# Patient Record
Sex: FEMALE | Race: BLACK OR AFRICAN AMERICAN | NOT HISPANIC OR LATINO | Employment: FULL TIME | URBAN - METROPOLITAN AREA
[De-identification: names, ages, dates, MRNs, and addresses within clinical notes are randomized per-mention and may not be internally consistent; named-entity substitution may affect disease eponyms.]

---

## 2017-01-12 RX ORDER — MONTELUKAST SODIUM 10 MG/1
TABLET ORAL
Qty: 90 TABLET | Refills: 3 | Status: SHIPPED | OUTPATIENT
Start: 2017-01-12 | End: 2018-01-21 | Stop reason: SDUPTHER

## 2017-01-12 RX ORDER — SIMVASTATIN 20 MG
TABLET ORAL
Qty: 90 TABLET | Refills: 3 | Status: SHIPPED | OUTPATIENT
Start: 2017-01-12 | End: 2018-01-21 | Stop reason: SDUPTHER

## 2017-01-12 RX ORDER — PANTOPRAZOLE SODIUM 40 MG/1
TABLET, DELAYED RELEASE ORAL
Qty: 90 TABLET | Refills: 3 | Status: SHIPPED | OUTPATIENT
Start: 2017-01-12 | End: 2018-02-15 | Stop reason: SDUPTHER

## 2017-01-12 RX ORDER — LISINOPRIL AND HYDROCHLOROTHIAZIDE 20; 12.5 MG/1; MG/1
TABLET ORAL
Qty: 180 TABLET | Refills: 3 | Status: SHIPPED | OUTPATIENT
Start: 2017-01-12 | End: 2018-02-12 | Stop reason: SDUPTHER

## 2017-01-12 RX ORDER — DESLORATADINE 5 MG/1
TABLET ORAL
Qty: 90 TABLET | Refills: 3 | Status: SHIPPED | OUTPATIENT
Start: 2017-01-12 | End: 2018-02-13 | Stop reason: SDUPTHER

## 2017-03-07 ENCOUNTER — LAB (OUTPATIENT)
Dept: INTERNAL MEDICINE | Facility: CLINIC | Age: 62
End: 2017-03-07

## 2017-03-07 DIAGNOSIS — Z00.00 HEALTH CARE MAINTENANCE: ICD-10-CM

## 2017-03-07 DIAGNOSIS — E11.9 TYPE 2 DIABETES MELLITUS WITHOUT COMPLICATION (HCC): ICD-10-CM

## 2017-03-07 DIAGNOSIS — E78.5 HYPERLIPIDEMIA: ICD-10-CM

## 2017-03-08 LAB
25(OH)D3+25(OH)D2 SERPL-MCNC: 66.2 NG/ML (ref 30–100)
ALBUMIN SERPL-MCNC: 4.4 G/DL (ref 3.5–5.2)
ALBUMIN/GLOB SERPL: 1.6 G/DL
ALP SERPL-CCNC: 63 U/L (ref 39–117)
ALT SERPL-CCNC: 13 U/L (ref 1–33)
AST SERPL-CCNC: 17 U/L (ref 1–32)
BASOPHILS # BLD AUTO: 0.04 10*3/MM3 (ref 0–0.2)
BASOPHILS NFR BLD AUTO: 0.7 % (ref 0–1.5)
BILIRUB SERPL-MCNC: 0.2 MG/DL (ref 0.1–1.2)
BUN SERPL-MCNC: 15 MG/DL (ref 8–23)
BUN/CREAT SERPL: 14.6 (ref 7–25)
CALCIUM SERPL-MCNC: 10.7 MG/DL (ref 8.6–10.5)
CHLORIDE SERPL-SCNC: 93 MMOL/L (ref 98–107)
CHOLEST SERPL-MCNC: 131 MG/DL (ref 0–200)
CO2 SERPL-SCNC: 31.9 MMOL/L (ref 22–29)
CREAT SERPL-MCNC: 1.03 MG/DL (ref 0.57–1)
EOSINOPHIL # BLD AUTO: 0.07 10*3/MM3 (ref 0–0.7)
EOSINOPHIL NFR BLD AUTO: 1.1 % (ref 0.3–6.2)
ERYTHROCYTE [DISTWIDTH] IN BLOOD BY AUTOMATED COUNT: 15.5 % (ref 11.7–13)
GLOBULIN SER CALC-MCNC: 2.8 GM/DL
GLUCOSE SERPL-MCNC: 103 MG/DL (ref 65–99)
HBA1C MFR BLD: 6.4 % (ref 4.8–5.6)
HCT VFR BLD AUTO: 38.6 % (ref 35.6–45.5)
HCV AB S/CO SERPL IA: 0.1 S/CO RATIO (ref 0–0.9)
HDLC SERPL-MCNC: 40 MG/DL (ref 40–60)
HGB BLD-MCNC: 11.6 G/DL (ref 11.9–15.5)
IMM GRANULOCYTES # BLD: 0 10*3/MM3 (ref 0–0.03)
IMM GRANULOCYTES NFR BLD: 0 % (ref 0–0.5)
LDLC SERPL CALC-MCNC: 71 MG/DL (ref 0–100)
LYMPHOCYTES # BLD AUTO: 2.35 10*3/MM3 (ref 0.9–4.8)
LYMPHOCYTES NFR BLD AUTO: 38.2 % (ref 19.6–45.3)
Lab: NORMAL
Lab: NORMAL
MCH RBC QN AUTO: 24.6 PG (ref 26.9–32)
MCHC RBC AUTO-ENTMCNC: 30.1 G/DL (ref 32.4–36.3)
MCV RBC AUTO: 81.8 FL (ref 80.5–98.2)
MONOCYTES # BLD AUTO: 0.5 10*3/MM3 (ref 0.2–1.2)
MONOCYTES NFR BLD AUTO: 8.1 % (ref 5–12)
NEUTROPHILS # BLD AUTO: 3.19 10*3/MM3 (ref 1.9–8.1)
NEUTROPHILS NFR BLD AUTO: 51.9 % (ref 42.7–76)
PLATELET # BLD AUTO: 456 10*3/MM3 (ref 140–500)
POTASSIUM SERPL-SCNC: 3.9 MMOL/L (ref 3.5–5.2)
PROT SERPL-MCNC: 7.2 G/DL (ref 6–8.5)
RBC # BLD AUTO: 4.72 10*6/MM3 (ref 3.9–5.2)
SODIUM SERPL-SCNC: 139 MMOL/L (ref 136–145)
TRIGL SERPL-MCNC: 98 MG/DL (ref 0–150)
TSH SERPL DL<=0.005 MIU/L-ACNC: 2.39 MIU/ML (ref 0.27–4.2)
VLDLC SERPL CALC-MCNC: 19.6 MG/DL (ref 5–40)
WBC # BLD AUTO: 6.15 10*3/MM3 (ref 4.5–10.7)

## 2017-03-09 LAB
FERRITIN SERPL-MCNC: 7.61 NG/ML (ref 13–150)
IRON SATN MFR SERPL: 9 % (ref 20–50)
IRON SERPL-MCNC: 59 MCG/DL (ref 37–145)
TIBC SERPL-MCNC: 627 MCG/DL
UIBC SERPL-MCNC: 568 MCG/DL
VIT B12 SERPL-MCNC: 890 PG/ML (ref 211–946)
WRITTEN AUTHORIZATION: NORMAL

## 2017-03-14 ENCOUNTER — OFFICE VISIT (OUTPATIENT)
Dept: INTERNAL MEDICINE | Facility: CLINIC | Age: 62
End: 2017-03-14

## 2017-03-14 ENCOUNTER — TELEPHONE (OUTPATIENT)
Dept: INTERNAL MEDICINE | Facility: CLINIC | Age: 62
End: 2017-03-14

## 2017-03-14 VITALS
SYSTOLIC BLOOD PRESSURE: 122 MMHG | RESPIRATION RATE: 18 BRPM | BODY MASS INDEX: 28.93 KG/M2 | HEART RATE: 80 BPM | OXYGEN SATURATION: 96 % | WEIGHT: 180 LBS | HEIGHT: 66 IN | DIASTOLIC BLOOD PRESSURE: 78 MMHG

## 2017-03-14 DIAGNOSIS — Z00.00 HEALTH MAINTENANCE EXAMINATION: Primary | ICD-10-CM

## 2017-03-14 DIAGNOSIS — Z13.820 SCREENING FOR OSTEOPOROSIS: ICD-10-CM

## 2017-03-14 DIAGNOSIS — Z71.6 ENCOUNTER FOR SMOKING CESSATION COUNSELING: ICD-10-CM

## 2017-03-14 DIAGNOSIS — D50.9 IRON DEFICIENCY ANEMIA, UNSPECIFIED IRON DEFICIENCY ANEMIA TYPE: ICD-10-CM

## 2017-03-14 DIAGNOSIS — E11.22 TYPE 2 DIABETES MELLITUS WITH STAGE 3 CHRONIC KIDNEY DISEASE, WITHOUT LONG-TERM CURRENT USE OF INSULIN (HCC): ICD-10-CM

## 2017-03-14 DIAGNOSIS — N18.30 TYPE 2 DIABETES MELLITUS WITH STAGE 3 CHRONIC KIDNEY DISEASE, WITHOUT LONG-TERM CURRENT USE OF INSULIN (HCC): ICD-10-CM

## 2017-03-14 DIAGNOSIS — Z72.0 TOBACCO USE: ICD-10-CM

## 2017-03-14 DIAGNOSIS — I10 BENIGN ESSENTIAL HYPERTENSION: ICD-10-CM

## 2017-03-14 DIAGNOSIS — E78.2 MIXED HYPERLIPIDEMIA: ICD-10-CM

## 2017-03-14 DIAGNOSIS — F17.200 TOBACCO DEPENDENCE: ICD-10-CM

## 2017-03-14 DIAGNOSIS — N18.30 CHRONIC KIDNEY DISEASE, STAGE III (MODERATE) (HCC): ICD-10-CM

## 2017-03-14 PROCEDURE — 90736 HZV VACCINE LIVE SUBQ: CPT | Performed by: INTERNAL MEDICINE

## 2017-03-14 PROCEDURE — 90471 IMMUNIZATION ADMIN: CPT | Performed by: INTERNAL MEDICINE

## 2017-03-14 PROCEDURE — 77080 DXA BONE DENSITY AXIAL: CPT | Performed by: INTERNAL MEDICINE

## 2017-03-14 PROCEDURE — 99213 OFFICE O/P EST LOW 20 MIN: CPT | Performed by: INTERNAL MEDICINE

## 2017-03-14 PROCEDURE — 99396 PREV VISIT EST AGE 40-64: CPT | Performed by: INTERNAL MEDICINE

## 2017-03-14 PROCEDURE — 99406 BEHAV CHNG SMOKING 3-10 MIN: CPT | Performed by: INTERNAL MEDICINE

## 2017-03-14 RX ORDER — ALBUTEROL SULFATE 90 UG/1
2 AEROSOL, METERED RESPIRATORY (INHALATION) EVERY 4 HOURS PRN
Qty: 18 G | Refills: 3 | Status: SHIPPED | OUTPATIENT
Start: 2017-03-14 | End: 2017-06-14 | Stop reason: SDUPTHER

## 2017-03-14 RX ORDER — FLUTICASONE PROPIONATE 50 MCG
2 SPRAY, SUSPENSION (ML) NASAL DAILY
Qty: 3 BOTTLE | Refills: 3 | Status: SHIPPED | OUTPATIENT
Start: 2017-03-14 | End: 2018-03-29 | Stop reason: SDUPTHER

## 2017-03-14 NOTE — PROGRESS NOTES
Chief Complaint  Linette Ghosh is a 61 y.o. female who presents for Annual Exam (CPE); Diabetes; Hypertension; Hyperlipidemia; and Chronic Kidney Disease  .    History of Present Illness   Linette is here for routine CPE.    Mammo: 9/19/2016  Pap: s/p hysterectomy  DEXA: not yet  C-scope: 5/2015 due 2020  Zostavax - due  Exercise: none  Eye exam - she goes to [facility] with Dr. Miles.  She has an appt in May.    Immunization History   Administered Date(s) Administered   • Pneumococcal Conjugate 01/17/2011   • Pneumococcal Conjugate 13-Valent 09/13/2016   • Tdap 03/07/2012   • Zoster 03/14/2017     We discussed that her blood counts are low and her storage iron is low.  She has had a change in her bowel movements.  She hasn't noticed any blood in her stools.    DM2 - she does not exercise at all.  We discussed her A1c which is well controlled.  She is still smoking.  We discussed this at length.  She has tried to quit in the past.  She has cut down the number of cigarettes she smokes a day.  She declines flu shots.    HLD - no myalgias  HTN - no cp or palp    Review of Systems   Constitutional: Negative for chills, fatigue and fever.   HENT: Negative for hearing loss, sore throat, tinnitus, trouble swallowing and voice change.    Eyes: Negative for visual disturbance.   Respiratory: Positive for cough and shortness of breath (sometimes).    Cardiovascular: Negative for chest pain, palpitations and leg swelling.   Gastrointestinal: Positive for abdominal distention (always bloated). Negative for abdominal pain, anal bleeding, blood in stool, constipation, diarrhea, nausea and vomiting.   Endocrine: Negative for polydipsia and polyuria.   Genitourinary: Negative for dysuria and hematuria.   Musculoskeletal: Positive for arthralgias (knees). Negative for myalgias.   Skin: Negative for rash and wound.   Neurological: Negative for dizziness, syncope, light-headedness and headaches.   Hematological: Negative  for adenopathy. Does not bruise/bleed easily.   Psychiatric/Behavioral: Negative for confusion and dysphoric mood. The patient is not nervous/anxious.        Patient Active Problem List   Diagnosis   • Moderate persistent asthma without complication   • Asthma   • Benign essential hypertension   • Hyperlipidemia   • Arthralgia of hip   • Pain of thigh   • Microscopic hematuria   • Type 2 diabetes mellitus   • Abnormal kidney function study   • Tobacco use   • Chronic kidney disease, stage III (moderate)   • Iron deficiency anemia       Past Medical History   Diagnosis Date   • Asthma    • Carotid artery stenosis    • Diabetes mellitus    • GERD (gastroesophageal reflux disease)    • Glaucoma    • Hydronephrosis    • Hyperlipidemia    • Hypertension    • Intracranial hemorrhage    • Pneumonia    • Polyp of sigmoid colon    • Vitamin D deficiency        Past Surgical History   Procedure Laterality Date   • Hysterectomy     • Hernia repair     • Gallbladder surgery         Family History   Problem Relation Age of Onset   • Hypertension Mother    • Diabetes Mother    • Hypertension Sister    • Diabetes Sister    • Hypertension Brother    • Diabetes Brother    • Lung cancer Maternal Uncle    • Stroke Maternal Grandfather        Social History     Social History   • Marital status: Unknown     Spouse name: N/A   • Number of children: 0   • Years of education: N/A     Occupational History   • [employer]      Social History Main Topics   • Smoking status: Current Every Day Smoker   • Smokeless tobacco: Never Used   • Alcohol use Yes      Comment: socially   • Drug use: No   • Sexual activity: Not on file     Other Topics Concern   • Not on file     Social History Narrative       Current Outpatient Prescriptions on File Prior to Visit   Medication Sig Dispense Refill   • albuterol (PROVENTIL) (2.5 MG/3ML) 0.083% nebulizer solution USE ONE VIAL VIA NEBULIZER BY MOUTH EVERY 6 TO 8 HOURS AS NEEDED 540 mL 1   • aspirin 325 MG tablet  "Take  by mouth daily.     • desloratadine (CLARINEX) 5 MG tablet TAKE 1 TABLET BY MOUTH DAILY 90 tablet 3   • hydrOXYzine (ATARAX) 25 MG tablet TAKE 1 TABLET BY MOUTH EVERY EIGHT HOURS AS NEEDED FOR ITCHING 270 tablet 2   • lisinopril-hydrochlorothiazide (PRINZIDE,ZESTORETIC) 20-12.5 MG per tablet TAKE 2 TABLETS BY MOUTH DAILY 180 tablet 3   • metFORMIN (GLUCOPHAGE) 1000 MG tablet Take 1 tablet by mouth 2 (two) times a day with meals. 180 tablet 2   • montelukast (SINGULAIR) 10 MG tablet TAKE 1 TABLET BY MOUTH EVERY NIGHT 90 tablet 3   • Multiple Vitamins-Minerals (CENTRUM SILVER PO) Take  by mouth daily.     • pantoprazole (PROTONIX) 40 MG EC tablet TAKE 1 TABLET BY MOUTH DAILY 90 tablet 3   • simvastatin (ZOCOR) 20 MG tablet TAKE 1 TABLET BY MOUTH EVERY NIGHT 90 tablet 3   • SYMBICORT 160-4.5 MCG/ACT inhaler INHALE TWO PUFFS BY MOUTH TWICE A DAY **RINSE MOUTH AFTER USE** 10.2 g 10   • timolol (TIMOPTIC) 0.25 % ophthalmic solution Apply  to eye.     • [DISCONTINUED] albuterol (PROVENTIL HFA;VENTOLIN HFA) 108 (90 BASE) MCG/ACT inhaler ProAir  (90 Base) MCG/ACT Inhalation Aerosol Solution; Patient Sig: ProAir  (90 Base) MCG/ACT Inhalation Aerosol Solution INHALE 2 PUFFS EVERY 4 HOURS AS NEEDED; 3; 3; 12-Feb-2014; Active     • [DISCONTINUED] fluticasone (FLONASE) 50 MCG/ACT nasal spray USE 2 SPRAYS IN EACH NOSTRIL DAILY 3 bottle 2     No current facility-administered medications on file prior to visit.        Allergies   Allergen Reactions   • Penicillins        Vitals:    03/14/17 0829   BP: 122/78   Pulse: 80   Resp: 18   SpO2: 96%   Weight: 180 lb (81.6 kg)   Height: 66\" (167.6 cm)       Body mass index is 29.05 kg/(m^2).    Objective   Physical Exam   Constitutional: She is oriented to person, place, and time. She appears well-developed and well-nourished. No distress.   HENT:   Head: Normocephalic and atraumatic.   Nose: Nose normal.   Mouth/Throat: Oropharynx is clear and moist.   Eyes: " Conjunctivae and EOM are normal. Pupils are equal, round, and reactive to light. No scleral icterus.   Neck: Normal range of motion. Neck supple. No thyromegaly present.   Cardiovascular: Normal rate, regular rhythm and normal heart sounds.  Exam reveals no gallop and no friction rub.    No murmur heard.  Pulses:       Carotid pulses are 2+ on the right side, and 2+ on the left side.       Femoral pulses are 2+ on the right side, and 2+ on the left side.       Dorsalis pedis pulses are 2+ on the right side, and 2+ on the left side.        Posterior tibial pulses are 2+ on the right side, and 2+ on the left side.   Pulmonary/Chest: Effort normal and breath sounds normal. No respiratory distress. She has no wheezes. She has no rales. Right breast exhibits no mass and no nipple discharge. Left breast exhibits no mass and no nipple discharge.   Abdominal: Soft. Bowel sounds are normal. She exhibits no distension and no mass. There is no tenderness.   Musculoskeletal: Normal range of motion. She exhibits no edema.    Linette had a diabetic foot exam performed today.   During the foot exam she had a monofilament test performed (normal in all six areas bilat).    Vascular Status -  Her exam exhibits right foot vasculature normal. Her exam exhibits no right foot edema. Her exam exhibits left foot vasculature normal. Her exam exhibits no left foot edema.   Skin Integrity  -  Her right foot skin is intact.     Linette 's left foot skin is intact. .  Lymphadenopathy:     She has no cervical adenopathy.     She has no axillary adenopathy.        Right: No inguinal and no supraclavicular adenopathy present.        Left: No inguinal and no supraclavicular adenopathy present.   Neurological: She is alert and oriented to person, place, and time. She has normal reflexes. No cranial nerve deficit.   Skin: Skin is warm and dry.   Psychiatric: She has a normal mood and affect. Her speech is normal and behavior is normal. Judgment and  thought content normal. Cognition and memory are normal.   Vitals reviewed.        Results for orders placed or performed in visit on 03/07/17   Lipid panel   Result Value Ref Range    Total Cholesterol 131 0 - 200 mg/dL    Triglycerides 98 0 - 150 mg/dL    HDL Cholesterol 40 40 - 60 mg/dL    VLDL Cholesterol 19.6 5 - 40 mg/dL    LDL Cholesterol  71 0 - 100 mg/dL   Comprehensive Metabolic Panel   Result Value Ref Range    Glucose 103 (H) 65 - 99 mg/dL    BUN 15 8 - 23 mg/dL    Creatinine 1.03 (H) 0.57 - 1.00 mg/dL    eGFR Non African Am 54 (L) >60 mL/min/1.73    eGFR African Am 66 >60 mL/min/1.73    BUN/Creatinine Ratio 14.6 7.0 - 25.0    Sodium 139 136 - 145 mmol/L    Potassium 3.9 3.5 - 5.2 mmol/L    Chloride 93 (L) 98 - 107 mmol/L    Total CO2 31.9 (H) 22.0 - 29.0 mmol/L    Calcium 10.7 (H) 8.6 - 10.5 mg/dL    Total Protein 7.2 6.0 - 8.5 g/dL    Albumin 4.40 3.50 - 5.20 g/dL    Globulin 2.8 gm/dL    A/G Ratio 1.6 g/dL    Total Bilirubin 0.2 0.1 - 1.2 mg/dL    Alkaline Phosphatase 63 39 - 117 U/L    AST (SGOT) 17 1 - 32 U/L    ALT (SGPT) 13 1 - 33 U/L   Hemoglobin A1c   Result Value Ref Range    Hemoglobin A1C 6.40 (H) 4.80 - 5.60 %   TSH   Result Value Ref Range    TSH 2.390 0.270 - 4.200 mIU/mL   Vitamin D 25 Hydroxy   Result Value Ref Range    25 Hydroxy, Vitamin D 66.2 30.0 - 100.0 ng/mL   Hepatitis C Antibody   Result Value Ref Range    Hep C Virus Ab 0.1 0.0 - 0.9 s/co ratio   Verbal Order   Result Value Ref Range    See below: Comment:     Additional Test(s) Requested Comment:    Iron Profile   Result Value Ref Range    TIBC 627 mcg/dL    UIBC 568 mcg/dL    Iron 59 37 - 145 mcg/dL    Iron Saturation 9 (L) 20 - 50 %   Vitamin B12   Result Value Ref Range    Vitamin B-12 890 211 - 946 pg/mL   Ferritin   Result Value Ref Range    Ferritin 7.61 (L) 13.00 - 150.00 ng/mL   Written Authorization   Result Value Ref Range    Written Authorization Comment    CBC & Differential   Result Value Ref Range    WBC 6.15  4.50 - 10.70 10*3/mm3    RBC 4.72 3.90 - 5.20 10*6/mm3    Hemoglobin 11.6 (L) 11.9 - 15.5 g/dL    Hematocrit 38.6 35.6 - 45.5 %    MCV 81.8 80.5 - 98.2 fL    MCH 24.6 (L) 26.9 - 32.0 pg    MCHC 30.1 (L) 32.4 - 36.3 g/dL    RDW 15.5 (H) 11.7 - 13.0 %    Platelets 456 140 - 500 10*3/mm3    Neutrophil Rel % 51.9 42.7 - 76.0 %    Lymphocyte Rel % 38.2 19.6 - 45.3 %    Monocyte Rel % 8.1 5.0 - 12.0 %    Eosinophil Rel % 1.1 0.3 - 6.2 %    Basophil Rel % 0.7 0.0 - 1.5 %    Neutrophils Absolute 3.19 1.90 - 8.10 10*3/mm3    Lymphocytes Absolute 2.35 0.90 - 4.80 10*3/mm3    Monocytes Absolute 0.50 0.20 - 1.20 10*3/mm3    Eosinophils Absolute 0.07 0.00 - 0.70 10*3/mm3    Basophils Absolute 0.04 0.00 - 0.20 10*3/mm3    Immature Granulocyte Rel % 0.0 0.0 - 0.5 %    Immature Grans Absolute 0.00 0.00 - 0.03 10*3/mm3       Assessment/Plan   Diagnoses and all orders for this visit:    Health maintenance examination  -     Varicella-Zoster Vaccine Subcutaneous    Benign essential hypertension    Mixed hyperlipidemia    Screening for osteoporosis  -     DEXA Bone Density Axial    Chronic kidney disease, stage III (moderate)  -     Urinalysis With / Culture If Indicated  -     Basic Metabolic Panel; Future    Type 2 diabetes mellitus with stage 3 chronic kidney disease, without long-term current use of insulin  -     Hemoglobin A1c; Future    Iron deficiency anemia, unspecified iron deficiency anemia type  -     CBC & Differential; Future  -     Iron Profile; Future  -     Ferritin; Future    Tobacco use    Tobacco dependence    Encounter for smoking cessation counseling    Other orders  -     albuterol (PROVENTIL HFA;VENTOLIN HFA) 108 (90 BASE) MCG/ACT inhaler; Inhale 2 puffs Every 4 (Four) Hours As Needed for Wheezing or shortness of air.  -     fluticasone (FLONASE) 50 MCG/ACT nasal spray; 2 sprays into each nostril Daily.        Discussion/Summary  Linette is here for routine CPE and follow up.  She is up to date on most health  maintenance.  Zostavax given today.  She sees her eye doctor regularly but we do not have a recent report from Dr. Miles.  We will call and request this again.  Her bp is well controlled.  Diabetes is well controlled.  CKD is stable.  Lipids look very good.  She has a new iron deficiency anemia which needs to be worked up.  I have given her a stool kit to take home.  Instructed her to bring this back to the office and not to mail it back.  We will check a urine today.  I have asked her to start Slo-Fe once a day.  I will see her back in 3 months.  In the interim, depending on her stool sample result we will either refer for scopes or order a CT abd and pelvis.      I spent 5 minutes discussing smoking cessation and lung cancer screening with Linette.  I recommended lung cancer screening with  LDCT screening based on age and pack-year history, absence of symptoms and no personal history of lung cancer.  We discussed the benefits of screening including reduced mortality.  We also discussed potential for false positive and need for continued testing.  We discussed the importance of adherence to annual LDCT lung cancer screening.  I have given the patient a packet of papers with handouts from the [place] [facility] and [place] leads collaborative which includes the potential benefits and harms of lung cancer screening, the Five Keys for Quitting, A Personalized Quit Plan, Phone and Website resources, and a table on first line medications used for smoking cessation and the 1-800-Quit-Now handout.  She will consider all of this and let me know if she wishes to proceed wth LDCT.  She does not wish to use chantix or wellbutrin as she has used these in the past but had side effects.      Return in about 3 months (around 6/14/2017) for Next scheduled follow up, with nonfasting labs prior.

## 2017-03-14 NOTE — TELEPHONE ENCOUNTER
----- Message from Yessenia Joe MD sent at 3/14/2017  1:09 PM EDT -----  Please call - her bone density is normal.  Will repeat in 5 years.  She has excellent bone density.

## 2017-03-14 NOTE — PATIENT INSTRUCTIONS
IF YOU SMOKE OR USE TOBACCO PLEASE READ THE FOLLOWING:    Why is smoking bad for me?  Smoking increases the risk of heart disease, lung disease, vascular disease, stroke, and cancer.     If you smoke, STOP!    If you would like more information on quitting smoking, please visit the [employer] website: [email or web address]   This link will provide additional resources including the QUIT line and the Beat the Pack support groups.     For more information:    American Cancer Society  ([phone] 454-4774    American Heart Association  1-207.283.6463      Steps to Quit Smoking   Smoking tobacco can be harmful to your health and can affect almost every organ in your body. Smoking puts you, and those around you, at risk for developing many serious chronic diseases. Quitting smoking is difficult, but it is one of the best things that you can do for your health. It is never too late to quit.  WHAT ARE THE BENEFITS OF QUITTING SMOKING?  When you quit smoking, you lower your risk of developing serious diseases and conditions, such as:  · Lung cancer or lung disease, such as COPD.  · Heart disease.  · Stroke.  · Heart attack.  · Infertility.  · Osteoporosis and bone fractures.  Additionally, symptoms such as coughing, wheezing, and shortness of breath may get better when you quit. You may also find that you get sick less often because your body is stronger at fighting off colds and infections. If you are pregnant, quitting smoking can help to reduce your chances of having a baby of low birth weight.  HOW DO I GET READY TO QUIT?  When you decide to quit smoking, create a plan to make sure that you are successful. Before you quit:  · Pick a date to quit. Set a date within the next two weeks to give you time to prepare.  · Write down the reasons why you are quitting. Keep this list in places where you will see it often, such as on your bathroom mirror or in your car or  "wallet.  · Identify the people, places, things, and activities that make you want to smoke (triggers) and avoid them. Make sure to take these actions:    Throw away all cigarettes at home, at work, and in your car.    Throw away smoking accessories, such as ashtrays and lighters.    Clean your car and make sure to empty the ashtray.    Clean your home, including curtains and carpets.  · Tell your family, friends, and coworkers that you are quitting. Support from your loved ones can make quitting easier.  · Talk with your health care provider about your options for quitting smoking.  · Find out what treatment options are covered by your health insurance.  WHAT STRATEGIES CAN I USE TO QUIT SMOKING?   Talk with your healthcare provider about different strategies to quit smoking. Some strategies include:  · Quitting smoking altogether instead of gradually lessening how much you smoke over a period of time. Research shows that quitting \"cold turkey\" is more successful than gradually quitting.  · Attending in-person counseling to help you build problem-solving skills. You are more likely to have success in quitting if you attend several counseling sessions. Even short sessions of 10 minutes can be effective.  · Finding resources and support systems that can help you to quit smoking and remain smoke-free after you quit. These resources are most helpful when you use them often. They can include:    Online chats with a counselor.    Telephone quitlines.    Printed self-help materials.    Support groups or group counseling.    Text messaging programs.    Mobile phone applications.  · Taking medicines to help you quit smoking. (If you are pregnant or breastfeeding, talk with your health care provider first.) Some medicines contain nicotine and some do not. Both types of medicines help with cravings, but the medicines that include nicotine help to relieve withdrawal symptoms. Your health care provider may recommend:    Nicotine " patches, gum, or lozenges.    Nicotine inhalers or sprays.    Non-nicotine medicine that is taken by mouth.  Talk with your health care provider about combining strategies, such as taking medicines while you are also receiving in-person counseling. Using these two strategies together makes you more likely to succeed in quitting than if you used either strategy on its own.  If you are pregnant or breastfeeding, talk with your health care provider about finding counseling or other support strategies to quit smoking. Do not take medicine to help you quit smoking unless told to do so by your health care provider.  WHAT THINGS CAN I DO TO MAKE IT EASIER TO QUIT?  Quitting smoking might feel overwhelming at first, but there is a lot that you can do to make it easier. Take these important actions:  · Reach out to your family and friends and ask that they support and encourage you during this time. Call telephone quitlines, reach out to support groups, or work with a counselor for support.  · Ask people who smoke to avoid smoking around you.  · Avoid places that trigger you to smoke, such as bars, parties, or smoke-break areas at work.  · Spend time around people who do not smoke.  · Lessen stress in your life, because stress can be a smoking trigger for some people. To lessen stress, try:    Exercising regularly.    Deep-breathing exercises.    Yoga.    Meditating.    Performing a body scan. This involves closing your eyes, scanning your body from head to toe, and noticing which parts of your body are particularly tense. Purposefully relax the muscles in those areas.  · Download or purchase mobile phone or tablet apps (applications) that can help you stick to your quit plan by providing reminders, tips, and encouragement. There are many free apps, such as QuitGuide from the CDC (Centers for Disease Control and Prevention). You can find other support for quitting smoking (smoking cessation) through smokefree.gov and other  websites.  HOW WILL I FEEL WHEN I QUIT SMOKING?  Within the first 24 hours of quitting smoking, you may start to feel some withdrawal symptoms. These symptoms are usually most noticeable 2-3 days after quitting, but they usually do not last beyond 2-3 weeks. Changes or symptoms that you might experience include:  · Mood swings.  · Restlessness, anxiety, or irritation.  · Difficulty concentrating.  · Dizziness.  · Strong cravings for sugary foods in addition to nicotine.  · Mild weight gain.  · Constipation.  · Nausea.  · Coughing or a sore throat.  · Changes in how your medicines work in your body.  · A depressed mood.  · Difficulty sleeping (insomnia).  After the first 2-3 weeks of quitting, you may start to notice more positive results, such as:  · Improved sense of smell and taste.  · Decreased coughing and sore throat.  · Slower heart rate.  · Lower blood pressure.  · Clearer skin.  · The ability to breathe more easily.  · Fewer sick days.  Quitting smoking is very challenging for most people. Do not get discouraged if you are not successful the first time. Some people need to make many attempts to quit before they achieve long-term success. Do your best to stick to your quit plan, and talk with your health care provider if you have any questions or concerns.     This information is not intended to replace advice given to you by your health care provider. Make sure you discuss any questions you have with your health care provider.     Document Released: 12/12/2002 Document Revised: 05/03/2016 Document Reviewed: 05/03/2016  [employer] Interactive Patient Education ©2016 [employer]

## 2017-03-15 ENCOUNTER — TELEPHONE (OUTPATIENT)
Dept: INTERNAL MEDICINE | Facility: CLINIC | Age: 62
End: 2017-03-15

## 2017-03-15 LAB
APPEARANCE UR: CLEAR
BACTERIA #/AREA URNS HPF: NORMAL /HPF
BILIRUB UR QL STRIP: NEGATIVE
COLOR UR: YELLOW
EPI CELLS #/AREA URNS HPF: NORMAL /HPF
GLUCOSE UR QL: NEGATIVE
HGB UR QL STRIP: NEGATIVE
KETONES UR QL STRIP: NEGATIVE
LEUKOCYTE ESTERASE UR QL STRIP: NEGATIVE
MICRO URNS: NORMAL
MICRO URNS: NORMAL
MUCOUS THREADS URNS QL MICRO: PRESENT /HPF
NITRITE UR QL STRIP: NEGATIVE
PH UR STRIP: 7 [PH] (ref 5–7.5)
PROT UR QL STRIP: NEGATIVE
RBC #/AREA URNS HPF: NORMAL /HPF
SP GR UR: 1.01 (ref 1–1.03)
URINALYSIS REFLEX: NORMAL
UROBILINOGEN UR STRIP-MCNC: 0.2 MG/DL (ref 0.2–1)
WBC #/AREA URNS HPF: NORMAL /HPF

## 2017-03-15 NOTE — TELEPHONE ENCOUNTER
----- Message from Yessenia Joe MD sent at 3/15/2017  9:23 AM EDT -----  Please call - her urine is negative.  Remind her that we need her stool sample back.

## 2017-03-16 ENCOUNTER — LAB (OUTPATIENT)
Dept: INTERNAL MEDICINE | Facility: CLINIC | Age: 62
End: 2017-03-16

## 2017-03-16 DIAGNOSIS — Z00.00 HEALTH CARE MAINTENANCE: Primary | ICD-10-CM

## 2017-03-16 LAB — HEMOCCULT STL QL IA: NEGATIVE

## 2017-03-16 PROCEDURE — 82274 ASSAY TEST FOR BLOOD FECAL: CPT | Performed by: INTERNAL MEDICINE

## 2017-03-17 ENCOUNTER — TELEPHONE (OUTPATIENT)
Dept: INTERNAL MEDICINE | Facility: CLINIC | Age: 62
End: 2017-03-17

## 2017-03-17 NOTE — TELEPHONE ENCOUNTER
Pt informed, via voicemail, to call back and schedule a non fasting lab apt in the near future at her convenience.

## 2017-03-17 NOTE — TELEPHONE ENCOUNTER
----- Message from Yessenia Joe MD sent at 3/16/2017  5:02 PM EDT -----  Please call - her stool is negative for blood.  I would like to check one additional lab on her. Please have her come in for a nonfasting lab to check a celiac panel.

## 2017-03-23 DIAGNOSIS — N18.30 TYPE 2 DIABETES MELLITUS WITH STAGE 3 CHRONIC KIDNEY DISEASE, WITHOUT LONG-TERM CURRENT USE OF INSULIN (HCC): ICD-10-CM

## 2017-03-23 DIAGNOSIS — E11.22 TYPE 2 DIABETES MELLITUS WITH STAGE 3 CHRONIC KIDNEY DISEASE, WITHOUT LONG-TERM CURRENT USE OF INSULIN (HCC): ICD-10-CM

## 2017-03-23 DIAGNOSIS — E78.2 MIXED HYPERLIPIDEMIA: ICD-10-CM

## 2017-03-23 DIAGNOSIS — D50.9 IRON DEFICIENCY ANEMIA, UNSPECIFIED IRON DEFICIENCY ANEMIA TYPE: Primary | ICD-10-CM

## 2017-03-23 DIAGNOSIS — I10 BENIGN ESSENTIAL HYPERTENSION: ICD-10-CM

## 2017-03-27 LAB
ENDOMYSIUM IGA SER QL: NEGATIVE
IGA SERPL-MCNC: 100 MG/DL (ref 87–352)
TTG IGA SER-ACNC: <2 U/ML (ref 0–3)

## 2017-03-28 ENCOUNTER — TELEPHONE (OUTPATIENT)
Dept: INTERNAL MEDICINE | Facility: CLINIC | Age: 62
End: 2017-03-28

## 2017-03-28 NOTE — TELEPHONE ENCOUNTER
----- Message from Yessenia Joe MD sent at 3/27/2017  5:01 PM EDT -----  Please call - her celiac panel is negative.  Given her reported change in bowel movements, I am inclined to refer her to GI for further eval of her iron deficiency.  IS she ok with this?

## 2017-04-17 ENCOUNTER — APPOINTMENT (OUTPATIENT)
Dept: CT IMAGING | Facility: HOSPITAL | Age: 62
End: 2017-04-17

## 2017-04-17 ENCOUNTER — HOSPITAL ENCOUNTER (EMERGENCY)
Facility: HOSPITAL | Age: 62
Discharge: HOME | End: 2017-04-17
Attending: EMERGENCY MEDICINE | Admitting: EMERGENCY MEDICINE

## 2017-04-17 ENCOUNTER — APPOINTMENT (OUTPATIENT)
Dept: GENERAL RADIOLOGY | Facility: HOSPITAL | Age: 62
End: 2017-04-17

## 2017-04-17 VITALS
HEART RATE: 77 BPM | RESPIRATION RATE: 18 BRPM | TEMPERATURE: 97.7 F | OXYGEN SATURATION: 98 % | BODY MASS INDEX: 29.73 KG/M2 | DIASTOLIC BLOOD PRESSURE: 83 MMHG | HEIGHT: 66 IN | SYSTOLIC BLOOD PRESSURE: 119 MMHG | WEIGHT: 185 LBS

## 2017-04-17 DIAGNOSIS — Z87.09 HISTORY OF ASTHMA: ICD-10-CM

## 2017-04-17 DIAGNOSIS — R51.9 ACUTE NONINTRACTABLE HEADACHE, UNSPECIFIED HEADACHE TYPE: Primary | ICD-10-CM

## 2017-04-17 DIAGNOSIS — R06.00 DYSPNEA, UNSPECIFIED TYPE: ICD-10-CM

## 2017-04-17 LAB
ALBUMIN SERPL-MCNC: 4.3 G/DL (ref 3.5–5.2)
ALBUMIN/GLOB SERPL: 1.3 G/DL
ALP SERPL-CCNC: 55 U/L (ref 39–117)
ALT SERPL W P-5'-P-CCNC: 12 U/L (ref 1–33)
ANION GAP SERPL CALCULATED.3IONS-SCNC: 12.8 MMOL/L
AST SERPL-CCNC: 15 U/L (ref 1–32)
BASOPHILS # BLD AUTO: 0.03 10*3/MM3 (ref 0–0.2)
BASOPHILS NFR BLD AUTO: 0.5 % (ref 0–1.5)
BILIRUB SERPL-MCNC: 0.3 MG/DL (ref 0.1–1.2)
BUN BLD-MCNC: 16 MG/DL (ref 8–23)
BUN/CREAT SERPL: 15 (ref 7–25)
CALCIUM SPEC-SCNC: 9.6 MG/DL (ref 8.6–10.5)
CHLORIDE SERPL-SCNC: 98 MMOL/L (ref 98–107)
CO2 SERPL-SCNC: 30.2 MMOL/L (ref 22–29)
CREAT BLD-MCNC: 1.07 MG/DL (ref 0.57–1)
DEPRECATED RDW RBC AUTO: 55.6 FL (ref 37–54)
EOSINOPHIL # BLD AUTO: 0.09 10*3/MM3 (ref 0–0.7)
EOSINOPHIL NFR BLD AUTO: 1.5 % (ref 0.3–6.2)
ERYTHROCYTE [DISTWIDTH] IN BLOOD BY AUTOMATED COUNT: 18.8 % (ref 11.7–13)
GFR SERPL CREATININE-BSD FRML MDRD: 52 ML/MIN/1.73
GLOBULIN UR ELPH-MCNC: 3.3 GM/DL
GLUCOSE BLD-MCNC: 109 MG/DL (ref 65–99)
HCT VFR BLD AUTO: 39.1 % (ref 35.6–45.5)
HGB BLD-MCNC: 12.1 G/DL (ref 11.9–15.5)
HOLD SPECIMEN: NORMAL
HOLD SPECIMEN: NORMAL
IMM GRANULOCYTES # BLD: 0 10*3/MM3 (ref 0–0.03)
IMM GRANULOCYTES NFR BLD: 0 % (ref 0–0.5)
INR PPP: 0.96 (ref 0.9–1.1)
LYMPHOCYTES # BLD AUTO: 2 10*3/MM3 (ref 0.9–4.8)
LYMPHOCYTES NFR BLD AUTO: 32.6 % (ref 19.6–45.3)
MCH RBC QN AUTO: 25.4 PG (ref 26.9–32)
MCHC RBC AUTO-ENTMCNC: 30.9 G/DL (ref 32.4–36.3)
MCV RBC AUTO: 82.1 FL (ref 80.5–98.2)
MONOCYTES # BLD AUTO: 0.51 10*3/MM3 (ref 0.2–1.2)
MONOCYTES NFR BLD AUTO: 8.3 % (ref 5–12)
NEUTROPHILS # BLD AUTO: 3.5 10*3/MM3 (ref 1.9–8.1)
NEUTROPHILS NFR BLD AUTO: 57.1 % (ref 42.7–76)
PLATELET # BLD AUTO: 312 10*3/MM3 (ref 140–500)
PMV BLD AUTO: 10.6 FL (ref 6–12)
POTASSIUM BLD-SCNC: 3.5 MMOL/L (ref 3.5–5.2)
PROT SERPL-MCNC: 7.6 G/DL (ref 6–8.5)
PROTHROMBIN TIME: 12.4 SECONDS (ref 11.7–14.2)
RBC # BLD AUTO: 4.76 10*6/MM3 (ref 3.9–5.2)
SODIUM BLD-SCNC: 141 MMOL/L (ref 136–145)
TROPONIN T SERPL-MCNC: <0.01 NG/ML (ref 0–0.03)
WBC NRBC COR # BLD: 6.13 10*3/MM3 (ref 4.5–10.7)
WHOLE BLOOD HOLD SPECIMEN: NORMAL
WHOLE BLOOD HOLD SPECIMEN: NORMAL

## 2017-04-17 PROCEDURE — 80053 COMPREHEN METABOLIC PANEL: CPT | Performed by: PHYSICIAN ASSISTANT

## 2017-04-17 PROCEDURE — 99284 EMERGENCY DEPT VISIT MOD MDM: CPT

## 2017-04-17 PROCEDURE — 71010 HC CHEST PA OR AP: CPT

## 2017-04-17 PROCEDURE — 85025 COMPLETE CBC W/AUTO DIFF WBC: CPT | Performed by: PHYSICIAN ASSISTANT

## 2017-04-17 PROCEDURE — 84484 ASSAY OF TROPONIN QUANT: CPT | Performed by: PHYSICIAN ASSISTANT

## 2017-04-17 PROCEDURE — 85610 PROTHROMBIN TIME: CPT | Performed by: PHYSICIAN ASSISTANT

## 2017-04-17 PROCEDURE — 93005 ELECTROCARDIOGRAM TRACING: CPT | Performed by: PHYSICIAN ASSISTANT

## 2017-04-17 PROCEDURE — 70450 CT HEAD/BRAIN W/O DYE: CPT

## 2017-04-17 PROCEDURE — 93010 ELECTROCARDIOGRAM REPORT: CPT | Performed by: INTERNAL MEDICINE

## 2017-04-17 PROCEDURE — 36415 COLL VENOUS BLD VENIPUNCTURE: CPT

## 2017-04-17 RX ORDER — SODIUM CHLORIDE 0.9 % (FLUSH) 0.9 %
10 SYRINGE (ML) INJECTION AS NEEDED
Status: DISCONTINUED | OUTPATIENT
Start: 2017-04-17 | End: 2017-04-17 | Stop reason: HOSPADM

## 2017-04-17 RX ORDER — ASPIRIN 325 MG
325 TABLET ORAL ONCE
Status: DISCONTINUED | OUTPATIENT
Start: 2017-04-17 | End: 2017-04-17

## 2017-04-17 NOTE — ED PROVIDER NOTES
EMERGENCY DEPARTMENT ENCOUNTER    CHIEF COMPLAINT  Chief Complaint: HA  History given by: patient   History limited by: nothing   Room Number: 01/01  PMD: Yessenia Joe MD      HPI:  Pt is a 61 y.o. female who presents with an episode of head pressure just above left ear at 1000 this morning. Pt also complains of lightheadedness, SOA, and shakiness during episode. Pt reports that she is still mildly SOA, but all of her other sx have resolved. She also reports intermittent left leg tingling for 1 week but no symptoms in her right leg. Pt denies focal weakness, facial droop, ear pain, chest pain, and any other sx at this time. Pt has had HA in the past but states that this pain feels different as it is more of a pressure.   Pt had a stress test and heart cath many years ago.     Duration: 2 hours  Timing: constant   Location: left parietal just above ear  Radiation: does not radiate   Quality: new   Intensity/Severity: moderate   Progression: most symptoms have resolved   Associated Symptoms: head pressure, SOA, shakiness, lightheadedness   Aggravating Factors: none specified   Alleviating Factors: none specified   Previous Episodes: none specified   Treatment before arrival: none specified     MEDICAL RECORD REVIEW  Pt has a h/o hypertension, diabetes, and asthma. Pt smokes.      PAST MEDICAL HISTORY  Active Ambulatory Problems     Diagnosis Date Noted   • Moderate persistent asthma without complication 03/09/2016   • Asthma 03/09/2016   • Benign essential hypertension 03/09/2016   • Hyperlipidemia 03/09/2016   • Arthralgia of hip 03/09/2016   • Pain of thigh 03/09/2016   • Microscopic hematuria 03/09/2016   • Type 2 diabetes mellitus 03/09/2016   • Abnormal kidney function study 03/09/2016   • Tobacco use 09/13/2016   • Chronic kidney disease, stage III (moderate) 03/14/2017   • Iron deficiency anemia 03/14/2017     Resolved Ambulatory Problems     Diagnosis Date Noted   • No Resolved Ambulatory Problems      Past Medical History:   Diagnosis Date   • Asthma    • Carotid artery stenosis    • Diabetes mellitus    • GERD (gastroesophageal reflux disease)    • Glaucoma    • Hydronephrosis    • Hyperlipidemia    • Hypertension    • Intracranial hemorrhage    • Pneumonia    • Polyp of sigmoid colon    • Vitamin D deficiency        PAST SURGICAL HISTORY  Past Surgical History:   Procedure Laterality Date   • GALLBLADDER SURGERY     • HERNIA REPAIR     • HYSTERECTOMY         FAMILY HISTORY  Family History   Problem Relation Age of Onset   • Hypertension Mother    • Diabetes Mother    • Hypertension Sister    • Diabetes Sister    • Hypertension Brother    • Diabetes Brother    • Lung cancer Maternal Uncle    • Stroke Maternal Grandfather        SOCIAL HISTORY  Social History     Social History   • Marital status: Unknown     Spouse name: N/A   • Number of children: 0   • Years of education: N/A     Occupational History   • [employer]      Social History Main Topics   • Smoking status: Current Every Day Smoker   • Smokeless tobacco: Never Used   • Alcohol use Yes      Comment: socially   • Drug use: No   • Sexual activity: Not on file     Other Topics Concern   • Not on file     Social History Narrative       ALLERGIES  Penicillins    REVIEW OF SYSTEMS  Review of Systems   Constitutional: Negative for chills and fever.        Shakiness    HENT: Negative for congestion, sinus pressure, sore throat and voice change.    Eyes: Negative for pain and visual disturbance.   Respiratory: Positive for shortness of breath. Negative for cough and chest tightness.    Cardiovascular: Negative for chest pain and palpitations.   Gastrointestinal: Negative for abdominal pain, diarrhea, nausea and vomiting.   Genitourinary: Positive for vaginal bleeding. Negative for dysuria, flank pain and hematuria.   Musculoskeletal: Negative for back pain, myalgias, neck pain and neck stiffness.   Skin: Negative for rash.   Neurological: Positive for  light-headedness, numbness (intermittent left leg tingling for 1 week) and headaches (pressure just above left ear). Negative for dizziness and weakness.   Psychiatric/Behavioral: Negative for agitation, confusion and suicidal ideas. The patient is not nervous/anxious.    All other systems reviewed and are negative.      PHYSICAL EXAM  ED Triage Vitals   Temp Heart Rate Resp BP SpO2   04/17/17 1117 04/17/17 1117 04/17/17 1117 04/17/17 1127 04/17/17 1117   97.8 °F (36.6 °C) 95 16 122/92 96 %      Temp src Heart Rate Source Patient Position BP Location FiO2 (%)   04/17/17 1117 04/17/17 1117 04/17/17 1127 04/17/17 1127 --   Tympanic Monitor Sitting Right arm        Physical Exam   Constitutional: She is oriented to person, place, and time and well-developed, well-nourished, and in no distress. No distress.   HENT:   Head: Normocephalic and atraumatic.   Mouth/Throat: Oropharynx is clear and moist.   Eyes: EOM are normal.   Neck: Normal range of motion. Neck supple.   Cardiovascular: Normal rate and regular rhythm.    Pulmonary/Chest: Breath sounds normal. She exhibits no tenderness.   Course breath sounds with expiration throughout.    Abdominal: Soft. She exhibits no distension. There is no tenderness. There is no rebound.   Musculoskeletal: Normal range of motion. She exhibits no edema.   Lymphadenopathy:     She has no cervical adenopathy.   Neurological: She is alert and oriented to person, place, and time. She has intact cranial nerves.   Unremarkable neurological exam.    Skin: Skin is warm and dry. No rash noted. No pallor.   Psychiatric: Mood, memory, affect and judgment normal.   Nursing note and vitals reviewed.      LAB RESULTS  Recent Results (from the past 24 hour(s))   Comprehensive Metabolic Panel    Collection Time: 04/17/17 12:26 PM   Result Value Ref Range    Glucose 109 (H) 65 - 99 mg/dL    BUN 16 8 - 23 mg/dL    Creatinine 1.07 (H) 0.57 - 1.00 mg/dL    Sodium 141 136 - 145 mmol/L    Potassium 3.5  3.5 - 5.2 mmol/L    Chloride 98 98 - 107 mmol/L    CO2 30.2 (H) 22.0 - 29.0 mmol/L    Calcium 9.6 8.6 - 10.5 mg/dL    Total Protein 7.6 6.0 - 8.5 g/dL    Albumin 4.30 3.50 - 5.20 g/dL    ALT (SGPT) 12 1 - 33 U/L    AST (SGOT) 15 1 - 32 U/L    Alkaline Phosphatase 55 39 - 117 U/L    Total Bilirubin 0.3 0.1 - 1.2 mg/dL    eGFR Non African Amer 52 (L) >60 mL/min/1.73    Globulin 3.3 gm/dL    A/G Ratio 1.3 g/dL    BUN/Creatinine Ratio 15.0 7.0 - 25.0    Anion Gap 12.8 mmol/L   Troponin    Collection Time: 04/17/17 12:26 PM   Result Value Ref Range    Troponin T <0.010 0.000 - 0.030 ng/mL   Protime-INR    Collection Time: 04/17/17 12:26 PM   Result Value Ref Range    Protime 12.4 11.7 - 14.2 Seconds    INR 0.96 0.90 - 1.10   CBC Auto Differential    Collection Time: 04/17/17 12:26 PM   Result Value Ref Range    WBC 6.13 4.50 - 10.70 10*3/mm3    RBC 4.76 3.90 - 5.20 10*6/mm3    Hemoglobin 12.1 11.9 - 15.5 g/dL    Hematocrit 39.1 35.6 - 45.5 %    MCV 82.1 80.5 - 98.2 fL    MCH 25.4 (L) 26.9 - 32.0 pg    MCHC 30.9 (L) 32.4 - 36.3 g/dL    RDW 18.8 (H) 11.7 - 13.0 %    RDW-SD 55.6 (H) 37.0 - 54.0 fl    MPV 10.6 6.0 - 12.0 fL    Platelets 312 140 - 500 10*3/mm3    Neutrophil % 57.1 42.7 - 76.0 %    Lymphocyte % 32.6 19.6 - 45.3 %    Monocyte % 8.3 5.0 - 12.0 %    Eosinophil % 1.5 0.3 - 6.2 %    Basophil % 0.5 0.0 - 1.5 %    Immature Grans % 0.0 0.0 - 0.5 %    Neutrophils, Absolute 3.50 1.90 - 8.10 10*3/mm3    Lymphocytes, Absolute 2.00 0.90 - 4.80 10*3/mm3    Monocytes, Absolute 0.51 0.20 - 1.20 10*3/mm3    Eosinophils, Absolute 0.09 0.00 - 0.70 10*3/mm3    Basophils, Absolute 0.03 0.00 - 0.20 10*3/mm3    Immature Grans, Absolute 0.00 0.00 - 0.03 10*3/mm3       I ordered the above labs and reviewed the results    RADIOLOGY  CT Head Without Contrast   Preliminary Result   No acute process identified. Further evaluation could be   performed with MRI examination of the brain as indicated.          XR Chest 1 View   Final Result       CXR shows NAD.    I ordered the above noted radiological studies and reviewed the images on the PACS system.        EKG    ekg was interpreted by Dr. Monge      PROCEDURES  Interval: baseline  1a. Level Of Consciousness: 0-->Alert: keenly responsive  1b. LOC Questions: 0-->Answers both questions correctly  1c. LOC Commands: 0-->Performs both tasks correctly  2. Best Gaze: 0-->Normal  3. Visual: 0-->No visual loss  4. Facial Palsy: 0-->Normal symmetrical movements  5a. Motor Arm, Left: 0-->No drift: limb holds 90 (or 45) degrees for full 10 secs  5b. Motor Arm, Right: 0-->No drift: limb holds 90 (or 45) degrees for full 10 secs  6a. Motor Leg, Left: 0-->No drift: leg holds 30 degree position for full 5 secs  6b. Motor Leg, Right: 0-->No drift: leg holds 30 degree position for full 5 secs  7. Limb Ataxia: 0-->Absent  8. Sensory: 0-->Normal: no sensory loss  9. Best Language: 0-->No aphasia: normal  10. Dysarthria: 0-->Normal  11. Extinction and Inattention (formerly Neglect): 0-->No abnormality    Total (NIH Stroke Scale): 0      COURSE & MEDICAL DECISION MAKING  Pertinent Labs and Imaging studies that were ordered and reviewed are noted above.  Results were reviewed/discussed with the patient and they were also made aware of online assess.  Pt also made aware that some labs, such as cultures, will not be resulted during ER visit and follow up with PMD is necessary.       PROGRESS AND CONSULTS    Progress Notes:    1405: Reviewed labs, which are essentially unremarkable. Orthostatics are normal.     1500: Pt's HA has improved.     1513: Reviewed pt's history and workup with Dr. Monge.  After a bedside evaluation; Dr. Monge agrees with the plan of care    1532: The patient's history, physical exam, and lab findings were discussed with the physician, who also performed a face to face history and physical exam.  I discussed all results and noted any abnormalities with patient.  Discussed absoute need to  "recheck abnormalities with their family physician.  I answered any of the patient's questions.  Discussed plan for discharge, as there is no emergent indication for admission.  Pt is agreeable and understands need for follow up and repeat testing.  Pt is aware that discharge does not mean that nothing is wrong but it indicates no emergency is present and they must continue care with their family physician.  Pt is discharged with instructions to follow up with primary care doctor to have their blood pressure rechecked.       MEDICATIONS GIVEN IN ER  Medications   sodium chloride 0.9 % flush 10 mL (not administered)       /74 (BP Location: Left arm, Patient Position: Lying)  Pulse 83  Temp 97.7 °F (36.5 °C) (Oral)   Resp 18  Ht 66\" (167.6 cm)  Wt 185 lb (83.9 kg)  SpO2 98%  BMI 29.86 kg/m2      DIAGNOSIS  Final diagnoses:   Acute nonintractable headache, unspecified headache type   History of asthma   Dyspnea, unspecified type       FOLLOW UP   Yessenia Joe MD  [address] [address]  [phone]            I personally scribed for Landy Cox PA-C on 4/17/2017 at 3:33 PM.  Electronically signed by Luli Torres on 4/17/2017 at time 3:33 PM               Luli Torres  04/17/17 1535       Landy Cox PA-C  04/17/17 1537    "

## 2017-04-17 NOTE — DISCHARGE INSTRUCTIONS
PLEASE READ AND REVIEW ALL DISCHARGE INSTRUCTIONS.     Please follow up with your primary care physician for any further evaluation/treatment and further management of your blood pressure.     Recheck in emergency department for any worsening and/or concerning symptoms including severe headache, confusion, weakness, chest pain, new shortness of breath, fever.     Take all prescribed medicine as written and continue chronic medication.  Tylenol/ibuprofen as needed for headache.

## 2017-04-17 NOTE — ED PROVIDER NOTES
Pt presents to the ED complaining of feeling light headed, SOB, head pressure and shakiness this morning around 1000 while sitting at her desk. Pt also complains of intermittent LE tingling for the last week but denies focal weakness or numbness. Pt states that her symptoms are now resolved. CT Head and CXR showed nothing acute. Pt's labs were unremarkable. On exam, the pt's heart is RRR, lungs are CTAB, abd is benign and the pt has a normal neuro exam. I agree with the plan to discharge the pt home to follow up with her PMD.    EKG           EKG time: 1331  Rhythm/Rate: NSR, 82  P waves and NC: normal  QRS, axis: normal   ST and T waves: normal     Interpreted Contemporaneously by me, independently viewed  No old for comparison    I supervised care provided by the midlevel provider.    We have discussed this patient's history, physical exam, and treatment plan.   I have reviewed the note and personally saw and examined the patient and agree with the plan of care.    Documentation assistance provided by autumn Mehta for Dr. Monge.  Information recorded by the autumn was done at my direction and has been verified and validated by me.       Anette Mheta  04/17/17 1524       Richard Monge MD  04/17/17 1600

## 2017-04-17 NOTE — ED NOTES
Orthostatic blood pressures:  112/81 lying  127/92 standing 1 minute  119/84 standing 3 minutes      Harika Tracy RN  04/17/17 4708

## 2017-04-18 ENCOUNTER — TELEPHONE (OUTPATIENT)
Dept: SOCIAL WORK | Facility: HOSPITAL | Age: 62
End: 2017-04-18

## 2017-06-12 ENCOUNTER — RESULTS ENCOUNTER (OUTPATIENT)
Dept: INTERNAL MEDICINE | Facility: CLINIC | Age: 62
End: 2017-06-12

## 2017-06-12 DIAGNOSIS — E11.22 TYPE 2 DIABETES MELLITUS WITH STAGE 3 CHRONIC KIDNEY DISEASE, WITHOUT LONG-TERM CURRENT USE OF INSULIN (HCC): ICD-10-CM

## 2017-06-12 DIAGNOSIS — N18.30 TYPE 2 DIABETES MELLITUS WITH STAGE 3 CHRONIC KIDNEY DISEASE, WITHOUT LONG-TERM CURRENT USE OF INSULIN (HCC): ICD-10-CM

## 2017-06-12 DIAGNOSIS — N18.30 CHRONIC KIDNEY DISEASE, STAGE III (MODERATE) (HCC): ICD-10-CM

## 2017-06-12 DIAGNOSIS — D50.9 IRON DEFICIENCY ANEMIA, UNSPECIFIED IRON DEFICIENCY ANEMIA TYPE: ICD-10-CM

## 2017-06-14 ENCOUNTER — TELEPHONE (OUTPATIENT)
Dept: INTERNAL MEDICINE | Facility: CLINIC | Age: 62
End: 2017-06-14

## 2017-06-14 ENCOUNTER — OFFICE VISIT (OUTPATIENT)
Dept: INTERNAL MEDICINE | Facility: CLINIC | Age: 62
End: 2017-06-14

## 2017-06-14 VITALS
WEIGHT: 180 LBS | OXYGEN SATURATION: 99 % | RESPIRATION RATE: 18 BRPM | SYSTOLIC BLOOD PRESSURE: 124 MMHG | BODY MASS INDEX: 28.93 KG/M2 | HEART RATE: 85 BPM | DIASTOLIC BLOOD PRESSURE: 84 MMHG | HEIGHT: 66 IN

## 2017-06-14 DIAGNOSIS — E11.22 TYPE 2 DIABETES MELLITUS WITH STAGE 3 CHRONIC KIDNEY DISEASE, WITHOUT LONG-TERM CURRENT USE OF INSULIN (HCC): Primary | ICD-10-CM

## 2017-06-14 DIAGNOSIS — N18.30 TYPE 2 DIABETES MELLITUS WITH STAGE 3 CHRONIC KIDNEY DISEASE, WITHOUT LONG-TERM CURRENT USE OF INSULIN (HCC): Primary | ICD-10-CM

## 2017-06-14 DIAGNOSIS — J45.40 MODERATE PERSISTENT ASTHMA WITHOUT COMPLICATION: ICD-10-CM

## 2017-06-14 DIAGNOSIS — D50.9 IRON DEFICIENCY ANEMIA, UNSPECIFIED IRON DEFICIENCY ANEMIA TYPE: ICD-10-CM

## 2017-06-14 DIAGNOSIS — E78.2 MIXED HYPERLIPIDEMIA: ICD-10-CM

## 2017-06-14 DIAGNOSIS — I10 BENIGN ESSENTIAL HYPERTENSION: ICD-10-CM

## 2017-06-14 DIAGNOSIS — N18.30 CHRONIC KIDNEY DISEASE, STAGE III (MODERATE) (HCC): ICD-10-CM

## 2017-06-14 LAB
BASOPHILS # BLD AUTO: 0.04 10*3/MM3 (ref 0–0.2)
BASOPHILS NFR BLD AUTO: 0.7 % (ref 0–1.5)
BUN SERPL-MCNC: 15 MG/DL (ref 8–23)
BUN/CREAT SERPL: 15.6 (ref 7–25)
CALCIUM SERPL-MCNC: 10.2 MG/DL (ref 8.6–10.5)
CHLORIDE SERPL-SCNC: 94 MMOL/L (ref 98–107)
CO2 SERPL-SCNC: 31.1 MMOL/L (ref 22–29)
CREAT SERPL-MCNC: 0.96 MG/DL (ref 0.57–1)
EOSINOPHIL # BLD AUTO: 0.06 10*3/MM3 (ref 0–0.7)
EOSINOPHIL NFR BLD AUTO: 1 % (ref 0.3–6.2)
ERYTHROCYTE [DISTWIDTH] IN BLOOD BY AUTOMATED COUNT: 18.2 % (ref 11.7–13)
FERRITIN SERPL-MCNC: 10.87 NG/ML (ref 13–150)
GLUCOSE SERPL-MCNC: 103 MG/DL (ref 65–99)
HBA1C MFR BLD: 5.99 % (ref 4.8–5.6)
HCT VFR BLD AUTO: 43.7 % (ref 35.6–45.5)
HGB BLD-MCNC: 13.4 G/DL (ref 11.9–15.5)
IMM GRANULOCYTES # BLD: 0 10*3/MM3 (ref 0–0.03)
IMM GRANULOCYTES NFR BLD: 0 % (ref 0–0.5)
IRON SATN MFR SERPL: 31 % (ref 20–50)
IRON SERPL-MCNC: 168 MCG/DL (ref 37–145)
LYMPHOCYTES # BLD AUTO: 2.77 10*3/MM3 (ref 0.9–4.8)
LYMPHOCYTES NFR BLD AUTO: 47.6 % (ref 19.6–45.3)
MCH RBC QN AUTO: 26.8 PG (ref 26.9–32)
MCHC RBC AUTO-ENTMCNC: 30.7 G/DL (ref 32.4–36.3)
MCV RBC AUTO: 87.4 FL (ref 80.5–98.2)
MONOCYTES # BLD AUTO: 0.41 10*3/MM3 (ref 0.2–1.2)
MONOCYTES NFR BLD AUTO: 7 % (ref 5–12)
NEUTROPHILS # BLD AUTO: 2.54 10*3/MM3 (ref 1.9–8.1)
NEUTROPHILS NFR BLD AUTO: 43.7 % (ref 42.7–76)
PLATELET # BLD AUTO: 342 10*3/MM3 (ref 140–500)
POTASSIUM SERPL-SCNC: 4.6 MMOL/L (ref 3.5–5.2)
RBC # BLD AUTO: 5 10*6/MM3 (ref 3.9–5.2)
SODIUM SERPL-SCNC: 137 MMOL/L (ref 136–145)
TIBC SERPL-MCNC: 538 MCG/DL
UIBC SERPL-MCNC: 370 MCG/DL
WBC # BLD AUTO: 5.82 10*3/MM3 (ref 4.5–10.7)

## 2017-06-14 PROCEDURE — 99214 OFFICE O/P EST MOD 30 MIN: CPT | Performed by: INTERNAL MEDICINE

## 2017-06-14 RX ORDER — ALBUTEROL SULFATE 90 UG/1
2 AEROSOL, METERED RESPIRATORY (INHALATION) EVERY 4 HOURS PRN
Qty: 18 G | Refills: 3 | Status: SHIPPED | OUTPATIENT
Start: 2017-06-14 | End: 2019-01-01 | Stop reason: SDUPTHER

## 2017-06-14 NOTE — TELEPHONE ENCOUNTER
Pt informed, via voicemail. To take her glucometer up to the pharmacy and have them send us the request to fill a new one, hard to find the old one that she has in our computer system. Will approve a refill for the one she has though, once we receive the refill request.

## 2017-06-14 NOTE — PROGRESS NOTES
Chief Complaint  Linette Ghosh is a 61 y.o. female who presents for Hypertension; Hyperlipidemia; Diabetes; and Follow-up (3 month)  .    History of Present Illness   Linette is here for routine follow up for HTN, HLD, CKD, and DM2. No cp or heart palpitations or dizziness.  No SOA currently. No edema.   She has a lot of congestion.  She checks her bs occasionally.  She doesn't get any exercise.  She is still smoking but not as much.  I sent her to an allergist who did allergy testing.  She was not happy about that.  She would like to see a pulmonary doctor for her lungs.  I have discussed smoking cessation and lung cancer screening CT with her in the past but she wants to see a pulmonologist first.      She had an ER visit back in April.  She was feeling dizzy and weird.  She had a head CT and CXR and EKG.  Her symptoms had gone by the time she got to the ER.  No recurrence of symptosm.      She has pain in her left heel when she gets out of bed.  If she has been sitting a while it bothers her when she gets up.  It started the early part of this year.      Review of Systems   Constitutional: Negative for chills and fever.   HENT: Positive for congestion and postnasal drip.    Respiratory: Positive for cough and shortness of breath.    Cardiovascular: Negative for chest pain, palpitations and leg swelling.   Endocrine: Negative for polydipsia and polyuria.   Musculoskeletal: Positive for arthralgias. Negative for myalgias.   Neurological: Negative for dizziness and light-headedness.       Patient Active Problem List   Diagnosis   • Moderate persistent asthma without complication   • Asthma   • Benign essential hypertension   • Hyperlipidemia   • Arthralgia of hip   • Pain of thigh   • Microscopic hematuria   • Type 2 diabetes mellitus   • Abnormal kidney function study   • Tobacco use   • Chronic kidney disease, stage III (moderate)   • Iron deficiency anemia       Past Medical History:   Diagnosis Date   • Asthma     • Carotid artery stenosis    • Diabetes mellitus    • GERD (gastroesophageal reflux disease)    • Glaucoma    • Hydronephrosis    • Hyperlipidemia    • Hypertension    • Intracranial hemorrhage    • Pneumonia    • Polyp of sigmoid colon    • Vitamin D deficiency        Past Surgical History:   Procedure Laterality Date   • GALLBLADDER SURGERY     • HERNIA REPAIR     • HYSTERECTOMY         Family History   Problem Relation Age of Onset   • Hypertension Mother    • Diabetes Mother    • Hypertension Sister    • Diabetes Sister    • Hypertension Brother    • Diabetes Brother    • Lung cancer Maternal Uncle    • Stroke Maternal Grandfather        Social History     Social History   • Marital status: Unknown     Spouse name: N/A   • Number of children: 0   • Years of education: N/A     Occupational History   • [employer]      Social History Main Topics   • Smoking status: Current Every Day Smoker   • Smokeless tobacco: Never Used   • Alcohol use Yes      Comment: socially   • Drug use: No   • Sexual activity: Not on file     Other Topics Concern   • Not on file     Social History Narrative       Current Outpatient Prescriptions on File Prior to Visit   Medication Sig Dispense Refill   • albuterol (PROVENTIL) (2.5 MG/3ML) 0.083% nebulizer solution USE ONE VIAL VIA NEBULIZER BY MOUTH EVERY 6 TO 8 HOURS AS NEEDED 540 mL 1   • aspirin 325 MG tablet Take  by mouth daily.     • desloratadine (CLARINEX) 5 MG tablet TAKE 1 TABLET BY MOUTH DAILY 90 tablet 3   • fluticasone (FLONASE) 50 MCG/ACT nasal spray 2 sprays into each nostril Daily. 3 bottle 3   • hydrOXYzine (ATARAX) 25 MG tablet TAKE 1 TABLET BY MOUTH EVERY EIGHT HOURS AS NEEDED FOR ITCHING 270 tablet 2   • lisinopril-hydrochlorothiazide (PRINZIDE,ZESTORETIC) 20-12.5 MG per tablet TAKE 2 TABLETS BY MOUTH DAILY 180 tablet 3   • montelukast (SINGULAIR) 10 MG tablet TAKE 1 TABLET BY MOUTH EVERY NIGHT 90 tablet 3   • Multiple Vitamins-Minerals (CENTRUM SILVER PO) Take  by mouth  "daily.     • pantoprazole (PROTONIX) 40 MG EC tablet TAKE 1 TABLET BY MOUTH DAILY 90 tablet 3   • simvastatin (ZOCOR) 20 MG tablet TAKE 1 TABLET BY MOUTH EVERY NIGHT 90 tablet 3   • SYMBICORT 160-4.5 MCG/ACT inhaler INHALE TWO PUFFS BY MOUTH TWICE A DAY **RINSE MOUTH AFTER USE** 10.2 g 10   • timolol (TIMOPTIC) 0.25 % ophthalmic solution Administer 1 drop to both eyes Daily.     • [DISCONTINUED] albuterol (PROVENTIL HFA;VENTOLIN HFA) 108 (90 BASE) MCG/ACT inhaler Inhale 2 puffs Every 4 (Four) Hours As Needed for Wheezing or shortness of air. 18 g 3   • [DISCONTINUED] metFORMIN (GLUCOPHAGE) 1000 MG tablet Take 1 tablet by mouth 2 (two) times a day with meals. 180 tablet 2     No current facility-administered medications on file prior to visit.        Allergies   Allergen Reactions   • Penicillins        Vitals:    06/14/17 0803   BP: 124/84   Pulse: 85   Resp: 18   SpO2: 99%   Weight: 180 lb (81.6 kg)   Height: 66\" (167.6 cm)       Body mass index is 29.05 kg/(m^2).    Objective   Physical Exam   Constitutional: She is oriented to person, place, and time. She appears well-developed and well-nourished. No distress.   HENT:   Head: Normocephalic and atraumatic.   Eyes: Conjunctivae are normal. No scleral icterus.   Neck: Normal range of motion. Neck supple.   Cardiovascular: Normal rate, regular rhythm and normal heart sounds.  Exam reveals no gallop and no friction rub.    No murmur heard.  Pulmonary/Chest: Effort normal and breath sounds normal. No respiratory distress. She has no wheezes. She has no rales.   Musculoskeletal: She exhibits no edema.   Lymphadenopathy:     She has no cervical adenopathy.   Neurological: She is alert and oriented to person, place, and time. No cranial nerve deficit.   Psychiatric: She has a normal mood and affect. Her behavior is normal. Judgment and thought content normal.       Results for orders placed or performed during the hospital encounter of 04/17/17   Comprehensive Metabolic " Panel   Result Value Ref Range    Glucose 109 (H) 65 - 99 mg/dL    BUN 16 8 - 23 mg/dL    Creatinine 1.07 (H) 0.57 - 1.00 mg/dL    Sodium 141 136 - 145 mmol/L    Potassium 3.5 3.5 - 5.2 mmol/L    Chloride 98 98 - 107 mmol/L    CO2 30.2 (H) 22.0 - 29.0 mmol/L    Calcium 9.6 8.6 - 10.5 mg/dL    Total Protein 7.6 6.0 - 8.5 g/dL    Albumin 4.30 3.50 - 5.20 g/dL    ALT (SGPT) 12 1 - 33 U/L    AST (SGOT) 15 1 - 32 U/L    Alkaline Phosphatase 55 39 - 117 U/L    Total Bilirubin 0.3 0.1 - 1.2 mg/dL    eGFR Non African Amer 52 (L) >60 mL/min/1.73    Globulin 3.3 gm/dL    A/G Ratio 1.3 g/dL    BUN/Creatinine Ratio 15.0 7.0 - 25.0    Anion Gap 12.8 mmol/L   Troponin   Result Value Ref Range    Troponin T <0.010 0.000 - 0.030 ng/mL   Protime-INR   Result Value Ref Range    Protime 12.4 11.7 - 14.2 Seconds    INR 0.96 0.90 - 1.10   CBC Auto Differential   Result Value Ref Range    WBC 6.13 4.50 - 10.70 10*3/mm3    RBC 4.76 3.90 - 5.20 10*6/mm3    Hemoglobin 12.1 11.9 - 15.5 g/dL    Hematocrit 39.1 35.6 - 45.5 %    MCV 82.1 80.5 - 98.2 fL    MCH 25.4 (L) 26.9 - 32.0 pg    MCHC 30.9 (L) 32.4 - 36.3 g/dL    RDW 18.8 (H) 11.7 - 13.0 %    RDW-SD 55.6 (H) 37.0 - 54.0 fl    MPV 10.6 6.0 - 12.0 fL    Platelets 312 140 - 500 10*3/mm3    Neutrophil % 57.1 42.7 - 76.0 %    Lymphocyte % 32.6 19.6 - 45.3 %    Monocyte % 8.3 5.0 - 12.0 %    Eosinophil % 1.5 0.3 - 6.2 %    Basophil % 0.5 0.0 - 1.5 %    Immature Grans % 0.0 0.0 - 0.5 %    Neutrophils, Absolute 3.50 1.90 - 8.10 10*3/mm3    Lymphocytes, Absolute 2.00 0.90 - 4.80 10*3/mm3    Monocytes, Absolute 0.51 0.20 - 1.20 10*3/mm3    Eosinophils, Absolute 0.09 0.00 - 0.70 10*3/mm3    Basophils, Absolute 0.03 0.00 - 0.20 10*3/mm3    Immature Grans, Absolute 0.00 0.00 - 0.03 10*3/mm3   Light Blue Top   Result Value Ref Range    Extra Tube hold for add-on    Green Top (Gel)   Result Value Ref Range    Extra Tube Hold for add-ons.    Lavender Top   Result Value Ref Range    Extra Tube hold for  add-on    Gold Top - SST   Result Value Ref Range    Extra Tube Hold for add-ons.        Assessment/Plan   Diagnoses and all orders for this visit:    Type 2 diabetes mellitus with stage 3 chronic kidney disease, without long-term current use of insulin    Benign essential hypertension    Mixed hyperlipidemia    Chronic kidney disease, stage III (moderate)    Iron deficiency anemia, unspecified iron deficiency anemia type    Moderate persistent asthma without complication  -     Ambulatory Referral to Pulmonology    Other orders  -     metFORMIN (GLUCOPHAGE) 1000 MG tablet; Take 1 tablet by mouth 2 (Two) Times a Day With Meals.  -     albuterol (PROVENTIL HFA;VENTOLIN HFA) 108 (90 BASE) MCG/ACT inhaler; Inhale 2 puffs Every 4 (Four) Hours As Needed for Wheezing or Shortness of Air.        Discussion/Summary  Linette is here for routine follow up.  She did not have her labs drawn prior to her appt.  She will have those today.  I have put in a pulmonary referral.  Maybe Dr. Rodriguez can inspire her to stop smoking.  I certainly have not been able to over the years despite continued efforts.  Continue all current meds.  I have reviewed the labs and imaging and EKG done in the ER in April.    Return for Annual physical, with fasting labs prior.

## 2017-06-15 ENCOUNTER — TELEPHONE (OUTPATIENT)
Dept: INTERNAL MEDICINE | Facility: CLINIC | Age: 62
End: 2017-06-15

## 2017-06-15 NOTE — TELEPHONE ENCOUNTER
----- Message from Yessenia Joe MD sent at 6/15/2017  1:03 PM EDT -----  Please call - her hgb is normal.  Her iron level is high.  Is she taking iron?  If so, she needs to stop the iron.  Her kidney function is looking better.  A1c is well controlled and better than it has been.  If she is not taking iron, we need to repeat an iron level in about 4 weeks.

## 2017-07-24 ENCOUNTER — TELEPHONE (OUTPATIENT)
Dept: INTERNAL MEDICINE | Facility: CLINIC | Age: 62
End: 2017-07-24

## 2017-07-24 NOTE — TELEPHONE ENCOUNTER
Pt called to get the name and number of the pulmonologist that  had referred her to. Left name, number and address on her voicemail.

## 2017-10-03 ENCOUNTER — APPOINTMENT (OUTPATIENT)
Dept: WOMENS IMAGING | Facility: HOSPITAL | Age: 62
End: 2017-10-03

## 2017-10-03 PROCEDURE — G0202 SCR MAMMO BI INCL CAD: HCPCS | Performed by: RADIOLOGY

## 2017-10-03 PROCEDURE — 77067 SCR MAMMO BI INCL CAD: CPT | Performed by: RADIOLOGY

## 2018-01-04 ENCOUNTER — OFFICE VISIT (OUTPATIENT)
Dept: INTERNAL MEDICINE | Facility: CLINIC | Age: 63
End: 2018-01-04

## 2018-01-04 ENCOUNTER — TELEPHONE (OUTPATIENT)
Dept: INTERNAL MEDICINE | Facility: CLINIC | Age: 63
End: 2018-01-04

## 2018-01-04 VITALS
WEIGHT: 186 LBS | DIASTOLIC BLOOD PRESSURE: 76 MMHG | SYSTOLIC BLOOD PRESSURE: 120 MMHG | BODY MASS INDEX: 29.89 KG/M2 | HEART RATE: 98 BPM | RESPIRATION RATE: 18 BRPM | OXYGEN SATURATION: 99 % | HEIGHT: 66 IN

## 2018-01-04 DIAGNOSIS — J45.40 MODERATE PERSISTENT ASTHMA WITHOUT COMPLICATION: ICD-10-CM

## 2018-01-04 DIAGNOSIS — R42 LIGHTHEADEDNESS: ICD-10-CM

## 2018-01-04 DIAGNOSIS — B34.9 VIRAL INFECTION: ICD-10-CM

## 2018-01-04 DIAGNOSIS — R19.7 DIARRHEA, UNSPECIFIED TYPE: ICD-10-CM

## 2018-01-04 DIAGNOSIS — R11.0 NAUSEA WITHOUT VOMITING: ICD-10-CM

## 2018-01-04 DIAGNOSIS — R53.83 FATIGUE, UNSPECIFIED TYPE: Primary | ICD-10-CM

## 2018-01-04 PROCEDURE — 99214 OFFICE O/P EST MOD 30 MIN: CPT | Performed by: INTERNAL MEDICINE

## 2018-01-04 RX ORDER — ONDANSETRON 4 MG/1
4 TABLET, FILM COATED ORAL EVERY 8 HOURS PRN
Qty: 12 TABLET | Refills: 0 | OUTPATIENT
Start: 2018-01-04 | End: 2019-03-07

## 2018-01-04 NOTE — PROGRESS NOTES
Chief Complaint  Linette Ghosh is a 62 y.o. female who presents for Diarrhea (This morning. ); Nausea; and Dizziness (Felt extremely weak and dizzy starting saturday. Chills, denies fever. )  .    History of Present Illness   She started getting sick Saturday.  She just felt exhausted.  Stayed in bed for several days.    No body aches. No chills.  She just feels like her legs are heavy.  She has had nausea and diarrhea.  No abd pain.  She feels a little chilled.  She doesn't really have a cough any more than she normally has.  .  She felt like she just couldn't get out of bed for days.  She felt better today enough to come to the doctor.      Review of Systems   Constitution: Positive for malaise/fatigue. Negative for chills and fever.   Respiratory: Positive for cough (at baseline). Negative for shortness of breath and sputum production.    Gastrointestinal: Positive for diarrhea and nausea. Negative for abdominal pain, hematochezia, melena and vomiting.       Patient Active Problem List   Diagnosis   • Moderate persistent asthma without complication   • Asthma   • Benign essential hypertension   • Hyperlipidemia   • Arthralgia of hip   • Pain of thigh   • Microscopic hematuria   • Type 2 diabetes mellitus   • Abnormal kidney function study   • Tobacco use   • Chronic kidney disease, stage III (moderate)   • Iron deficiency anemia       Past Medical History:   Diagnosis Date   • Asthma    • Carotid artery stenosis    • Diabetes mellitus    • GERD (gastroesophageal reflux disease)    • Glaucoma    • Hydronephrosis    • Hyperlipidemia    • Hypertension    • Intracranial hemorrhage    • Pneumonia    • Polyp of sigmoid colon    • Vitamin D deficiency        Past Surgical History:   Procedure Laterality Date   • GALLBLADDER SURGERY     • HERNIA REPAIR     • HYSTERECTOMY         Family History   Problem Relation Age of Onset   • Hypertension Mother    • Diabetes Mother    • Hypertension Sister    • Diabetes Sister    •  Hypertension Brother    • Diabetes Brother    • Lung cancer Maternal Uncle    • Stroke Maternal Grandfather        Social History     Social History   • Marital status: Unknown     Spouse name: N/A   • Number of children: 0   • Years of education: N/A     Occupational History   • [employer]      Social History Main Topics   • Smoking status: Current Every Day Smoker   • Smokeless tobacco: Never Used   • Alcohol use Yes      Comment: socially   • Drug use: No   • Sexual activity: Not on file     Other Topics Concern   • Not on file     Social History Narrative       Current Outpatient Prescriptions on File Prior to Visit   Medication Sig Dispense Refill   • albuterol (PROVENTIL HFA;VENTOLIN HFA) 108 (90 BASE) MCG/ACT inhaler Inhale 2 puffs Every 4 (Four) Hours As Needed for Wheezing or Shortness of Air. 18 g 3   • albuterol (PROVENTIL) (2.5 MG/3ML) 0.083% nebulizer solution USE ONE VIAL VIA NEBULIZER BY MOUTH EVERY 6 TO 8 HOURS AS NEEDED 540 mL 1   • aspirin 325 MG tablet Take  by mouth daily.     • desloratadine (CLARINEX) 5 MG tablet TAKE 1 TABLET BY MOUTH DAILY 90 tablet 3   • fluticasone (FLONASE) 50 MCG/ACT nasal spray 2 sprays into each nostril Daily. 3 bottle 3   • hydrOXYzine (ATARAX) 25 MG tablet TAKE 1 TABLET BY MOUTH EVERY EIGHT HOURS AS NEEDED FOR ITCHING 270 tablet 2   • lisinopril-hydrochlorothiazide (PRINZIDE,ZESTORETIC) 20-12.5 MG per tablet TAKE 2 TABLETS BY MOUTH DAILY 180 tablet 3   • metFORMIN (GLUCOPHAGE) 1000 MG tablet Take 1 tablet by mouth 2 (Two) Times a Day With Meals. 180 tablet 2   • montelukast (SINGULAIR) 10 MG tablet TAKE 1 TABLET BY MOUTH EVERY NIGHT 90 tablet 3   • Multiple Vitamins-Minerals (CENTRUM SILVER PO) Take  by mouth daily.     • pantoprazole (PROTONIX) 40 MG EC tablet TAKE 1 TABLET BY MOUTH DAILY 90 tablet 3   • simvastatin (ZOCOR) 20 MG tablet TAKE 1 TABLET BY MOUTH EVERY NIGHT 90 tablet 3   • SYMBICORT 160-4.5 MCG/ACT inhaler INHALE TWO PUFFS BY MOUTH TWICE A DAY **RINSE MOUTH  "AFTER USE** 10.2 g 10   • timolol (TIMOPTIC) 0.25 % ophthalmic solution Administer 1 drop to both eyes Daily.       No current facility-administered medications on file prior to visit.        Allergies   Allergen Reactions   • Penicillins        Vitals:    01/04/18 1535   BP: 120/76   Pulse: 98   Resp: 18   SpO2: 99%   Weight: 84.4 kg (186 lb)   Height: 167.6 cm (65.98\")       Body mass index is 30.04 kg/(m^2).    Objective   Physical Exam   Constitutional: She is oriented to person, place, and time. She appears well-developed and well-nourished. No distress.   HENT:   Head: Normocephalic and atraumatic.   Eyes: Conjunctivae are normal. No scleral icterus.   Neck: Normal range of motion. Neck supple.   Cardiovascular: Normal rate and regular rhythm.    Pulmonary/Chest: Effort normal and breath sounds normal. No respiratory distress. She has no wheezes. She has no rales.   Abdominal: Soft. Bowel sounds are normal. She exhibits no distension. There is no tenderness.   Lymphadenopathy:     She has no cervical adenopathy.   Neurological: She is alert and oriented to person, place, and time. No cranial nerve deficit.   Psychiatric: She has a normal mood and affect. Her behavior is normal.       Results for orders placed or performed in visit on 06/12/17   Basic Metabolic Panel   Result Value Ref Range    Glucose 103 (H) 65 - 99 mg/dL    BUN 15 8 - 23 mg/dL    Creatinine 0.96 0.57 - 1.00 mg/dL    eGFR Non African Am 59 (L) >60 mL/min/1.73    eGFR African Am 72 >60 mL/min/1.73    BUN/Creatinine Ratio 15.6 7.0 - 25.0    Sodium 137 136 - 145 mmol/L    Potassium 4.6 3.5 - 5.2 mmol/L    Chloride 94 (L) 98 - 107 mmol/L    Total CO2 31.1 (H) 22.0 - 29.0 mmol/L    Calcium 10.2 8.6 - 10.5 mg/dL   Hemoglobin A1c   Result Value Ref Range    Hemoglobin A1C 5.99 (H) 4.80 - 5.60 %   Iron Profile   Result Value Ref Range    TIBC 538 mcg/dL    UIBC 370 mcg/dL    Iron 168 (H) 37 - 145 mcg/dL    Iron Saturation 31 20 - 50 %   Ferritin "   Result Value Ref Range    Ferritin 10.87 (L) 13.00 - 150.00 ng/mL   CBC & Differential   Result Value Ref Range    WBC 5.82 4.50 - 10.70 10*3/mm3    RBC 5.00 3.90 - 5.20 10*6/mm3    Hemoglobin 13.4 11.9 - 15.5 g/dL    Hematocrit 43.7 35.6 - 45.5 %    MCV 87.4 80.5 - 98.2 fL    MCH 26.8 (L) 26.9 - 32.0 pg    MCHC 30.7 (L) 32.4 - 36.3 g/dL    RDW 18.2 (H) 11.7 - 13.0 %    Platelets 342 140 - 500 10*3/mm3    Neutrophil Rel % 43.7 42.7 - 76.0 %    Lymphocyte Rel % 47.6 (H) 19.6 - 45.3 %    Monocyte Rel % 7.0 5.0 - 12.0 %    Eosinophil Rel % 1.0 0.3 - 6.2 %    Basophil Rel % 0.7 0.0 - 1.5 %    Neutrophils Absolute 2.54 1.90 - 8.10 10*3/mm3    Lymphocytes Absolute 2.77 0.90 - 4.80 10*3/mm3    Monocytes Absolute 0.41 0.20 - 1.20 10*3/mm3    Eosinophils Absolute 0.06 0.00 - 0.70 10*3/mm3    Basophils Absolute 0.04 0.00 - 0.20 10*3/mm3    Immature Granulocyte Rel % 0.0 0.0 - 0.5 %    Immature Grans Absolute 0.00 0.00 - 0.03 10*3/mm3       Assessment/Plan   Diagnoses and all orders for this visit:    Fatigue, unspecified type  -     CBC & Differential  -     CBC & Differential    Lightheadedness  -     CBC & Differential  -     CBC & Differential    Nausea without vomiting    Diarrhea, unspecified type    Viral infection    Moderate persistent asthma without complication  -     Home Nebulizer Accessories    Other orders  -     ondansetron (ZOFRAN) 4 MG tablet; Take 1 tablet by mouth Every 8 (Eight) Hours As Needed for Nausea or Vomiting.      Discussion/Summary  Linette is here for acute care for what is likely just a viral illness.  Unfortunately, our in office CBC machine is down.  Will send out her CBC.  Advised to continue to rest.  I sent in zofran for nausea.  Advance diet as tolerated to build back strength.    No Follow-up on file.

## 2018-01-04 NOTE — TELEPHONE ENCOUNTER
Pt called and wanted to get an abx for uri.   Can somebody call her and offer her an appointment in the 3 o'clock hour. Must be seen to figure out what abx she needs.

## 2018-01-04 NOTE — TELEPHONE ENCOUNTER
Called patient and offered an appointment with Dr. Joe today 01-4-18 at 3:30 pm. Patient accepted and she will be here for the appointment

## 2018-01-05 LAB
BASOPHILS # BLD AUTO: 0.02 10*3/MM3 (ref 0–0.2)
BASOPHILS NFR BLD AUTO: 0.4 % (ref 0–1.5)
EOSINOPHIL # BLD AUTO: 0.01 10*3/MM3 (ref 0–0.7)
EOSINOPHIL NFR BLD AUTO: 0.2 % (ref 0.3–6.2)
ERYTHROCYTE [DISTWIDTH] IN BLOOD BY AUTOMATED COUNT: 16 % (ref 11.7–13)
HCT VFR BLD AUTO: 42.4 % (ref 35.6–45.5)
HGB BLD-MCNC: 13.1 G/DL (ref 11.9–15.5)
IMM GRANULOCYTES # BLD: 0 10*3/MM3 (ref 0–0.03)
IMM GRANULOCYTES NFR BLD: 0 % (ref 0–0.5)
LYMPHOCYTES # BLD AUTO: 2.47 10*3/MM3 (ref 0.9–4.8)
LYMPHOCYTES NFR BLD AUTO: 50 % (ref 19.6–45.3)
MCH RBC QN AUTO: 26.6 PG (ref 26.9–32)
MCHC RBC AUTO-ENTMCNC: 30.9 G/DL (ref 32.4–36.3)
MCV RBC AUTO: 86.2 FL (ref 80.5–98.2)
MONOCYTES # BLD AUTO: 0.67 10*3/MM3 (ref 0.2–1.2)
MONOCYTES NFR BLD AUTO: 13.6 % (ref 5–12)
NEUTROPHILS # BLD AUTO: 1.77 10*3/MM3 (ref 1.9–8.1)
NEUTROPHILS NFR BLD AUTO: 35.8 % (ref 42.7–76)
PLATELET # BLD AUTO: 290 10*3/MM3 (ref 140–500)
RBC # BLD AUTO: 4.92 10*6/MM3 (ref 3.9–5.2)
WBC # BLD AUTO: 4.94 10*3/MM3 (ref 4.5–10.7)

## 2018-01-10 ENCOUNTER — TELEPHONE (OUTPATIENT)
Dept: INTERNAL MEDICINE | Facility: CLINIC | Age: 63
End: 2018-01-10

## 2018-01-22 RX ORDER — SIMVASTATIN 20 MG
TABLET ORAL
Qty: 90 TABLET | Refills: 3 | Status: SHIPPED | OUTPATIENT
Start: 2018-01-22 | End: 2019-03-08 | Stop reason: SDUPTHER

## 2018-01-22 RX ORDER — MONTELUKAST SODIUM 10 MG/1
TABLET ORAL
Qty: 90 TABLET | Refills: 3 | Status: SHIPPED | OUTPATIENT
Start: 2018-01-22 | End: 2019-03-08 | Stop reason: SDUPTHER

## 2018-02-13 RX ORDER — LISINOPRIL AND HYDROCHLOROTHIAZIDE 20; 12.5 MG/1; MG/1
TABLET ORAL
Qty: 180 TABLET | Refills: 3 | Status: SHIPPED | OUTPATIENT
Start: 2018-02-13 | End: 2019-02-13 | Stop reason: SDUPTHER

## 2018-02-13 RX ORDER — DESLORATADINE 5 MG/1
5 TABLET ORAL DAILY
Qty: 90 TABLET | Refills: 3 | Status: SHIPPED | OUTPATIENT
Start: 2018-02-13 | End: 2018-02-15 | Stop reason: SDUPTHER

## 2018-02-15 RX ORDER — DESLORATADINE 5 MG/1
5 TABLET ORAL DAILY
Qty: 90 TABLET | Refills: 3 | Status: SHIPPED | OUTPATIENT
Start: 2018-02-15 | End: 2019-03-18 | Stop reason: SDUPTHER

## 2018-02-15 RX ORDER — PANTOPRAZOLE SODIUM 40 MG/1
40 TABLET, DELAYED RELEASE ORAL DAILY
Qty: 90 TABLET | Refills: 3 | Status: SHIPPED | OUTPATIENT
Start: 2018-02-15 | End: 2019-01-01 | Stop reason: SDUPTHER

## 2018-03-16 DIAGNOSIS — E78.2 MIXED HYPERLIPIDEMIA: ICD-10-CM

## 2018-03-16 DIAGNOSIS — N18.30 CHRONIC KIDNEY DISEASE, STAGE III (MODERATE) (HCC): ICD-10-CM

## 2018-03-16 DIAGNOSIS — E11.22 TYPE 2 DIABETES MELLITUS WITH STAGE 3 CHRONIC KIDNEY DISEASE, WITHOUT LONG-TERM CURRENT USE OF INSULIN (HCC): ICD-10-CM

## 2018-03-16 DIAGNOSIS — D50.9 IRON DEFICIENCY ANEMIA, UNSPECIFIED IRON DEFICIENCY ANEMIA TYPE: ICD-10-CM

## 2018-03-16 DIAGNOSIS — I10 BENIGN ESSENTIAL HYPERTENSION: ICD-10-CM

## 2018-03-16 DIAGNOSIS — Z00.00 HEALTH CARE MAINTENANCE: Primary | ICD-10-CM

## 2018-03-16 DIAGNOSIS — N18.30 TYPE 2 DIABETES MELLITUS WITH STAGE 3 CHRONIC KIDNEY DISEASE, WITHOUT LONG-TERM CURRENT USE OF INSULIN (HCC): ICD-10-CM

## 2018-03-21 LAB
25(OH)D3+25(OH)D2 SERPL-MCNC: 52.9 NG/ML (ref 30–100)
ALBUMIN SERPL-MCNC: 4.6 G/DL (ref 3.5–5.2)
ALBUMIN/CREAT UR: <12.1 MG/G CREAT (ref 0–30)
ALBUMIN/GLOB SERPL: 1.6 G/DL
ALP SERPL-CCNC: 70 U/L (ref 39–117)
ALT SERPL-CCNC: 11 U/L (ref 1–33)
APPEARANCE UR: CLEAR
AST SERPL-CCNC: 16 U/L (ref 1–32)
BACTERIA #/AREA URNS HPF: NORMAL /HPF
BASOPHILS # BLD AUTO: 0.04 10*3/MM3 (ref 0–0.2)
BASOPHILS NFR BLD AUTO: 0.6 % (ref 0–1.5)
BILIRUB SERPL-MCNC: 0.2 MG/DL (ref 0.1–1.2)
BILIRUB UR QL STRIP: NEGATIVE
BUN SERPL-MCNC: 12 MG/DL (ref 8–23)
BUN/CREAT SERPL: 12.9 (ref 7–25)
CALCIUM SERPL-MCNC: 10.3 MG/DL (ref 8.6–10.5)
CHLORIDE SERPL-SCNC: 97 MMOL/L (ref 98–107)
CHOLEST SERPL-MCNC: 134 MG/DL (ref 0–200)
CO2 SERPL-SCNC: 34.7 MMOL/L (ref 22–29)
COLOR UR: YELLOW
CREAT SERPL-MCNC: 0.93 MG/DL (ref 0.57–1)
CREAT UR-MCNC: 24.8 MG/DL
EOSINOPHIL # BLD AUTO: 0.11 10*3/MM3 (ref 0–0.7)
EOSINOPHIL NFR BLD AUTO: 1.6 % (ref 0.3–6.2)
EPI CELLS #/AREA URNS HPF: NORMAL /HPF
ERYTHROCYTE [DISTWIDTH] IN BLOOD BY AUTOMATED COUNT: 15.1 % (ref 11.7–13)
GFR SERPLBLD CREATININE-BSD FMLA CKD-EPI: 61 ML/MIN/1.73
GFR SERPLBLD CREATININE-BSD FMLA CKD-EPI: 74 ML/MIN/1.73
GLOBULIN SER CALC-MCNC: 2.8 GM/DL
GLUCOSE SERPL-MCNC: 111 MG/DL (ref 65–99)
GLUCOSE UR QL: NEGATIVE
HBA1C MFR BLD: 6.6 % (ref 4.8–5.6)
HCT VFR BLD AUTO: 42 % (ref 35.6–45.5)
HDLC SERPL-MCNC: 43 MG/DL (ref 40–60)
HGB BLD-MCNC: 12.9 G/DL (ref 11.9–15.5)
HGB UR QL STRIP: NEGATIVE
IMM GRANULOCYTES # BLD: 0 10*3/MM3 (ref 0–0.03)
IMM GRANULOCYTES NFR BLD: 0 % (ref 0–0.5)
KETONES UR QL STRIP: NEGATIVE
LDLC SERPL CALC-MCNC: 66 MG/DL (ref 0–100)
LEUKOCYTE ESTERASE UR QL STRIP: NEGATIVE
LYMPHOCYTES # BLD AUTO: 3.22 10*3/MM3 (ref 0.9–4.8)
LYMPHOCYTES NFR BLD AUTO: 47.3 % (ref 19.6–45.3)
MCH RBC QN AUTO: 27.5 PG (ref 26.9–32)
MCHC RBC AUTO-ENTMCNC: 30.7 G/DL (ref 32.4–36.3)
MCV RBC AUTO: 89.6 FL (ref 80.5–98.2)
MICRO URNS: NORMAL
MICRO URNS: NORMAL
MICROALBUMIN UR-MCNC: <3 UG/ML
MONOCYTES # BLD AUTO: 0.44 10*3/MM3 (ref 0.2–1.2)
MONOCYTES NFR BLD AUTO: 6.5 % (ref 5–12)
NEUTROPHILS # BLD AUTO: 3 10*3/MM3 (ref 1.9–8.1)
NEUTROPHILS NFR BLD AUTO: 44 % (ref 42.7–76)
NITRITE UR QL STRIP: NEGATIVE
PH UR STRIP: 7.5 [PH] (ref 5–7.5)
PLATELET # BLD AUTO: 335 10*3/MM3 (ref 140–500)
POTASSIUM SERPL-SCNC: 4.5 MMOL/L (ref 3.5–5.2)
PROT SERPL-MCNC: 7.4 G/DL (ref 6–8.5)
PROT UR QL STRIP: NEGATIVE
RBC # BLD AUTO: 4.69 10*6/MM3 (ref 3.9–5.2)
RBC #/AREA URNS HPF: NORMAL /HPF
SODIUM SERPL-SCNC: 142 MMOL/L (ref 136–145)
SP GR UR: 1.01 (ref 1–1.03)
TRIGL SERPL-MCNC: 126 MG/DL (ref 0–150)
TSH SERPL DL<=0.005 MIU/L-ACNC: 2.74 MIU/ML (ref 0.27–4.2)
URINALYSIS REFLEX: NORMAL
UROBILINOGEN UR STRIP-MCNC: 0.2 MG/DL (ref 0.2–1)
VLDLC SERPL CALC-MCNC: 25.2 MG/DL (ref 5–40)
WBC # BLD AUTO: 6.81 10*3/MM3 (ref 4.5–10.7)
WBC #/AREA URNS HPF: NORMAL /HPF

## 2018-03-27 ENCOUNTER — OFFICE VISIT (OUTPATIENT)
Dept: INTERNAL MEDICINE | Facility: CLINIC | Age: 63
End: 2018-03-27

## 2018-03-27 VITALS
OXYGEN SATURATION: 98 % | HEIGHT: 66 IN | SYSTOLIC BLOOD PRESSURE: 114 MMHG | WEIGHT: 180 LBS | BODY MASS INDEX: 28.93 KG/M2 | DIASTOLIC BLOOD PRESSURE: 70 MMHG | HEART RATE: 95 BPM | RESPIRATION RATE: 18 BRPM

## 2018-03-27 DIAGNOSIS — R13.19 ESOPHAGEAL DYSPHAGIA: ICD-10-CM

## 2018-03-27 DIAGNOSIS — Z72.0 TOBACCO USE: ICD-10-CM

## 2018-03-27 DIAGNOSIS — E78.2 MIXED HYPERLIPIDEMIA: ICD-10-CM

## 2018-03-27 DIAGNOSIS — N18.30 CHRONIC KIDNEY DISEASE, STAGE III (MODERATE) (HCC): ICD-10-CM

## 2018-03-27 DIAGNOSIS — I10 BENIGN ESSENTIAL HYPERTENSION: ICD-10-CM

## 2018-03-27 DIAGNOSIS — Z00.00 HEALTH MAINTENANCE EXAMINATION: Primary | ICD-10-CM

## 2018-03-27 DIAGNOSIS — R00.2 HEART PALPITATIONS: ICD-10-CM

## 2018-03-27 DIAGNOSIS — J45.40 MODERATE PERSISTENT ASTHMA WITHOUT COMPLICATION: ICD-10-CM

## 2018-03-27 PROCEDURE — 93000 ELECTROCARDIOGRAM COMPLETE: CPT | Performed by: INTERNAL MEDICINE

## 2018-03-27 PROCEDURE — 99396 PREV VISIT EST AGE 40-64: CPT | Performed by: INTERNAL MEDICINE

## 2018-03-27 NOTE — PROGRESS NOTES
Chief Complaint  Linette Ghosh is a 62 y.o. female who presents for Annual Exam (CPE)  .    History of Present Illness   Linette is here for routine cpe.  She had an episode of heart racing last week when she went to bed.  She denied any cp.  She may have been a little short of breath.  She does get lightheaded if she hasn't eaten.  She is still smoking. She is not ready to quit and she does not want LDCT at this time.    Mammo:10/3/17  Pap: s/p hysterectomy  DEXA:3/14/17 normal  C-scope:5/22/15  Eye exam: she goes every three months.      Exercise: none    Immunization History   Administered Date(s) Administered   • Pneumococcal Conjugate (PCV7) 01/17/2011   • Pneumococcal Conjugate 13-Valent (PCV13) 09/13/2016   • Tdap 03/07/2012   • Zoster 03/14/2017       Review of Systems   Constitution: Negative for chills, fever and malaise/fatigue.   HENT: Negative for hearing loss, hoarse voice and sore throat.    Eyes: Negative for blurred vision, double vision and visual disturbance.   Cardiovascular: Positive for palpitations. Negative for chest pain and leg swelling.   Respiratory: Positive for cough (sometimes) and wheezing. Negative for shortness of breath.    Endocrine: Negative for polydipsia and polyuria.   Hematologic/Lymphatic: Does not bruise/bleed easily.   Skin: Negative for rash and suspicious lesions.   Musculoskeletal: Positive for joint pain. Negative for arthritis and myalgias.   Gastrointestinal: Positive for bloating (chronic), constipation (chronic) and dysphagia. Negative for abdominal pain, change in bowel habit, diarrhea, hematochezia, melena, nausea and vomiting.   Genitourinary: Negative for dysuria and hematuria.   Neurological: Positive for light-headedness (if she hasn't eaten). Negative for dizziness and headaches.   Psychiatric/Behavioral: Negative for depression. The patient is nervous/anxious.        Patient Active Problem List   Diagnosis   • Moderate persistent asthma without complication    • Asthma   • Benign essential hypertension   • Hyperlipidemia   • Arthralgia of hip   • Pain of thigh   • Microscopic hematuria   • Type 2 diabetes mellitus   • Tobacco use   • Chronic kidney disease, stage III (moderate)   • Iron deficiency anemia       Past Medical History:   Diagnosis Date   • Asthma    • Carotid artery stenosis    • Diabetes mellitus    • GERD (gastroesophageal reflux disease)    • Glaucoma    • Hydronephrosis    • Hyperlipidemia    • Hypertension    • Intracranial hemorrhage    • Pneumonia    • Polyp of sigmoid colon    • Vitamin D deficiency        Past Surgical History:   Procedure Laterality Date   • GALLBLADDER SURGERY     • HERNIA REPAIR     • HYSTERECTOMY         Family History   Problem Relation Age of Onset   • Hypertension Mother    • Diabetes Mother    • Hypertension Sister    • Diabetes Sister    • Hypertension Brother    • Diabetes Brother    • Lung cancer Maternal Uncle    • Stroke Maternal Grandfather        Social History     Social History   • Marital status: Unknown     Spouse name: N/A   • Number of children: 0   • Years of education: N/A     Occupational History   • [employer]      Social History Main Topics   • Smoking status: Current Every Day Smoker   • Smokeless tobacco: Never Used   • Alcohol use Yes      Comment: socially   • Drug use: No   • Sexual activity: Not on file     Other Topics Concern   • Not on file     Social History Narrative   • No narrative on file       Current Outpatient Prescriptions on File Prior to Visit   Medication Sig Dispense Refill   • albuterol (PROVENTIL HFA;VENTOLIN HFA) 108 (90 BASE) MCG/ACT inhaler Inhale 2 puffs Every 4 (Four) Hours As Needed for Wheezing or Shortness of Air. 18 g 3   • albuterol (PROVENTIL) (2.5 MG/3ML) 0.083% nebulizer solution USE ONE VIAL VIA NEBULIZER BY MOUTH EVERY 6 TO 8 HOURS AS NEEDED 540 mL 1   • aspirin 325 MG tablet Take  by mouth daily.     • desloratadine (CLARINEX) 5 MG tablet Take 1 tablet by mouth Daily. 90  "tablet 3   • fluticasone (FLONASE) 50 MCG/ACT nasal spray 2 sprays into each nostril Daily. 3 bottle 3   • hydrOXYzine (ATARAX) 25 MG tablet TAKE 1 TABLET BY MOUTH EVERY EIGHT HOURS AS NEEDED FOR ITCHING 270 tablet 2   • lisinopril-hydrochlorothiazide (PRINZIDE,ZESTORETIC) 20-12.5 MG per tablet TAKE 2 TABLETS BY MOUTH DAILY 180 tablet 3   • metFORMIN (GLUCOPHAGE) 1000 MG tablet TAKE 1 TABLET BY MOUTH TWICE DAILY WITH MEALS 180 tablet 1   • montelukast (SINGULAIR) 10 MG tablet TAKE 1 TABLET BY MOUTH EVERY NIGHT 90 tablet 3   • Multiple Vitamins-Minerals (CENTRUM SILVER PO) Take  by mouth daily.     • ondansetron (ZOFRAN) 4 MG tablet Take 1 tablet by mouth Every 8 (Eight) Hours As Needed for Nausea or Vomiting. 12 tablet 0   • pantoprazole (PROTONIX) 40 MG EC tablet Take 1 tablet by mouth Daily. 90 tablet 3   • simvastatin (ZOCOR) 20 MG tablet TAKE 1 TABLET BY MOUTH EVERY NIGHT 90 tablet 3   • SYMBICORT 160-4.5 MCG/ACT inhaler INHALE TWO PUFFS BY MOUTH TWICE A DAY **RINSE MOUTH AFTER USE** 10.2 g 10   • timolol (TIMOPTIC) 0.25 % ophthalmic solution Administer 1 drop to both eyes Daily.       No current facility-administered medications on file prior to visit.        Allergies   Allergen Reactions   • Penicillins        Vitals:    03/27/18 0831   BP: 114/70   Pulse: 95   Resp: 18   SpO2: 98%   Weight: 81.6 kg (180 lb)   Height: 167.6 cm (65.98\")       Body mass index is 29.07 kg/m².    Objective   Physical Exam   Constitutional: She is oriented to person, place, and time. She appears well-developed and well-nourished. No distress.   HENT:   Head: Normocephalic and atraumatic.   Nose: Nose normal.   Mouth/Throat: Oropharynx is clear and moist.   Eyes: Conjunctivae and EOM are normal. Pupils are equal, round, and reactive to light. No scleral icterus.   Neck: Normal range of motion. Neck supple. No thyromegaly present.   Cardiovascular: Normal rate, regular rhythm and normal heart sounds.  Exam reveals no gallop and no " friction rub.    No murmur heard.  Pulses:       Carotid pulses are 2+ on the right side, and 2+ on the left side.       Femoral pulses are 2+ on the right side, and 2+ on the left side.       Dorsalis pedis pulses are 2+ on the right side, and 2+ on the left side.        Posterior tibial pulses are 2+ on the right side, and 2+ on the left side.   Pulmonary/Chest: Effort normal and breath sounds normal. No respiratory distress. She has no wheezes. She has no rales. Right breast exhibits no mass and no nipple discharge. Left breast exhibits no mass and no nipple discharge.   Abdominal: Soft. Bowel sounds are normal. She exhibits no distension and no mass. There is no tenderness.   Musculoskeletal: Normal range of motion. She exhibits no edema.    Linette had a diabetic foot exam performed today.   During the foot exam she had a monofilament test performed (normal in all six areas mike).  Vascular Status -  Her right foot exhibits normal foot vasculature  and no edema. Her left foot exhibits normal foot vasculature  and no edema.  Skin Integrity  -  Her right foot skin is intact.Her left foot skin is intact..  Lymphadenopathy:     She has no cervical adenopathy.     She has no axillary adenopathy.        Right: No inguinal and no supraclavicular adenopathy present.        Left: No inguinal and no supraclavicular adenopathy present.   Neurological: She is alert and oriented to person, place, and time. She has normal reflexes. No cranial nerve deficit.   Skin: Skin is warm and dry.   Psychiatric: She has a normal mood and affect. Her speech is normal and behavior is normal. Judgment and thought content normal. Cognition and memory are normal.   Vitals reviewed.        Results for orders placed or performed in visit on 03/16/18   Comprehensive Metabolic Panel   Result Value Ref Range    Glucose 111 (H) 65 - 99 mg/dL    BUN 12 8 - 23 mg/dL    Creatinine 0.93 0.57 - 1.00 mg/dL    eGFR Non African Am 61 >60 mL/min/1.73    eGFR   Am 74 >60 mL/min/1.73    BUN/Creatinine Ratio 12.9 7.0 - 25.0    Sodium 142 136 - 145 mmol/L    Potassium 4.5 3.5 - 5.2 mmol/L    Chloride 97 (L) 98 - 107 mmol/L    Total CO2 34.7 (H) 22.0 - 29.0 mmol/L    Calcium 10.3 8.6 - 10.5 mg/dL    Total Protein 7.4 6.0 - 8.5 g/dL    Albumin 4.60 3.50 - 5.20 g/dL    Globulin 2.8 gm/dL    A/G Ratio 1.6 g/dL    Total Bilirubin 0.2 0.1 - 1.2 mg/dL    Alkaline Phosphatase 70 39 - 117 U/L    AST (SGOT) 16 1 - 32 U/L    ALT (SGPT) 11 1 - 33 U/L   Lipid Panel   Result Value Ref Range    Total Cholesterol 134 0 - 200 mg/dL    Triglycerides 126 0 - 150 mg/dL    HDL Cholesterol 43 40 - 60 mg/dL    VLDL Cholesterol 25.2 5 - 40 mg/dL    LDL Cholesterol  66 0 - 100 mg/dL   TSH Rfx On Abnormal To Free T4   Result Value Ref Range    TSH 2.74 0.27 - 4.2 mIU/mL   Urinalysis With / Culture If Indicated - Urine, Clean Catch   Result Value Ref Range    Specific Gravity, UA 1.013 1.005 - 1.030    pH, UA 7.5 5.0 - 7.5    Color, UA Yellow Yellow    Appearance, UA Clear Clear    Leukocytes, UA Negative Negative    Protein Negative Negative/Trace    Glucose, UA Negative Negative    Ketones Negative Negative    Blood, UA Negative Negative    Bilirubin, UA Negative Negative    Urobilinogen, UA 0.2 0.2 - 1.0 mg/dL    Nitrite, UA Negative Negative    Microscopic Examination Comment     MICROSCOPIC EXAMINATION See below:     Urinalysis Reflex Comment    Vitamin D 25 Hydroxy   Result Value Ref Range    25 Hydroxy, Vitamin D 52.9 30.0 - 100.0 ng/ml   Hemoglobin A1c   Result Value Ref Range    Hemoglobin A1C 6.60 (H) 4.80 - 5.60 %   Microalbumin / Creatinine Urine Ratio - Urine, Clean Catch   Result Value Ref Range    Creatinine, Urine 24.8 Not Estab. mg/dL    Microalbumin, Urine <3.0 Not Estab. ug/mL    Microalbumin/Creatinine Ratio <12.1 0.0 - 30.0 mg/g creat   Microscopic Examination   Result Value Ref Range    WBC, UA 0-5 0 - 5 /hpf    RBC, UA None seen 0 - 2 /hpf    Epithelial Cells (non renal)  0-10 0 - 10 /hpf    Bacteria, UA Few None seen/Few /hpf   CBC & Differential   Result Value Ref Range    WBC 6.81 4.50 - 10.70 10*3/mm3    RBC 4.69 3.90 - 5.20 10*6/mm3    Hemoglobin 12.9 11.9 - 15.5 g/dL    Hematocrit 42.0 35.6 - 45.5 %    MCV 89.6 80.5 - 98.2 fL    MCH 27.5 26.9 - 32.0 pg    MCHC 30.7 (L) 32.4 - 36.3 g/dL    RDW 15.1 (H) 11.7 - 13.0 %    Platelets 335 140 - 500 10*3/mm3    Neutrophil Rel % 44.0 42.7 - 76.0 %    Lymphocyte Rel % 47.3 (H) 19.6 - 45.3 %    Monocyte Rel % 6.5 5.0 - 12.0 %    Eosinophil Rel % 1.6 0.3 - 6.2 %    Basophil Rel % 0.6 0.0 - 1.5 %    Neutrophils Absolute 3.00 1.90 - 8.10 10*3/mm3    Lymphocytes Absolute 3.22 0.90 - 4.80 10*3/mm3    Monocytes Absolute 0.44 0.20 - 1.20 10*3/mm3    Eosinophils Absolute 0.11 0.00 - 0.70 10*3/mm3    Basophils Absolute 0.04 0.00 - 0.20 10*3/mm3    Immature Granulocyte Rel % 0.0 0.0 - 0.5 %    Immature Grans Absolute 0.00 0.00 - 0.03 10*3/mm3       ECG 12 Lead  Date/Time: 3/27/2018 9:25 AM  Performed by: MAYRA OLSEN  Authorized by: MAYRA OLSEN   Comparison: compared with previous ECG from 4/17/2017  Similar to previous ECG  Rhythm: sinus rhythm  Rate: normal  Conduction: conduction normal  ST Segments: ST segments normal  T Waves: T waves normal  QRS axis: normal  Other: no other findings  Clinical impression: normal ECG  Comments: Reason for exam: palpitations            Assessment/Plan   Diagnoses and all orders for this visit:    Health maintenance examination    Benign essential hypertension    Mixed hyperlipidemia    Moderate persistent asthma without complication    Chronic kidney disease, stage III (moderate)    Tobacco use  -     Ambulatory Referral to Gastroenterology    Esophageal dysphagia  -     Ambulatory Referral to Gastroenterology        Discussion/Summary  Linette is here for routine CPE.  She is up to date on most health maintenance.  Her bp is very well controlled. Her creatinine actually looks a lot better.   Her A1c is  controlled but did increase from last visit.  She is not getting any exercise.  I have really encouraged her to work on this.  I am concerned about the dysphagia to liquids in a long time smoker.  I have put in a referral for GI for an EGD.  Continue current meds.  FMLA papers filled out for her asthma.      I recommended lung cancer screening with  LDCT screening based on age and pack-year history, absence of symptoms and no personal history of lung cancer.  We discussed the benefits of screening including reduced mortality.  We also discussed potential for false positive and need for continued testing.  We discussed the importance of adherence to annual LDCT lung cancer screening.  I have given the patient a packet of papers with handouts from the [place] [facility] and [place] [employer] which includes the potential benefits and harms of lung cancer screening, the Five Keys for Quitting, A Personalized Quit Plan, Phone and Website resources, and a table on first line medications used for smoking cessation and the 1-800-Quit-Now handout.  She has declined this screening but will let me know if she changes her mind.        No Follow-up on file.

## 2018-03-29 RX ORDER — FLUTICASONE PROPIONATE 50 MCG
2 SPRAY, SUSPENSION (ML) NASAL DAILY
Qty: 3 BOTTLE | Refills: 3 | Status: SHIPPED | OUTPATIENT
Start: 2018-03-29 | End: 2019-03-18 | Stop reason: SDUPTHER

## 2018-05-21 RX ORDER — FLUTICASONE PROPIONATE 50 MCG
SPRAY, SUSPENSION (ML) NASAL
Qty: 16 G | Refills: 2 | Status: SHIPPED | OUTPATIENT
Start: 2018-05-21 | End: 2018-12-30 | Stop reason: SDUPTHER

## 2018-09-17 DIAGNOSIS — N18.30 CHRONIC KIDNEY DISEASE, STAGE III (MODERATE) (HCC): ICD-10-CM

## 2018-09-17 DIAGNOSIS — E11.22 TYPE 2 DIABETES MELLITUS WITH STAGE 3 CHRONIC KIDNEY DISEASE, WITHOUT LONG-TERM CURRENT USE OF INSULIN (HCC): ICD-10-CM

## 2018-09-17 DIAGNOSIS — D50.9 IRON DEFICIENCY ANEMIA, UNSPECIFIED IRON DEFICIENCY ANEMIA TYPE: ICD-10-CM

## 2018-09-17 DIAGNOSIS — N18.30 TYPE 2 DIABETES MELLITUS WITH STAGE 3 CHRONIC KIDNEY DISEASE, WITHOUT LONG-TERM CURRENT USE OF INSULIN (HCC): ICD-10-CM

## 2018-09-17 DIAGNOSIS — I10 BENIGN ESSENTIAL HYPERTENSION: Primary | ICD-10-CM

## 2018-09-17 DIAGNOSIS — E78.2 MIXED HYPERLIPIDEMIA: ICD-10-CM

## 2018-09-18 LAB
ALBUMIN SERPL-MCNC: 4.3 G/DL (ref 3.5–5.2)
ALBUMIN/GLOB SERPL: 1.4 G/DL
ALP SERPL-CCNC: 74 U/L (ref 39–117)
ALT SERPL-CCNC: 12 U/L (ref 1–33)
AST SERPL-CCNC: 12 U/L (ref 1–32)
BASOPHILS # BLD AUTO: 0.02 10*3/MM3 (ref 0–0.2)
BASOPHILS NFR BLD AUTO: 0.3 % (ref 0–1.5)
BILIRUB SERPL-MCNC: 0.2 MG/DL (ref 0.1–1.2)
BUN SERPL-MCNC: 13 MG/DL (ref 8–23)
BUN/CREAT SERPL: 12.9 (ref 7–25)
CALCIUM SERPL-MCNC: 10.2 MG/DL (ref 8.6–10.5)
CHLORIDE SERPL-SCNC: 94 MMOL/L (ref 98–107)
CO2 SERPL-SCNC: 31.5 MMOL/L (ref 22–29)
CREAT SERPL-MCNC: 1.01 MG/DL (ref 0.57–1)
EOSINOPHIL # BLD AUTO: 0.05 10*3/MM3 (ref 0–0.7)
EOSINOPHIL NFR BLD AUTO: 0.6 % (ref 0.3–6.2)
ERYTHROCYTE [DISTWIDTH] IN BLOOD BY AUTOMATED COUNT: 16.4 % (ref 11.7–13)
GLOBULIN SER CALC-MCNC: 3.1 GM/DL
GLUCOSE SERPL-MCNC: 110 MG/DL (ref 65–99)
HBA1C MFR BLD: 7 % (ref 4.8–5.6)
HCT VFR BLD AUTO: 38 % (ref 35.6–45.5)
HGB BLD-MCNC: 11.5 G/DL (ref 11.9–15.5)
IMM GRANULOCYTES # BLD: 0.01 10*3/MM3 (ref 0–0.03)
IMM GRANULOCYTES NFR BLD: 0.1 % (ref 0–0.5)
LYMPHOCYTES # BLD AUTO: 2.75 10*3/MM3 (ref 0.9–4.8)
LYMPHOCYTES NFR BLD AUTO: 35.5 % (ref 19.6–45.3)
MCH RBC QN AUTO: 24.5 PG (ref 26.9–32)
MCHC RBC AUTO-ENTMCNC: 30.3 G/DL (ref 32.4–36.3)
MCV RBC AUTO: 80.9 FL (ref 80.5–98.2)
MONOCYTES # BLD AUTO: 0.68 10*3/MM3 (ref 0.2–1.2)
MONOCYTES NFR BLD AUTO: 8.8 % (ref 5–12)
NEUTROPHILS # BLD AUTO: 4.24 10*3/MM3 (ref 1.9–8.1)
NEUTROPHILS NFR BLD AUTO: 54.8 % (ref 42.7–76)
PLATELET # BLD AUTO: 386 10*3/MM3 (ref 140–500)
POTASSIUM SERPL-SCNC: 4.6 MMOL/L (ref 3.5–5.2)
PROT SERPL-MCNC: 7.4 G/DL (ref 6–8.5)
RBC # BLD AUTO: 4.7 10*6/MM3 (ref 3.9–5.2)
SODIUM SERPL-SCNC: 137 MMOL/L (ref 136–145)
WBC # BLD AUTO: 7.74 10*3/MM3 (ref 4.5–10.7)

## 2018-09-19 LAB
FERRITIN SERPL-MCNC: 9 NG/ML (ref 13–150)
IRON SATN MFR SERPL: 10 % (ref 20–50)
IRON SERPL-MCNC: 56 MCG/DL (ref 37–145)
Lab: NORMAL
TIBC SERPL-MCNC: 565 MCG/DL
UIBC SERPL-MCNC: 509 MCG/DL
VIT B12 SERPL-MCNC: 1038 PG/ML (ref 211–946)
WRITTEN AUTHORIZATION: NORMAL

## 2018-10-04 ENCOUNTER — APPOINTMENT (OUTPATIENT)
Dept: WOMENS IMAGING | Facility: HOSPITAL | Age: 63
End: 2018-10-04

## 2018-10-04 PROCEDURE — 77067 SCR MAMMO BI INCL CAD: CPT | Performed by: RADIOLOGY

## 2018-10-09 ENCOUNTER — OFFICE VISIT (OUTPATIENT)
Dept: INTERNAL MEDICINE | Facility: CLINIC | Age: 63
End: 2018-10-09

## 2018-10-09 VITALS
HEART RATE: 73 BPM | WEIGHT: 170 LBS | DIASTOLIC BLOOD PRESSURE: 82 MMHG | RESPIRATION RATE: 18 BRPM | HEIGHT: 66 IN | OXYGEN SATURATION: 98 % | BODY MASS INDEX: 27.32 KG/M2 | TEMPERATURE: 98.6 F | SYSTOLIC BLOOD PRESSURE: 116 MMHG

## 2018-10-09 DIAGNOSIS — I10 BENIGN ESSENTIAL HYPERTENSION: Primary | ICD-10-CM

## 2018-10-09 DIAGNOSIS — N18.30 TYPE 2 DIABETES MELLITUS WITH STAGE 3 CHRONIC KIDNEY DISEASE, WITHOUT LONG-TERM CURRENT USE OF INSULIN (HCC): ICD-10-CM

## 2018-10-09 DIAGNOSIS — N18.30 CHRONIC KIDNEY DISEASE, STAGE III (MODERATE) (HCC): ICD-10-CM

## 2018-10-09 DIAGNOSIS — E78.2 MIXED HYPERLIPIDEMIA: ICD-10-CM

## 2018-10-09 DIAGNOSIS — M25.562 ACUTE PAIN OF LEFT KNEE: ICD-10-CM

## 2018-10-09 DIAGNOSIS — D50.9 IRON DEFICIENCY ANEMIA, UNSPECIFIED IRON DEFICIENCY ANEMIA TYPE: ICD-10-CM

## 2018-10-09 DIAGNOSIS — E11.22 TYPE 2 DIABETES MELLITUS WITH STAGE 3 CHRONIC KIDNEY DISEASE, WITHOUT LONG-TERM CURRENT USE OF INSULIN (HCC): ICD-10-CM

## 2018-10-09 LAB
BASOPHILS # BLD AUTO: 0.02 10*3/MM3 (ref 0–0.2)
BASOPHILS NFR BLD AUTO: 0.3 % (ref 0–1.5)
EOSINOPHIL # BLD AUTO: 0.06 10*3/MM3 (ref 0–0.7)
EOSINOPHIL NFR BLD AUTO: 1 % (ref 0.3–6.2)
ERYTHROCYTE [DISTWIDTH] IN BLOOD BY AUTOMATED COUNT: 16.6 % (ref 11.7–13)
HCT VFR BLD AUTO: 37.2 % (ref 35.6–45.5)
HGB BLD-MCNC: 10.8 G/DL (ref 11.9–15.5)
IMM GRANULOCYTES # BLD: 0 10*3/MM3 (ref 0–0.03)
IMM GRANULOCYTES NFR BLD: 0 % (ref 0–0.5)
LYMPHOCYTES # BLD AUTO: 2.16 10*3/MM3 (ref 0.9–4.8)
LYMPHOCYTES NFR BLD AUTO: 35.5 % (ref 19.6–45.3)
MCH RBC QN AUTO: 23.5 PG (ref 26.9–32)
MCHC RBC AUTO-ENTMCNC: 29 G/DL (ref 32.4–36.3)
MCV RBC AUTO: 81 FL (ref 80.5–98.2)
MONOCYTES # BLD AUTO: 0.62 10*3/MM3 (ref 0.2–1.2)
MONOCYTES NFR BLD AUTO: 10.2 % (ref 5–12)
NEUTROPHILS # BLD AUTO: 3.22 10*3/MM3 (ref 1.9–8.1)
NEUTROPHILS NFR BLD AUTO: 53 % (ref 42.7–76)
PLATELET # BLD AUTO: 408 10*3/MM3 (ref 140–500)
RBC # BLD AUTO: 4.59 10*6/MM3 (ref 3.9–5.2)
WBC # BLD AUTO: 6.08 10*3/MM3 (ref 4.5–10.7)

## 2018-10-09 PROCEDURE — 99214 OFFICE O/P EST MOD 30 MIN: CPT | Performed by: INTERNAL MEDICINE

## 2018-10-09 NOTE — PROGRESS NOTES
Chief Complaint  Linette Ghosh is a 63 y.o. female who presents for Hypertension (6 month follow up ); Hyperlipidemia; Diabetes; and Chronic Kidney Disease  .    History of Present Illness   Linette is here for routine follow up on her HTN, HLD, DM, and CKD.  She was noted to have an iron deficiency anemia on her most recent labs.  She denies any blood in her stool or black tarry stool.  She is prone to constipation.  Her last c-scope was 2015.  She has had polyps in the past.  No abd pain or bloating.    Her left knee has been bothering her for two weeks.  She is really not getting any exercise.  No cp or dizziness.  She has occ palpitations.  Occ SOA with her asthma.  She has some congestion now.  She is still smoking.        Review of Systems   Constitution: Positive for weight loss. Negative for malaise/fatigue.   Cardiovascular: Positive for palpitations (occ). Negative for chest pain, dyspnea on exertion and leg swelling.   Respiratory: Positive for cough (occ) and wheezing (occ).    Endocrine: Negative for polydipsia and polyuria.   Musculoskeletal: Positive for joint pain (left knee) and joint swelling (left knee).   Gastrointestinal: Positive for constipation (she is prone to this). Negative for bloating, abdominal pain and change in bowel habit.   Neurological: Negative for dizziness and light-headedness.       Patient Active Problem List   Diagnosis   • Moderate persistent asthma without complication   • Asthma   • Benign essential hypertension   • Hyperlipidemia   • Arthralgia of hip   • Pain of thigh   • Microscopic hematuria   • Type 2 diabetes mellitus (CMS/HCC)   • Tobacco use   • Chronic kidney disease, stage III (moderate) (CMS/HCC)   • Iron deficiency anemia       Past Medical History:   Diagnosis Date   • Asthma    • Carotid artery stenosis    • Diabetes mellitus (CMS/HCC)    • GERD (gastroesophageal reflux disease)    • Glaucoma    • Hydronephrosis    • Hyperlipidemia    • Hypertension    •  Intracranial hemorrhage (CMS/HCC)    • Pneumonia    • Polyp of sigmoid colon    • Vitamin D deficiency        Past Surgical History:   Procedure Laterality Date   • GALLBLADDER SURGERY     • HERNIA REPAIR     • HYSTERECTOMY         Family History   Problem Relation Age of Onset   • Hypertension Mother    • Diabetes Mother    • Hypertension Sister    • Diabetes Sister    • Hypertension Brother    • Diabetes Brother    • Lung cancer Maternal Uncle    • Stroke Maternal Grandfather        Social History     Social History   • Marital status: Unknown     Spouse name: N/A   • Number of children: 0   • Years of education: N/A     Occupational History   • [employer]      Social History Main Topics   • Smoking status: Current Every Day Smoker   • Smokeless tobacco: Never Used   • Alcohol use Yes      Comment: socially   • Drug use: No   • Sexual activity: Not on file     Other Topics Concern   • Not on file     Social History Narrative   • No narrative on file       Current Outpatient Prescriptions on File Prior to Visit   Medication Sig Dispense Refill   • albuterol (PROVENTIL HFA;VENTOLIN HFA) 108 (90 BASE) MCG/ACT inhaler Inhale 2 puffs Every 4 (Four) Hours As Needed for Wheezing or Shortness of Air. 18 g 3   • albuterol (PROVENTIL) (2.5 MG/3ML) 0.083% nebulizer solution USE ONE VIAL VIA NEBULIZER BY MOUTH EVERY 6 TO 8 HOURS AS NEEDED 540 mL 1   • aspirin 325 MG tablet Take  by mouth daily.     • desloratadine (CLARINEX) 5 MG tablet Take 1 tablet by mouth Daily. 90 tablet 3   • fluticasone (FLONASE) 50 MCG/ACT nasal spray 2 sprays into each nostril Daily. 3 bottle 3   • fluticasone (FLONASE) 50 MCG/ACT nasal spray USE 2 SPRAYS IN EACH NOSTRIL DAILY 16 g 2   • hydrOXYzine (ATARAX) 25 MG tablet TAKE 1 TABLET BY MOUTH EVERY EIGHT HOURS AS NEEDED FOR ITCHING 270 tablet 2   • lisinopril-hydrochlorothiazide (PRINZIDE,ZESTORETIC) 20-12.5 MG per tablet TAKE 2 TABLETS BY MOUTH DAILY 180 tablet 3   • metFORMIN (GLUCOPHAGE) 1000 MG tablet  "TAKE 1 TABLET BY MOUTH TWICE DAILY WITH MEALS 180 tablet 3   • montelukast (SINGULAIR) 10 MG tablet TAKE 1 TABLET BY MOUTH EVERY NIGHT 90 tablet 3   • Multiple Vitamins-Minerals (CENTRUM SILVER PO) Take  by mouth daily.     • ondansetron (ZOFRAN) 4 MG tablet Take 1 tablet by mouth Every 8 (Eight) Hours As Needed for Nausea or Vomiting. 12 tablet 0   • pantoprazole (PROTONIX) 40 MG EC tablet Take 1 tablet by mouth Daily. 90 tablet 3   • simvastatin (ZOCOR) 20 MG tablet TAKE 1 TABLET BY MOUTH EVERY NIGHT 90 tablet 3   • SYMBICORT 160-4.5 MCG/ACT inhaler INHALE TWO PUFFS BY MOUTH TWICE A DAY **RINSE MOUTH AFTER USE** 10.2 g 10   • timolol (TIMOPTIC) 0.25 % ophthalmic solution Administer 1 drop to both eyes Daily.       No current facility-administered medications on file prior to visit.        Allergies   Allergen Reactions   • Penicillins        Vitals:    10/09/18 0804   BP: 116/82   BP Location: Left arm   Patient Position: Sitting   Cuff Size: Adult   Pulse: 73   Resp: 18   Temp: 98.6 °F (37 °C)   TempSrc: Oral   SpO2: 98%   Weight: 77.1 kg (170 lb)   Height: 167.6 cm (66\")       Body mass index is 27.44 kg/m².    Objective   Physical Exam   Constitutional: She is oriented to person, place, and time. She appears well-developed and well-nourished. No distress.   HENT:   Head: Normocephalic and atraumatic.   Eyes: Conjunctivae are normal. No scleral icterus.   Neck: Normal range of motion. Neck supple.   Cardiovascular: Normal rate, regular rhythm and normal heart sounds.  Exam reveals no gallop and no friction rub.    No murmur heard.  Pulmonary/Chest: Effort normal and breath sounds normal. No respiratory distress. She has no wheezes. She has no rales.   Musculoskeletal: She exhibits no edema.        Left knee: She exhibits no deformity. Tenderness found. Medial joint line and lateral joint line tenderness noted.   Lymphadenopathy:     She has no cervical adenopathy.   Neurological: She is alert and oriented to " person, place, and time. No cranial nerve deficit.   Psychiatric: She has a normal mood and affect. Her behavior is normal. Judgment and thought content normal.       Results for orders placed or performed in visit on 09/17/18   Comprehensive Metabolic Panel   Result Value Ref Range    Glucose 110 (H) 65 - 99 mg/dL    BUN 13 8 - 23 mg/dL    Creatinine 1.01 (H) 0.57 - 1.00 mg/dL    eGFR Non African Am 55 (L) >60 mL/min/1.73    eGFR African Am 67 >60 mL/min/1.73    BUN/Creatinine Ratio 12.9 7.0 - 25.0    Sodium 137 136 - 145 mmol/L    Potassium 4.6 3.5 - 5.2 mmol/L    Chloride 94 (L) 98 - 107 mmol/L    Total CO2 31.5 (H) 22.0 - 29.0 mmol/L    Calcium 10.2 8.6 - 10.5 mg/dL    Total Protein 7.4 6.0 - 8.5 g/dL    Albumin 4.30 3.50 - 5.20 g/dL    Globulin 3.1 gm/dL    A/G Ratio 1.4 g/dL    Total Bilirubin 0.2 0.1 - 1.2 mg/dL    Alkaline Phosphatase 74 39 - 117 U/L    AST (SGOT) 12 1 - 32 U/L    ALT (SGPT) 12 1 - 33 U/L   Hemoglobin A1c   Result Value Ref Range    Hemoglobin A1C 7.00 (H) 4.80 - 5.60 %   Iron Profile   Result Value Ref Range    TIBC 565 mcg/dL    UIBC 509 mcg/dL    Iron 56 37 - 145 mcg/dL    Iron Saturation 10 (L) 20 - 50 %   Ferritin   Result Value Ref Range    Ferritin 9.00 (L) 13.00 - 150.00 ng/mL   Vitamin B12   Result Value Ref Range    Vitamin B-12 1,038 (H) 211 - 946 pg/mL   Please Note   Result Value Ref Range    Please note Comment    Written Authorization   Result Value Ref Range    Written Authorization Comment    CBC & Differential   Result Value Ref Range    WBC 7.74 4.50 - 10.70 10*3/mm3    RBC 4.70 3.90 - 5.20 10*6/mm3    Hemoglobin 11.5 (L) 11.9 - 15.5 g/dL    Hematocrit 38.0 35.6 - 45.5 %    MCV 80.9 80.5 - 98.2 fL    MCH 24.5 (L) 26.9 - 32.0 pg    MCHC 30.3 (L) 32.4 - 36.3 g/dL    RDW 16.4 (H) 11.7 - 13.0 %    Platelets 386 140 - 500 10*3/mm3    Neutrophil Rel % 54.8 42.7 - 76.0 %    Lymphocyte Rel % 35.5 19.6 - 45.3 %    Monocyte Rel % 8.8 5.0 - 12.0 %    Eosinophil Rel % 0.6 0.3 - 6.2 %     Basophil Rel % 0.3 0.0 - 1.5 %    Neutrophils Absolute 4.24 1.90 - 8.10 10*3/mm3    Lymphocytes Absolute 2.75 0.90 - 4.80 10*3/mm3    Monocytes Absolute 0.68 0.20 - 1.20 10*3/mm3    Eosinophils Absolute 0.05 0.00 - 0.70 10*3/mm3    Basophils Absolute 0.02 0.00 - 0.20 10*3/mm3    Immature Granulocyte Rel % 0.1 0.0 - 0.5 %    Immature Grans Absolute 0.01 0.00 - 0.03 10*3/mm3       Assessment/Plan   Diagnoses and all orders for this visit:    Benign essential hypertension    Mixed hyperlipidemia    Type 2 diabetes mellitus with stage 3 chronic kidney disease, without long-term current use of insulin (CMS/[facility])    Chronic kidney disease, stage III (moderate) (CMS/[facility])    Acute pain of left knee  -     Ambulatory Referral to Orthopedic Surgery    Iron deficiency anemia, unspecified iron deficiency anemia type  -     CBC & Differential        Discussion/Summary  Linette is here for routine follow up.  Her bp is very well controlled.  Her kidney function looks very good.  Her A1c has gone up some but she has been drinking more soda.  I have asked her to really work on this.  Referral placed for her knee.  I am concerned about the drop in her hgb.  Will repeat a CBC today.  I sent her home with a stool kit.  Advised not to mail this back but to bring it back in.  Unfortunately, she is still smoking with no intention to quit.  She declines flu shots.  I have on many occasions advised her that not getting a flu shot is a very risky idea given her asthma and DM.  She is resolute in her decision not to get one.    Return in about 6 months (around 4/9/2019) for Annual physical, with fasting labs prior.

## 2018-10-10 ENCOUNTER — TELEPHONE (OUTPATIENT)
Dept: INTERNAL MEDICINE | Facility: CLINIC | Age: 63
End: 2018-10-10

## 2018-10-10 NOTE — TELEPHONE ENCOUNTER
----- Message from Yessenia Joe MD sent at 10/10/2018  9:56 AM EDT -----  Please call - her hgb is lower than it was three weeks ago.  I really want her to get the stool study back asap.  Has she had any GI discomfort, bloating, cramping, change in bowel habits at all??

## 2018-10-11 ENCOUNTER — TELEPHONE (OUTPATIENT)
Dept: INTERNAL MEDICINE | Facility: CLINIC | Age: 63
End: 2018-10-11

## 2018-10-11 DIAGNOSIS — D50.9 IRON DEFICIENCY ANEMIA, UNSPECIFIED IRON DEFICIENCY ANEMIA TYPE: Primary | ICD-10-CM

## 2018-10-11 NOTE — TELEPHONE ENCOUNTER
----- Message from Yessenia Joe MD sent at 10/11/2018  4:09 PM EDT -----  Please call - her stool is negative for blood.  Did she used to be on iron?  Certainly, some of the meds she is on could be hampering her ability to absorb iron.

## 2018-10-11 NOTE — TELEPHONE ENCOUNTER
Pt informed, states understanding.   States that you use to have her take iron a while back and she has discontinued taking it.   Wants to know if you'd like her to restart this and if so does she need a prescription strength?    Also stated that she has been under a lot of stress lately and was curious if this would cause her hemoglobin to drop so low.

## 2018-10-15 NOTE — TELEPHONE ENCOUNTER
Please call - I would like for her to take iron (Slo-Fe) three times a week.i reviewed her labs from the past year.  It looks like her iron and hgb have been high and low and we have had a hard time keeping this in balance.  Let's see if we can keep her iron levels balanced with three time a week iron.  Repeat labs in 6 weeks.    CBC, iron studies.

## 2018-10-15 NOTE — TELEPHONE ENCOUNTER
Pt informed, via voicemail.   Told to call back if she had questions and to call back and schedule a nonfasting lab apt 6 weeks after starting the iron.

## 2018-11-01 ENCOUNTER — OFFICE VISIT (OUTPATIENT)
Dept: ORTHOPEDIC SURGERY | Facility: CLINIC | Age: 63
End: 2018-11-01

## 2018-11-01 VITALS — TEMPERATURE: 99.1 F | HEIGHT: 64 IN | BODY MASS INDEX: 28.85 KG/M2 | WEIGHT: 169 LBS

## 2018-11-01 DIAGNOSIS — M25.562 ACUTE PAIN OF BOTH KNEES: Primary | ICD-10-CM

## 2018-11-01 DIAGNOSIS — M25.561 ACUTE PAIN OF BOTH KNEES: Primary | ICD-10-CM

## 2018-11-01 DIAGNOSIS — M25.562 ACUTE PAIN OF LEFT KNEE: ICD-10-CM

## 2018-11-01 PROCEDURE — 99204 OFFICE O/P NEW MOD 45 MIN: CPT | Performed by: NURSE PRACTITIONER

## 2018-11-01 PROCEDURE — 73562 X-RAY EXAM OF KNEE 3: CPT | Performed by: NURSE PRACTITIONER

## 2018-11-01 NOTE — PROGRESS NOTES
Patient: Linette Ghosh  YOB: 1955 63 y.o. female  Medical Record Number: [id number]    Chief Complaints:   Chief Complaint   Patient presents with   • Right Knee - Pain   • Left Knee - Pain       History of Present Illness:Linette Ghosh is a 63 y.o. female who presents as a new patient both myself as well as to the practice with complaints of left knee pain.  Patient reports it started 4 weeks ago.  Acute onset after twisting her knee.  She does have some mild right knee pain but nothing like the left.  She describes the knee pain as a severe intermittent stabbing type pain with clicking swelling and feelings of instability and giving way.  Sitting makes the pain worse.    Allergies:   Allergies   Allergen Reactions   • Penicillins        Medications:   Current Outpatient Prescriptions   Medication Sig Dispense Refill   • albuterol (PROVENTIL HFA;VENTOLIN HFA) 108 (90 BASE) MCG/ACT inhaler Inhale 2 puffs Every 4 (Four) Hours As Needed for Wheezing or Shortness of Air. 18 g 3   • albuterol (PROVENTIL) (2.5 MG/3ML) 0.083% nebulizer solution USE ONE VIAL VIA NEBULIZER BY MOUTH EVERY 6 TO 8 HOURS AS NEEDED 540 mL 1   • aspirin 325 MG tablet Take  by mouth daily.     • desloratadine (CLARINEX) 5 MG tablet Take 1 tablet by mouth Daily. 90 tablet 3   • fluticasone (FLONASE) 50 MCG/ACT nasal spray 2 sprays into each nostril Daily. 3 bottle 3   • fluticasone (FLONASE) 50 MCG/ACT nasal spray USE 2 SPRAYS IN EACH NOSTRIL DAILY 16 g 2   • hydrOXYzine (ATARAX) 25 MG tablet TAKE 1 TABLET BY MOUTH EVERY EIGHT HOURS AS NEEDED FOR ITCHING 270 tablet 2   • lisinopril-hydrochlorothiazide (PRINZIDE,ZESTORETIC) 20-12.5 MG per tablet TAKE 2 TABLETS BY MOUTH DAILY 180 tablet 3   • metFORMIN (GLUCOPHAGE) 1000 MG tablet TAKE 1 TABLET BY MOUTH TWICE DAILY WITH MEALS 180 tablet 3   • montelukast (SINGULAIR) 10 MG tablet TAKE 1 TABLET BY MOUTH EVERY NIGHT 90 tablet 3   • Multiple Vitamins-Minerals (CENTRUM SILVER PO) Take  by  "mouth daily.     • ondansetron (ZOFRAN) 4 MG tablet Take 1 tablet by mouth Every 8 (Eight) Hours As Needed for Nausea or Vomiting. 12 tablet 0   • pantoprazole (PROTONIX) 40 MG EC tablet Take 1 tablet by mouth Daily. 90 tablet 3   • simvastatin (ZOCOR) 20 MG tablet TAKE 1 TABLET BY MOUTH EVERY NIGHT 90 tablet 3   • SYMBICORT 160-4.5 MCG/ACT inhaler INHALE TWO PUFFS BY MOUTH TWICE A DAY **RINSE MOUTH AFTER USE** 10.2 g 10   • timolol (TIMOPTIC) 0.25 % ophthalmic solution Administer 1 drop to both eyes Daily.       No current facility-administered medications for this visit.          The following portions of the patient's history were reviewed and updated as appropriate: allergies, current medications, past family history, past medical history, past social history, past surgical history and problem list.    Review of Systems:   A 14 point review of systems was performed. All systems negative except pertinent positives/negative listed in HPI above    Physical Exam:   Vitals:    11/01/18 1005   Temp: 99.1 °F (37.3 °C)   Weight: 76.7 kg (169 lb)   Height: 162.6 cm (64\")       General: A and O x 3, ASA, NAD    SCLERA:    Normal    DENTITION:   Normal skin clear no unusual lesions noted  Left knee patient has trace amount of effusion noted left knee with only 95° flexion neutron extension with a positive Genesis negative Lockman calf is soft and nontender right knee no effusion, good range of motion normal exam.    Radiology:  Xrays 3views (ap,lateral, sunrise)bilateral knees were ordered and reviewed today secondary to pain and show moderate patellofemoral arthritic changes.  Otherwise normal.  No comparative views available     Assessment/Plan:Severe left knee pain and mechanical symptoms following trauma    We'll proceed with an MRI of the left knee to evaluate the meniscus.  She'll call couple days after regarding results and treatment options  "

## 2018-11-18 ENCOUNTER — HOSPITAL ENCOUNTER (OUTPATIENT)
Dept: MRI IMAGING | Facility: HOSPITAL | Age: 63
Discharge: HOME | End: 2018-11-18
Admitting: NURSE PRACTITIONER

## 2018-11-18 DIAGNOSIS — M25.562 ACUTE PAIN OF LEFT KNEE: ICD-10-CM

## 2018-11-18 PROCEDURE — 73721 MRI JNT OF LWR EXTRE W/O DYE: CPT

## 2018-11-26 ENCOUNTER — RESULTS ENCOUNTER (OUTPATIENT)
Dept: INTERNAL MEDICINE | Facility: CLINIC | Age: 63
End: 2018-11-26

## 2018-11-26 DIAGNOSIS — D50.9 IRON DEFICIENCY ANEMIA, UNSPECIFIED IRON DEFICIENCY ANEMIA TYPE: ICD-10-CM

## 2018-11-28 ENCOUNTER — TELEPHONE (OUTPATIENT)
Dept: ORTHOPEDIC SURGERY | Facility: CLINIC | Age: 63
End: 2018-11-28

## 2018-12-31 RX ORDER — FLUTICASONE PROPIONATE 50 MCG
SPRAY, SUSPENSION (ML) NASAL
Qty: 16 G | Refills: 3 | Status: SHIPPED | OUTPATIENT
Start: 2018-12-31 | End: 2019-03-18 | Stop reason: SDUPTHER

## 2019-01-01 ENCOUNTER — DOCUMENTATION (OUTPATIENT)
Dept: INTERNAL MEDICINE | Facility: CLINIC | Age: 64
End: 2019-01-01

## 2019-01-01 ENCOUNTER — HOSPITAL ENCOUNTER (EMERGENCY)
Facility: HOSPITAL | Age: 64
Discharge: HOME | End: 2019-12-02
Attending: EMERGENCY MEDICINE | Admitting: EMERGENCY MEDICINE

## 2019-01-01 ENCOUNTER — APPOINTMENT (OUTPATIENT)
Dept: ONCOLOGY | Facility: HOSPITAL | Age: 64
End: 2019-01-01

## 2019-01-01 ENCOUNTER — DOCUMENTATION (OUTPATIENT)
Dept: ONCOLOGY | Facility: CLINIC | Age: 64
End: 2019-01-01

## 2019-01-01 ENCOUNTER — TELEPHONE (OUTPATIENT)
Dept: SOCIAL WORK | Facility: HOSPITAL | Age: 64
End: 2019-01-01

## 2019-01-01 ENCOUNTER — RESULTS ENCOUNTER (OUTPATIENT)
Dept: INTERNAL MEDICINE | Facility: CLINIC | Age: 64
End: 2019-01-01

## 2019-01-01 ENCOUNTER — ANESTHESIA (OUTPATIENT)
Dept: GASTROENTEROLOGY | Facility: HOSPITAL | Age: 64
End: 2019-01-01

## 2019-01-01 ENCOUNTER — APPOINTMENT (OUTPATIENT)
Dept: CARDIOLOGY | Facility: HOSPITAL | Age: 64
End: 2019-01-01

## 2019-01-01 ENCOUNTER — APPOINTMENT (OUTPATIENT)
Dept: PET IMAGING | Facility: HOSPITAL | Age: 64
End: 2019-01-01

## 2019-01-01 ENCOUNTER — INFUSION (OUTPATIENT)
Dept: ONCOLOGY | Facility: HOSPITAL | Age: 64
End: 2019-01-01

## 2019-01-01 ENCOUNTER — APPOINTMENT (OUTPATIENT)
Dept: GENERAL RADIOLOGY | Facility: HOSPITAL | Age: 64
End: 2019-01-01

## 2019-01-01 ENCOUNTER — READMISSION MANAGEMENT (OUTPATIENT)
Dept: CALL CENTER | Facility: HOSPITAL | Age: 64
End: 2019-01-01

## 2019-01-01 ENCOUNTER — OFFICE VISIT (OUTPATIENT)
Dept: ONCOLOGY | Facility: CLINIC | Age: 64
End: 2019-01-01

## 2019-01-01 ENCOUNTER — HOSPITAL ENCOUNTER (OUTPATIENT)
Dept: PET IMAGING | Facility: HOSPITAL | Age: 64
Discharge: HOME | End: 2019-06-25

## 2019-01-01 ENCOUNTER — APPOINTMENT (OUTPATIENT)
Dept: CT IMAGING | Facility: HOSPITAL | Age: 64
End: 2019-01-01

## 2019-01-01 ENCOUNTER — HOSPITAL ENCOUNTER (OUTPATIENT)
Dept: PET IMAGING | Facility: HOSPITAL | Age: 64
Discharge: HOME | End: 2019-06-25
Admitting: INTERNAL MEDICINE

## 2019-01-01 ENCOUNTER — TELEPHONE (OUTPATIENT)
Dept: ONCOLOGY | Facility: HOSPITAL | Age: 64
End: 2019-01-01

## 2019-01-01 ENCOUNTER — LAB (OUTPATIENT)
Dept: LAB | Facility: HOSPITAL | Age: 64
End: 2019-01-01

## 2019-01-01 ENCOUNTER — OFFICE VISIT (OUTPATIENT)
Dept: INTERNAL MEDICINE | Facility: CLINIC | Age: 64
End: 2019-01-01

## 2019-01-01 ENCOUNTER — HOSPITAL ENCOUNTER (OUTPATIENT)
Dept: ULTRASOUND IMAGING | Facility: HOSPITAL | Age: 64
End: 2019-01-01

## 2019-01-01 ENCOUNTER — APPOINTMENT (OUTPATIENT)
Dept: WOMENS IMAGING | Facility: HOSPITAL | Age: 64
End: 2019-01-01

## 2019-01-01 ENCOUNTER — HOSPITAL ENCOUNTER (EMERGENCY)
Facility: HOSPITAL | Age: 64
Discharge: HOME | End: 2019-09-02
Attending: EMERGENCY MEDICINE | Admitting: EMERGENCY MEDICINE

## 2019-01-01 ENCOUNTER — TELEPHONE (OUTPATIENT)
Dept: ONCOLOGY | Facility: CLINIC | Age: 64
End: 2019-01-01

## 2019-01-01 ENCOUNTER — HOSPITAL ENCOUNTER (OUTPATIENT)
Dept: PET IMAGING | Facility: HOSPITAL | Age: 64
Discharge: HOME | End: 2019-09-18

## 2019-01-01 ENCOUNTER — APPOINTMENT (OUTPATIENT)
Dept: LAB | Facility: HOSPITAL | Age: 64
End: 2019-01-01

## 2019-01-01 ENCOUNTER — HOSPITAL ENCOUNTER (OUTPATIENT)
Facility: HOSPITAL | Age: 64
Setting detail: HOSPITAL OUTPATIENT SURGERY
Discharge: HOME | End: 2019-06-17
Attending: INTERNAL MEDICINE | Admitting: INTERNAL MEDICINE

## 2019-01-01 ENCOUNTER — TELEPHONE (OUTPATIENT)
Dept: GASTROENTEROLOGY | Facility: CLINIC | Age: 64
End: 2019-01-01

## 2019-01-01 ENCOUNTER — TELEPHONE (OUTPATIENT)
Dept: INTERNAL MEDICINE | Facility: CLINIC | Age: 64
End: 2019-01-01

## 2019-01-01 ENCOUNTER — HOSPITAL ENCOUNTER (OUTPATIENT)
Facility: HOSPITAL | Age: 64
Setting detail: HOSPITAL OUTPATIENT SURGERY
End: 2019-01-01
Attending: INTERNAL MEDICINE | Admitting: INTERNAL MEDICINE

## 2019-01-01 ENCOUNTER — HOSPITAL ENCOUNTER (OUTPATIENT)
Dept: PET IMAGING | Facility: HOSPITAL | Age: 64
Discharge: HOME | End: 2019-12-06
Admitting: INTERNAL MEDICINE

## 2019-01-01 ENCOUNTER — HOSPITAL ENCOUNTER (EMERGENCY)
Facility: HOSPITAL | Age: 64
Discharge: HOME | End: 2019-06-01
Attending: EMERGENCY MEDICINE | Admitting: EMERGENCY MEDICINE

## 2019-01-01 ENCOUNTER — HOSPITAL ENCOUNTER (OUTPATIENT)
Dept: ULTRASOUND IMAGING | Facility: HOSPITAL | Age: 64
Discharge: HOME | End: 2019-10-02
Admitting: INTERNAL MEDICINE

## 2019-01-01 ENCOUNTER — DOCUMENTATION (OUTPATIENT)
Dept: NUTRITION | Facility: HOSPITAL | Age: 64
End: 2019-01-01

## 2019-01-01 ENCOUNTER — APPOINTMENT (OUTPATIENT)
Dept: ONCOLOGY | Facility: CLINIC | Age: 64
End: 2019-01-01

## 2019-01-01 ENCOUNTER — HOSPITAL ENCOUNTER (OUTPATIENT)
Dept: CARDIOLOGY | Facility: HOSPITAL | Age: 64
Discharge: HOME | End: 2019-08-05
Admitting: INTERNAL MEDICINE

## 2019-01-01 ENCOUNTER — HOSPITAL ENCOUNTER (EMERGENCY)
Facility: HOSPITAL | Age: 64
End: 2019-01-01

## 2019-01-01 ENCOUNTER — ANESTHESIA EVENT (OUTPATIENT)
Dept: GASTROENTEROLOGY | Facility: HOSPITAL | Age: 64
End: 2019-01-01

## 2019-01-01 ENCOUNTER — HOSPITAL ENCOUNTER (OUTPATIENT)
Dept: PET IMAGING | Facility: HOSPITAL | Age: 64
Discharge: HOME | End: 2019-09-18
Admitting: INTERNAL MEDICINE

## 2019-01-01 ENCOUNTER — CONSULT (OUTPATIENT)
Dept: ONCOLOGY | Facility: CLINIC | Age: 64
End: 2019-01-01

## 2019-01-01 ENCOUNTER — HOSPITAL ENCOUNTER (INPATIENT)
Facility: HOSPITAL | Age: 64
LOS: 1 days | Discharge: HOME | End: 2019-07-30
Attending: INTERNAL MEDICINE | Admitting: INTERNAL MEDICINE

## 2019-01-01 ENCOUNTER — TELEPHONE (OUTPATIENT)
Dept: INTERVENTIONAL RADIOLOGY/VASCULAR | Facility: HOSPITAL | Age: 64
End: 2019-01-01

## 2019-01-01 ENCOUNTER — HOSPITAL ENCOUNTER (INPATIENT)
Facility: HOSPITAL | Age: 64
LOS: 9 days | Discharge: HOME | End: 2019-08-14
Attending: EMERGENCY MEDICINE | Admitting: HOSPITALIST

## 2019-01-01 ENCOUNTER — HOSPITAL ENCOUNTER (OUTPATIENT)
Dept: CT IMAGING | Facility: HOSPITAL | Age: 64
Discharge: HOME | End: 2019-06-09
Admitting: INTERNAL MEDICINE

## 2019-01-01 ENCOUNTER — HOSPITAL ENCOUNTER (OUTPATIENT)
Facility: HOSPITAL | Age: 64
Setting detail: OBSERVATION
Discharge: HOME | End: 2019-09-22
Attending: EMERGENCY MEDICINE | Admitting: INTERNAL MEDICINE

## 2019-01-01 ENCOUNTER — APPOINTMENT (OUTPATIENT)
Dept: OTHER | Facility: HOSPITAL | Age: 64
End: 2019-01-01

## 2019-01-01 ENCOUNTER — HOSPITAL ENCOUNTER (INPATIENT)
Facility: HOSPITAL | Age: 64
LOS: 6 days | Discharge: HOME | End: 2019-12-27
Attending: EMERGENCY MEDICINE | Admitting: HOSPITALIST

## 2019-01-01 ENCOUNTER — HOSPITAL ENCOUNTER (OUTPATIENT)
Dept: PET IMAGING | Facility: HOSPITAL | Age: 64
End: 2019-01-01

## 2019-01-01 ENCOUNTER — APPOINTMENT (OUTPATIENT)
Dept: ULTRASOUND IMAGING | Facility: HOSPITAL | Age: 64
End: 2019-01-01

## 2019-01-01 ENCOUNTER — HOSPITAL ENCOUNTER (INPATIENT)
Facility: HOSPITAL | Age: 64
LOS: 2 days | Discharge: HOME | End: 2019-07-19
Attending: EMERGENCY MEDICINE | Admitting: HOSPITALIST

## 2019-01-01 ENCOUNTER — DOCUMENTATION (OUTPATIENT)
Dept: OTHER | Facility: HOSPITAL | Age: 64
End: 2019-01-01

## 2019-01-01 ENCOUNTER — OFFICE VISIT (OUTPATIENT)
Dept: CARDIOLOGY | Facility: CLINIC | Age: 64
End: 2019-01-01

## 2019-01-01 ENCOUNTER — TELEPHONE (OUTPATIENT)
Dept: PHARMACY | Facility: HOSPITAL | Age: 64
End: 2019-01-01

## 2019-01-01 ENCOUNTER — HOSPITAL ENCOUNTER (INPATIENT)
Facility: HOSPITAL | Age: 64
LOS: 3 days | Discharge: HOME | End: 2019-10-16
Attending: EMERGENCY MEDICINE | Admitting: HOSPITALIST

## 2019-01-01 VITALS
HEIGHT: 70 IN | HEART RATE: 120 BPM | BODY MASS INDEX: 18.17 KG/M2 | DIASTOLIC BLOOD PRESSURE: 74 MMHG | SYSTOLIC BLOOD PRESSURE: 112 MMHG | RESPIRATION RATE: 16 BRPM | TEMPERATURE: 98.2 F | OXYGEN SATURATION: 92 % | WEIGHT: 126.9 LBS

## 2019-01-01 VITALS
SYSTOLIC BLOOD PRESSURE: 110 MMHG | OXYGEN SATURATION: 95 % | RESPIRATION RATE: 18 BRPM | DIASTOLIC BLOOD PRESSURE: 85 MMHG | WEIGHT: 126.54 LBS | HEART RATE: 118 BPM | HEART RATE: 110 BPM | WEIGHT: 132.3 LBS | DIASTOLIC BLOOD PRESSURE: 70 MMHG | HEIGHT: 66 IN | BODY MASS INDEX: 21.26 KG/M2 | TEMPERATURE: 98.6 F | HEIGHT: 66 IN | OXYGEN SATURATION: 99 % | SYSTOLIC BLOOD PRESSURE: 142 MMHG | TEMPERATURE: 98.2 F | RESPIRATION RATE: 16 BRPM | BODY MASS INDEX: 20.34 KG/M2

## 2019-01-01 VITALS
HEART RATE: 123 BPM | HEIGHT: 63 IN | WEIGHT: 125 LBS | DIASTOLIC BLOOD PRESSURE: 88 MMHG | OXYGEN SATURATION: 97 % | BODY MASS INDEX: 22.15 KG/M2 | SYSTOLIC BLOOD PRESSURE: 134 MMHG

## 2019-01-01 VITALS
HEART RATE: 83 BPM | WEIGHT: 144.4 LBS | HEIGHT: 65 IN | OXYGEN SATURATION: 100 % | SYSTOLIC BLOOD PRESSURE: 95 MMHG | BODY MASS INDEX: 24.06 KG/M2 | RESPIRATION RATE: 16 BRPM | DIASTOLIC BLOOD PRESSURE: 78 MMHG | TEMPERATURE: 97.9 F

## 2019-01-01 VITALS
DIASTOLIC BLOOD PRESSURE: 71 MMHG | WEIGHT: 140.65 LBS | SYSTOLIC BLOOD PRESSURE: 101 MMHG | OXYGEN SATURATION: 93 % | HEIGHT: 65 IN | RESPIRATION RATE: 18 BRPM | BODY MASS INDEX: 23.43 KG/M2 | HEART RATE: 116 BPM | TEMPERATURE: 98.6 F

## 2019-01-01 VITALS
BODY MASS INDEX: 21.34 KG/M2 | HEART RATE: 115 BPM | WEIGHT: 132.8 LBS | TEMPERATURE: 97.6 F | HEIGHT: 66 IN | RESPIRATION RATE: 16 BRPM | DIASTOLIC BLOOD PRESSURE: 92 MMHG | OXYGEN SATURATION: 92 % | SYSTOLIC BLOOD PRESSURE: 149 MMHG

## 2019-01-01 VITALS
SYSTOLIC BLOOD PRESSURE: 123 MMHG | BODY MASS INDEX: 22.5 KG/M2 | DIASTOLIC BLOOD PRESSURE: 80 MMHG | HEART RATE: 135 BPM | OXYGEN SATURATION: 95 % | TEMPERATURE: 98.7 F | WEIGHT: 140 LBS

## 2019-01-01 VITALS
RESPIRATION RATE: 20 BRPM | OXYGEN SATURATION: 94 % | BODY MASS INDEX: 21.16 KG/M2 | HEIGHT: 65 IN | SYSTOLIC BLOOD PRESSURE: 145 MMHG | HEART RATE: 118 BPM | DIASTOLIC BLOOD PRESSURE: 99 MMHG | TEMPERATURE: 97 F | WEIGHT: 126.98 LBS

## 2019-01-01 VITALS
HEART RATE: 109 BPM | TEMPERATURE: 97.3 F | DIASTOLIC BLOOD PRESSURE: 85 MMHG | HEIGHT: 66 IN | OXYGEN SATURATION: 99 % | RESPIRATION RATE: 16 BRPM | WEIGHT: 138 LBS | SYSTOLIC BLOOD PRESSURE: 128 MMHG | HEART RATE: 95 BPM | SYSTOLIC BLOOD PRESSURE: 120 MMHG | BODY MASS INDEX: 22.18 KG/M2 | HEIGHT: 66 IN | OXYGEN SATURATION: 95 % | DIASTOLIC BLOOD PRESSURE: 84 MMHG | TEMPERATURE: 97.8 F | BODY MASS INDEX: 22.43 KG/M2 | WEIGHT: 139.6 LBS | RESPIRATION RATE: 16 BRPM

## 2019-01-01 VITALS
DIASTOLIC BLOOD PRESSURE: 80 MMHG | SYSTOLIC BLOOD PRESSURE: 115 MMHG | WEIGHT: 117.9 LBS | BODY MASS INDEX: 18.95 KG/M2 | OXYGEN SATURATION: 92 % | HEIGHT: 66 IN | RESPIRATION RATE: 16 BRPM | TEMPERATURE: 97.8 F | HEART RATE: 109 BPM

## 2019-01-01 VITALS
TEMPERATURE: 98.1 F | HEIGHT: 66 IN | WEIGHT: 135.9 LBS | RESPIRATION RATE: 14 BRPM | OXYGEN SATURATION: 95 % | BODY MASS INDEX: 21.84 KG/M2 | SYSTOLIC BLOOD PRESSURE: 153 MMHG | HEART RATE: 100 BPM | DIASTOLIC BLOOD PRESSURE: 102 MMHG

## 2019-01-01 VITALS
RESPIRATION RATE: 16 BRPM | HEART RATE: 115 BPM | TEMPERATURE: 98.2 F | SYSTOLIC BLOOD PRESSURE: 112 MMHG | DIASTOLIC BLOOD PRESSURE: 74 MMHG | HEIGHT: 66 IN | OXYGEN SATURATION: 94 % | BODY MASS INDEX: 20.43 KG/M2

## 2019-01-01 VITALS
HEART RATE: 97 BPM | OXYGEN SATURATION: 97 % | DIASTOLIC BLOOD PRESSURE: 90 MMHG | HEIGHT: 66 IN | TEMPERATURE: 97.7 F | WEIGHT: 144 LBS | RESPIRATION RATE: 14 BRPM | BODY MASS INDEX: 23.14 KG/M2 | SYSTOLIC BLOOD PRESSURE: 136 MMHG

## 2019-01-01 VITALS
WEIGHT: 122.1 LBS | DIASTOLIC BLOOD PRESSURE: 73 MMHG | HEART RATE: 97 BPM | HEIGHT: 66 IN | RESPIRATION RATE: 16 BRPM | SYSTOLIC BLOOD PRESSURE: 108 MMHG | BODY MASS INDEX: 19.62 KG/M2 | TEMPERATURE: 97 F | OXYGEN SATURATION: 94 %

## 2019-01-01 VITALS — HEIGHT: 66 IN | WEIGHT: 117 LBS | BODY MASS INDEX: 18.8 KG/M2

## 2019-01-01 VITALS
BODY MASS INDEX: 23.89 KG/M2 | HEART RATE: 119 BPM | RESPIRATION RATE: 16 BRPM | SYSTOLIC BLOOD PRESSURE: 124 MMHG | HEIGHT: 65 IN | DIASTOLIC BLOOD PRESSURE: 84 MMHG | OXYGEN SATURATION: 98 % | TEMPERATURE: 97.9 F | WEIGHT: 143.4 LBS

## 2019-01-01 VITALS
DIASTOLIC BLOOD PRESSURE: 81 MMHG | BODY MASS INDEX: 21.7 KG/M2 | HEART RATE: 123 BPM | SYSTOLIC BLOOD PRESSURE: 130 MMHG | OXYGEN SATURATION: 94 % | TEMPERATURE: 98.9 F | HEART RATE: 144 BPM | OXYGEN SATURATION: 94 % | SYSTOLIC BLOOD PRESSURE: 128 MMHG | DIASTOLIC BLOOD PRESSURE: 80 MMHG | WEIGHT: 135 LBS | RESPIRATION RATE: 20 BRPM

## 2019-01-01 VITALS
HEIGHT: 66 IN | BODY MASS INDEX: 20.89 KG/M2 | DIASTOLIC BLOOD PRESSURE: 68 MMHG | TEMPERATURE: 98.9 F | RESPIRATION RATE: 14 BRPM | HEART RATE: 115 BPM | SYSTOLIC BLOOD PRESSURE: 111 MMHG | OXYGEN SATURATION: 99 % | WEIGHT: 130 LBS

## 2019-01-01 VITALS
HEIGHT: 63 IN | DIASTOLIC BLOOD PRESSURE: 76 MMHG | WEIGHT: 117 LBS | HEART RATE: 95 BPM | SYSTOLIC BLOOD PRESSURE: 113 MMHG | RESPIRATION RATE: 20 BRPM | TEMPERATURE: 97.4 F | BODY MASS INDEX: 20.73 KG/M2 | OXYGEN SATURATION: 94 %

## 2019-01-01 VITALS
RESPIRATION RATE: 18 BRPM | SYSTOLIC BLOOD PRESSURE: 123 MMHG | BODY MASS INDEX: 22.5 KG/M2 | TEMPERATURE: 97.9 F | HEART RATE: 134 BPM | WEIGHT: 140 LBS | DIASTOLIC BLOOD PRESSURE: 80 MMHG | OXYGEN SATURATION: 96 %

## 2019-01-01 VITALS
WEIGHT: 132 LBS | TEMPERATURE: 97.6 F | DIASTOLIC BLOOD PRESSURE: 86 MMHG | SYSTOLIC BLOOD PRESSURE: 124 MMHG | OXYGEN SATURATION: 99 % | HEIGHT: 66 IN | HEART RATE: 87 BPM | BODY MASS INDEX: 21.21 KG/M2 | RESPIRATION RATE: 18 BRPM

## 2019-01-01 VITALS
OXYGEN SATURATION: 97 % | BODY MASS INDEX: 24.63 KG/M2 | WEIGHT: 148 LBS | HEART RATE: 113 BPM | DIASTOLIC BLOOD PRESSURE: 70 MMHG | SYSTOLIC BLOOD PRESSURE: 118 MMHG

## 2019-01-01 VITALS
OXYGEN SATURATION: 95 % | SYSTOLIC BLOOD PRESSURE: 116 MMHG | HEART RATE: 98 BPM | DIASTOLIC BLOOD PRESSURE: 80 MMHG | BODY MASS INDEX: 23.13 KG/M2 | HEIGHT: 66 IN | WEIGHT: 143.9 LBS | TEMPERATURE: 98.3 F | RESPIRATION RATE: 16 BRPM

## 2019-01-01 VITALS
BODY MASS INDEX: 21.5 KG/M2 | WEIGHT: 137 LBS | DIASTOLIC BLOOD PRESSURE: 86 MMHG | SYSTOLIC BLOOD PRESSURE: 142 MMHG | HEIGHT: 67 IN

## 2019-01-01 VITALS
BODY MASS INDEX: 19.37 KG/M2 | HEART RATE: 100 BPM | TEMPERATURE: 98.6 F | RESPIRATION RATE: 16 BRPM | SYSTOLIC BLOOD PRESSURE: 104 MMHG | HEIGHT: 66 IN | DIASTOLIC BLOOD PRESSURE: 57 MMHG | WEIGHT: 120.5 LBS | OXYGEN SATURATION: 94 %

## 2019-01-01 VITALS
DIASTOLIC BLOOD PRESSURE: 77 MMHG | SYSTOLIC BLOOD PRESSURE: 102 MMHG | RESPIRATION RATE: 16 BRPM | TEMPERATURE: 98.7 F | HEART RATE: 90 BPM | BODY MASS INDEX: 24.83 KG/M2 | HEIGHT: 65 IN | WEIGHT: 149 LBS | OXYGEN SATURATION: 98 %

## 2019-01-01 VITALS
DIASTOLIC BLOOD PRESSURE: 70 MMHG | HEIGHT: 66 IN | HEART RATE: 69 BPM | OXYGEN SATURATION: 98 % | WEIGHT: 117.8 LBS | TEMPERATURE: 97.9 F | SYSTOLIC BLOOD PRESSURE: 108 MMHG | RESPIRATION RATE: 16 BRPM | BODY MASS INDEX: 18.93 KG/M2

## 2019-01-01 VITALS
SYSTOLIC BLOOD PRESSURE: 130 MMHG | OXYGEN SATURATION: 99 % | BODY MASS INDEX: 20.47 KG/M2 | DIASTOLIC BLOOD PRESSURE: 64 MMHG | HEART RATE: 109 BPM | WEIGHT: 123 LBS

## 2019-01-01 VITALS
DIASTOLIC BLOOD PRESSURE: 92 MMHG | WEIGHT: 137.6 LBS | BODY MASS INDEX: 22.21 KG/M2 | OXYGEN SATURATION: 94 % | HEART RATE: 119 BPM | TEMPERATURE: 98.2 F | SYSTOLIC BLOOD PRESSURE: 147 MMHG

## 2019-01-01 VITALS
BODY MASS INDEX: 20.6 KG/M2 | WEIGHT: 128.2 LBS | HEIGHT: 66 IN | RESPIRATION RATE: 16 BRPM | TEMPERATURE: 97.1 F | OXYGEN SATURATION: 98 % | SYSTOLIC BLOOD PRESSURE: 110 MMHG | DIASTOLIC BLOOD PRESSURE: 72 MMHG | HEART RATE: 103 BPM

## 2019-01-01 VITALS — BODY MASS INDEX: 22.95 KG/M2 | WEIGHT: 142.8 LBS

## 2019-01-01 VITALS
SYSTOLIC BLOOD PRESSURE: 137 MMHG | RESPIRATION RATE: 14 BRPM | OXYGEN SATURATION: 94 % | HEIGHT: 66 IN | WEIGHT: 138.9 LBS | DIASTOLIC BLOOD PRESSURE: 90 MMHG | HEART RATE: 109 BPM | TEMPERATURE: 98 F | BODY MASS INDEX: 22.32 KG/M2

## 2019-01-01 VITALS — HEART RATE: 136 BPM

## 2019-01-01 DIAGNOSIS — J90 PLEURAL EFFUSION ON RIGHT: ICD-10-CM

## 2019-01-01 DIAGNOSIS — I82.4Z1 ACUTE DEEP VEIN THROMBOSIS (DVT) OF DISTAL END OF RIGHT LOWER EXTREMITY (HCC): Primary | ICD-10-CM

## 2019-01-01 DIAGNOSIS — C25.2 MALIGNANT NEOPLASM OF TAIL OF PANCREAS (HCC): Primary | ICD-10-CM

## 2019-01-01 DIAGNOSIS — E11.22 TYPE 2 DIABETES MELLITUS WITH STAGE 3 CHRONIC KIDNEY DISEASE, WITHOUT LONG-TERM CURRENT USE OF INSULIN (HCC): ICD-10-CM

## 2019-01-01 DIAGNOSIS — R06.03 ACUTE RESPIRATORY DISTRESS: ICD-10-CM

## 2019-01-01 DIAGNOSIS — G89.3 CANCER ASSOCIATED PAIN: ICD-10-CM

## 2019-01-01 DIAGNOSIS — D72.829 LEUKOCYTOSIS, UNSPECIFIED TYPE: ICD-10-CM

## 2019-01-01 DIAGNOSIS — C25.2 MALIGNANT NEOPLASM OF TAIL OF PANCREAS (HCC): ICD-10-CM

## 2019-01-01 DIAGNOSIS — R00.0 TACHYCARDIA, UNSPECIFIED: Primary | ICD-10-CM

## 2019-01-01 DIAGNOSIS — J90 PLEURAL EFFUSION, RIGHT: ICD-10-CM

## 2019-01-01 DIAGNOSIS — J45.21 MILD INTERMITTENT ASTHMA WITH ACUTE EXACERBATION IN ADULT: Primary | ICD-10-CM

## 2019-01-01 DIAGNOSIS — M79.89 LEFT LEG SWELLING: Primary | ICD-10-CM

## 2019-01-01 DIAGNOSIS — R00.0 TACHYCARDIA: Primary | ICD-10-CM

## 2019-01-01 DIAGNOSIS — R00.0 TACHYCARDIA: ICD-10-CM

## 2019-01-01 DIAGNOSIS — I26.99 PULMONARY EMBOLISM, BILATERAL (HCC): Primary | ICD-10-CM

## 2019-01-01 DIAGNOSIS — E87.6 HYPOKALEMIA: ICD-10-CM

## 2019-01-01 DIAGNOSIS — J90 PLEURAL EFFUSION ON RIGHT: Primary | ICD-10-CM

## 2019-01-01 DIAGNOSIS — J18.9 PNEUMONIA OF LEFT UPPER LOBE DUE TO INFECTIOUS ORGANISM: Primary | ICD-10-CM

## 2019-01-01 DIAGNOSIS — K86.89 PANCREATIC MASS: ICD-10-CM

## 2019-01-01 DIAGNOSIS — R06.02 SHORTNESS OF BREATH: ICD-10-CM

## 2019-01-01 DIAGNOSIS — C25.9 MALIGNANT NEOPLASM OF PANCREAS, UNSPECIFIED LOCATION OF MALIGNANCY (HCC): ICD-10-CM

## 2019-01-01 DIAGNOSIS — Z45.2 ENCOUNTER FOR FITTING AND ADJUSTMENT OF VASCULAR CATHETER: ICD-10-CM

## 2019-01-01 DIAGNOSIS — T45.1X5A CHEMOTHERAPY INDUCED NEUTROPENIA (HCC): ICD-10-CM

## 2019-01-01 DIAGNOSIS — T45.1X5A ANEMIA ASSOCIATED WITH CHEMOTHERAPY: ICD-10-CM

## 2019-01-01 DIAGNOSIS — I10 HYPERTENSION, UNSPECIFIED TYPE: ICD-10-CM

## 2019-01-01 DIAGNOSIS — A41.9 SEPSIS, DUE TO UNSPECIFIED ORGANISM, UNSPECIFIED WHETHER ACUTE ORGAN DYSFUNCTION PRESENT (HCC): ICD-10-CM

## 2019-01-01 DIAGNOSIS — R10.13 EPIGASTRIC PAIN: ICD-10-CM

## 2019-01-01 DIAGNOSIS — K86.89 PANCREATIC MASS: Primary | ICD-10-CM

## 2019-01-01 DIAGNOSIS — Z45.2 ENCOUNTER FOR FITTING AND ADJUSTMENT OF VASCULAR CATHETER: Primary | ICD-10-CM

## 2019-01-01 DIAGNOSIS — R63.4 WEIGHT LOSS: ICD-10-CM

## 2019-01-01 DIAGNOSIS — E11.9 TYPE 2 DIABETES MELLITUS WITHOUT COMPLICATION, WITHOUT LONG-TERM CURRENT USE OF INSULIN (HCC): ICD-10-CM

## 2019-01-01 DIAGNOSIS — R11.0 CHEMOTHERAPY-INDUCED NAUSEA: ICD-10-CM

## 2019-01-01 DIAGNOSIS — R09.02 HYPOXIA: ICD-10-CM

## 2019-01-01 DIAGNOSIS — I10 BENIGN ESSENTIAL HYPERTENSION: ICD-10-CM

## 2019-01-01 DIAGNOSIS — J18.9 PNEUMONIA OF RIGHT LOWER LOBE DUE TO INFECTIOUS ORGANISM: ICD-10-CM

## 2019-01-01 DIAGNOSIS — J90 PLEURAL EFFUSION, RIGHT: Primary | ICD-10-CM

## 2019-01-01 DIAGNOSIS — J45.40 MODERATE PERSISTENT ASTHMA WITHOUT COMPLICATION: ICD-10-CM

## 2019-01-01 DIAGNOSIS — R93.89 ABNORMAL CT OF THE CHEST: ICD-10-CM

## 2019-01-01 DIAGNOSIS — R10.9 RIGHT FLANK PAIN: ICD-10-CM

## 2019-01-01 DIAGNOSIS — R53.1 WEAKNESS: ICD-10-CM

## 2019-01-01 DIAGNOSIS — N18.30 TYPE 2 DIABETES MELLITUS WITH STAGE 3 CHRONIC KIDNEY DISEASE, WITHOUT LONG-TERM CURRENT USE OF INSULIN (HCC): ICD-10-CM

## 2019-01-01 DIAGNOSIS — G89.3 CANCER ASSOCIATED PAIN: Primary | ICD-10-CM

## 2019-01-01 DIAGNOSIS — J90 PLEURAL EFFUSION: Primary | ICD-10-CM

## 2019-01-01 DIAGNOSIS — E87.1 HYPONATREMIA: ICD-10-CM

## 2019-01-01 DIAGNOSIS — N18.9 ANEMIA DUE TO CHRONIC KIDNEY DISEASE, UNSPECIFIED CKD STAGE: Primary | ICD-10-CM

## 2019-01-01 DIAGNOSIS — K92.0 HEMATEMESIS WITH NAUSEA: ICD-10-CM

## 2019-01-01 DIAGNOSIS — B33.8 RSV INFECTION: Primary | ICD-10-CM

## 2019-01-01 DIAGNOSIS — E87.6 HYPOKALEMIA: Primary | ICD-10-CM

## 2019-01-01 DIAGNOSIS — C25.9 MALIGNANT NEOPLASM OF PANCREAS, UNSPECIFIED LOCATION OF MALIGNANCY (HCC): Primary | ICD-10-CM

## 2019-01-01 DIAGNOSIS — E78.2 MIXED HYPERLIPIDEMIA: ICD-10-CM

## 2019-01-01 DIAGNOSIS — R60.9 PERIPHERAL EDEMA: ICD-10-CM

## 2019-01-01 DIAGNOSIS — D70.1 CHEMOTHERAPY INDUCED NEUTROPENIA (HCC): ICD-10-CM

## 2019-01-01 DIAGNOSIS — D64.81 ANEMIA ASSOCIATED WITH CHEMOTHERAPY: ICD-10-CM

## 2019-01-01 DIAGNOSIS — R06.00 DYSPNEA, UNSPECIFIED TYPE: ICD-10-CM

## 2019-01-01 DIAGNOSIS — E78.2 MIXED HYPERLIPIDEMIA: Primary | ICD-10-CM

## 2019-01-01 DIAGNOSIS — T45.1X5A CHEMOTHERAPY-INDUCED NAUSEA: ICD-10-CM

## 2019-01-01 DIAGNOSIS — D63.1 ANEMIA DUE TO CHRONIC KIDNEY DISEASE, UNSPECIFIED CKD STAGE: Primary | ICD-10-CM

## 2019-01-01 DIAGNOSIS — I82.4Z1 ACUTE DEEP VEIN THROMBOSIS (DVT) OF DISTAL END OF RIGHT LOWER EXTREMITY (HCC): ICD-10-CM

## 2019-01-01 DIAGNOSIS — E87.1 HYPONATREMIA: Primary | ICD-10-CM

## 2019-01-01 LAB
ABO + RH BLD: NORMAL
ABO + RH BLD: NORMAL
ABO GROUP BLD: NORMAL
ABO GROUP BLD: NORMAL
ALBUMIN SERPL-MCNC: 3 G/DL (ref 3.5–5.2)
ALBUMIN SERPL-MCNC: 3.1 G/DL (ref 3.5–5.2)
ALBUMIN SERPL-MCNC: 3.1 G/DL (ref 3.5–5.2)
ALBUMIN SERPL-MCNC: 3.2 G/DL (ref 3.5–5.2)
ALBUMIN SERPL-MCNC: 3.3 G/DL (ref 3.5–5.2)
ALBUMIN SERPL-MCNC: 3.4 G/DL (ref 3.5–5.2)
ALBUMIN SERPL-MCNC: 3.5 G/DL (ref 3.5–5.2)
ALBUMIN SERPL-MCNC: 3.6 G/DL (ref 3.5–5.2)
ALBUMIN SERPL-MCNC: 3.7 G/DL (ref 3.5–5.2)
ALBUMIN SERPL-MCNC: 3.7 G/DL (ref 3.5–5.2)
ALBUMIN SERPL-MCNC: 3.8 G/DL (ref 3.5–5.2)
ALBUMIN SERPL-MCNC: 3.9 G/DL (ref 3.5–5.2)
ALBUMIN SERPL-MCNC: 3.9 G/DL (ref 3.5–5.2)
ALBUMIN SERPL-MCNC: 4 G/DL (ref 3.5–5.2)
ALBUMIN SERPL-MCNC: 4 G/DL (ref 3.5–5.2)
ALBUMIN SERPL-MCNC: 4.3 G/DL (ref 3.5–5.2)
ALBUMIN SERPL-MCNC: 4.4 G/DL (ref 3.5–5.2)
ALBUMIN/GLOB SERPL: 0.8 G/DL
ALBUMIN/GLOB SERPL: 0.9 G/DL
ALBUMIN/GLOB SERPL: 0.9 G/DL (ref 1.1–2.4)
ALBUMIN/GLOB SERPL: 1 G/DL
ALBUMIN/GLOB SERPL: 1 G/DL (ref 1.1–2.4)
ALBUMIN/GLOB SERPL: 1.1 G/DL
ALBUMIN/GLOB SERPL: 1.1 G/DL (ref 1.1–2.4)
ALBUMIN/GLOB SERPL: 1.1 G/DL (ref 1.1–2.4)
ALBUMIN/GLOB SERPL: 1.2 G/DL
ALBUMIN/GLOB SERPL: 1.2 G/DL
ALBUMIN/GLOB SERPL: 1.2 G/DL (ref 1.1–2.4)
ALBUMIN/GLOB SERPL: 1.2 G/DL (ref 1.1–2.4)
ALBUMIN/GLOB SERPL: 1.3 G/DL
ALP SERPL-CCNC: 100 U/L (ref 38–116)
ALP SERPL-CCNC: 105 U/L (ref 38–116)
ALP SERPL-CCNC: 109 U/L (ref 39–117)
ALP SERPL-CCNC: 109 U/L (ref 39–117)
ALP SERPL-CCNC: 115 U/L (ref 39–117)
ALP SERPL-CCNC: 118 U/L (ref 38–116)
ALP SERPL-CCNC: 120 U/L (ref 39–117)
ALP SERPL-CCNC: 121 U/L (ref 38–116)
ALP SERPL-CCNC: 121 U/L (ref 39–117)
ALP SERPL-CCNC: 124 U/L (ref 39–117)
ALP SERPL-CCNC: 128 U/L (ref 39–117)
ALP SERPL-CCNC: 128 U/L (ref 39–117)
ALP SERPL-CCNC: 129 U/L (ref 38–116)
ALP SERPL-CCNC: 133 U/L (ref 38–116)
ALP SERPL-CCNC: 135 U/L (ref 39–117)
ALP SERPL-CCNC: 140 U/L (ref 38–116)
ALP SERPL-CCNC: 148 U/L (ref 38–116)
ALP SERPL-CCNC: 160 U/L (ref 39–117)
ALP SERPL-CCNC: 161 U/L (ref 39–117)
ALP SERPL-CCNC: 162 U/L (ref 39–117)
ALP SERPL-CCNC: 174 U/L (ref 39–117)
ALP SERPL-CCNC: 68 U/L (ref 39–117)
ALP SERPL-CCNC: 69 U/L (ref 39–117)
ALP SERPL-CCNC: 70 U/L (ref 39–117)
ALP SERPL-CCNC: 73 U/L (ref 39–117)
ALP SERPL-CCNC: 75 U/L (ref 39–117)
ALP SERPL-CCNC: 76 U/L (ref 38–116)
ALP SERPL-CCNC: 80 U/L (ref 38–116)
ALP SERPL-CCNC: 86 U/L (ref 38–116)
ALP SERPL-CCNC: 98 U/L (ref 39–117)
ALT SERPL W P-5'-P-CCNC: 10 U/L (ref 0–33)
ALT SERPL W P-5'-P-CCNC: 10 U/L (ref 1–33)
ALT SERPL W P-5'-P-CCNC: 11 U/L (ref 0–33)
ALT SERPL W P-5'-P-CCNC: 11 U/L (ref 1–33)
ALT SERPL W P-5'-P-CCNC: 12 U/L (ref 1–33)
ALT SERPL W P-5'-P-CCNC: 13 U/L (ref 0–33)
ALT SERPL W P-5'-P-CCNC: 13 U/L (ref 1–33)
ALT SERPL W P-5'-P-CCNC: 14 U/L (ref 0–33)
ALT SERPL W P-5'-P-CCNC: 14 U/L (ref 1–33)
ALT SERPL W P-5'-P-CCNC: 14 U/L (ref 1–33)
ALT SERPL W P-5'-P-CCNC: 15 U/L (ref 1–33)
ALT SERPL W P-5'-P-CCNC: 16 U/L (ref 0–33)
ALT SERPL W P-5'-P-CCNC: 17 U/L (ref 0–33)
ALT SERPL W P-5'-P-CCNC: 19 U/L (ref 1–33)
ALT SERPL W P-5'-P-CCNC: 20 U/L (ref 1–33)
ALT SERPL W P-5'-P-CCNC: 22 U/L (ref 1–33)
ALT SERPL W P-5'-P-CCNC: 24 U/L (ref 1–33)
ALT SERPL W P-5'-P-CCNC: 25 U/L (ref 0–33)
ALT SERPL W P-5'-P-CCNC: 32 U/L (ref 1–33)
ALT SERPL W P-5'-P-CCNC: 36 U/L (ref 1–33)
ALT SERPL W P-5'-P-CCNC: 37 U/L (ref 1–33)
ALT SERPL W P-5'-P-CCNC: 38 U/L (ref 0–33)
ALT SERPL W P-5'-P-CCNC: 47 U/L (ref 1–33)
ALT SERPL W P-5'-P-CCNC: 6 U/L (ref 0–33)
ALT SERPL W P-5'-P-CCNC: 6 U/L (ref 0–33)
ALT SERPL W P-5'-P-CCNC: 6 U/L (ref 1–33)
ALT SERPL W P-5'-P-CCNC: 9 U/L (ref 0–33)
ALT SERPL W P-5'-P-CCNC: 9 U/L (ref 1–33)
ANION GAP SERPL CALCULATED.3IONS-SCNC: 10.2 MMOL/L (ref 5–15)
ANION GAP SERPL CALCULATED.3IONS-SCNC: 10.4 MMOL/L (ref 5–15)
ANION GAP SERPL CALCULATED.3IONS-SCNC: 10.7 MMOL/L (ref 5–15)
ANION GAP SERPL CALCULATED.3IONS-SCNC: 10.7 MMOL/L (ref 5–15)
ANION GAP SERPL CALCULATED.3IONS-SCNC: 10.8 MMOL/L (ref 5–15)
ANION GAP SERPL CALCULATED.3IONS-SCNC: 11 MMOL/L (ref 5–15)
ANION GAP SERPL CALCULATED.3IONS-SCNC: 11.3 MMOL/L
ANION GAP SERPL CALCULATED.3IONS-SCNC: 11.3 MMOL/L (ref 5–15)
ANION GAP SERPL CALCULATED.3IONS-SCNC: 11.3 MMOL/L (ref 5–15)
ANION GAP SERPL CALCULATED.3IONS-SCNC: 11.4 MMOL/L (ref 5–15)
ANION GAP SERPL CALCULATED.3IONS-SCNC: 11.4 MMOL/L (ref 5–15)
ANION GAP SERPL CALCULATED.3IONS-SCNC: 11.6 MMOL/L (ref 5–15)
ANION GAP SERPL CALCULATED.3IONS-SCNC: 11.7 MMOL/L (ref 5–15)
ANION GAP SERPL CALCULATED.3IONS-SCNC: 11.8 MMOL/L (ref 5–15)
ANION GAP SERPL CALCULATED.3IONS-SCNC: 12.2 MMOL/L (ref 5–15)
ANION GAP SERPL CALCULATED.3IONS-SCNC: 12.3 MMOL/L (ref 5–15)
ANION GAP SERPL CALCULATED.3IONS-SCNC: 12.5 MMOL/L (ref 5–15)
ANION GAP SERPL CALCULATED.3IONS-SCNC: 12.6 MMOL/L (ref 5–15)
ANION GAP SERPL CALCULATED.3IONS-SCNC: 12.6 MMOL/L (ref 5–15)
ANION GAP SERPL CALCULATED.3IONS-SCNC: 12.9 MMOL/L (ref 5–15)
ANION GAP SERPL CALCULATED.3IONS-SCNC: 13.3 MMOL/L (ref 5–15)
ANION GAP SERPL CALCULATED.3IONS-SCNC: 13.5 MMOL/L (ref 5–15)
ANION GAP SERPL CALCULATED.3IONS-SCNC: 13.8 MMOL/L (ref 5–15)
ANION GAP SERPL CALCULATED.3IONS-SCNC: 14 MMOL/L (ref 5–15)
ANION GAP SERPL CALCULATED.3IONS-SCNC: 14 MMOL/L (ref 5–15)
ANION GAP SERPL CALCULATED.3IONS-SCNC: 14.8 MMOL/L (ref 5–15)
ANION GAP SERPL CALCULATED.3IONS-SCNC: 15.1 MMOL/L
ANION GAP SERPL CALCULATED.3IONS-SCNC: 15.3 MMOL/L (ref 5–15)
ANION GAP SERPL CALCULATED.3IONS-SCNC: 15.3 MMOL/L (ref 5–15)
ANION GAP SERPL CALCULATED.3IONS-SCNC: 16 MMOL/L (ref 5–15)
ANION GAP SERPL CALCULATED.3IONS-SCNC: 17 MMOL/L (ref 5–15)
ANION GAP SERPL CALCULATED.3IONS-SCNC: 19 MMOL/L (ref 5–15)
ANION GAP SERPL CALCULATED.3IONS-SCNC: 7.1 MMOL/L (ref 5–15)
ANION GAP SERPL CALCULATED.3IONS-SCNC: 7.6 MMOL/L (ref 5–15)
ANION GAP SERPL CALCULATED.3IONS-SCNC: 8 MMOL/L (ref 5–15)
ANION GAP SERPL CALCULATED.3IONS-SCNC: 8.5 MMOL/L (ref 5–15)
ANION GAP SERPL CALCULATED.3IONS-SCNC: 9 MMOL/L (ref 5–15)
ANION GAP SERPL CALCULATED.3IONS-SCNC: 9.4 MMOL/L (ref 5–15)
ANION GAP SERPL CALCULATED.3IONS-SCNC: 9.6 MMOL/L (ref 5–15)
ANISOCYTOSIS BLD QL: ABNORMAL
APPEARANCE FLD: ABNORMAL
APPEARANCE FLD: ABNORMAL
APTT PPP: 109.8 SECONDS (ref 22.7–35.4)
APTT PPP: 129.6 SECONDS (ref 22.7–35.4)
APTT PPP: 27.8 SECONDS (ref 22.7–35.4)
APTT PPP: 31.6 SECONDS (ref 22.7–35.4)
APTT PPP: 54.5 SECONDS (ref 22.7–35.4)
APTT PPP: 64.3 SECONDS (ref 22.7–35.4)
APTT PPP: 64.7 SECONDS (ref 22.7–35.4)
APTT PPP: 75.4 SECONDS (ref 22.7–35.4)
APTT PPP: 90.1 SECONDS (ref 22.7–35.4)
ARTERIAL PATENCY WRIST A: ABNORMAL
ARTERIAL PATENCY WRIST A: POSITIVE
AST SERPL-CCNC: 11 U/L (ref 0–32)
AST SERPL-CCNC: 11 U/L (ref 1–32)
AST SERPL-CCNC: 12 U/L (ref 1–32)
AST SERPL-CCNC: 13 U/L (ref 0–32)
AST SERPL-CCNC: 13 U/L (ref 1–32)
AST SERPL-CCNC: 13 U/L (ref 1–32)
AST SERPL-CCNC: 14 U/L (ref 1–32)
AST SERPL-CCNC: 14 U/L (ref 1–32)
AST SERPL-CCNC: 15 U/L (ref 0–32)
AST SERPL-CCNC: 16 U/L (ref 0–32)
AST SERPL-CCNC: 16 U/L (ref 1–32)
AST SERPL-CCNC: 17 U/L (ref 0–32)
AST SERPL-CCNC: 17 U/L (ref 1–32)
AST SERPL-CCNC: 18 U/L (ref 1–32)
AST SERPL-CCNC: 18 U/L (ref 1–32)
AST SERPL-CCNC: 19 U/L (ref 0–32)
AST SERPL-CCNC: 20 U/L (ref 1–32)
AST SERPL-CCNC: 20 U/L (ref 1–32)
AST SERPL-CCNC: 21 U/L (ref 0–32)
AST SERPL-CCNC: 21 U/L (ref 0–32)
AST SERPL-CCNC: 21 U/L (ref 1–32)
AST SERPL-CCNC: 22 U/L (ref 0–32)
AST SERPL-CCNC: 25 U/L (ref 1–32)
AST SERPL-CCNC: 26 U/L (ref 1–32)
AST SERPL-CCNC: 32 U/L (ref 0–32)
AST SERPL-CCNC: 32 U/L (ref 1–32)
AST SERPL-CCNC: 43 U/L (ref 1–32)
AST SERPL-CCNC: 44 U/L (ref 0–32)
AST SERPL-CCNC: 50 U/L (ref 1–32)
AST SERPL-CCNC: 69 U/L (ref 1–32)
ATMOSPHERIC PRESS: 753.7 MMHG
ATMOSPHERIC PRESS: 758.2 MMHG
B PARAPERT DNA SPEC QL NAA+PROBE: NOT DETECTED
B PERT DNA SPEC QL NAA+PROBE: NOT DETECTED
BACTERIA FLD CULT: NORMAL
BACTERIA SPEC AEROBE CULT: NORMAL
BACTERIA SPEC RESP CULT: NORMAL
BACTERIA UR QL AUTO: ABNORMAL /HPF
BASE EXCESS BLDA CALC-SCNC: 7.9 MMOL/L (ref 0–2)
BASE EXCESS BLDA CALC-SCNC: 9.4 MMOL/L (ref 0–2)
BASOPHILS # BLD AUTO: 0.02 10*3/MM3 (ref 0–0.2)
BASOPHILS # BLD AUTO: 0.03 10*3/MM3 (ref 0–0.2)
BASOPHILS # BLD AUTO: 0.04 10*3/MM3 (ref 0–0.2)
BASOPHILS # BLD AUTO: 0.05 10*3/MM3 (ref 0–0.2)
BASOPHILS # BLD AUTO: 0.06 10*3/MM3 (ref 0–0.2)
BASOPHILS # BLD AUTO: 0.07 10*3/MM3 (ref 0–0.2)
BASOPHILS # BLD AUTO: 0.08 10*3/MM3 (ref 0–0.2)
BASOPHILS # BLD AUTO: 0.09 10*3/MM3 (ref 0–0.2)
BASOPHILS # BLD AUTO: 0.11 10*3/MM3 (ref 0–0.2)
BASOPHILS # BLD AUTO: 0.19 10*3/MM3 (ref 0–0.2)
BASOPHILS # BLD MANUAL: 0.04 10*3/MM3 (ref 0–0.2)
BASOPHILS # BLD MANUAL: 0.09 10*3/MM3 (ref 0–0.2)
BASOPHILS # BLD MANUAL: 0.1 10*3/MM3 (ref 0–0.2)
BASOPHILS NFR BLD AUTO: 0.2 % (ref 0–1.5)
BASOPHILS NFR BLD AUTO: 0.3 % (ref 0–1.5)
BASOPHILS NFR BLD AUTO: 0.3 % (ref 0–1.5)
BASOPHILS NFR BLD AUTO: 0.4 % (ref 0–1.5)
BASOPHILS NFR BLD AUTO: 0.5 % (ref 0–1.5)
BASOPHILS NFR BLD AUTO: 0.6 % (ref 0–1.5)
BASOPHILS NFR BLD AUTO: 0.7 % (ref 0–1.5)
BASOPHILS NFR BLD AUTO: 0.8 % (ref 0–1.5)
BASOPHILS NFR BLD AUTO: 0.8 % (ref 0–1.5)
BASOPHILS NFR BLD AUTO: 0.9 % (ref 0–1.5)
BASOPHILS NFR BLD AUTO: 1 % (ref 0–1.5)
BASOPHILS NFR BLD AUTO: 1.2 % (ref 0–1.5)
BDY SITE: ABNORMAL
BDY SITE: ABNORMAL
BH BB BLOOD EXPIRATION DATE: NORMAL
BH BB BLOOD EXPIRATION DATE: NORMAL
BH BB BLOOD TYPE BARCODE: 5100
BH BB BLOOD TYPE BARCODE: 5100
BH BB DISPENSE STATUS: NORMAL
BH BB DISPENSE STATUS: NORMAL
BH BB PRODUCT CODE: NORMAL
BH BB PRODUCT CODE: NORMAL
BH BB UNIT NUMBER: NORMAL
BH BB UNIT NUMBER: NORMAL
BH CV ECHO MEAS - ACS: 1.8 CM
BH CV ECHO MEAS - AO MAX PG (FULL): 2.2 MMHG
BH CV ECHO MEAS - AO MAX PG: 8.1 MMHG
BH CV ECHO MEAS - AO MEAN PG (FULL): 1.2 MMHG
BH CV ECHO MEAS - AO MEAN PG: 4.1 MMHG
BH CV ECHO MEAS - AO ROOT AREA (BSA CORRECTED): 1.8
BH CV ECHO MEAS - AO ROOT AREA: 7.8 CM^2
BH CV ECHO MEAS - AO ROOT DIAM: 3.1 CM
BH CV ECHO MEAS - AO V2 MAX: 142.3 CM/SEC
BH CV ECHO MEAS - AO V2 MEAN: 91.9 CM/SEC
BH CV ECHO MEAS - AO V2 VTI: 23.6 CM
BH CV ECHO MEAS - AVA(I,A): 2.2 CM^2
BH CV ECHO MEAS - AVA(I,D): 2.2 CM^2
BH CV ECHO MEAS - AVA(V,A): 2.5 CM^2
BH CV ECHO MEAS - AVA(V,D): 2.5 CM^2
BH CV ECHO MEAS - BSA(HAYCOCK): 1.7 M^2
BH CV ECHO MEAS - BSA: 1.7 M^2
BH CV ECHO MEAS - BZI_BMI: 22.1 KILOGRAMS/M^2
BH CV ECHO MEAS - BZI_METRIC_HEIGHT: 167.6 CM
BH CV ECHO MEAS - BZI_METRIC_WEIGHT: 62.1 KG
BH CV ECHO MEAS - EDV(CUBED): 62.1 ML
BH CV ECHO MEAS - EDV(MOD-SP4): 57 ML
BH CV ECHO MEAS - EDV(TEICH): 68.3 ML
BH CV ECHO MEAS - EF(CUBED): 80.5 %
BH CV ECHO MEAS - EF(MOD-BP): 51 %
BH CV ECHO MEAS - EF(MOD-SP4): 50.9 %
BH CV ECHO MEAS - EF(TEICH): 73.6 %
BH CV ECHO MEAS - ESV(CUBED): 12.1 ML
BH CV ECHO MEAS - ESV(MOD-SP4): 28 ML
BH CV ECHO MEAS - ESV(TEICH): 18 ML
BH CV ECHO MEAS - IVS/LVPW: 0.86
BH CV ECHO MEAS - IVSD: 0.8 CM
BH CV ECHO MEAS - LAT PEAK E' VEL: 10 CM/SEC
BH CV ECHO MEAS - LV DIASTOLIC VOL/BSA (35-75): 33.5 ML/M^2
BH CV ECHO MEAS - LV MASS(C)D: 102.2 GRAMS
BH CV ECHO MEAS - LV MASS(C)DI: 60 GRAMS/M^2
BH CV ECHO MEAS - LV MAX PG: 5.9 MMHG
BH CV ECHO MEAS - LV MEAN PG: 2.9 MMHG
BH CV ECHO MEAS - LV SYSTOLIC VOL/BSA (12-30): 16.4 ML/M^2
BH CV ECHO MEAS - LV V1 MAX: 121.7 CM/SEC
BH CV ECHO MEAS - LV V1 MEAN: 78 CM/SEC
BH CV ECHO MEAS - LV V1 VTI: 18.1 CM
BH CV ECHO MEAS - LVIDD: 4 CM
BH CV ECHO MEAS - LVIDS: 2.3 CM
BH CV ECHO MEAS - LVLD AP4: 6.6 CM
BH CV ECHO MEAS - LVLS AP4: 6.8 CM
BH CV ECHO MEAS - LVOT AREA (M): 2.8 CM^2
BH CV ECHO MEAS - LVOT AREA: 2.9 CM^2
BH CV ECHO MEAS - LVOT DIAM: 1.9 CM
BH CV ECHO MEAS - LVPWD: 0.93 CM
BH CV ECHO MEAS - MED PEAK E' VEL: 9 CM/SEC
BH CV ECHO MEAS - MR MAX PG: 37.9 MMHG
BH CV ECHO MEAS - MR MAX VEL: 307.7 CM/SEC
BH CV ECHO MEAS - MV E MAX VEL: 127 CM/SEC
BH CV ECHO MEAS - MV MAX PG: 6.5 MMHG
BH CV ECHO MEAS - MV MEAN PG: 3.4 MMHG
BH CV ECHO MEAS - MV V2 MAX: 127 CM/SEC
BH CV ECHO MEAS - MV V2 MEAN: 86.9 CM/SEC
BH CV ECHO MEAS - MV V2 VTI: 14.5 CM
BH CV ECHO MEAS - MVA(VTI): 3.7 CM^2
BH CV ECHO MEAS - PA MAX PG (FULL): 0.94 MMHG
BH CV ECHO MEAS - PA MAX PG: 2.5 MMHG
BH CV ECHO MEAS - PA V2 MAX: 78.4 CM/SEC
BH CV ECHO MEAS - PVA(V,A): 2.1 CM^2
BH CV ECHO MEAS - PVA(V,D): 2.1 CM^2
BH CV ECHO MEAS - QP/QS: 0.48
BH CV ECHO MEAS - RAP SYSTOLE: 3 MMHG
BH CV ECHO MEAS - RV MAX PG: 1.5 MMHG
BH CV ECHO MEAS - RV MEAN PG: 0.75 MMHG
BH CV ECHO MEAS - RV V1 MAX: 61.6 CM/SEC
BH CV ECHO MEAS - RV V1 MEAN: 40.6 CM/SEC
BH CV ECHO MEAS - RV V1 VTI: 9.8 CM
BH CV ECHO MEAS - RVOT AREA: 2.6 CM^2
BH CV ECHO MEAS - RVOT DIAM: 1.8 CM
BH CV ECHO MEAS - SI(AO): 107.8 ML/M^2
BH CV ECHO MEAS - SI(CUBED): 29.3 ML/M^2
BH CV ECHO MEAS - SI(LVOT): 31.1 ML/M^2
BH CV ECHO MEAS - SI(MOD-SP4): 17 ML/M^2
BH CV ECHO MEAS - SI(TEICH): 29.5 ML/M^2
BH CV ECHO MEAS - SV(AO): 183.6 ML
BH CV ECHO MEAS - SV(CUBED): 50 ML
BH CV ECHO MEAS - SV(LVOT): 52.9 ML
BH CV ECHO MEAS - SV(MOD-SP4): 29 ML
BH CV ECHO MEAS - SV(RVOT): 25.5 ML
BH CV ECHO MEAS - SV(TEICH): 50.3 ML
BH CV ECHO MEAS - TAPSE (>1.6): 2.4 CM2
BH CV ECHO MEAS - TR MAX VEL: 369.5 CM/SEC
BH CV ECHO MEASUREMENTS AVERAGE E/E' RATIO: 13.37
BH CV LOW VAS LEFT GASTRONEMIUS VESSEL: 1
BH CV LOW VAS LEFT LESSER SAPH VESSEL: 1
BH CV LOW VAS RIGHT GASTRONEMIUS VESSEL: 1
BH CV LOW VAS RIGHT GASTRONEMIUS VESSEL: 1
BH CV LOWER VASCULAR LEFT COMMON FEMORAL AUGMENT: NORMAL
BH CV LOWER VASCULAR LEFT COMMON FEMORAL COMPETENT: NORMAL
BH CV LOWER VASCULAR LEFT COMMON FEMORAL COMPRESS: NORMAL
BH CV LOWER VASCULAR LEFT COMMON FEMORAL PHASIC: NORMAL
BH CV LOWER VASCULAR LEFT COMMON FEMORAL SPONT: NORMAL
BH CV LOWER VASCULAR LEFT DISTAL FEMORAL COMPRESS: NORMAL
BH CV LOWER VASCULAR LEFT GASTRONEMIUS COMPRESS: NORMAL
BH CV LOWER VASCULAR LEFT GASTRONEMIUS THROMBUS: NORMAL
BH CV LOWER VASCULAR LEFT GASTRONEMIUS THROMBUS: NORMAL
BH CV LOWER VASCULAR LEFT GREATER SAPH AK COMPRESS: NORMAL
BH CV LOWER VASCULAR LEFT GREATER SAPH BK COMPRESS: NORMAL
BH CV LOWER VASCULAR LEFT LESSER SAPH COMPRESS: NORMAL
BH CV LOWER VASCULAR LEFT LESSER SAPH COMPRESS: NORMAL
BH CV LOWER VASCULAR LEFT LESSER SAPH THROMBUS: NORMAL
BH CV LOWER VASCULAR LEFT LESSER SAPH THROMBUS: NORMAL
BH CV LOWER VASCULAR LEFT MID FEMORAL AUGMENT: NORMAL
BH CV LOWER VASCULAR LEFT MID FEMORAL COMPETENT: NORMAL
BH CV LOWER VASCULAR LEFT MID FEMORAL COMPRESS: NORMAL
BH CV LOWER VASCULAR LEFT MID FEMORAL PHASIC: NORMAL
BH CV LOWER VASCULAR LEFT MID FEMORAL SPONT: NORMAL
BH CV LOWER VASCULAR LEFT PERONEAL COMPRESS: NORMAL
BH CV LOWER VASCULAR LEFT POPLITEAL AUGMENT: NORMAL
BH CV LOWER VASCULAR LEFT POPLITEAL COMPETENT: NORMAL
BH CV LOWER VASCULAR LEFT POPLITEAL COMPRESS: NORMAL
BH CV LOWER VASCULAR LEFT POPLITEAL PHASIC: NORMAL
BH CV LOWER VASCULAR LEFT POPLITEAL SPONT: NORMAL
BH CV LOWER VASCULAR LEFT POSTERIOR TIBIAL COMPRESS: NORMAL
BH CV LOWER VASCULAR LEFT PROFUNDA FEMORAL COMPRESS: NORMAL
BH CV LOWER VASCULAR LEFT PROFUNDA FEMORAL COMPRESS: NORMAL
BH CV LOWER VASCULAR LEFT PROXIMAL FEMORAL COMPRESS: NORMAL
BH CV LOWER VASCULAR LEFT SAPHENOFEMORAL JUNCTION COMPRESS: NORMAL
BH CV LOWER VASCULAR RIGHT COMMON FEMORAL AUGMENT: NORMAL
BH CV LOWER VASCULAR RIGHT COMMON FEMORAL COMPETENT: NORMAL
BH CV LOWER VASCULAR RIGHT COMMON FEMORAL COMPRESS: NORMAL
BH CV LOWER VASCULAR RIGHT COMMON FEMORAL PHASIC: NORMAL
BH CV LOWER VASCULAR RIGHT COMMON FEMORAL SPONT: NORMAL
BH CV LOWER VASCULAR RIGHT DISTAL FEMORAL COMPRESS: NORMAL
BH CV LOWER VASCULAR RIGHT GASTRONEMIUS COMPRESS: NORMAL
BH CV LOWER VASCULAR RIGHT GASTRONEMIUS THROMBUS: NORMAL
BH CV LOWER VASCULAR RIGHT GREATER SAPH AK COMPRESS: NORMAL
BH CV LOWER VASCULAR RIGHT GREATER SAPH BK COMPRESS: NORMAL
BH CV LOWER VASCULAR RIGHT MID FEMORAL AUGMENT: NORMAL
BH CV LOWER VASCULAR RIGHT MID FEMORAL COMPETENT: NORMAL
BH CV LOWER VASCULAR RIGHT MID FEMORAL COMPRESS: NORMAL
BH CV LOWER VASCULAR RIGHT MID FEMORAL PHASIC: NORMAL
BH CV LOWER VASCULAR RIGHT MID FEMORAL SPONT: NORMAL
BH CV LOWER VASCULAR RIGHT PERONEAL COMPRESS: NORMAL
BH CV LOWER VASCULAR RIGHT POPLITEAL AUGMENT: NORMAL
BH CV LOWER VASCULAR RIGHT POPLITEAL COMPETENT: NORMAL
BH CV LOWER VASCULAR RIGHT POPLITEAL COMPRESS: NORMAL
BH CV LOWER VASCULAR RIGHT POPLITEAL PHASIC: NORMAL
BH CV LOWER VASCULAR RIGHT POPLITEAL SPONT: NORMAL
BH CV LOWER VASCULAR RIGHT POSTERIOR TIBIAL COMPRESS: NORMAL
BH CV LOWER VASCULAR RIGHT PROFUNDA FEMORAL COMPRESS: NORMAL
BH CV LOWER VASCULAR RIGHT PROFUNDA FEMORAL COMPRESS: NORMAL
BH CV LOWER VASCULAR RIGHT PROXIMAL FEMORAL COMPRESS: NORMAL
BH CV LOWER VASCULAR RIGHT SAPHENOFEMORAL JUNCTION COMPRESS: NORMAL
BH CV XLRA - RV BASE: 2.6 CM
BH CV XLRA - TDI S': 12 CM/SEC
BILIRUB SERPL-MCNC: 0.2 MG/DL (ref 0.2–1.2)
BILIRUB SERPL-MCNC: 0.3 MG/DL (ref 0.2–1.2)
BILIRUB SERPL-MCNC: 0.4 MG/DL (ref 0.2–1.2)
BILIRUB SERPL-MCNC: 0.5 MG/DL (ref 0.2–1.2)
BILIRUB SERPL-MCNC: 0.8 MG/DL (ref 0.2–1.2)
BILIRUB SERPL-MCNC: <0.2 MG/DL (ref 0.2–1.2)
BILIRUB UR QL STRIP: NEGATIVE
BLD GP AB SCN SERPL QL: NEGATIVE
BLD GP AB SCN SERPL QL: NEGATIVE
BUN BLD-MCNC: 10 MG/DL (ref 6–20)
BUN BLD-MCNC: 10 MG/DL (ref 8–23)
BUN BLD-MCNC: 11 MG/DL (ref 6–20)
BUN BLD-MCNC: 11 MG/DL (ref 8–23)
BUN BLD-MCNC: 12 MG/DL (ref 6–20)
BUN BLD-MCNC: 12 MG/DL (ref 8–23)
BUN BLD-MCNC: 4 MG/DL (ref 8–23)
BUN BLD-MCNC: 5 MG/DL (ref 8–23)
BUN BLD-MCNC: 5 MG/DL (ref 8–23)
BUN BLD-MCNC: 6 MG/DL (ref 6–20)
BUN BLD-MCNC: 6 MG/DL (ref 6–20)
BUN BLD-MCNC: 6 MG/DL (ref 8–23)
BUN BLD-MCNC: 7 MG/DL (ref 6–20)
BUN BLD-MCNC: 7 MG/DL (ref 6–20)
BUN BLD-MCNC: 7 MG/DL (ref 8–23)
BUN BLD-MCNC: 8 MG/DL (ref 6–20)
BUN BLD-MCNC: 8 MG/DL (ref 8–23)
BUN BLD-MCNC: 9 MG/DL (ref 6–20)
BUN BLD-MCNC: 9 MG/DL (ref 8–23)
BUN/CREAT SERPL: 10.7 (ref 7–25)
BUN/CREAT SERPL: 10.8 (ref 7.3–30)
BUN/CREAT SERPL: 11.1 (ref 7–25)
BUN/CREAT SERPL: 11.3 (ref 7.3–30)
BUN/CREAT SERPL: 11.3 (ref 7.3–30)
BUN/CREAT SERPL: 11.5 (ref 7–25)
BUN/CREAT SERPL: 11.6 (ref 7.3–30)
BUN/CREAT SERPL: 11.9 (ref 7–25)
BUN/CREAT SERPL: 12.2 (ref 7–25)
BUN/CREAT SERPL: 12.3 (ref 7–25)
BUN/CREAT SERPL: 13.3 (ref 7–25)
BUN/CREAT SERPL: 13.4 (ref 7.3–30)
BUN/CREAT SERPL: 13.5 (ref 7–25)
BUN/CREAT SERPL: 13.8 (ref 7–25)
BUN/CREAT SERPL: 14.3 (ref 7.3–30)
BUN/CREAT SERPL: 14.5 (ref 7–25)
BUN/CREAT SERPL: 15.4 (ref 7–25)
BUN/CREAT SERPL: 15.4 (ref 7–25)
BUN/CREAT SERPL: 15.5 (ref 7.3–30)
BUN/CREAT SERPL: 15.5 (ref 7.3–30)
BUN/CREAT SERPL: 16.4 (ref 7–25)
BUN/CREAT SERPL: 16.7 (ref 7.3–30)
BUN/CREAT SERPL: 17.7 (ref 7–25)
BUN/CREAT SERPL: 17.9 (ref 7–25)
BUN/CREAT SERPL: 18.2 (ref 7.3–30)
BUN/CREAT SERPL: 18.9 (ref 7–25)
BUN/CREAT SERPL: 19 (ref 7–25)
BUN/CREAT SERPL: 21.3 (ref 7–25)
BUN/CREAT SERPL: 21.8 (ref 7–25)
BUN/CREAT SERPL: 22.4 (ref 7–25)
BUN/CREAT SERPL: 22.4 (ref 7–25)
BUN/CREAT SERPL: 23.1 (ref 7–25)
BUN/CREAT SERPL: 23.1 (ref 7–25)
BUN/CREAT SERPL: 25.6 (ref 7–25)
BUN/CREAT SERPL: 8.8 (ref 7–25)
BUN/CREAT SERPL: 8.8 (ref 7–25)
BUN/CREAT SERPL: 9.1 (ref 7.3–30)
BUN/CREAT SERPL: 9.4 (ref 7–25)
BUN/CREAT SERPL: 9.4 (ref 7–25)
BUN/CREAT SERPL: 9.5 (ref 7–25)
BUN/CREAT SERPL: 9.9 (ref 7–25)
BURR CELLS BLD QL SMEAR: ABNORMAL
C PNEUM DNA NPH QL NAA+NON-PROBE: NOT DETECTED
CALCIUM SPEC-SCNC: 10 MG/DL (ref 8.6–10.5)
CALCIUM SPEC-SCNC: 10 MG/DL (ref 8.6–10.5)
CALCIUM SPEC-SCNC: 7.9 MG/DL (ref 8.6–10.5)
CALCIUM SPEC-SCNC: 8 MG/DL (ref 8.6–10.5)
CALCIUM SPEC-SCNC: 8.3 MG/DL (ref 8.6–10.5)
CALCIUM SPEC-SCNC: 8.4 MG/DL (ref 8.6–10.5)
CALCIUM SPEC-SCNC: 8.5 MG/DL (ref 8.6–10.5)
CALCIUM SPEC-SCNC: 8.5 MG/DL (ref 8.6–10.5)
CALCIUM SPEC-SCNC: 8.7 MG/DL (ref 8.6–10.5)
CALCIUM SPEC-SCNC: 8.8 MG/DL (ref 8.5–10.2)
CALCIUM SPEC-SCNC: 8.9 MG/DL (ref 8.5–10.2)
CALCIUM SPEC-SCNC: 8.9 MG/DL (ref 8.6–10.5)
CALCIUM SPEC-SCNC: 9 MG/DL (ref 8.6–10.5)
CALCIUM SPEC-SCNC: 9 MG/DL (ref 8.6–10.5)
CALCIUM SPEC-SCNC: 9.1 MG/DL (ref 8.5–10.2)
CALCIUM SPEC-SCNC: 9.1 MG/DL (ref 8.6–10.5)
CALCIUM SPEC-SCNC: 9.2 MG/DL (ref 8.5–10.2)
CALCIUM SPEC-SCNC: 9.2 MG/DL (ref 8.5–10.2)
CALCIUM SPEC-SCNC: 9.2 MG/DL (ref 8.6–10.5)
CALCIUM SPEC-SCNC: 9.3 MG/DL (ref 8.5–10.2)
CALCIUM SPEC-SCNC: 9.3 MG/DL (ref 8.6–10.5)
CALCIUM SPEC-SCNC: 9.3 MG/DL (ref 8.6–10.5)
CALCIUM SPEC-SCNC: 9.4 MG/DL (ref 8.5–10.2)
CALCIUM SPEC-SCNC: 9.4 MG/DL (ref 8.6–10.5)
CALCIUM SPEC-SCNC: 9.5 MG/DL (ref 8.5–10.2)
CALCIUM SPEC-SCNC: 9.5 MG/DL (ref 8.6–10.5)
CALCIUM SPEC-SCNC: 9.6 MG/DL (ref 8.5–10.2)
CALCIUM SPEC-SCNC: 9.7 MG/DL (ref 8.5–10.2)
CALCIUM SPEC-SCNC: 9.8 MG/DL (ref 8.5–10.2)
CALCIUM SPEC-SCNC: 9.8 MG/DL (ref 8.6–10.5)
CANCER AG19-9 SERPL-ACNC: 1541 U/ML
CANCER AG19-9 SERPL-ACNC: 655.3 U/ML
CANCER AG19-9 SERPL-ACNC: 807.7 U/ML
CANCER AG19-9 SERPL-ACNC: ABNORMAL U/ML
CANCER AG19-9 SERPL-ACNC: ABNORMAL U/ML
CHLORIDE SERPL-SCNC: 100 MMOL/L (ref 98–107)
CHLORIDE SERPL-SCNC: 81 MMOL/L (ref 98–107)
CHLORIDE SERPL-SCNC: 84 MMOL/L (ref 98–107)
CHLORIDE SERPL-SCNC: 85 MMOL/L (ref 98–107)
CHLORIDE SERPL-SCNC: 85 MMOL/L (ref 98–107)
CHLORIDE SERPL-SCNC: 86 MMOL/L (ref 98–107)
CHLORIDE SERPL-SCNC: 86 MMOL/L (ref 98–107)
CHLORIDE SERPL-SCNC: 87 MMOL/L (ref 98–107)
CHLORIDE SERPL-SCNC: 87 MMOL/L (ref 98–107)
CHLORIDE SERPL-SCNC: 88 MMOL/L (ref 98–107)
CHLORIDE SERPL-SCNC: 88 MMOL/L (ref 98–107)
CHLORIDE SERPL-SCNC: 89 MMOL/L (ref 98–107)
CHLORIDE SERPL-SCNC: 90 MMOL/L (ref 98–107)
CHLORIDE SERPL-SCNC: 90 MMOL/L (ref 98–107)
CHLORIDE SERPL-SCNC: 91 MMOL/L (ref 98–107)
CHLORIDE SERPL-SCNC: 92 MMOL/L (ref 98–107)
CHLORIDE SERPL-SCNC: 93 MMOL/L (ref 98–107)
CHLORIDE SERPL-SCNC: 94 MMOL/L (ref 98–107)
CHLORIDE SERPL-SCNC: 96 MMOL/L (ref 98–107)
CHLORIDE SERPL-SCNC: 97 MMOL/L (ref 98–107)
CLARITY UR: CLEAR
CO2 SERPL-SCNC: 26 MMOL/L (ref 22–29)
CO2 SERPL-SCNC: 26 MMOL/L (ref 22–29)
CO2 SERPL-SCNC: 27.4 MMOL/L (ref 22–29)
CO2 SERPL-SCNC: 27.4 MMOL/L (ref 22–29)
CO2 SERPL-SCNC: 27.5 MMOL/L (ref 22–29)
CO2 SERPL-SCNC: 27.7 MMOL/L (ref 22–29)
CO2 SERPL-SCNC: 27.8 MMOL/L (ref 22–29)
CO2 SERPL-SCNC: 27.8 MMOL/L (ref 22–29)
CO2 SERPL-SCNC: 28.2 MMOL/L (ref 22–29)
CO2 SERPL-SCNC: 28.4 MMOL/L (ref 22–29)
CO2 SERPL-SCNC: 28.6 MMOL/L (ref 22–29)
CO2 SERPL-SCNC: 28.7 MMOL/L (ref 22–29)
CO2 SERPL-SCNC: 28.9 MMOL/L (ref 22–29)
CO2 SERPL-SCNC: 29 MMOL/L (ref 22–29)
CO2 SERPL-SCNC: 29.1 MMOL/L (ref 22–29)
CO2 SERPL-SCNC: 29.3 MMOL/L (ref 22–29)
CO2 SERPL-SCNC: 29.4 MMOL/L (ref 22–29)
CO2 SERPL-SCNC: 29.4 MMOL/L (ref 22–29)
CO2 SERPL-SCNC: 29.5 MMOL/L (ref 22–29)
CO2 SERPL-SCNC: 29.6 MMOL/L (ref 22–29)
CO2 SERPL-SCNC: 29.7 MMOL/L (ref 22–29)
CO2 SERPL-SCNC: 30 MMOL/L (ref 22–29)
CO2 SERPL-SCNC: 30 MMOL/L (ref 22–29)
CO2 SERPL-SCNC: 30.3 MMOL/L (ref 22–29)
CO2 SERPL-SCNC: 30.3 MMOL/L (ref 22–29)
CO2 SERPL-SCNC: 30.5 MMOL/L (ref 22–29)
CO2 SERPL-SCNC: 30.6 MMOL/L (ref 22–29)
CO2 SERPL-SCNC: 30.7 MMOL/L (ref 22–29)
CO2 SERPL-SCNC: 31.4 MMOL/L (ref 22–29)
CO2 SERPL-SCNC: 32.4 MMOL/L (ref 22–29)
CO2 SERPL-SCNC: 32.6 MMOL/L (ref 22–29)
CO2 SERPL-SCNC: 32.7 MMOL/L (ref 22–29)
CO2 SERPL-SCNC: 32.7 MMOL/L (ref 22–29)
CO2 SERPL-SCNC: 33 MMOL/L (ref 22–29)
CO2 SERPL-SCNC: 33.2 MMOL/L (ref 22–29)
CO2 SERPL-SCNC: 34 MMOL/L (ref 22–29)
CO2 SERPL-SCNC: 34.7 MMOL/L (ref 22–29)
CO2 SERPL-SCNC: 36 MMOL/L (ref 22–29)
CO2 SERPL-SCNC: 38.9 MMOL/L (ref 22–29)
COLOR FLD: ABNORMAL
COLOR FLD: YELLOW
COLOR UR: YELLOW
CREAT BLD-MCNC: 0.39 MG/DL (ref 0.57–1)
CREAT BLD-MCNC: 0.42 MG/DL (ref 0.57–1)
CREAT BLD-MCNC: 0.42 MG/DL (ref 0.57–1)
CREAT BLD-MCNC: 0.43 MG/DL (ref 0.57–1)
CREAT BLD-MCNC: 0.47 MG/DL (ref 0.57–1)
CREAT BLD-MCNC: 0.49 MG/DL (ref 0.57–1)
CREAT BLD-MCNC: 0.49 MG/DL (ref 0.57–1)
CREAT BLD-MCNC: 0.52 MG/DL (ref 0.57–1)
CREAT BLD-MCNC: 0.52 MG/DL (ref 0.57–1)
CREAT BLD-MCNC: 0.53 MG/DL (ref 0.57–1)
CREAT BLD-MCNC: 0.53 MG/DL (ref 0.57–1)
CREAT BLD-MCNC: 0.53 MG/DL (ref 0.6–1.1)
CREAT BLD-MCNC: 0.54 MG/DL (ref 0.6–1.1)
CREAT BLD-MCNC: 0.55 MG/DL (ref 0.57–1)
CREAT BLD-MCNC: 0.56 MG/DL (ref 0.57–1)
CREAT BLD-MCNC: 0.57 MG/DL (ref 0.57–1)
CREAT BLD-MCNC: 0.58 MG/DL (ref 0.57–1)
CREAT BLD-MCNC: 0.58 MG/DL (ref 0.6–1.1)
CREAT BLD-MCNC: 0.58 MG/DL (ref 0.6–1.1)
CREAT BLD-MCNC: 0.59 MG/DL (ref 0.57–1)
CREAT BLD-MCNC: 0.62 MG/DL (ref 0.57–1)
CREAT BLD-MCNC: 0.62 MG/DL (ref 0.6–1.1)
CREAT BLD-MCNC: 0.63 MG/DL (ref 0.57–1)
CREAT BLD-MCNC: 0.64 MG/DL (ref 0.57–1)
CREAT BLD-MCNC: 0.65 MG/DL (ref 0.57–1)
CREAT BLD-MCNC: 0.65 MG/DL (ref 0.57–1)
CREAT BLD-MCNC: 0.65 MG/DL (ref 0.6–1.1)
CREAT BLD-MCNC: 0.66 MG/DL (ref 0.6–1.1)
CREAT BLD-MCNC: 0.66 MG/DL (ref 0.6–1.1)
CREAT BLD-MCNC: 0.67 MG/DL (ref 0.57–1)
CREAT BLD-MCNC: 0.68 MG/DL (ref 0.57–1)
CREAT BLD-MCNC: 0.69 MG/DL (ref 0.6–1.1)
CREAT BLD-MCNC: 0.7 MG/DL (ref 0.6–1.1)
CREAT BLD-MCNC: 0.71 MG/DL (ref 0.57–1)
CREAT BLD-MCNC: 0.74 MG/DL (ref 0.57–1)
CREAT BLD-MCNC: 0.82 MG/DL (ref 0.6–1.1)
CREAT BLD-MCNC: 0.83 MG/DL (ref 0.57–1)
CREAT BLD-MCNC: 0.89 MG/DL (ref 0.57–1)
CREAT BLD-MCNC: 0.9 MG/DL (ref 0.57–1)
CREAT BLDA-MCNC: 0.6 MG/DL (ref 0.6–1.3)
CREAT BLDA-MCNC: 1 MG/DL (ref 0.6–1.3)
CYTO UR: NORMAL
CYTO UR: NORMAL
D DIMER PPP FEU-MCNC: 0.78 MCGFEU/ML (ref 0–0.49)
D-LACTATE SERPL-SCNC: 0.9 MMOL/L (ref 0.5–2)
D-LACTATE SERPL-SCNC: 1.2 MMOL/L (ref 0.5–2)
D-LACTATE SERPL-SCNC: 1.5 MMOL/L (ref 0.5–2)
D-LACTATE SERPL-SCNC: 1.6 MMOL/L (ref 0.5–2)
DACRYOCYTES BLD QL SMEAR: ABNORMAL
DEPRECATED RDW RBC AUTO: 40.8 FL (ref 37–54)
DEPRECATED RDW RBC AUTO: 41.4 FL (ref 37–54)
DEPRECATED RDW RBC AUTO: 41.7 FL (ref 37–54)
DEPRECATED RDW RBC AUTO: 41.8 FL (ref 37–54)
DEPRECATED RDW RBC AUTO: 42.2 FL (ref 37–54)
DEPRECATED RDW RBC AUTO: 42.5 FL (ref 37–54)
DEPRECATED RDW RBC AUTO: 42.9 FL (ref 37–54)
DEPRECATED RDW RBC AUTO: 43.5 FL (ref 37–54)
DEPRECATED RDW RBC AUTO: 43.5 FL (ref 37–54)
DEPRECATED RDW RBC AUTO: 47.3 FL (ref 37–54)
DEPRECATED RDW RBC AUTO: 47.5 FL (ref 37–54)
DEPRECATED RDW RBC AUTO: 48.4 FL (ref 37–54)
DEPRECATED RDW RBC AUTO: 49.1 FL (ref 37–54)
DEPRECATED RDW RBC AUTO: 51.8 FL (ref 37–54)
DEPRECATED RDW RBC AUTO: 52.1 FL (ref 37–54)
DEPRECATED RDW RBC AUTO: 52.9 FL (ref 37–54)
DEPRECATED RDW RBC AUTO: 52.9 FL (ref 37–54)
DEPRECATED RDW RBC AUTO: 53.7 FL (ref 37–54)
DEPRECATED RDW RBC AUTO: 53.9 FL (ref 37–54)
DEPRECATED RDW RBC AUTO: 54.1 FL (ref 37–54)
DEPRECATED RDW RBC AUTO: 54.4 FL (ref 37–54)
DEPRECATED RDW RBC AUTO: 54.4 FL (ref 37–54)
DEPRECATED RDW RBC AUTO: 54.8 FL (ref 37–54)
DEPRECATED RDW RBC AUTO: 55 FL (ref 37–54)
DEPRECATED RDW RBC AUTO: 55.1 FL (ref 37–54)
DEPRECATED RDW RBC AUTO: 55.7 FL (ref 37–54)
DEPRECATED RDW RBC AUTO: 55.9 FL (ref 37–54)
DEPRECATED RDW RBC AUTO: 56.3 FL (ref 37–54)
DEPRECATED RDW RBC AUTO: 56.6 FL (ref 37–54)
DEPRECATED RDW RBC AUTO: 57.1 FL (ref 37–54)
DEPRECATED RDW RBC AUTO: 57.3 FL (ref 37–54)
DEPRECATED RDW RBC AUTO: 58.6 FL (ref 37–54)
DEPRECATED RDW RBC AUTO: 60 FL (ref 37–54)
DEPRECATED RDW RBC AUTO: 61.5 FL (ref 37–54)
DEPRECATED RDW RBC AUTO: 63.4 FL (ref 37–54)
DEPRECATED RDW RBC AUTO: 63.4 FL (ref 37–54)
DEPRECATED RDW RBC AUTO: 66 FL (ref 37–54)
DEPRECATED RDW RBC AUTO: 66.1 FL (ref 37–54)
DEPRECATED RDW RBC AUTO: 67 FL (ref 37–54)
DEPRECATED RDW RBC AUTO: 67.2 FL (ref 37–54)
DEPRECATED RDW RBC AUTO: 68.9 FL (ref 37–54)
DEPRECATED RDW RBC AUTO: 78.2 FL (ref 37–54)
DX PRELIMINARY: NORMAL
EOSINOPHIL # BLD AUTO: 0 10*3/MM3 (ref 0–0.4)
EOSINOPHIL # BLD AUTO: 0 10*3/MM3 (ref 0–0.4)
EOSINOPHIL # BLD AUTO: 0.01 10*3/MM3 (ref 0–0.4)
EOSINOPHIL # BLD AUTO: 0.02 10*3/MM3 (ref 0–0.4)
EOSINOPHIL # BLD AUTO: 0.03 10*3/MM3 (ref 0–0.4)
EOSINOPHIL # BLD AUTO: 0.04 10*3/MM3 (ref 0–0.4)
EOSINOPHIL # BLD AUTO: 0.05 10*3/MM3 (ref 0–0.4)
EOSINOPHIL # BLD AUTO: 0.06 10*3/MM3 (ref 0–0.4)
EOSINOPHIL # BLD AUTO: 0.07 10*3/MM3 (ref 0–0.4)
EOSINOPHIL # BLD AUTO: 0.1 10*3/MM3 (ref 0–0.4)
EOSINOPHIL # BLD AUTO: 0.16 10*3/MM3 (ref 0–0.4)
EOSINOPHIL # BLD AUTO: 0.17 10*3/MM3 (ref 0–0.4)
EOSINOPHIL # BLD AUTO: 0.19 10*3/MM3 (ref 0–0.4)
EOSINOPHIL # BLD AUTO: 0.21 10*3/MM3 (ref 0–0.4)
EOSINOPHIL # BLD AUTO: 0.21 10*3/MM3 (ref 0–0.4)
EOSINOPHIL # BLD AUTO: 0.24 10*3/MM3 (ref 0–0.4)
EOSINOPHIL # BLD AUTO: 0.26 10*3/MM3 (ref 0–0.4)
EOSINOPHIL # BLD AUTO: 0.31 10*3/MM3 (ref 0–0.4)
EOSINOPHIL # BLD AUTO: 0.32 10*3/MM3 (ref 0–0.4)
EOSINOPHIL # BLD AUTO: 0.49 10*3/MM3 (ref 0–0.4)
EOSINOPHIL # BLD AUTO: 1.03 10*3/MM3 (ref 0–0.4)
EOSINOPHIL # BLD MANUAL: 0.07 10*3/MM3 (ref 0–0.4)
EOSINOPHIL # BLD MANUAL: 0.09 10*3/MM3 (ref 0–0.4)
EOSINOPHIL # BLD MANUAL: 0.15 10*3/MM3 (ref 0–0.4)
EOSINOPHIL # BLD MANUAL: 0.18 10*3/MM3 (ref 0–0.4)
EOSINOPHIL NFR BLD AUTO: 0 % (ref 0.3–6.2)
EOSINOPHIL NFR BLD AUTO: 0.2 % (ref 0.3–6.2)
EOSINOPHIL NFR BLD AUTO: 0.3 % (ref 0.3–6.2)
EOSINOPHIL NFR BLD AUTO: 0.4 % (ref 0.3–6.2)
EOSINOPHIL NFR BLD AUTO: 0.5 % (ref 0.3–6.2)
EOSINOPHIL NFR BLD AUTO: 0.5 % (ref 0.3–6.2)
EOSINOPHIL NFR BLD AUTO: 0.7 % (ref 0.3–6.2)
EOSINOPHIL NFR BLD AUTO: 0.9 % (ref 0.3–6.2)
EOSINOPHIL NFR BLD AUTO: 0.9 % (ref 0.3–6.2)
EOSINOPHIL NFR BLD AUTO: 1 % (ref 0.3–6.2)
EOSINOPHIL NFR BLD AUTO: 1.4 % (ref 0.3–6.2)
EOSINOPHIL NFR BLD AUTO: 1.5 % (ref 0.3–6.2)
EOSINOPHIL NFR BLD AUTO: 1.8 % (ref 0.3–6.2)
EOSINOPHIL NFR BLD AUTO: 11.6 % (ref 0.3–6.2)
EOSINOPHIL NFR BLD AUTO: 2.2 % (ref 0.3–6.2)
EOSINOPHIL NFR BLD AUTO: 2.7 % (ref 0.3–6.2)
EOSINOPHIL NFR BLD AUTO: 2.9 % (ref 0.3–6.2)
EOSINOPHIL NFR BLD AUTO: 4 % (ref 0.3–6.2)
EOSINOPHIL NFR BLD AUTO: 4.1 % (ref 0.3–6.2)
EOSINOPHIL NFR BLD AUTO: 4.3 % (ref 0.3–6.2)
EOSINOPHIL NFR BLD AUTO: 6.1 % (ref 0.3–6.2)
EOSINOPHIL NFR BLD AUTO: 6.5 % (ref 0.3–6.2)
EOSINOPHIL NFR BLD MANUAL: 2.1 % (ref 0.3–6.2)
EOSINOPHIL NFR BLD MANUAL: 3 % (ref 0.3–6.2)
EOSINOPHIL NFR BLD MANUAL: 4 % (ref 0.3–6.2)
EOSINOPHIL NFR BLD MANUAL: 4.1 % (ref 0.3–6.2)
EOSINOPHIL NFR FLD MANUAL: 1 %
EOSINOPHIL NFR FLD MANUAL: 2 %
ERYTHROCYTE [DISTWIDTH] IN BLOOD BY AUTOMATED COUNT: 14.1 % (ref 12.3–15.4)
ERYTHROCYTE [DISTWIDTH] IN BLOOD BY AUTOMATED COUNT: 14.3 % (ref 12.3–15.4)
ERYTHROCYTE [DISTWIDTH] IN BLOOD BY AUTOMATED COUNT: 14.3 % (ref 12.3–15.4)
ERYTHROCYTE [DISTWIDTH] IN BLOOD BY AUTOMATED COUNT: 14.4 % (ref 12.3–15.4)
ERYTHROCYTE [DISTWIDTH] IN BLOOD BY AUTOMATED COUNT: 14.6 % (ref 12.3–15.4)
ERYTHROCYTE [DISTWIDTH] IN BLOOD BY AUTOMATED COUNT: 14.7 % (ref 12.3–15.4)
ERYTHROCYTE [DISTWIDTH] IN BLOOD BY AUTOMATED COUNT: 14.8 % (ref 12.3–15.4)
ERYTHROCYTE [DISTWIDTH] IN BLOOD BY AUTOMATED COUNT: 16.8 % (ref 12.3–15.4)
ERYTHROCYTE [DISTWIDTH] IN BLOOD BY AUTOMATED COUNT: 17.8 % (ref 12.3–15.4)
ERYTHROCYTE [DISTWIDTH] IN BLOOD BY AUTOMATED COUNT: 17.9 % (ref 12.3–15.4)
ERYTHROCYTE [DISTWIDTH] IN BLOOD BY AUTOMATED COUNT: 18.4 % (ref 12.3–15.4)
ERYTHROCYTE [DISTWIDTH] IN BLOOD BY AUTOMATED COUNT: 18.4 % (ref 12.3–15.4)
ERYTHROCYTE [DISTWIDTH] IN BLOOD BY AUTOMATED COUNT: 18.5 % (ref 12.3–15.4)
ERYTHROCYTE [DISTWIDTH] IN BLOOD BY AUTOMATED COUNT: 18.6 % (ref 12.3–15.4)
ERYTHROCYTE [DISTWIDTH] IN BLOOD BY AUTOMATED COUNT: 18.7 % (ref 12.3–15.4)
ERYTHROCYTE [DISTWIDTH] IN BLOOD BY AUTOMATED COUNT: 18.8 % (ref 12.3–15.4)
ERYTHROCYTE [DISTWIDTH] IN BLOOD BY AUTOMATED COUNT: 18.8 % (ref 12.3–15.4)
ERYTHROCYTE [DISTWIDTH] IN BLOOD BY AUTOMATED COUNT: 18.9 % (ref 12.3–15.4)
ERYTHROCYTE [DISTWIDTH] IN BLOOD BY AUTOMATED COUNT: 19 % (ref 12.3–15.4)
ERYTHROCYTE [DISTWIDTH] IN BLOOD BY AUTOMATED COUNT: 19 % (ref 12.3–15.4)
ERYTHROCYTE [DISTWIDTH] IN BLOOD BY AUTOMATED COUNT: 19.1 % (ref 12.3–15.4)
ERYTHROCYTE [DISTWIDTH] IN BLOOD BY AUTOMATED COUNT: 19.1 % (ref 12.3–15.4)
ERYTHROCYTE [DISTWIDTH] IN BLOOD BY AUTOMATED COUNT: 19.2 % (ref 12.3–15.4)
ERYTHROCYTE [DISTWIDTH] IN BLOOD BY AUTOMATED COUNT: 19.3 % (ref 12.3–15.4)
ERYTHROCYTE [DISTWIDTH] IN BLOOD BY AUTOMATED COUNT: 19.3 % (ref 12.3–15.4)
ERYTHROCYTE [DISTWIDTH] IN BLOOD BY AUTOMATED COUNT: 19.4 % (ref 12.3–15.4)
ERYTHROCYTE [DISTWIDTH] IN BLOOD BY AUTOMATED COUNT: 19.5 % (ref 12.3–15.4)
ERYTHROCYTE [DISTWIDTH] IN BLOOD BY AUTOMATED COUNT: 19.7 % (ref 12.3–15.4)
ERYTHROCYTE [DISTWIDTH] IN BLOOD BY AUTOMATED COUNT: 19.7 % (ref 12.3–15.4)
ERYTHROCYTE [DISTWIDTH] IN BLOOD BY AUTOMATED COUNT: 19.9 % (ref 12.3–15.4)
ERYTHROCYTE [DISTWIDTH] IN BLOOD BY AUTOMATED COUNT: 20.5 % (ref 12.3–15.4)
ERYTHROCYTE [DISTWIDTH] IN BLOOD BY AUTOMATED COUNT: 20.7 % (ref 12.3–15.4)
ERYTHROCYTE [DISTWIDTH] IN BLOOD BY AUTOMATED COUNT: 21.2 % (ref 12.3–15.4)
ERYTHROCYTE [DISTWIDTH] IN BLOOD BY AUTOMATED COUNT: 21.2 % (ref 12.3–15.4)
ERYTHROCYTE [DISTWIDTH] IN BLOOD BY AUTOMATED COUNT: 21.3 % (ref 12.3–15.4)
ERYTHROCYTE [DISTWIDTH] IN BLOOD BY AUTOMATED COUNT: 21.6 % (ref 12.3–15.4)
ERYTHROCYTE [DISTWIDTH] IN BLOOD BY AUTOMATED COUNT: 21.7 % (ref 12.3–15.4)
ERYTHROCYTE [DISTWIDTH] IN BLOOD BY AUTOMATED COUNT: 22.5 % (ref 12.3–15.4)
ERYTHROCYTE [DISTWIDTH] IN BLOOD BY AUTOMATED COUNT: 23.1 % (ref 12.3–15.4)
EXPIRATION DATE: NORMAL
FECAL OCCULT BLOOD SCREEN, POC: NEGATIVE
FERRITIN SERPL-MCNC: 12.3 NG/ML (ref 13–150)
FERRITIN SERPL-MCNC: 148 NG/ML (ref 13–150)
FLUAV H1 2009 PAND RNA NPH QL NAA+PROBE: NOT DETECTED
FLUAV H1 HA GENE NPH QL NAA+PROBE: NOT DETECTED
FLUAV H3 RNA NPH QL NAA+PROBE: NOT DETECTED
FLUAV SUBTYP SPEC NAA+PROBE: NOT DETECTED
FLUBV RNA ISLT QL NAA+PROBE: NOT DETECTED
FOLATE SERPL-MCNC: >20 NG/ML (ref 4.78–24.2)
FUNGUS WND CULT: NORMAL
GAS FLOW AIRWAY: 3 LPM
GAS FLOW AIRWAY: 5 LPM
GFR SERPL CREATININE-BSD FRML MDRD: 103 ML/MIN/1.73
GFR SERPL CREATININE-BSD FRML MDRD: 105 ML/MIN/1.73
GFR SERPL CREATININE-BSD FRML MDRD: 107 ML/MIN/1.73
GFR SERPL CREATININE-BSD FRML MDRD: 109 ML/MIN/1.73
GFR SERPL CREATININE-BSD FRML MDRD: 111 ML/MIN/1.73
GFR SERPL CREATININE-BSD FRML MDRD: 111 ML/MIN/1.73
GFR SERPL CREATININE-BSD FRML MDRD: 114 ML/MIN/1.73
GFR SERPL CREATININE-BSD FRML MDRD: 116 ML/MIN/1.73
GFR SERPL CREATININE-BSD FRML MDRD: 116 ML/MIN/1.73
GFR SERPL CREATININE-BSD FRML MDRD: 117 ML/MIN/1.73
GFR SERPL CREATININE-BSD FRML MDRD: 117 ML/MIN/1.73
GFR SERPL CREATININE-BSD FRML MDRD: 119 ML/MIN/1.73
GFR SERPL CREATININE-BSD FRML MDRD: 119 ML/MIN/1.73
GFR SERPL CREATININE-BSD FRML MDRD: 127 ML/MIN/1.73
GFR SERPL CREATININE-BSD FRML MDRD: 133 ML/MIN/1.73
GFR SERPL CREATININE-BSD FRML MDRD: 141 ML/MIN/1.73
GFR SERPL CREATININE-BSD FRML MDRD: 63 ML/MIN/1.73
GFR SERPL CREATININE-BSD FRML MDRD: 64 ML/MIN/1.73
GFR SERPL CREATININE-BSD FRML MDRD: 69 ML/MIN/1.73
GFR SERPL CREATININE-BSD FRML MDRD: 70 ML/MIN/1.73
GFR SERPL CREATININE-BSD FRML MDRD: 79 ML/MIN/1.73
GFR SERPL CREATININE-BSD FRML MDRD: 83 ML/MIN/1.73
GFR SERPL CREATININE-BSD FRML MDRD: 85 ML/MIN/1.73
GFR SERPL CREATININE-BSD FRML MDRD: 86 ML/MIN/1.73
GFR SERPL CREATININE-BSD FRML MDRD: 87 ML/MIN/1.73
GFR SERPL CREATININE-BSD FRML MDRD: 89 ML/MIN/1.73
GFR SERPL CREATININE-BSD FRML MDRD: 90 ML/MIN/1.73
GFR SERPL CREATININE-BSD FRML MDRD: 90 ML/MIN/1.73
GFR SERPL CREATININE-BSD FRML MDRD: 92 ML/MIN/1.73
GFR SERPL CREATININE-BSD FRML MDRD: 92 ML/MIN/1.73
GFR SERPL CREATININE-BSD FRML MDRD: 93 ML/MIN/1.73
GFR SERPL CREATININE-BSD FRML MDRD: 95 ML/MIN/1.73
GFR SERPL CREATININE-BSD FRML MDRD: >150 ML/MIN/1.73
GIANT PLATELETS: ABNORMAL
GIE STN SPEC: NORMAL
GLOBULIN UR ELPH-MCNC: 3 GM/DL
GLOBULIN UR ELPH-MCNC: 3.1 GM/DL
GLOBULIN UR ELPH-MCNC: 3.1 GM/DL
GLOBULIN UR ELPH-MCNC: 3.2 GM/DL
GLOBULIN UR ELPH-MCNC: 3.2 GM/DL (ref 1.8–3.5)
GLOBULIN UR ELPH-MCNC: 3.3 GM/DL
GLOBULIN UR ELPH-MCNC: 3.3 GM/DL (ref 1.8–3.5)
GLOBULIN UR ELPH-MCNC: 3.3 GM/DL (ref 1.8–3.5)
GLOBULIN UR ELPH-MCNC: 3.4 GM/DL
GLOBULIN UR ELPH-MCNC: 3.4 GM/DL (ref 1.8–3.5)
GLOBULIN UR ELPH-MCNC: 3.5 GM/DL
GLOBULIN UR ELPH-MCNC: 3.5 GM/DL
GLOBULIN UR ELPH-MCNC: 3.5 GM/DL (ref 1.8–3.5)
GLOBULIN UR ELPH-MCNC: 3.5 GM/DL (ref 1.8–3.5)
GLOBULIN UR ELPH-MCNC: 3.6 GM/DL (ref 1.8–3.5)
GLOBULIN UR ELPH-MCNC: 3.7 GM/DL
GLOBULIN UR ELPH-MCNC: 3.7 GM/DL (ref 1.8–3.5)
GLOBULIN UR ELPH-MCNC: 3.7 GM/DL (ref 1.8–3.5)
GLOBULIN UR ELPH-MCNC: 3.8 GM/DL (ref 1.8–3.5)
GLOBULIN UR ELPH-MCNC: 3.9 GM/DL
GLOBULIN UR ELPH-MCNC: 3.9 GM/DL (ref 1.8–3.5)
GLUCOSE BLD-MCNC: 108 MG/DL (ref 74–124)
GLUCOSE BLD-MCNC: 112 MG/DL (ref 65–99)
GLUCOSE BLD-MCNC: 113 MG/DL (ref 65–99)
GLUCOSE BLD-MCNC: 119 MG/DL (ref 65–99)
GLUCOSE BLD-MCNC: 121 MG/DL (ref 65–99)
GLUCOSE BLD-MCNC: 123 MG/DL (ref 65–99)
GLUCOSE BLD-MCNC: 124 MG/DL (ref 65–99)
GLUCOSE BLD-MCNC: 125 MG/DL (ref 65–99)
GLUCOSE BLD-MCNC: 125 MG/DL (ref 74–124)
GLUCOSE BLD-MCNC: 126 MG/DL (ref 65–99)
GLUCOSE BLD-MCNC: 126 MG/DL (ref 65–99)
GLUCOSE BLD-MCNC: 130 MG/DL (ref 65–99)
GLUCOSE BLD-MCNC: 139 MG/DL (ref 65–99)
GLUCOSE BLD-MCNC: 140 MG/DL (ref 65–99)
GLUCOSE BLD-MCNC: 146 MG/DL (ref 65–99)
GLUCOSE BLD-MCNC: 151 MG/DL (ref 65–99)
GLUCOSE BLD-MCNC: 154 MG/DL (ref 65–99)
GLUCOSE BLD-MCNC: 158 MG/DL (ref 74–124)
GLUCOSE BLD-MCNC: 159 MG/DL (ref 74–124)
GLUCOSE BLD-MCNC: 164 MG/DL (ref 65–99)
GLUCOSE BLD-MCNC: 170 MG/DL (ref 65–99)
GLUCOSE BLD-MCNC: 171 MG/DL (ref 65–99)
GLUCOSE BLD-MCNC: 172 MG/DL (ref 65–99)
GLUCOSE BLD-MCNC: 173 MG/DL (ref 74–124)
GLUCOSE BLD-MCNC: 178 MG/DL (ref 65–99)
GLUCOSE BLD-MCNC: 182 MG/DL (ref 65–99)
GLUCOSE BLD-MCNC: 183 MG/DL (ref 74–124)
GLUCOSE BLD-MCNC: 188 MG/DL (ref 65–99)
GLUCOSE BLD-MCNC: 190 MG/DL (ref 65–99)
GLUCOSE BLD-MCNC: 197 MG/DL (ref 65–99)
GLUCOSE BLD-MCNC: 201 MG/DL (ref 74–124)
GLUCOSE BLD-MCNC: 204 MG/DL (ref 74–124)
GLUCOSE BLD-MCNC: 234 MG/DL (ref 65–99)
GLUCOSE BLD-MCNC: 234 MG/DL (ref 65–99)
GLUCOSE BLD-MCNC: 248 MG/DL (ref 74–124)
GLUCOSE BLD-MCNC: 280 MG/DL (ref 65–99)
GLUCOSE BLD-MCNC: 290 MG/DL (ref 74–124)
GLUCOSE BLD-MCNC: 311 MG/DL (ref 74–124)
GLUCOSE BLD-MCNC: 79 MG/DL (ref 65–99)
GLUCOSE BLD-MCNC: 85 MG/DL (ref 65–99)
GLUCOSE BLD-MCNC: 90 MG/DL (ref 65–99)
GLUCOSE BLDC GLUCOMTR-MCNC: 100 MG/DL (ref 70–130)
GLUCOSE BLDC GLUCOMTR-MCNC: 107 MG/DL (ref 70–130)
GLUCOSE BLDC GLUCOMTR-MCNC: 112 MG/DL (ref 70–130)
GLUCOSE BLDC GLUCOMTR-MCNC: 114 MG/DL (ref 70–130)
GLUCOSE BLDC GLUCOMTR-MCNC: 118 MG/DL (ref 70–130)
GLUCOSE BLDC GLUCOMTR-MCNC: 122 MG/DL (ref 70–130)
GLUCOSE BLDC GLUCOMTR-MCNC: 125 MG/DL (ref 70–130)
GLUCOSE BLDC GLUCOMTR-MCNC: 127 MG/DL (ref 70–130)
GLUCOSE BLDC GLUCOMTR-MCNC: 128 MG/DL (ref 70–130)
GLUCOSE BLDC GLUCOMTR-MCNC: 129 MG/DL (ref 70–130)
GLUCOSE BLDC GLUCOMTR-MCNC: 131 MG/DL (ref 70–130)
GLUCOSE BLDC GLUCOMTR-MCNC: 132 MG/DL (ref 70–130)
GLUCOSE BLDC GLUCOMTR-MCNC: 133 MG/DL (ref 70–130)
GLUCOSE BLDC GLUCOMTR-MCNC: 133 MG/DL (ref 70–130)
GLUCOSE BLDC GLUCOMTR-MCNC: 134 MG/DL (ref 70–130)
GLUCOSE BLDC GLUCOMTR-MCNC: 135 MG/DL (ref 70–130)
GLUCOSE BLDC GLUCOMTR-MCNC: 136 MG/DL (ref 70–130)
GLUCOSE BLDC GLUCOMTR-MCNC: 136 MG/DL (ref 70–130)
GLUCOSE BLDC GLUCOMTR-MCNC: 137 MG/DL (ref 70–130)
GLUCOSE BLDC GLUCOMTR-MCNC: 137 MG/DL (ref 70–130)
GLUCOSE BLDC GLUCOMTR-MCNC: 142 MG/DL (ref 70–130)
GLUCOSE BLDC GLUCOMTR-MCNC: 148 MG/DL (ref 70–130)
GLUCOSE BLDC GLUCOMTR-MCNC: 149 MG/DL (ref 70–130)
GLUCOSE BLDC GLUCOMTR-MCNC: 153 MG/DL (ref 70–130)
GLUCOSE BLDC GLUCOMTR-MCNC: 156 MG/DL (ref 70–130)
GLUCOSE BLDC GLUCOMTR-MCNC: 160 MG/DL (ref 70–130)
GLUCOSE BLDC GLUCOMTR-MCNC: 161 MG/DL (ref 70–130)
GLUCOSE BLDC GLUCOMTR-MCNC: 163 MG/DL (ref 70–130)
GLUCOSE BLDC GLUCOMTR-MCNC: 164 MG/DL (ref 70–130)
GLUCOSE BLDC GLUCOMTR-MCNC: 166 MG/DL (ref 70–130)
GLUCOSE BLDC GLUCOMTR-MCNC: 166 MG/DL (ref 70–130)
GLUCOSE BLDC GLUCOMTR-MCNC: 167 MG/DL (ref 70–130)
GLUCOSE BLDC GLUCOMTR-MCNC: 167 MG/DL (ref 70–130)
GLUCOSE BLDC GLUCOMTR-MCNC: 173 MG/DL (ref 70–130)
GLUCOSE BLDC GLUCOMTR-MCNC: 174 MG/DL (ref 70–130)
GLUCOSE BLDC GLUCOMTR-MCNC: 174 MG/DL (ref 70–130)
GLUCOSE BLDC GLUCOMTR-MCNC: 175 MG/DL (ref 70–130)
GLUCOSE BLDC GLUCOMTR-MCNC: 176 MG/DL (ref 70–130)
GLUCOSE BLDC GLUCOMTR-MCNC: 178 MG/DL (ref 70–130)
GLUCOSE BLDC GLUCOMTR-MCNC: 180 MG/DL (ref 70–130)
GLUCOSE BLDC GLUCOMTR-MCNC: 180 MG/DL (ref 70–130)
GLUCOSE BLDC GLUCOMTR-MCNC: 185 MG/DL (ref 70–130)
GLUCOSE BLDC GLUCOMTR-MCNC: 186 MG/DL (ref 70–130)
GLUCOSE BLDC GLUCOMTR-MCNC: 187 MG/DL (ref 70–130)
GLUCOSE BLDC GLUCOMTR-MCNC: 188 MG/DL (ref 70–130)
GLUCOSE BLDC GLUCOMTR-MCNC: 188 MG/DL (ref 70–130)
GLUCOSE BLDC GLUCOMTR-MCNC: 191 MG/DL (ref 70–130)
GLUCOSE BLDC GLUCOMTR-MCNC: 191 MG/DL (ref 70–130)
GLUCOSE BLDC GLUCOMTR-MCNC: 192 MG/DL (ref 70–130)
GLUCOSE BLDC GLUCOMTR-MCNC: 193 MG/DL (ref 70–130)
GLUCOSE BLDC GLUCOMTR-MCNC: 195 MG/DL (ref 70–130)
GLUCOSE BLDC GLUCOMTR-MCNC: 196 MG/DL (ref 70–130)
GLUCOSE BLDC GLUCOMTR-MCNC: 196 MG/DL (ref 70–130)
GLUCOSE BLDC GLUCOMTR-MCNC: 197 MG/DL (ref 70–130)
GLUCOSE BLDC GLUCOMTR-MCNC: 197 MG/DL (ref 70–130)
GLUCOSE BLDC GLUCOMTR-MCNC: 199 MG/DL (ref 70–130)
GLUCOSE BLDC GLUCOMTR-MCNC: 199 MG/DL (ref 70–130)
GLUCOSE BLDC GLUCOMTR-MCNC: 202 MG/DL (ref 70–130)
GLUCOSE BLDC GLUCOMTR-MCNC: 205 MG/DL (ref 70–130)
GLUCOSE BLDC GLUCOMTR-MCNC: 207 MG/DL (ref 70–130)
GLUCOSE BLDC GLUCOMTR-MCNC: 208 MG/DL (ref 70–130)
GLUCOSE BLDC GLUCOMTR-MCNC: 209 MG/DL (ref 70–130)
GLUCOSE BLDC GLUCOMTR-MCNC: 212 MG/DL (ref 70–130)
GLUCOSE BLDC GLUCOMTR-MCNC: 213 MG/DL (ref 70–130)
GLUCOSE BLDC GLUCOMTR-MCNC: 214 MG/DL (ref 70–130)
GLUCOSE BLDC GLUCOMTR-MCNC: 215 MG/DL (ref 70–130)
GLUCOSE BLDC GLUCOMTR-MCNC: 215 MG/DL (ref 70–130)
GLUCOSE BLDC GLUCOMTR-MCNC: 218 MG/DL (ref 70–130)
GLUCOSE BLDC GLUCOMTR-MCNC: 219 MG/DL (ref 70–130)
GLUCOSE BLDC GLUCOMTR-MCNC: 233 MG/DL (ref 70–130)
GLUCOSE BLDC GLUCOMTR-MCNC: 241 MG/DL (ref 70–130)
GLUCOSE BLDC GLUCOMTR-MCNC: 246 MG/DL (ref 70–130)
GLUCOSE BLDC GLUCOMTR-MCNC: 251 MG/DL (ref 70–130)
GLUCOSE BLDC GLUCOMTR-MCNC: 265 MG/DL (ref 70–130)
GLUCOSE BLDC GLUCOMTR-MCNC: 268 MG/DL (ref 70–130)
GLUCOSE BLDC GLUCOMTR-MCNC: 269 MG/DL (ref 70–130)
GLUCOSE BLDC GLUCOMTR-MCNC: 290 MG/DL (ref 70–130)
GLUCOSE BLDC GLUCOMTR-MCNC: 356 MG/DL (ref 70–130)
GLUCOSE BLDC GLUCOMTR-MCNC: 390 MG/DL (ref 70–130)
GLUCOSE BLDC GLUCOMTR-MCNC: 81 MG/DL (ref 70–130)
GLUCOSE BLDC GLUCOMTR-MCNC: 86 MG/DL (ref 70–130)
GLUCOSE UR STRIP-MCNC: NEGATIVE MG/DL
GRAM STN SPEC: NORMAL
HADV DNA SPEC NAA+PROBE: NOT DETECTED
HBA1C MFR BLD: 6.4 % (ref 4.8–5.6)
HBA1C MFR BLD: 8 % (ref 4.8–5.6)
HBA1C MFR BLD: 8.3 % (ref 4.8–5.6)
HCO3 BLDA-SCNC: 34.1 MMOL/L (ref 22–28)
HCO3 BLDA-SCNC: 36.9 MMOL/L (ref 22–28)
HCOV 229E RNA SPEC QL NAA+PROBE: NOT DETECTED
HCOV HKU1 RNA SPEC QL NAA+PROBE: NOT DETECTED
HCOV NL63 RNA SPEC QL NAA+PROBE: NOT DETECTED
HCOV OC43 RNA SPEC QL NAA+PROBE: NOT DETECTED
HCT VFR BLD AUTO: 25.5 % (ref 34–46.6)
HCT VFR BLD AUTO: 26.3 % (ref 34–46.6)
HCT VFR BLD AUTO: 26.7 % (ref 34–46.6)
HCT VFR BLD AUTO: 26.9 % (ref 34–46.6)
HCT VFR BLD AUTO: 28.1 % (ref 34–46.6)
HCT VFR BLD AUTO: 28.9 % (ref 34–46.6)
HCT VFR BLD AUTO: 29.4 % (ref 34–46.6)
HCT VFR BLD AUTO: 29.4 % (ref 34–46.6)
HCT VFR BLD AUTO: 29.6 % (ref 34–46.6)
HCT VFR BLD AUTO: 29.9 % (ref 34–46.6)
HCT VFR BLD AUTO: 29.9 % (ref 34–46.6)
HCT VFR BLD AUTO: 30.4 % (ref 34–46.6)
HCT VFR BLD AUTO: 30.7 % (ref 34–46.6)
HCT VFR BLD AUTO: 30.7 % (ref 34–46.6)
HCT VFR BLD AUTO: 30.8 % (ref 34–46.6)
HCT VFR BLD AUTO: 30.9 % (ref 34–46.6)
HCT VFR BLD AUTO: 30.9 % (ref 34–46.6)
HCT VFR BLD AUTO: 31 % (ref 34–46.6)
HCT VFR BLD AUTO: 31 % (ref 34–46.6)
HCT VFR BLD AUTO: 31.1 % (ref 34–46.6)
HCT VFR BLD AUTO: 31.2 % (ref 34–46.6)
HCT VFR BLD AUTO: 31.2 % (ref 34–46.6)
HCT VFR BLD AUTO: 31.7 % (ref 34–46.6)
HCT VFR BLD AUTO: 31.9 % (ref 34–46.6)
HCT VFR BLD AUTO: 32 % (ref 34–46.6)
HCT VFR BLD AUTO: 32 % (ref 34–46.6)
HCT VFR BLD AUTO: 32.2 % (ref 34–46.6)
HCT VFR BLD AUTO: 32.2 % (ref 34–46.6)
HCT VFR BLD AUTO: 32.3 % (ref 34–46.6)
HCT VFR BLD AUTO: 32.3 % (ref 34–46.6)
HCT VFR BLD AUTO: 32.6 % (ref 34–46.6)
HCT VFR BLD AUTO: 33 % (ref 34–46.6)
HCT VFR BLD AUTO: 33 % (ref 34–46.6)
HCT VFR BLD AUTO: 33.6 % (ref 34–46.6)
HCT VFR BLD AUTO: 33.7 % (ref 34–46.6)
HCT VFR BLD AUTO: 33.8 % (ref 34–46.6)
HCT VFR BLD AUTO: 34 % (ref 34–46.6)
HCT VFR BLD AUTO: 34.1 % (ref 34–46.6)
HCT VFR BLD AUTO: 35.2 % (ref 34–46.6)
HCT VFR BLD AUTO: 35.4 % (ref 34–46.6)
HCT VFR BLD AUTO: 35.6 % (ref 34–46.6)
HCT VFR BLD AUTO: 35.7 % (ref 34–46.6)
HCT VFR BLD AUTO: 36 % (ref 34–46.6)
HCT VFR BLD AUTO: 36.4 % (ref 34–46.6)
HCT VFR BLD AUTO: 37.6 % (ref 34–46.6)
HCT VFR BLD AUTO: 39.1 % (ref 34–46.6)
HCT VFR BLD AUTO: 39.2 % (ref 34–46.6)
HGB BLD-MCNC: 10 G/DL (ref 12–15.9)
HGB BLD-MCNC: 10.2 G/DL (ref 12–15.9)
HGB BLD-MCNC: 10.2 G/DL (ref 12–15.9)
HGB BLD-MCNC: 10.3 G/DL (ref 12–15.9)
HGB BLD-MCNC: 10.4 G/DL (ref 12–15.9)
HGB BLD-MCNC: 10.7 G/DL (ref 12–15.9)
HGB BLD-MCNC: 11 G/DL (ref 12–15.9)
HGB BLD-MCNC: 11 G/DL (ref 12–15.9)
HGB BLD-MCNC: 11.1 G/DL (ref 12–15.9)
HGB BLD-MCNC: 11.1 G/DL (ref 12–15.9)
HGB BLD-MCNC: 11.2 G/DL (ref 12–15.9)
HGB BLD-MCNC: 11.7 G/DL (ref 12–15.9)
HGB BLD-MCNC: 11.8 G/DL (ref 12–15.9)
HGB BLD-MCNC: 12.5 G/DL (ref 12–15.9)
HGB BLD-MCNC: 7.8 G/DL (ref 12–15.9)
HGB BLD-MCNC: 7.9 G/DL (ref 12–15.9)
HGB BLD-MCNC: 8.1 G/DL (ref 12–15.9)
HGB BLD-MCNC: 8.1 G/DL (ref 12–15.9)
HGB BLD-MCNC: 8.3 G/DL (ref 12–15.9)
HGB BLD-MCNC: 8.5 G/DL (ref 12–15.9)
HGB BLD-MCNC: 8.8 G/DL (ref 12–15.9)
HGB BLD-MCNC: 9 G/DL (ref 12–15.9)
HGB BLD-MCNC: 9 G/DL (ref 12–15.9)
HGB BLD-MCNC: 9.1 G/DL (ref 12–15.9)
HGB BLD-MCNC: 9.2 G/DL (ref 12–15.9)
HGB BLD-MCNC: 9.2 G/DL (ref 12–15.9)
HGB BLD-MCNC: 9.3 G/DL (ref 12–15.9)
HGB BLD-MCNC: 9.3 G/DL (ref 12–15.9)
HGB BLD-MCNC: 9.4 G/DL (ref 12–15.9)
HGB BLD-MCNC: 9.5 G/DL (ref 12–15.9)
HGB BLD-MCNC: 9.6 G/DL (ref 12–15.9)
HGB BLD-MCNC: 9.7 G/DL (ref 12–15.9)
HGB BLD-MCNC: 9.7 G/DL (ref 12–15.9)
HGB BLD-MCNC: 9.8 G/DL (ref 12–15.9)
HGB BLD-MCNC: 9.9 G/DL (ref 12–15.9)
HGB BLD-MCNC: 9.9 G/DL (ref 12–15.9)
HGB RETIC QN AUTO: 27.1 PG (ref 29.8–36.1)
HGB UR QL STRIP.AUTO: ABNORMAL
HMPV RNA NPH QL NAA+NON-PROBE: NOT DETECTED
HOLD SPECIMEN: NORMAL
HPIV1 RNA SPEC QL NAA+PROBE: NOT DETECTED
HPIV2 RNA SPEC QL NAA+PROBE: NOT DETECTED
HPIV3 RNA NPH QL NAA+PROBE: NOT DETECTED
HPIV4 P GENE NPH QL NAA+PROBE: NOT DETECTED
HYALINE CASTS UR QL AUTO: ABNORMAL /LPF
HYPOCHROMIA BLD QL: ABNORMAL
IMM GRANULOCYTES # BLD AUTO: 0.02 10*3/MM3 (ref 0–0.05)
IMM GRANULOCYTES # BLD AUTO: 0.03 10*3/MM3 (ref 0–0.05)
IMM GRANULOCYTES # BLD AUTO: 0.04 10*3/MM3 (ref 0–0.05)
IMM GRANULOCYTES # BLD AUTO: 0.05 10*3/MM3 (ref 0–0.05)
IMM GRANULOCYTES # BLD AUTO: 0.05 10*3/MM3 (ref 0–0.05)
IMM GRANULOCYTES # BLD AUTO: 0.07 10*3/MM3 (ref 0–0.05)
IMM GRANULOCYTES # BLD AUTO: 0.08 10*3/MM3 (ref 0–0.05)
IMM GRANULOCYTES # BLD AUTO: 0.08 10*3/MM3 (ref 0–0.05)
IMM GRANULOCYTES # BLD AUTO: 0.11 10*3/MM3 (ref 0–0.05)
IMM GRANULOCYTES # BLD AUTO: 0.12 10*3/MM3 (ref 0–0.05)
IMM GRANULOCYTES # BLD AUTO: 0.13 10*3/MM3 (ref 0–0.05)
IMM GRANULOCYTES # BLD AUTO: 0.17 10*3/MM3 (ref 0–0.05)
IMM GRANULOCYTES # BLD AUTO: 0.26 10*3/MM3 (ref 0–0.05)
IMM GRANULOCYTES # BLD AUTO: 0.29 10*3/MM3 (ref 0–0.05)
IMM GRANULOCYTES # BLD AUTO: 0.31 10*3/MM3 (ref 0–0.05)
IMM GRANULOCYTES # BLD AUTO: 0.4 10*3/MM3 (ref 0–0.05)
IMM GRANULOCYTES # BLD AUTO: 0.42 10*3/MM3 (ref 0–0.05)
IMM GRANULOCYTES # BLD AUTO: 0.45 10*3/MM3 (ref 0–0.05)
IMM GRANULOCYTES # BLD AUTO: 1.01 10*3/MM3 (ref 0–0.05)
IMM GRANULOCYTES # BLD AUTO: 1.3 10*3/MM3 (ref 0–0.05)
IMM GRANULOCYTES # BLD AUTO: 3.54 10*3/MM3 (ref 0–0.05)
IMM GRANULOCYTES NFR BLD AUTO: 0.2 % (ref 0–0.5)
IMM GRANULOCYTES NFR BLD AUTO: 0.3 % (ref 0–0.5)
IMM GRANULOCYTES NFR BLD AUTO: 0.4 % (ref 0–0.5)
IMM GRANULOCYTES NFR BLD AUTO: 0.6 % (ref 0–0.5)
IMM GRANULOCYTES NFR BLD AUTO: 0.7 % (ref 0–0.5)
IMM GRANULOCYTES NFR BLD AUTO: 0.7 % (ref 0–0.5)
IMM GRANULOCYTES NFR BLD AUTO: 0.9 % (ref 0–0.5)
IMM GRANULOCYTES NFR BLD AUTO: 1 % (ref 0–0.5)
IMM GRANULOCYTES NFR BLD AUTO: 1.4 % (ref 0–0.5)
IMM GRANULOCYTES NFR BLD AUTO: 1.8 % (ref 0–0.5)
IMM GRANULOCYTES NFR BLD AUTO: 1.9 % (ref 0–0.5)
IMM GRANULOCYTES NFR BLD AUTO: 14.1 % (ref 0–0.5)
IMM GRANULOCYTES NFR BLD AUTO: 2 % (ref 0–0.5)
IMM GRANULOCYTES NFR BLD AUTO: 2.5 % (ref 0–0.5)
IMM GRANULOCYTES NFR BLD AUTO: 2.7 % (ref 0–0.5)
IMM GRANULOCYTES NFR BLD AUTO: 2.9 % (ref 0–0.5)
IMM GRANULOCYTES NFR BLD AUTO: 5 % (ref 0–0.5)
IMM GRANULOCYTES NFR BLD AUTO: 5.5 % (ref 0–0.5)
IMM RETICS NFR: 21.5 % (ref 3–15.8)
INR PPP: 1.06 (ref 0.9–1.1)
INR PPP: 1.08 (ref 0.9–1.1)
INR PPP: 1.09 (ref 0.9–1.1)
INR PPP: 1.11 (ref 0.9–1.1)
INR PPP: 1.26 (ref 0.9–1.1)
IRON 24H UR-MRATE: 16 MCG/DL (ref 37–145)
IRON 24H UR-MRATE: 25 MCG/DL (ref 37–145)
IRON SATN MFR SERPL: 5 % (ref 20–50)
IRON SATN MFR SERPL: 5 % (ref 20–50)
KETONES UR QL STRIP: NEGATIVE
L PNEUMO1 AG UR QL IA: NEGATIVE
L PNEUMO1 AG UR QL IA: NEGATIVE
LAB AP CASE REPORT: NORMAL
LAB AP DIAGNOSIS COMMENT: NORMAL
LAB AP DIAGNOSIS COMMENT: NORMAL
LAB AP NON-GYN SPECIMEN ADEQUACY: NORMAL
LDH FLD-CCNC: 308 U/L
LDH SERPL-CCNC: 113 U/L (ref 135–214)
LEFT ATRIUM VOLUME INDEX: 11 ML/M2
LEUKOCYTE ESTERASE UR QL STRIP.AUTO: NEGATIVE
LYMPHOCYTES # BLD AUTO: 0.56 10*3/MM3 (ref 0.7–3.1)
LYMPHOCYTES # BLD AUTO: 0.75 10*3/MM3 (ref 0.7–3.1)
LYMPHOCYTES # BLD AUTO: 0.76 10*3/MM3 (ref 0.7–3.1)
LYMPHOCYTES # BLD AUTO: 0.76 10*3/MM3 (ref 0.7–3.1)
LYMPHOCYTES # BLD AUTO: 0.87 10*3/MM3 (ref 0.7–3.1)
LYMPHOCYTES # BLD AUTO: 0.89 10*3/MM3 (ref 0.7–3.1)
LYMPHOCYTES # BLD AUTO: 0.9 10*3/MM3 (ref 0.7–3.1)
LYMPHOCYTES # BLD AUTO: 1.06 10*3/MM3 (ref 0.7–3.1)
LYMPHOCYTES # BLD AUTO: 1.1 10*3/MM3 (ref 0.7–3.1)
LYMPHOCYTES # BLD AUTO: 1.15 10*3/MM3 (ref 0.7–3.1)
LYMPHOCYTES # BLD AUTO: 1.15 10*3/MM3 (ref 0.7–3.1)
LYMPHOCYTES # BLD AUTO: 1.17 10*3/MM3 (ref 0.7–3.1)
LYMPHOCYTES # BLD AUTO: 1.23 10*3/MM3 (ref 0.7–3.1)
LYMPHOCYTES # BLD AUTO: 1.26 10*3/MM3 (ref 0.7–3.1)
LYMPHOCYTES # BLD AUTO: 1.36 10*3/MM3 (ref 0.7–3.1)
LYMPHOCYTES # BLD AUTO: 1.44 10*3/MM3 (ref 0.7–3.1)
LYMPHOCYTES # BLD AUTO: 1.46 10*3/MM3 (ref 0.7–3.1)
LYMPHOCYTES # BLD AUTO: 1.48 10*3/MM3 (ref 0.7–3.1)
LYMPHOCYTES # BLD AUTO: 1.5 10*3/MM3 (ref 0.7–3.1)
LYMPHOCYTES # BLD AUTO: 1.6 10*3/MM3 (ref 0.7–3.1)
LYMPHOCYTES # BLD AUTO: 1.6 10*3/MM3 (ref 0.7–3.1)
LYMPHOCYTES # BLD AUTO: 1.62 10*3/MM3 (ref 0.7–3.1)
LYMPHOCYTES # BLD AUTO: 1.66 10*3/MM3 (ref 0.7–3.1)
LYMPHOCYTES # BLD AUTO: 1.75 10*3/MM3 (ref 0.7–3.1)
LYMPHOCYTES # BLD AUTO: 2.07 10*3/MM3 (ref 0.7–3.1)
LYMPHOCYTES # BLD AUTO: 2.2 10*3/MM3 (ref 0.7–3.1)
LYMPHOCYTES # BLD AUTO: 2.29 10*3/MM3 (ref 0.7–3.1)
LYMPHOCYTES # BLD AUTO: 2.78 10*3/MM3 (ref 0.7–3.1)
LYMPHOCYTES # BLD MANUAL: 0.3 10*3/MM3 (ref 0.7–3.1)
LYMPHOCYTES # BLD MANUAL: 0.3 10*3/MM3 (ref 0.7–3.1)
LYMPHOCYTES # BLD MANUAL: 0.33 10*3/MM3 (ref 0.7–3.1)
LYMPHOCYTES # BLD MANUAL: 0.35 10*3/MM3 (ref 0.7–3.1)
LYMPHOCYTES # BLD MANUAL: 0.6 10*3/MM3 (ref 0.7–3.1)
LYMPHOCYTES # BLD MANUAL: 0.65 10*3/MM3 (ref 0.7–3.1)
LYMPHOCYTES # BLD MANUAL: 1 10*3/MM3 (ref 0.7–3.1)
LYMPHOCYTES # BLD MANUAL: 1.09 10*3/MM3 (ref 0.7–3.1)
LYMPHOCYTES # BLD MANUAL: 1.24 10*3/MM3 (ref 0.7–3.1)
LYMPHOCYTES # BLD MANUAL: 1.29 10*3/MM3 (ref 0.7–3.1)
LYMPHOCYTES # BLD MANUAL: 1.31 10*3/MM3 (ref 0.7–3.1)
LYMPHOCYTES # BLD MANUAL: 1.51 10*3/MM3 (ref 0.7–3.1)
LYMPHOCYTES # BLD MANUAL: 1.54 10*3/MM3 (ref 0.7–3.1)
LYMPHOCYTES NFR BLD AUTO: 10.6 % (ref 19.6–45.3)
LYMPHOCYTES NFR BLD AUTO: 11 % (ref 19.6–45.3)
LYMPHOCYTES NFR BLD AUTO: 11.4 % (ref 19.6–45.3)
LYMPHOCYTES NFR BLD AUTO: 12.8 % (ref 19.6–45.3)
LYMPHOCYTES NFR BLD AUTO: 13.5 % (ref 19.6–45.3)
LYMPHOCYTES NFR BLD AUTO: 14.4 % (ref 19.6–45.3)
LYMPHOCYTES NFR BLD AUTO: 14.9 % (ref 19.6–45.3)
LYMPHOCYTES NFR BLD AUTO: 15.2 % (ref 19.6–45.3)
LYMPHOCYTES NFR BLD AUTO: 15.5 % (ref 19.6–45.3)
LYMPHOCYTES NFR BLD AUTO: 18.4 % (ref 19.6–45.3)
LYMPHOCYTES NFR BLD AUTO: 19 % (ref 19.6–45.3)
LYMPHOCYTES NFR BLD AUTO: 21.8 % (ref 19.6–45.3)
LYMPHOCYTES NFR BLD AUTO: 21.8 % (ref 19.6–45.3)
LYMPHOCYTES NFR BLD AUTO: 22.6 % (ref 19.6–45.3)
LYMPHOCYTES NFR BLD AUTO: 24 % (ref 19.6–45.3)
LYMPHOCYTES NFR BLD AUTO: 25.8 % (ref 19.6–45.3)
LYMPHOCYTES NFR BLD AUTO: 26 % (ref 19.6–45.3)
LYMPHOCYTES NFR BLD AUTO: 3.8 % (ref 19.6–45.3)
LYMPHOCYTES NFR BLD AUTO: 30.4 % (ref 19.6–45.3)
LYMPHOCYTES NFR BLD AUTO: 32.2 % (ref 19.6–45.3)
LYMPHOCYTES NFR BLD AUTO: 4.6 % (ref 19.6–45.3)
LYMPHOCYTES NFR BLD AUTO: 4.8 % (ref 19.6–45.3)
LYMPHOCYTES NFR BLD AUTO: 6 % (ref 19.6–45.3)
LYMPHOCYTES NFR BLD AUTO: 6.4 % (ref 19.6–45.3)
LYMPHOCYTES NFR BLD AUTO: 7.8 % (ref 19.6–45.3)
LYMPHOCYTES NFR BLD AUTO: 8.2 % (ref 19.6–45.3)
LYMPHOCYTES NFR BLD AUTO: 8.7 % (ref 19.6–45.3)
LYMPHOCYTES NFR BLD AUTO: 9.2 % (ref 19.6–45.3)
LYMPHOCYTES NFR BLD MANUAL: 1 % (ref 19.6–45.3)
LYMPHOCYTES NFR BLD MANUAL: 1 % (ref 5–12)
LYMPHOCYTES NFR BLD MANUAL: 11 % (ref 5–12)
LYMPHOCYTES NFR BLD MANUAL: 12 % (ref 19.6–45.3)
LYMPHOCYTES NFR BLD MANUAL: 12.4 % (ref 19.6–45.3)
LYMPHOCYTES NFR BLD MANUAL: 13 % (ref 5–12)
LYMPHOCYTES NFR BLD MANUAL: 14.4 % (ref 5–12)
LYMPHOCYTES NFR BLD MANUAL: 15.3 % (ref 5–12)
LYMPHOCYTES NFR BLD MANUAL: 19 % (ref 5–12)
LYMPHOCYTES NFR BLD MANUAL: 2 % (ref 19.6–45.3)
LYMPHOCYTES NFR BLD MANUAL: 2 % (ref 19.6–45.3)
LYMPHOCYTES NFR BLD MANUAL: 2 % (ref 5–12)
LYMPHOCYTES NFR BLD MANUAL: 3 % (ref 5–12)
LYMPHOCYTES NFR BLD MANUAL: 3 % (ref 5–12)
LYMPHOCYTES NFR BLD MANUAL: 3.1 % (ref 19.6–45.3)
LYMPHOCYTES NFR BLD MANUAL: 35 % (ref 19.6–45.3)
LYMPHOCYTES NFR BLD MANUAL: 4 % (ref 5–12)
LYMPHOCYTES NFR BLD MANUAL: 4.1 % (ref 5–12)
LYMPHOCYTES NFR BLD MANUAL: 5 % (ref 19.6–45.3)
LYMPHOCYTES NFR BLD MANUAL: 5 % (ref 5–12)
LYMPHOCYTES NFR BLD MANUAL: 57 % (ref 19.6–45.3)
LYMPHOCYTES NFR BLD MANUAL: 69.4 % (ref 19.6–45.3)
LYMPHOCYTES NFR BLD MANUAL: 8 % (ref 19.6–45.3)
LYMPHOCYTES NFR FLD MANUAL: 25 %
LYMPHOCYTES NFR FLD MANUAL: 33 %
Lab: 128
M PNEUMO IGG SER IA-ACNC: NOT DETECTED
MACROCYTES BLD QL SMEAR: ABNORMAL
MAGNESIUM SERPL-MCNC: 1.1 MG/DL (ref 1.6–2.4)
MAGNESIUM SERPL-MCNC: 1.1 MG/DL (ref 1.6–2.4)
MAGNESIUM SERPL-MCNC: 1.2 MG/DL (ref 1.6–2.4)
MAGNESIUM SERPL-MCNC: 1.6 MG/DL (ref 1.6–2.4)
MAGNESIUM SERPL-MCNC: 1.9 MG/DL (ref 1.6–2.4)
MAGNESIUM SERPL-MCNC: 2 MG/DL (ref 1.6–2.4)
MAGNESIUM SERPL-MCNC: 2.1 MG/DL (ref 1.6–2.4)
MAGNESIUM SERPL-MCNC: 2.1 MG/DL (ref 1.6–2.4)
MAGNESIUM SERPL-MCNC: 2.2 MG/DL (ref 1.6–2.4)
MAGNESIUM SERPL-MCNC: 2.3 MG/DL (ref 1.6–2.4)
MAGNESIUM SERPL-MCNC: 2.7 MG/DL (ref 1.6–2.4)
MAGNESIUM SERPL-MCNC: 3 MG/DL (ref 1.6–2.4)
MCH RBC QN AUTO: 24 PG (ref 26.6–33)
MCH RBC QN AUTO: 24.1 PG (ref 26.6–33)
MCH RBC QN AUTO: 24.3 PG (ref 26.6–33)
MCH RBC QN AUTO: 24.4 PG (ref 26.6–33)
MCH RBC QN AUTO: 24.4 PG (ref 26.6–33)
MCH RBC QN AUTO: 24.5 PG (ref 26.6–33)
MCH RBC QN AUTO: 24.6 PG (ref 26.6–33)
MCH RBC QN AUTO: 24.6 PG (ref 26.6–33)
MCH RBC QN AUTO: 24.7 PG (ref 26.6–33)
MCH RBC QN AUTO: 24.8 PG (ref 26.6–33)
MCH RBC QN AUTO: 24.9 PG (ref 26.6–33)
MCH RBC QN AUTO: 25.1 PG (ref 26.6–33)
MCH RBC QN AUTO: 25.1 PG (ref 26.6–33)
MCH RBC QN AUTO: 25.5 PG (ref 26.6–33)
MCH RBC QN AUTO: 25.6 PG (ref 26.6–33)
MCH RBC QN AUTO: 25.7 PG (ref 26.6–33)
MCH RBC QN AUTO: 25.8 PG (ref 26.6–33)
MCH RBC QN AUTO: 25.9 PG (ref 26.6–33)
MCH RBC QN AUTO: 26 PG (ref 26.6–33)
MCH RBC QN AUTO: 26.1 PG (ref 26.6–33)
MCH RBC QN AUTO: 26.2 PG (ref 26.6–33)
MCH RBC QN AUTO: 26.2 PG (ref 26.6–33)
MCH RBC QN AUTO: 26.3 PG (ref 26.6–33)
MCH RBC QN AUTO: 26.4 PG (ref 26.6–33)
MCH RBC QN AUTO: 26.4 PG (ref 26.6–33)
MCH RBC QN AUTO: 26.6 PG (ref 26.6–33)
MCH RBC QN AUTO: 26.6 PG (ref 26.6–33)
MCH RBC QN AUTO: 26.8 PG (ref 26.6–33)
MCH RBC QN AUTO: 26.9 PG (ref 26.6–33)
MCH RBC QN AUTO: 27.2 PG (ref 26.6–33)
MCHC RBC AUTO-ENTMCNC: 28.7 G/DL (ref 31.5–35.7)
MCHC RBC AUTO-ENTMCNC: 29 G/DL (ref 31.5–35.7)
MCHC RBC AUTO-ENTMCNC: 29.1 G/DL (ref 31.5–35.7)
MCHC RBC AUTO-ENTMCNC: 29.2 G/DL (ref 31.5–35.7)
MCHC RBC AUTO-ENTMCNC: 29.2 G/DL (ref 31.5–35.7)
MCHC RBC AUTO-ENTMCNC: 29.3 G/DL (ref 31.5–35.7)
MCHC RBC AUTO-ENTMCNC: 29.3 G/DL (ref 31.5–35.7)
MCHC RBC AUTO-ENTMCNC: 29.4 G/DL (ref 31.5–35.7)
MCHC RBC AUTO-ENTMCNC: 29.5 G/DL (ref 31.5–35.7)
MCHC RBC AUTO-ENTMCNC: 29.6 G/DL (ref 31.5–35.7)
MCHC RBC AUTO-ENTMCNC: 29.9 G/DL (ref 31.5–35.7)
MCHC RBC AUTO-ENTMCNC: 30 G/DL (ref 31.5–35.7)
MCHC RBC AUTO-ENTMCNC: 30 G/DL (ref 31.5–35.7)
MCHC RBC AUTO-ENTMCNC: 30.2 G/DL (ref 31.5–35.7)
MCHC RBC AUTO-ENTMCNC: 30.3 G/DL (ref 31.5–35.7)
MCHC RBC AUTO-ENTMCNC: 30.4 G/DL (ref 31.5–35.7)
MCHC RBC AUTO-ENTMCNC: 30.5 G/DL (ref 31.5–35.7)
MCHC RBC AUTO-ENTMCNC: 30.8 G/DL (ref 31.5–35.7)
MCHC RBC AUTO-ENTMCNC: 30.9 G/DL (ref 31.5–35.7)
MCHC RBC AUTO-ENTMCNC: 30.9 G/DL (ref 31.5–35.7)
MCHC RBC AUTO-ENTMCNC: 31 G/DL (ref 31.5–35.7)
MCHC RBC AUTO-ENTMCNC: 31.1 G/DL (ref 31.5–35.7)
MCHC RBC AUTO-ENTMCNC: 31.3 G/DL (ref 31.5–35.7)
MCHC RBC AUTO-ENTMCNC: 31.3 G/DL (ref 31.5–35.7)
MCHC RBC AUTO-ENTMCNC: 31.5 G/DL (ref 31.5–35.7)
MCHC RBC AUTO-ENTMCNC: 31.9 G/DL (ref 31.5–35.7)
MCV RBC AUTO: 78.8 FL (ref 79–97)
MCV RBC AUTO: 79.6 FL (ref 79–97)
MCV RBC AUTO: 79.6 FL (ref 79–97)
MCV RBC AUTO: 79.9 FL (ref 79–97)
MCV RBC AUTO: 80.3 FL (ref 79–97)
MCV RBC AUTO: 80.6 FL (ref 79–97)
MCV RBC AUTO: 80.6 FL (ref 79–97)
MCV RBC AUTO: 80.7 FL (ref 79–97)
MCV RBC AUTO: 80.8 FL (ref 79–97)
MCV RBC AUTO: 81 FL (ref 79–97)
MCV RBC AUTO: 81 FL (ref 79–97)
MCV RBC AUTO: 81.8 FL (ref 79–97)
MCV RBC AUTO: 82.2 FL (ref 79–97)
MCV RBC AUTO: 82.6 FL (ref 79–97)
MCV RBC AUTO: 82.8 FL (ref 79–97)
MCV RBC AUTO: 82.9 FL (ref 79–97)
MCV RBC AUTO: 82.9 FL (ref 79–97)
MCV RBC AUTO: 83.2 FL (ref 79–97)
MCV RBC AUTO: 83.5 FL (ref 79–97)
MCV RBC AUTO: 83.6 FL (ref 79–97)
MCV RBC AUTO: 83.9 FL (ref 79–97)
MCV RBC AUTO: 84.1 FL (ref 79–97)
MCV RBC AUTO: 84.4 FL (ref 79–97)
MCV RBC AUTO: 84.5 FL (ref 79–97)
MCV RBC AUTO: 84.6 FL (ref 79–97)
MCV RBC AUTO: 84.9 FL (ref 79–97)
MCV RBC AUTO: 85 FL (ref 79–97)
MCV RBC AUTO: 85 FL (ref 79–97)
MCV RBC AUTO: 85.2 FL (ref 79–97)
MCV RBC AUTO: 85.3 FL (ref 79–97)
MCV RBC AUTO: 85.5 FL (ref 79–97)
MCV RBC AUTO: 85.5 FL (ref 79–97)
MCV RBC AUTO: 85.6 FL (ref 79–97)
MCV RBC AUTO: 85.7 FL (ref 79–97)
MCV RBC AUTO: 85.9 FL (ref 79–97)
MCV RBC AUTO: 86.3 FL (ref 79–97)
MCV RBC AUTO: 86.3 FL (ref 79–97)
MCV RBC AUTO: 86.5 FL (ref 79–97)
MCV RBC AUTO: 86.8 FL (ref 79–97)
MCV RBC AUTO: 87.4 FL (ref 79–97)
MCV RBC AUTO: 87.7 FL (ref 79–97)
MCV RBC AUTO: 89.6 FL (ref 79–97)
MCV RBC AUTO: 91.7 FL (ref 79–97)
MCV RBC AUTO: 92.6 FL (ref 79–97)
METAMYELOCYTES NFR BLD MANUAL: 2 % (ref 0–0)
MICROCYTES BLD QL: ABNORMAL
MICROCYTES BLD QL: ABNORMAL
MODALITY: ABNORMAL
MODALITY: ABNORMAL
MONOCYTES # BLD AUTO: 0.08 10*3/MM3 (ref 0.1–0.9)
MONOCYTES # BLD AUTO: 0.11 10*3/MM3 (ref 0.1–0.9)
MONOCYTES # BLD AUTO: 0.15 10*3/MM3 (ref 0.1–0.9)
MONOCYTES # BLD AUTO: 0.16 10*3/MM3 (ref 0.1–0.9)
MONOCYTES # BLD AUTO: 0.26 10*3/MM3 (ref 0.1–0.9)
MONOCYTES # BLD AUTO: 0.27 10*3/MM3 (ref 0.1–0.9)
MONOCYTES # BLD AUTO: 0.3 10*3/MM3 (ref 0.1–0.9)
MONOCYTES # BLD AUTO: 0.33 10*3/MM3 (ref 0.1–0.9)
MONOCYTES # BLD AUTO: 0.34 10*3/MM3 (ref 0.1–0.9)
MONOCYTES # BLD AUTO: 0.44 10*3/MM3 (ref 0.1–0.9)
MONOCYTES # BLD AUTO: 0.46 10*3/MM3 (ref 0.1–0.9)
MONOCYTES # BLD AUTO: 0.48 10*3/MM3 (ref 0.1–0.9)
MONOCYTES # BLD AUTO: 0.49 10*3/MM3 (ref 0.1–0.9)
MONOCYTES # BLD AUTO: 0.51 10*3/MM3 (ref 0.1–0.9)
MONOCYTES # BLD AUTO: 0.52 10*3/MM3 (ref 0.1–0.9)
MONOCYTES # BLD AUTO: 0.59 10*3/MM3 (ref 0.1–0.9)
MONOCYTES # BLD AUTO: 0.64 10*3/MM3 (ref 0.1–0.9)
MONOCYTES # BLD AUTO: 0.65 10*3/MM3 (ref 0.1–0.9)
MONOCYTES # BLD AUTO: 0.71 10*3/MM3 (ref 0.1–0.9)
MONOCYTES # BLD AUTO: 0.73 10*3/MM3 (ref 0.1–0.9)
MONOCYTES # BLD AUTO: 0.74 10*3/MM3 (ref 0.1–0.9)
MONOCYTES # BLD AUTO: 0.76 10*3/MM3 (ref 0.1–0.9)
MONOCYTES # BLD AUTO: 0.81 10*3/MM3 (ref 0.1–0.9)
MONOCYTES # BLD AUTO: 0.85 10*3/MM3 (ref 0.1–0.9)
MONOCYTES # BLD AUTO: 0.86 10*3/MM3 (ref 0.1–0.9)
MONOCYTES # BLD AUTO: 0.91 10*3/MM3 (ref 0.1–0.9)
MONOCYTES # BLD AUTO: 0.96 10*3/MM3 (ref 0.1–0.9)
MONOCYTES # BLD AUTO: 0.96 10*3/MM3 (ref 0.1–0.9)
MONOCYTES # BLD AUTO: 0.98 10*3/MM3 (ref 0.1–0.9)
MONOCYTES # BLD AUTO: 1.03 10*3/MM3 (ref 0.1–0.9)
MONOCYTES # BLD AUTO: 1.2 10*3/MM3 (ref 0.1–0.9)
MONOCYTES # BLD AUTO: 1.26 10*3/MM3 (ref 0.1–0.9)
MONOCYTES # BLD AUTO: 1.27 10*3/MM3 (ref 0.1–0.9)
MONOCYTES # BLD AUTO: 1.37 10*3/MM3 (ref 0.1–0.9)
MONOCYTES # BLD AUTO: 1.54 10*3/MM3 (ref 0.1–0.9)
MONOCYTES # BLD AUTO: 1.68 10*3/MM3 (ref 0.1–0.9)
MONOCYTES # BLD AUTO: 1.75 10*3/MM3 (ref 0.1–0.9)
MONOCYTES # BLD AUTO: 1.82 10*3/MM3 (ref 0.1–0.9)
MONOCYTES NFR BLD AUTO: 0.6 % (ref 5–12)
MONOCYTES NFR BLD AUTO: 0.8 % (ref 5–12)
MONOCYTES NFR BLD AUTO: 1.4 % (ref 5–12)
MONOCYTES NFR BLD AUTO: 1.6 % (ref 5–12)
MONOCYTES NFR BLD AUTO: 10.5 % (ref 5–12)
MONOCYTES NFR BLD AUTO: 10.7 % (ref 5–12)
MONOCYTES NFR BLD AUTO: 10.8 % (ref 5–12)
MONOCYTES NFR BLD AUTO: 11 % (ref 5–12)
MONOCYTES NFR BLD AUTO: 11 % (ref 5–12)
MONOCYTES NFR BLD AUTO: 12.7 % (ref 5–12)
MONOCYTES NFR BLD AUTO: 12.8 % (ref 5–12)
MONOCYTES NFR BLD AUTO: 15.1 % (ref 5–12)
MONOCYTES NFR BLD AUTO: 2.3 % (ref 5–12)
MONOCYTES NFR BLD AUTO: 21.4 % (ref 5–12)
MONOCYTES NFR BLD AUTO: 3.2 % (ref 5–12)
MONOCYTES NFR BLD AUTO: 3.8 % (ref 5–12)
MONOCYTES NFR BLD AUTO: 5.3 % (ref 5–12)
MONOCYTES NFR BLD AUTO: 6 % (ref 5–12)
MONOCYTES NFR BLD AUTO: 6 % (ref 5–12)
MONOCYTES NFR BLD AUTO: 6.3 % (ref 5–12)
MONOCYTES NFR BLD AUTO: 6.7 % (ref 5–12)
MONOCYTES NFR BLD AUTO: 7.2 % (ref 5–12)
MONOCYTES NFR BLD AUTO: 8.3 % (ref 5–12)
MONOCYTES NFR BLD AUTO: 8.4 % (ref 5–12)
MONOCYTES NFR BLD AUTO: 8.8 % (ref 5–12)
MONOCYTES NFR BLD AUTO: 9.7 % (ref 5–12)
MONOCYTES NFR BLD AUTO: 9.8 % (ref 5–12)
MONOCYTES NFR BLD AUTO: 9.9 % (ref 5–12)
MONOCYTES NFR FLD: 4 %
MONOCYTES NFR FLD: 6 %
MONOS+MACROS NFR FLD: 17 %
MONOS+MACROS NFR FLD: 36 %
MRSA DNA SPEC QL NAA+PROBE: NORMAL
MRSA SPEC QL CULT: NORMAL
MYELOCYTES NFR BLD MANUAL: 1 % (ref 0–0)
NEGATIVE CONTROL: NEGATIVE
NEUTROPHILS # BLD AUTO: 0.25 10*3/MM3 (ref 1.7–7)
NEUTROPHILS # BLD AUTO: 0.37 10*3/MM3 (ref 1.7–7)
NEUTROPHILS # BLD AUTO: 1.73 10*3/MM3 (ref 1.7–7)
NEUTROPHILS # BLD AUTO: 1.86 10*3/MM3 (ref 1.7–7)
NEUTROPHILS # BLD AUTO: 10.45 10*3/MM3 (ref 1.7–7)
NEUTROPHILS # BLD AUTO: 11.23 10*3/MM3 (ref 1.7–7)
NEUTROPHILS # BLD AUTO: 13.32 10*3/MM3 (ref 1.7–7)
NEUTROPHILS # BLD AUTO: 13.96 10*3/MM3 (ref 1.7–7)
NEUTROPHILS # BLD AUTO: 14.78 10*3/MM3 (ref 1.7–7)
NEUTROPHILS # BLD AUTO: 16.43 10*3/MM3 (ref 1.7–7)
NEUTROPHILS # BLD AUTO: 17.95 10*3/MM3 (ref 1.7–7)
NEUTROPHILS # BLD AUTO: 18.17 10*3/MM3 (ref 1.7–7)
NEUTROPHILS # BLD AUTO: 20.66 10*3/MM3 (ref 1.7–7)
NEUTROPHILS # BLD AUTO: 23.01 10*3/MM3 (ref 1.7–7)
NEUTROPHILS # BLD AUTO: 28.46 10*3/MM3 (ref 1.7–7)
NEUTROPHILS # BLD AUTO: 28.75 10*3/MM3 (ref 1.7–7)
NEUTROPHILS # BLD AUTO: 28.87 10*3/MM3 (ref 1.7–7)
NEUTROPHILS # BLD AUTO: 29.12 10*3/MM3 (ref 1.7–7)
NEUTROPHILS # BLD AUTO: 3.23 10*3/MM3 (ref 1.7–7)
NEUTROPHILS # BLD AUTO: 3.28 10*3/MM3 (ref 1.7–7)
NEUTROPHILS # BLD AUTO: 3.53 10*3/MM3 (ref 1.7–7)
NEUTROPHILS # BLD AUTO: 30.98 10*3/MM3 (ref 1.7–7)
NEUTROPHILS # BLD AUTO: 31.69 10*3/MM3 (ref 1.7–7)
NEUTROPHILS # BLD AUTO: 4 10*3/MM3 (ref 1.7–7)
NEUTROPHILS # BLD AUTO: 4.02 10*3/MM3 (ref 1.7–7)
NEUTROPHILS # BLD AUTO: 4.04 10*3/MM3 (ref 1.7–7)
NEUTROPHILS # BLD AUTO: 4.75 10*3/MM3 (ref 1.7–7)
NEUTROPHILS # BLD AUTO: 4.94 10*3/MM3 (ref 1.7–7)
NEUTROPHILS # BLD AUTO: 5.05 10*3/MM3 (ref 1.7–7)
NEUTROPHILS # BLD AUTO: 5.11 10*3/MM3 (ref 1.7–7)
NEUTROPHILS # BLD AUTO: 5.76 10*3/MM3 (ref 1.7–7)
NEUTROPHILS # BLD AUTO: 5.87 10*3/MM3 (ref 1.7–7)
NEUTROPHILS # BLD AUTO: 5.92 10*3/MM3 (ref 1.7–7)
NEUTROPHILS # BLD AUTO: 6.17 10*3/MM3 (ref 1.7–7)
NEUTROPHILS # BLD AUTO: 6.61 10*3/MM3 (ref 1.7–7)
NEUTROPHILS # BLD AUTO: 6.63 10*3/MM3 (ref 1.7–7)
NEUTROPHILS # BLD AUTO: 6.78 10*3/MM3 (ref 1.7–7)
NEUTROPHILS # BLD AUTO: 6.97 10*3/MM3 (ref 1.7–7)
NEUTROPHILS # BLD AUTO: 8.24 10*3/MM3 (ref 1.7–7)
NEUTROPHILS # BLD AUTO: 8.47 10*3/MM3 (ref 1.7–7)
NEUTROPHILS # BLD AUTO: 9.59 10*3/MM3 (ref 1.7–7)
NEUTROPHILS NFR BLD AUTO: 40.5 % (ref 42.7–76)
NEUTROPHILS NFR BLD AUTO: 53.6 % (ref 42.7–76)
NEUTROPHILS NFR BLD AUTO: 55.8 % (ref 42.7–76)
NEUTROPHILS NFR BLD AUTO: 56.9 % (ref 42.7–76)
NEUTROPHILS NFR BLD AUTO: 58.3 % (ref 42.7–76)
NEUTROPHILS NFR BLD AUTO: 61.2 % (ref 42.7–76)
NEUTROPHILS NFR BLD AUTO: 65.6 % (ref 42.7–76)
NEUTROPHILS NFR BLD AUTO: 65.9 % (ref 42.7–76)
NEUTROPHILS NFR BLD AUTO: 67.7 % (ref 42.7–76)
NEUTROPHILS NFR BLD AUTO: 67.8 % (ref 42.7–76)
NEUTROPHILS NFR BLD AUTO: 68.1 % (ref 42.7–76)
NEUTROPHILS NFR BLD AUTO: 72.3 % (ref 42.7–76)
NEUTROPHILS NFR BLD AUTO: 72.3 % (ref 42.7–76)
NEUTROPHILS NFR BLD AUTO: 72.8 % (ref 42.7–76)
NEUTROPHILS NFR BLD AUTO: 73.7 % (ref 42.7–76)
NEUTROPHILS NFR BLD AUTO: 74 % (ref 42.7–76)
NEUTROPHILS NFR BLD AUTO: 74.1 % (ref 42.7–76)
NEUTROPHILS NFR BLD AUTO: 76.3 % (ref 42.7–76)
NEUTROPHILS NFR BLD AUTO: 78.6 % (ref 42.7–76)
NEUTROPHILS NFR BLD AUTO: 79.2 % (ref 42.7–76)
NEUTROPHILS NFR BLD AUTO: 81.4 % (ref 42.7–76)
NEUTROPHILS NFR BLD AUTO: 85.3 % (ref 42.7–76)
NEUTROPHILS NFR BLD AUTO: 85.7 % (ref 42.7–76)
NEUTROPHILS NFR BLD AUTO: 85.9 % (ref 42.7–76)
NEUTROPHILS NFR BLD AUTO: 88.7 % (ref 42.7–76)
NEUTROPHILS NFR BLD AUTO: 89 % (ref 42.7–76)
NEUTROPHILS NFR BLD AUTO: 89.6 % (ref 42.7–76)
NEUTROPHILS NFR BLD AUTO: 90.9 % (ref 42.7–76)
NEUTROPHILS NFR BLD MANUAL: 11.2 % (ref 42.7–76)
NEUTROPHILS NFR BLD MANUAL: 16 % (ref 42.7–76)
NEUTROPHILS NFR BLD MANUAL: 47 % (ref 42.7–76)
NEUTROPHILS NFR BLD MANUAL: 70.1 % (ref 42.7–76)
NEUTROPHILS NFR BLD MANUAL: 77 % (ref 42.7–76)
NEUTROPHILS NFR BLD MANUAL: 82 % (ref 42.7–76)
NEUTROPHILS NFR BLD MANUAL: 92.9 % (ref 42.7–76)
NEUTROPHILS NFR BLD MANUAL: 94.9 % (ref 42.7–76)
NEUTROPHILS NFR BLD MANUAL: 95 % (ref 42.7–76)
NEUTROPHILS NFR BLD MANUAL: 95 % (ref 42.7–76)
NEUTROPHILS NFR BLD MANUAL: 96 % (ref 42.7–76)
NEUTROPHILS NFR BLD MANUAL: 97 % (ref 42.7–76)
NEUTROPHILS NFR BLD MANUAL: 97 % (ref 42.7–76)
NEUTROPHILS NFR FLD MANUAL: 31 %
NEUTROPHILS NFR FLD MANUAL: 45 %
NITRITE UR QL STRIP: NEGATIVE
NRBC BLD AUTO-RTO: 0 /100 WBC (ref 0–0.2)
NRBC BLD AUTO-RTO: 0.1 /100 WBC (ref 0–0.2)
NRBC BLD AUTO-RTO: 0.2 /100 WBC (ref 0–0.2)
NRBC BLD AUTO-RTO: 0.3 /100 WBC (ref 0–0.2)
NRBC BLD AUTO-RTO: 0.4 /100 WBC (ref 0–0.2)
NRBC BLD AUTO-RTO: 0.6 /100 WBC (ref 0–0.2)
NRBC BLD AUTO-RTO: 0.7 /100 WBC (ref 0–0.2)
NRBC BLD AUTO-RTO: 0.8 /100 WBC (ref 0–0.2)
NRBC BLD AUTO-RTO: 1 /100 WBC (ref 0–0.2)
NRBC BLD AUTO-RTO: 1.3 /100 WBC (ref 0–0.2)
NRBC BLD AUTO-RTO: 1.5 /100 WBC (ref 0–0.2)
NRBC BLD AUTO-RTO: 1.9 /100 WBC (ref 0–0.2)
NRBC BLD AUTO-RTO: 2.1 /100 WBC (ref 0–0.2)
NRBC BLD AUTO-RTO: 2.2 /100 WBC (ref 0–0.2)
NRBC BLD AUTO-RTO: 3.4 /100 WBC (ref 0–0.2)
NRBC FLD-RTO: 18 /100 WBCS
NRBC SPEC MANUAL: 1 /100 WBC (ref 0–0.2)
NRBC SPEC MANUAL: 1 /100 WBC (ref 0–0.2)
NRBC SPEC MANUAL: 2 /100 WBC (ref 0–0.2)
NRBC SPEC MANUAL: 4.1 /100 WBC (ref 0–0.2)
NRBC SPEC MANUAL: 7.1 /100 WBC (ref 0–0.2)
NRBC SPEC MANUAL: 9 /100 WBC (ref 0–0.2)
NT-PROBNP SERPL-MCNC: 107.8 PG/ML (ref 5–900)
NT-PROBNP SERPL-MCNC: 161.5 PG/ML (ref 5–900)
NT-PROBNP SERPL-MCNC: 1621 PG/ML (ref 5–900)
NT-PROBNP SERPL-MCNC: 264.1 PG/ML (ref 5–900)
NT-PROBNP SERPL-MCNC: 569.7 PG/ML (ref 5–900)
OSMOLALITY SERPL: 284 MOSM/KG (ref 280–301)
OSMOLALITY UR: 199 MOSM/KG
PATH REPORT.FINAL DX SPEC: NORMAL
PATH REPORT.GROSS SPEC: NORMAL
PCO2 BLDA: 54.5 MM HG (ref 35–45)
PCO2 BLDA: 63.7 MM HG (ref 35–45)
PH BLDA: 7.37 PH UNITS (ref 7.35–7.45)
PH BLDA: 7.41 PH UNITS (ref 7.35–7.45)
PH UR STRIP.AUTO: 7 [PH] (ref 5–8)
PHOSPHATE SERPL-MCNC: 2 MG/DL (ref 2.5–4.5)
PHOSPHATE SERPL-MCNC: 2.4 MG/DL (ref 2.5–4.5)
PHOSPHATE SERPL-MCNC: 2.7 MG/DL (ref 2.5–4.5)
PLAT MORPH BLD: NORMAL
PLATELET # BLD AUTO: 103 10*3/MM3 (ref 140–450)
PLATELET # BLD AUTO: 110 10*3/MM3 (ref 140–450)
PLATELET # BLD AUTO: 152 10*3/MM3 (ref 140–450)
PLATELET # BLD AUTO: 218 10*3/MM3 (ref 140–450)
PLATELET # BLD AUTO: 219 10*3/MM3 (ref 140–450)
PLATELET # BLD AUTO: 220 10*3/MM3 (ref 140–450)
PLATELET # BLD AUTO: 222 10*3/MM3 (ref 140–450)
PLATELET # BLD AUTO: 223 10*3/MM3 (ref 140–450)
PLATELET # BLD AUTO: 229 10*3/MM3 (ref 140–450)
PLATELET # BLD AUTO: 239 10*3/MM3 (ref 140–450)
PLATELET # BLD AUTO: 241 10*3/MM3 (ref 140–450)
PLATELET # BLD AUTO: 242 10*3/MM3 (ref 140–450)
PLATELET # BLD AUTO: 250 10*3/MM3 (ref 140–450)
PLATELET # BLD AUTO: 252 10*3/MM3 (ref 140–450)
PLATELET # BLD AUTO: 253 10*3/MM3 (ref 140–450)
PLATELET # BLD AUTO: 254 10*3/MM3 (ref 140–450)
PLATELET # BLD AUTO: 264 10*3/MM3 (ref 140–450)
PLATELET # BLD AUTO: 266 10*3/MM3 (ref 140–450)
PLATELET # BLD AUTO: 275 10*3/MM3 (ref 140–450)
PLATELET # BLD AUTO: 281 10*3/MM3 (ref 140–450)
PLATELET # BLD AUTO: 284 10*3/MM3 (ref 140–450)
PLATELET # BLD AUTO: 291 10*3/MM3 (ref 140–450)
PLATELET # BLD AUTO: 301 10*3/MM3 (ref 140–450)
PLATELET # BLD AUTO: 303 10*3/MM3 (ref 140–450)
PLATELET # BLD AUTO: 304 10*3/MM3 (ref 140–450)
PLATELET # BLD AUTO: 309 10*3/MM3 (ref 140–450)
PLATELET # BLD AUTO: 313 10*3/MM3 (ref 140–450)
PLATELET # BLD AUTO: 315 10*3/MM3 (ref 140–450)
PLATELET # BLD AUTO: 323 10*3/MM3 (ref 140–450)
PLATELET # BLD AUTO: 335 10*3/MM3 (ref 140–450)
PLATELET # BLD AUTO: 350 10*3/MM3 (ref 140–450)
PLATELET # BLD AUTO: 355 10*3/MM3 (ref 140–450)
PLATELET # BLD AUTO: 359 10*3/MM3 (ref 140–450)
PLATELET # BLD AUTO: 375 10*3/MM3 (ref 140–450)
PLATELET # BLD AUTO: 393 10*3/MM3 (ref 140–450)
PLATELET # BLD AUTO: 400 10*3/MM3 (ref 140–450)
PLATELET # BLD AUTO: 402 10*3/MM3 (ref 140–450)
PLATELET # BLD AUTO: 402 10*3/MM3 (ref 140–450)
PLATELET # BLD AUTO: 410 10*3/MM3 (ref 140–450)
PLATELET # BLD AUTO: 435 10*3/MM3 (ref 140–450)
PLATELET # BLD AUTO: 464 10*3/MM3 (ref 140–450)
PLATELET # BLD AUTO: 85 10*3/MM3 (ref 140–450)
PMV BLD AUTO: 10 FL (ref 6–12)
PMV BLD AUTO: 10.1 FL (ref 6–12)
PMV BLD AUTO: 10.2 FL (ref 6–12)
PMV BLD AUTO: 10.3 FL (ref 6–12)
PMV BLD AUTO: 10.3 FL (ref 6–12)
PMV BLD AUTO: 10.4 FL (ref 6–12)
PMV BLD AUTO: 10.5 FL (ref 6–12)
PMV BLD AUTO: 10.6 FL (ref 6–12)
PMV BLD AUTO: 10.7 FL (ref 6–12)
PMV BLD AUTO: 10.7 FL (ref 6–12)
PMV BLD AUTO: 10.8 FL (ref 6–12)
PMV BLD AUTO: 10.9 FL (ref 6–12)
PMV BLD AUTO: 11 FL (ref 6–12)
PMV BLD AUTO: 11.1 FL (ref 6–12)
PMV BLD AUTO: 11.1 FL (ref 6–12)
PMV BLD AUTO: 11.2 FL (ref 6–12)
PMV BLD AUTO: 11.2 FL (ref 6–12)
PMV BLD AUTO: 11.6 FL (ref 6–12)
PMV BLD AUTO: 8.8 FL (ref 6–12)
PMV BLD AUTO: 9.1 FL (ref 6–12)
PMV BLD AUTO: 9.3 FL (ref 6–12)
PMV BLD AUTO: 9.5 FL (ref 6–12)
PMV BLD AUTO: 9.6 FL (ref 6–12)
PMV BLD AUTO: 9.6 FL (ref 6–12)
PMV BLD AUTO: 9.7 FL (ref 6–12)
PMV BLD AUTO: 9.8 FL (ref 6–12)
PMV BLD AUTO: 9.8 FL (ref 6–12)
PMV BLD AUTO: 9.9 FL (ref 6–12)
PMV BLD AUTO: 9.9 FL (ref 6–12)
PO2 BLDA: 52.4 MM HG (ref 80–100)
PO2 BLDA: 85.2 MM HG (ref 80–100)
POIKILOCYTOSIS BLD QL SMEAR: ABNORMAL
POLYCHROMASIA BLD QL SMEAR: ABNORMAL
POSITIVE CONTROL: POSITIVE
POTASSIUM BLD-SCNC: 2.5 MMOL/L (ref 3.5–5.2)
POTASSIUM BLD-SCNC: 2.7 MMOL/L (ref 3.5–4.7)
POTASSIUM BLD-SCNC: 2.8 MMOL/L (ref 3.5–5.2)
POTASSIUM BLD-SCNC: 2.9 MMOL/L (ref 3.5–5.2)
POTASSIUM BLD-SCNC: 3.1 MMOL/L (ref 3.5–5.2)
POTASSIUM BLD-SCNC: 3.1 MMOL/L (ref 3.5–5.2)
POTASSIUM BLD-SCNC: 3.3 MMOL/L (ref 3.5–5.2)
POTASSIUM BLD-SCNC: 3.4 MMOL/L (ref 3.5–5.2)
POTASSIUM BLD-SCNC: 3.4 MMOL/L (ref 3.5–5.2)
POTASSIUM BLD-SCNC: 3.5 MMOL/L (ref 3.5–5.2)
POTASSIUM BLD-SCNC: 3.5 MMOL/L (ref 3.5–5.2)
POTASSIUM BLD-SCNC: 3.6 MMOL/L (ref 3.5–4.7)
POTASSIUM BLD-SCNC: 3.6 MMOL/L (ref 3.5–4.7)
POTASSIUM BLD-SCNC: 3.6 MMOL/L (ref 3.5–5.2)
POTASSIUM BLD-SCNC: 3.7 MMOL/L (ref 3.5–5.2)
POTASSIUM BLD-SCNC: 3.8 MMOL/L (ref 3.5–4.7)
POTASSIUM BLD-SCNC: 3.8 MMOL/L (ref 3.5–4.7)
POTASSIUM BLD-SCNC: 3.8 MMOL/L (ref 3.5–5.2)
POTASSIUM BLD-SCNC: 3.9 MMOL/L (ref 3.5–5.2)
POTASSIUM BLD-SCNC: 3.9 MMOL/L (ref 3.5–5.2)
POTASSIUM BLD-SCNC: 4 MMOL/L (ref 3.5–4.7)
POTASSIUM BLD-SCNC: 4 MMOL/L (ref 3.5–5.2)
POTASSIUM BLD-SCNC: 4.1 MMOL/L (ref 3.5–4.7)
POTASSIUM BLD-SCNC: 4.1 MMOL/L (ref 3.5–5.2)
POTASSIUM BLD-SCNC: 4.1 MMOL/L (ref 3.5–5.2)
POTASSIUM BLD-SCNC: 4.2 MMOL/L (ref 3.5–4.7)
POTASSIUM BLD-SCNC: 4.2 MMOL/L (ref 3.5–5.2)
POTASSIUM BLD-SCNC: 4.2 MMOL/L (ref 3.5–5.2)
POTASSIUM BLD-SCNC: 4.6 MMOL/L (ref 3.5–5.2)
POTASSIUM BLD-SCNC: 4.8 MMOL/L (ref 3.5–4.7)
POTASSIUM BLD-SCNC: 4.9 MMOL/L (ref 3.5–4.7)
POTASSIUM BLD-SCNC: 5.3 MMOL/L (ref 3.5–4.7)
PROCALCITONIN SERPL-MCNC: 0.07 NG/ML (ref 0.1–0.25)
PROCALCITONIN SERPL-MCNC: 0.1 NG/ML (ref 0.1–0.25)
PROCALCITONIN SERPL-MCNC: 0.16 NG/ML (ref 0.1–0.25)
PROCALCITONIN SERPL-MCNC: 0.19 NG/ML (ref 0.1–0.25)
PROCALCITONIN SERPL-MCNC: 0.2 NG/ML (ref 0.1–0.25)
PROMYELOCYTES NFR BLD MANUAL: 1 % (ref 0–0)
PROT FLD-MCNC: 4.6 G/DL
PROT SERPL-MCNC: 6.4 G/DL (ref 6–8.5)
PROT SERPL-MCNC: 6.6 G/DL (ref 6–8.5)
PROT SERPL-MCNC: 6.6 G/DL (ref 6–8.5)
PROT SERPL-MCNC: 6.7 G/DL (ref 6.3–8)
PROT SERPL-MCNC: 6.7 G/DL (ref 6–8.5)
PROT SERPL-MCNC: 6.8 G/DL (ref 6–8.5)
PROT SERPL-MCNC: 6.9 G/DL (ref 6.3–8)
PROT SERPL-MCNC: 6.9 G/DL (ref 6–8.5)
PROT SERPL-MCNC: 6.9 G/DL (ref 6–8.5)
PROT SERPL-MCNC: 7 G/DL (ref 6.3–8)
PROT SERPL-MCNC: 7 G/DL (ref 6–8.5)
PROT SERPL-MCNC: 7 G/DL (ref 6–8.5)
PROT SERPL-MCNC: 7.1 G/DL (ref 6.3–8)
PROT SERPL-MCNC: 7.1 G/DL (ref 6.3–8)
PROT SERPL-MCNC: 7.1 G/DL (ref 6–8.5)
PROT SERPL-MCNC: 7.2 G/DL (ref 6.3–8)
PROT SERPL-MCNC: 7.2 G/DL (ref 6–8.5)
PROT SERPL-MCNC: 7.2 G/DL (ref 6–8.5)
PROT SERPL-MCNC: 7.3 G/DL (ref 6.3–8)
PROT SERPL-MCNC: 7.3 G/DL (ref 6.3–8)
PROT SERPL-MCNC: 7.4 G/DL (ref 6.3–8)
PROT SERPL-MCNC: 7.5 G/DL (ref 6.3–8)
PROT SERPL-MCNC: 7.6 G/DL (ref 6.3–8)
PROT SERPL-MCNC: 7.6 G/DL (ref 6–8.5)
PROT SERPL-MCNC: 7.6 G/DL (ref 6–8.5)
PROT SERPL-MCNC: 7.7 G/DL (ref 6–8.5)
PROT UR QL STRIP: NEGATIVE
PROTHROMBIN TIME: 13.5 SECONDS (ref 11.7–14.2)
PROTHROMBIN TIME: 13.7 SECONDS (ref 11.7–14.2)
PROTHROMBIN TIME: 13.8 SECONDS (ref 11.7–14.2)
PROTHROMBIN TIME: 14 SECONDS (ref 11.7–14.2)
PROTHROMBIN TIME: 15.5 SECONDS (ref 11.7–14.2)
RBC # BLD AUTO: 3.05 10*6/MM3 (ref 3.77–5.28)
RBC # BLD AUTO: 3.07 10*6/MM3 (ref 3.77–5.28)
RBC # BLD AUTO: 3.16 10*6/MM3 (ref 3.77–5.28)
RBC # BLD AUTO: 3.17 10*6/MM3 (ref 3.77–5.28)
RBC # BLD AUTO: 3.39 10*6/MM3 (ref 3.77–5.28)
RBC # BLD AUTO: 3.51 10*6/MM3 (ref 3.77–5.28)
RBC # BLD AUTO: 3.52 10*6/MM3 (ref 3.77–5.28)
RBC # BLD AUTO: 3.52 10*6/MM3 (ref 3.77–5.28)
RBC # BLD AUTO: 3.53 10*6/MM3 (ref 3.77–5.28)
RBC # BLD AUTO: 3.56 10*6/MM3 (ref 3.77–5.28)
RBC # BLD AUTO: 3.64 10*6/MM3 (ref 3.77–5.28)
RBC # BLD AUTO: 3.65 10*6/MM3 (ref 3.77–5.28)
RBC # BLD AUTO: 3.66 10*6/MM3 (ref 3.77–5.28)
RBC # BLD AUTO: 3.69 10*6/MM3 (ref 3.77–5.28)
RBC # BLD AUTO: 3.71 10*6/MM3 (ref 3.77–5.28)
RBC # BLD AUTO: 3.71 10*6/MM3 (ref 3.77–5.28)
RBC # BLD AUTO: 3.73 10*6/MM3 (ref 3.77–5.28)
RBC # BLD AUTO: 3.73 10*6/MM3 (ref 3.77–5.28)
RBC # BLD AUTO: 3.74 10*6/MM3 (ref 3.77–5.28)
RBC # BLD AUTO: 3.75 10*6/MM3 (ref 3.77–5.28)
RBC # BLD AUTO: 3.79 10*6/MM3 (ref 3.77–5.28)
RBC # BLD AUTO: 3.79 10*6/MM3 (ref 3.77–5.28)
RBC # BLD AUTO: 3.84 10*6/MM3 (ref 3.77–5.28)
RBC # BLD AUTO: 3.85 10*6/MM3 (ref 3.77–5.28)
RBC # BLD AUTO: 3.92 10*6/MM3 (ref 3.77–5.28)
RBC # BLD AUTO: 3.93 10*6/MM3 (ref 3.77–5.28)
RBC # BLD AUTO: 3.99 10*6/MM3 (ref 3.77–5.28)
RBC # BLD AUTO: 4.02 10*6/MM3 (ref 3.77–5.28)
RBC # BLD AUTO: 4.04 10*6/MM3 (ref 3.77–5.28)
RBC # BLD AUTO: 4.08 10*6/MM3 (ref 3.77–5.28)
RBC # BLD AUTO: 4.08 10*6/MM3 (ref 3.77–5.28)
RBC # BLD AUTO: 4.15 10*6/MM3 (ref 3.77–5.28)
RBC # BLD AUTO: 4.16 10*6/MM3 (ref 3.77–5.28)
RBC # BLD AUTO: 4.21 10*6/MM3 (ref 3.77–5.28)
RBC # BLD AUTO: 4.22 10*6/MM3 (ref 3.77–5.28)
RBC # BLD AUTO: 4.3 10*6/MM3 (ref 3.77–5.28)
RBC # BLD AUTO: 4.42 10*6/MM3 (ref 3.77–5.28)
RBC # BLD AUTO: 4.43 10*6/MM3 (ref 3.77–5.28)
RBC # BLD AUTO: 4.62 10*6/MM3 (ref 3.77–5.28)
RBC # BLD AUTO: 4.85 10*6/MM3 (ref 3.77–5.28)
RBC # FLD AUTO: 5750 /MM3
RBC # FLD AUTO: ABNORMAL /MM3
RBC # UR: ABNORMAL /HPF
RBC MORPH BLD: NORMAL
RBC MORPH BLD: NORMAL
REF LAB TEST METHOD: ABNORMAL
REF LAB TEST METHOD: NORMAL
RETICS/RBC NFR AUTO: 1.49 % (ref 0.7–1.9)
RH BLD: POSITIVE
RH BLD: POSITIVE
RHINOVIRUS RNA SPEC NAA+PROBE: NOT DETECTED
RSV RNA NPH QL NAA+NON-PROBE: DETECTED
RSV RNA NPH QL NAA+NON-PROBE: NOT DETECTED
RSV RNA NPH QL NAA+NON-PROBE: NOT DETECTED
S PNEUM AG SPEC QL LA: NEGATIVE
S PNEUM AG SPEC QL LA: NEGATIVE
SAO2 % BLDCOA: 84.2 % (ref 92–99)
SAO2 % BLDCOA: 96.2 % (ref 92–99)
SINUS: 2.4 CM
SMUDGE CELLS BLD QL SMEAR: ABNORMAL
SMUDGE CELLS BLD QL SMEAR: ABNORMAL
SODIUM BLD-SCNC: 126 MMOL/L (ref 136–145)
SODIUM BLD-SCNC: 126 MMOL/L (ref 136–145)
SODIUM BLD-SCNC: 128 MMOL/L (ref 136–145)
SODIUM BLD-SCNC: 129 MMOL/L (ref 134–145)
SODIUM BLD-SCNC: 130 MMOL/L (ref 134–145)
SODIUM BLD-SCNC: 130 MMOL/L (ref 134–145)
SODIUM BLD-SCNC: 130 MMOL/L (ref 136–145)
SODIUM BLD-SCNC: 130 MMOL/L (ref 136–145)
SODIUM BLD-SCNC: 131 MMOL/L (ref 134–145)
SODIUM BLD-SCNC: 131 MMOL/L (ref 136–145)
SODIUM BLD-SCNC: 132 MMOL/L (ref 136–145)
SODIUM BLD-SCNC: 133 MMOL/L (ref 136–145)
SODIUM BLD-SCNC: 134 MMOL/L (ref 136–145)
SODIUM BLD-SCNC: 135 MMOL/L (ref 134–145)
SODIUM BLD-SCNC: 135 MMOL/L (ref 134–145)
SODIUM BLD-SCNC: 135 MMOL/L (ref 136–145)
SODIUM BLD-SCNC: 136 MMOL/L (ref 134–145)
SODIUM BLD-SCNC: 136 MMOL/L (ref 136–145)
SODIUM BLD-SCNC: 136 MMOL/L (ref 136–145)
SODIUM BLD-SCNC: 137 MMOL/L (ref 134–145)
SODIUM BLD-SCNC: 137 MMOL/L (ref 134–145)
SODIUM BLD-SCNC: 137 MMOL/L (ref 136–145)
SODIUM BLD-SCNC: 138 MMOL/L (ref 136–145)
SODIUM BLD-SCNC: 139 MMOL/L (ref 136–145)
SODIUM UR-SCNC: 37 MMOL/L
SP GR UR STRIP: 1.01 (ref 1–1.03)
SPHEROCYTES BLD QL SMEAR: ABNORMAL
SQUAMOUS #/AREA URNS HPF: ABNORMAL /HPF
STJ: 2.6 CM
T&S EXPIRATION DATE: NORMAL
T&S EXPIRATION DATE: NORMAL
TARGETS BLD QL SMEAR: ABNORMAL
TIBC SERPL-MCNC: 349 MCG/DL (ref 298–536)
TIBC SERPL-MCNC: 535 MCG/DL (ref 298–536)
TOTAL RATE: 24 BREATHS/MINUTE
TOTAL RATE: 24 BREATHS/MINUTE
TRANSFERRIN SERPL-MCNC: 234 MG/DL (ref 200–360)
TRANSFERRIN SERPL-MCNC: 359 MG/DL (ref 200–360)
TROPONIN T SERPL-MCNC: <0.01 NG/ML (ref 0–0.03)
TSH SERPL DL<=0.05 MIU/L-ACNC: 3.97 MIU/ML (ref 0.27–4.2)
UNIT  ABO: NORMAL
UNIT  ABO: NORMAL
UNIT  RH: NORMAL
UNIT  RH: NORMAL
UROBILINOGEN UR QL STRIP: ABNORMAL
VARIANT LYMPHS NFR BLD MANUAL: 5 % (ref 0–5)
VIT B12 BLD-MCNC: >2000 PG/ML (ref 211–946)
WBC # FLD AUTO: 6200 /MM3
WBC # FLD AUTO: 825 /MM3
WBC MORPH BLD: NORMAL
WBC NRBC COR # BLD: 10.33 10*3/MM3 (ref 3.4–10.8)
WBC NRBC COR # BLD: 11.13 10*3/MM3 (ref 3.4–10.8)
WBC NRBC COR # BLD: 11.25 10*3/MM3 (ref 3.4–10.8)
WBC NRBC COR # BLD: 11.6 10*3/MM3 (ref 3.4–10.8)
WBC NRBC COR # BLD: 12.46 10*3/MM3 (ref 3.4–10.8)
WBC NRBC COR # BLD: 14.19 10*3/MM3 (ref 3.4–10.8)
WBC NRBC COR # BLD: 14.66 10*3/MM3 (ref 3.4–10.8)
WBC NRBC COR # BLD: 16.66 10*3/MM3 (ref 3.4–10.8)
WBC NRBC COR # BLD: 18.31 10*3/MM3 (ref 3.4–10.8)
WBC NRBC COR # BLD: 18.36 10*3/MM3 (ref 3.4–10.8)
WBC NRBC COR # BLD: 2.22 10*3/MM3 (ref 3.4–10.8)
WBC NRBC COR # BLD: 2.29 10*3/MM3 (ref 3.4–10.8)
WBC NRBC COR # BLD: 20.92 10*3/MM3 (ref 3.4–10.8)
WBC NRBC COR # BLD: 25.12 10*3/MM3 (ref 3.4–10.8)
WBC NRBC COR # BLD: 25.36 10*3/MM3 (ref 3.4–10.8)
WBC NRBC COR # BLD: 25.86 10*3/MM3 (ref 3.4–10.8)
WBC NRBC COR # BLD: 29.76 10*3/MM3 (ref 3.4–10.8)
WBC NRBC COR # BLD: 29.96 10*3/MM3 (ref 3.4–10.8)
WBC NRBC COR # BLD: 3.69 10*3/MM3 (ref 3.4–10.8)
WBC NRBC COR # BLD: 30.26 10*3/MM3 (ref 3.4–10.8)
WBC NRBC COR # BLD: 30.33 10*3/MM3 (ref 3.4–10.8)
WBC NRBC COR # BLD: 32.64 10*3/MM3 (ref 3.4–10.8)
WBC NRBC COR # BLD: 32.67 10*3/MM3 (ref 3.4–10.8)
WBC NRBC COR # BLD: 4.59 10*3/MM3 (ref 3.4–10.8)
WBC NRBC COR # BLD: 4.86 10*3/MM3 (ref 3.4–10.8)
WBC NRBC COR # BLD: 5.09 10*3/MM3 (ref 3.4–10.8)
WBC NRBC COR # BLD: 5.09 10*3/MM3 (ref 3.4–10.8)
WBC NRBC COR # BLD: 5.27 10*3/MM3 (ref 3.4–10.8)
WBC NRBC COR # BLD: 5.62 10*3/MM3 (ref 3.4–10.8)
WBC NRBC COR # BLD: 5.84 10*3/MM3 (ref 3.4–10.8)
WBC NRBC COR # BLD: 5.93 10*3/MM3 (ref 3.4–10.8)
WBC NRBC COR # BLD: 6.34 10*3/MM3 (ref 3.4–10.8)
WBC NRBC COR # BLD: 7.24 10*3/MM3 (ref 3.4–10.8)
WBC NRBC COR # BLD: 7.4 10*3/MM3 (ref 3.4–10.8)
WBC NRBC COR # BLD: 7.53 10*3/MM3 (ref 3.4–10.8)
WBC NRBC COR # BLD: 7.76 10*3/MM3 (ref 3.4–10.8)
WBC NRBC COR # BLD: 7.79 10*3/MM3 (ref 3.4–10.8)
WBC NRBC COR # BLD: 7.94 10*3/MM3 (ref 3.4–10.8)
WBC NRBC COR # BLD: 8.12 10*3/MM3 (ref 3.4–10.8)
WBC NRBC COR # BLD: 8.14 10*3/MM3 (ref 3.4–10.8)
WBC NRBC COR # BLD: 8.74 10*3/MM3 (ref 3.4–10.8)
WBC NRBC COR # BLD: 8.8 10*3/MM3 (ref 3.4–10.8)
WBC NRBC COR # BLD: 8.87 10*3/MM3 (ref 3.4–10.8)
WBC NRBC COR # BLD: 8.99 10*3/MM3 (ref 3.4–10.8)
WBC UR QL AUTO: ABNORMAL /HPF
WHOLE BLOOD HOLD SPECIMEN: NORMAL

## 2019-01-01 PROCEDURE — 71046 X-RAY EXAM CHEST 2 VIEWS: CPT

## 2019-01-01 PROCEDURE — 25010000002 AZITHROMYCIN PER 500 MG: Performed by: INTERNAL MEDICINE

## 2019-01-01 PROCEDURE — 25010000002 FLUOROURACIL PER 500 MG: Performed by: INTERNAL MEDICINE

## 2019-01-01 PROCEDURE — 0 IOPAMIDOL PER 1 ML: Performed by: EMERGENCY MEDICINE

## 2019-01-01 PROCEDURE — 96368 THER/DIAG CONCURRENT INF: CPT | Performed by: INTERNAL MEDICINE

## 2019-01-01 PROCEDURE — 85025 COMPLETE CBC W/AUTO DIFF WBC: CPT | Performed by: NURSE PRACTITIONER

## 2019-01-01 PROCEDURE — 25010000002 PHENYLEPHRINE PER 1 ML: Performed by: ANESTHESIOLOGY

## 2019-01-01 PROCEDURE — 25010000003 POTASSIUM CHLORIDE 10 MEQ/100ML SOLUTION: Performed by: NURSE PRACTITIONER

## 2019-01-01 PROCEDURE — 0W993ZX DRAINAGE OF RIGHT PLEURAL CAVITY, PERCUTANEOUS APPROACH, DIAGNOSTIC: ICD-10-PCS | Performed by: RADIOLOGY

## 2019-01-01 PROCEDURE — 82962 GLUCOSE BLOOD TEST: CPT

## 2019-01-01 PROCEDURE — 94799 UNLISTED PULMONARY SVC/PX: CPT

## 2019-01-01 PROCEDURE — 80053 COMPREHEN METABOLIC PANEL: CPT | Performed by: EMERGENCY MEDICINE

## 2019-01-01 PROCEDURE — 86301 IMMUNOASSAY TUMOR CA 19-9: CPT | Performed by: INTERNAL MEDICINE

## 2019-01-01 PROCEDURE — 87070 CULTURE OTHR SPECIMN AEROBIC: CPT | Performed by: INTERNAL MEDICINE

## 2019-01-01 PROCEDURE — 25010000003 LIDOCAINE 1 % SOLUTION: Performed by: RADIOLOGY

## 2019-01-01 PROCEDURE — 86850 RBC ANTIBODY SCREEN: CPT | Performed by: INTERNAL MEDICINE

## 2019-01-01 PROCEDURE — 71045 X-RAY EXAM CHEST 1 VIEW: CPT

## 2019-01-01 PROCEDURE — 85730 THROMBOPLASTIN TIME PARTIAL: CPT | Performed by: HOSPITALIST

## 2019-01-01 PROCEDURE — 63710000001 INSULIN LISPRO (HUMAN) PER 5 UNITS: Performed by: HOSPITALIST

## 2019-01-01 PROCEDURE — 85730 THROMBOPLASTIN TIME PARTIAL: CPT | Performed by: INTERNAL MEDICINE

## 2019-01-01 PROCEDURE — 63710000001 PREDNISONE PER 1 MG: Performed by: INTERNAL MEDICINE

## 2019-01-01 PROCEDURE — 96377 APPLICATON ON-BODY INJECTOR: CPT

## 2019-01-01 PROCEDURE — 63710000001 INSULIN LISPRO (HUMAN) PER 5 UNITS: Performed by: INTERNAL MEDICINE

## 2019-01-01 PROCEDURE — 82565 ASSAY OF CREATININE: CPT

## 2019-01-01 PROCEDURE — 82270 OCCULT BLOOD FECES: CPT | Performed by: EMERGENCY MEDICINE

## 2019-01-01 PROCEDURE — 70491 CT SOFT TISSUE NECK W/DYE: CPT

## 2019-01-01 PROCEDURE — 83880 ASSAY OF NATRIURETIC PEPTIDE: CPT | Performed by: NURSE PRACTITIONER

## 2019-01-01 PROCEDURE — 80053 COMPREHEN METABOLIC PANEL: CPT | Performed by: NURSE PRACTITIONER

## 2019-01-01 PROCEDURE — 86850 RBC ANTIBODY SCREEN: CPT | Performed by: EMERGENCY MEDICINE

## 2019-01-01 PROCEDURE — 25010000002 ONDANSETRON PER 1 MG: Performed by: INTERNAL MEDICINE

## 2019-01-01 PROCEDURE — 93000 ELECTROCARDIOGRAM COMPLETE: CPT | Performed by: INTERNAL MEDICINE

## 2019-01-01 PROCEDURE — 85007 BL SMEAR W/DIFF WBC COUNT: CPT | Performed by: NURSE PRACTITIONER

## 2019-01-01 PROCEDURE — 63710000001 INSULIN GLARGINE PER 5 UNITS: Performed by: INTERNAL MEDICINE

## 2019-01-01 PROCEDURE — 87205 SMEAR GRAM STAIN: CPT | Performed by: INTERNAL MEDICINE

## 2019-01-01 PROCEDURE — 63710000001 INSULIN LISPRO (HUMAN) PER 5 UNITS: Performed by: NURSE PRACTITIONER

## 2019-01-01 PROCEDURE — 80053 COMPREHEN METABOLIC PANEL: CPT

## 2019-01-01 PROCEDURE — 25010000002 OXALIPLATIN PER 0.5 MG: Performed by: INTERNAL MEDICINE

## 2019-01-01 PROCEDURE — 85025 COMPLETE CBC W/AUTO DIFF WBC: CPT | Performed by: INTERNAL MEDICINE

## 2019-01-01 PROCEDURE — 96413 CHEMO IV INFUSION 1 HR: CPT | Performed by: INTERNAL MEDICINE

## 2019-01-01 PROCEDURE — 36430 TRANSFUSION BLD/BLD COMPNT: CPT

## 2019-01-01 PROCEDURE — 96361 HYDRATE IV INFUSION ADD-ON: CPT

## 2019-01-01 PROCEDURE — 84145 PROCALCITONIN (PCT): CPT | Performed by: INTERNAL MEDICINE

## 2019-01-01 PROCEDURE — 25010000002 AZITHROMYCIN PER 500 MG: Performed by: HOSPITALIST

## 2019-01-01 PROCEDURE — 25010000002 CEFEPIME PER 500 MG: Performed by: INTERNAL MEDICINE

## 2019-01-01 PROCEDURE — 96417 CHEMO IV INFUS EACH ADDL SEQ: CPT | Performed by: NURSE PRACTITIONER

## 2019-01-01 PROCEDURE — 85025 COMPLETE CBC W/AUTO DIFF WBC: CPT | Performed by: EMERGENCY MEDICINE

## 2019-01-01 PROCEDURE — 82607 VITAMIN B-12: CPT | Performed by: INTERNAL MEDICINE

## 2019-01-01 PROCEDURE — 84484 ASSAY OF TROPONIN QUANT: CPT | Performed by: EMERGENCY MEDICINE

## 2019-01-01 PROCEDURE — 80053 COMPREHEN METABOLIC PANEL: CPT | Performed by: INTERNAL MEDICINE

## 2019-01-01 PROCEDURE — 77067 SCR MAMMO BI INCL CAD: CPT | Performed by: RADIOLOGY

## 2019-01-01 PROCEDURE — 78815 PET IMAGE W/CT SKULL-THIGH: CPT

## 2019-01-01 PROCEDURE — 99284 EMERGENCY DEPT VISIT MOD MDM: CPT

## 2019-01-01 PROCEDURE — 86901 BLOOD TYPING SEROLOGIC RH(D): CPT

## 2019-01-01 PROCEDURE — 25010000002 ATROPINE PER 0.01 MG: Performed by: INTERNAL MEDICINE

## 2019-01-01 PROCEDURE — 85610 PROTHROMBIN TIME: CPT | Performed by: EMERGENCY MEDICINE

## 2019-01-01 PROCEDURE — A9552 F18 FDG: HCPCS | Performed by: INTERNAL MEDICINE

## 2019-01-01 PROCEDURE — 71275 CT ANGIOGRAPHY CHEST: CPT

## 2019-01-01 PROCEDURE — 88341 IMHCHEM/IMCYTCHM EA ADD ANTB: CPT | Performed by: INTERNAL MEDICINE

## 2019-01-01 PROCEDURE — 93005 ELECTROCARDIOGRAM TRACING: CPT | Performed by: NURSE PRACTITIONER

## 2019-01-01 PROCEDURE — 86900 BLOOD TYPING SEROLOGIC ABO: CPT | Performed by: INTERNAL MEDICINE

## 2019-01-01 PROCEDURE — 85379 FIBRIN DEGRADATION QUANT: CPT | Performed by: NURSE PRACTITIONER

## 2019-01-01 PROCEDURE — 93010 ELECTROCARDIOGRAM REPORT: CPT | Performed by: INTERNAL MEDICINE

## 2019-01-01 PROCEDURE — 84157 ASSAY OF PROTEIN OTHER: CPT | Performed by: INTERNAL MEDICINE

## 2019-01-01 PROCEDURE — 25010000002 PEGFILGRASTIM 6 MG/0.6ML PREFILLED SYRINGE KIT: Performed by: INTERNAL MEDICINE

## 2019-01-01 PROCEDURE — 96415 CHEMO IV INFUSION ADDL HR: CPT | Performed by: NURSE PRACTITIONER

## 2019-01-01 PROCEDURE — 88342 IMHCHEM/IMCYTCHM 1ST ANTB: CPT | Performed by: INTERNAL MEDICINE

## 2019-01-01 PROCEDURE — 85025 COMPLETE CBC W/AUTO DIFF WBC: CPT

## 2019-01-01 PROCEDURE — 83735 ASSAY OF MAGNESIUM: CPT | Performed by: INTERNAL MEDICINE

## 2019-01-01 PROCEDURE — 93306 TTE W/DOPPLER COMPLETE: CPT

## 2019-01-01 PROCEDURE — 25010000002 METHYLPREDNISOLONE PER 125 MG: Performed by: EMERGENCY MEDICINE

## 2019-01-01 PROCEDURE — 83880 ASSAY OF NATRIURETIC PEPTIDE: CPT | Performed by: EMERGENCY MEDICINE

## 2019-01-01 PROCEDURE — 25010000002 FLUOROURACIL PER 500 MG: Performed by: NURSE PRACTITIONER

## 2019-01-01 PROCEDURE — 96375 TX/PRO/DX INJ NEW DRUG ADDON: CPT

## 2019-01-01 PROCEDURE — G0378 HOSPITAL OBSERVATION PER HR: HCPCS

## 2019-01-01 PROCEDURE — G0463 HOSPITAL OUTPT CLINIC VISIT: HCPCS | Performed by: NURSE PRACTITIONER

## 2019-01-01 PROCEDURE — 88305 TISSUE EXAM BY PATHOLOGIST: CPT | Performed by: INTERNAL MEDICINE

## 2019-01-01 PROCEDURE — 25010000002 CEFEPIME PER 500 MG: Performed by: HOSPITALIST

## 2019-01-01 PROCEDURE — 83735 ASSAY OF MAGNESIUM: CPT | Performed by: HOSPITALIST

## 2019-01-01 PROCEDURE — 36415 COLL VENOUS BLD VENIPUNCTURE: CPT | Performed by: HOSPITALIST

## 2019-01-01 PROCEDURE — 99285 EMERGENCY DEPT VISIT HI MDM: CPT

## 2019-01-01 PROCEDURE — 84484 ASSAY OF TROPONIN QUANT: CPT | Performed by: NURSE PRACTITIONER

## 2019-01-01 PROCEDURE — 96368 THER/DIAG CONCURRENT INF: CPT | Performed by: NURSE PRACTITIONER

## 2019-01-01 PROCEDURE — 25010000002 PALONOSETRON PER 25 MCG: Performed by: INTERNAL MEDICINE

## 2019-01-01 PROCEDURE — 93005 ELECTROCARDIOGRAM TRACING: CPT | Performed by: EMERGENCY MEDICINE

## 2019-01-01 PROCEDURE — 93971 EXTREMITY STUDY: CPT

## 2019-01-01 PROCEDURE — 25010000002 FILGRASTIM 300 MCG/0.5ML SOLUTION PREFILLED SYRINGE: Performed by: INTERNAL MEDICINE

## 2019-01-01 PROCEDURE — 85025 COMPLETE CBC W/AUTO DIFF WBC: CPT | Performed by: HOSPITALIST

## 2019-01-01 PROCEDURE — 82803 BLOOD GASES ANY COMBINATION: CPT

## 2019-01-01 PROCEDURE — 86901 BLOOD TYPING SEROLOGIC RH(D): CPT | Performed by: INTERNAL MEDICINE

## 2019-01-01 PROCEDURE — 25010000002 IRINOTECAN PER 20 MG: Performed by: INTERNAL MEDICINE

## 2019-01-01 PROCEDURE — 25010000002 DEXAMETHASONE SODIUM PHOSPHATE 100 MG/10ML SOLUTION: Performed by: INTERNAL MEDICINE

## 2019-01-01 PROCEDURE — 96375 TX/PRO/DX INJ NEW DRUG ADDON: CPT | Performed by: INTERNAL MEDICINE

## 2019-01-01 PROCEDURE — 80048 BASIC METABOLIC PNL TOTAL CA: CPT | Performed by: HOSPITALIST

## 2019-01-01 PROCEDURE — 43255 EGD CONTROL BLEEDING ANY: CPT | Performed by: INTERNAL MEDICINE

## 2019-01-01 PROCEDURE — 85027 COMPLETE CBC AUTOMATED: CPT | Performed by: HOSPITALIST

## 2019-01-01 PROCEDURE — 87040 BLOOD CULTURE FOR BACTERIA: CPT | Performed by: EMERGENCY MEDICINE

## 2019-01-01 PROCEDURE — 85007 BL SMEAR W/DIFF WBC COUNT: CPT | Performed by: INTERNAL MEDICINE

## 2019-01-01 PROCEDURE — 94640 AIRWAY INHALATION TREATMENT: CPT

## 2019-01-01 PROCEDURE — 25010000002 IOPAMIDOL 61 % SOLUTION: Performed by: INTERNAL MEDICINE

## 2019-01-01 PROCEDURE — 63710000001 INSULIN GLARGINE PER 5 UNITS: Performed by: HOSPITALIST

## 2019-01-01 PROCEDURE — 82746 ASSAY OF FOLIC ACID SERUM: CPT | Performed by: INTERNAL MEDICINE

## 2019-01-01 PROCEDURE — 25010000002 LEUCOVORIN CALCIUM PER 50 MG: Performed by: INTERNAL MEDICINE

## 2019-01-01 PROCEDURE — 96416 CHEMO PROLONG INFUSE W/PUMP: CPT | Performed by: NURSE PRACTITIONER

## 2019-01-01 PROCEDURE — 99232 SBSQ HOSP IP/OBS MODERATE 35: CPT | Performed by: INTERNAL MEDICINE

## 2019-01-01 PROCEDURE — 96416 CHEMO PROLONG INFUSE W/PUMP: CPT | Performed by: INTERNAL MEDICINE

## 2019-01-01 PROCEDURE — 84100 ASSAY OF PHOSPHORUS: CPT | Performed by: INTERNAL MEDICINE

## 2019-01-01 PROCEDURE — 87899 AGENT NOS ASSAY W/OPTIC: CPT | Performed by: HOSPITALIST

## 2019-01-01 PROCEDURE — 96417 CHEMO IV INFUS EACH ADDL SEQ: CPT | Performed by: INTERNAL MEDICINE

## 2019-01-01 PROCEDURE — 85730 THROMBOPLASTIN TIME PARTIAL: CPT | Performed by: EMERGENCY MEDICINE

## 2019-01-01 PROCEDURE — 96523 IRRIG DRUG DELIVERY DEVICE: CPT | Performed by: NURSE PRACTITIONER

## 2019-01-01 PROCEDURE — 25010000002 IRINOTECAN PER 20 MG: Performed by: NURSE PRACTITIONER

## 2019-01-01 PROCEDURE — 96375 TX/PRO/DX INJ NEW DRUG ADDON: CPT | Performed by: NURSE PRACTITIONER

## 2019-01-01 PROCEDURE — 25010000002 OXALIPLATIN PER 0.5 MG: Performed by: NURSE PRACTITIONER

## 2019-01-01 PROCEDURE — 25010000002 VANCOMYCIN 10 G RECONSTITUTED SOLUTION: Performed by: INTERNAL MEDICINE

## 2019-01-01 PROCEDURE — 73610 X-RAY EXAM OF ANKLE: CPT

## 2019-01-01 PROCEDURE — 99212-NC PR NO CHARGE CBC OFFICE OUTPATIENT VISIT 10 MINUTES: Performed by: NURSE PRACTITIONER

## 2019-01-01 PROCEDURE — 93970 EXTREMITY STUDY: CPT

## 2019-01-01 PROCEDURE — 25010000002 AZITHROMYCIN PER 500 MG: Performed by: EMERGENCY MEDICINE

## 2019-01-01 PROCEDURE — 25010000003 CEFTRIAXONE PER 250 MG: Performed by: HOSPITALIST

## 2019-01-01 PROCEDURE — 96411 CHEMO IV PUSH ADDL DRUG: CPT | Performed by: INTERNAL MEDICINE

## 2019-01-01 PROCEDURE — 36415 COLL VENOUS BLD VENIPUNCTURE: CPT | Performed by: INTERNAL MEDICINE

## 2019-01-01 PROCEDURE — 71046 X-RAY EXAM CHEST 2 VIEWS: CPT | Performed by: INTERNAL MEDICINE

## 2019-01-01 PROCEDURE — 25010000002 ALTEPLASE 2 MG RECONSTITUTED SOLUTION: Performed by: INTERNAL MEDICINE

## 2019-01-01 PROCEDURE — 25010000002 HEPARIN (PORCINE) PER 1000 UNITS: Performed by: NURSE PRACTITIONER

## 2019-01-01 PROCEDURE — 87070 CULTURE OTHR SPECIMN AEROBIC: CPT | Performed by: HOSPITALIST

## 2019-01-01 PROCEDURE — 25010000002 VANCOMYCIN 10 G RECONSTITUTED SOLUTION: Performed by: NURSE PRACTITIONER

## 2019-01-01 PROCEDURE — 86901 BLOOD TYPING SEROLOGIC RH(D): CPT | Performed by: EMERGENCY MEDICINE

## 2019-01-01 PROCEDURE — 83935 ASSAY OF URINE OSMOLALITY: CPT | Performed by: HOSPITALIST

## 2019-01-01 PROCEDURE — 96365 THER/PROPH/DIAG IV INF INIT: CPT

## 2019-01-01 PROCEDURE — 74177 CT ABD & PELVIS W/CONTRAST: CPT

## 2019-01-01 PROCEDURE — 99214 OFFICE O/P EST MOD 30 MIN: CPT | Performed by: INTERNAL MEDICINE

## 2019-01-01 PROCEDURE — 94770: CPT

## 2019-01-01 PROCEDURE — 99215 OFFICE O/P EST HI 40 MIN: CPT | Performed by: NURSE PRACTITIONER

## 2019-01-01 PROCEDURE — 99254 IP/OBS CNSLTJ NEW/EST MOD 60: CPT | Performed by: INTERNAL MEDICINE

## 2019-01-01 PROCEDURE — 83605 ASSAY OF LACTIC ACID: CPT | Performed by: INTERNAL MEDICINE

## 2019-01-01 PROCEDURE — 85018 HEMOGLOBIN: CPT | Performed by: NURSE PRACTITIONER

## 2019-01-01 PROCEDURE — 96374 THER/PROPH/DIAG INJ IV PUSH: CPT

## 2019-01-01 PROCEDURE — 25010000003 POTASSIUM CHLORIDE 10 MEQ/100ML SOLUTION: Performed by: EMERGENCY MEDICINE

## 2019-01-01 PROCEDURE — 86900 BLOOD TYPING SEROLOGIC ABO: CPT | Performed by: EMERGENCY MEDICINE

## 2019-01-01 PROCEDURE — 70450 CT HEAD/BRAIN W/O DYE: CPT

## 2019-01-01 PROCEDURE — 25010000002 CEFEPIME PER 500 MG: Performed by: NURSE PRACTITIONER

## 2019-01-01 PROCEDURE — 25010000002 LEUCOVORIN CALCIUM PER 50 MG: Performed by: NURSE PRACTITIONER

## 2019-01-01 PROCEDURE — 96415 CHEMO IV INFUSION ADDL HR: CPT | Performed by: INTERNAL MEDICINE

## 2019-01-01 PROCEDURE — 96411 CHEMO IV PUSH ADDL DRUG: CPT | Performed by: NURSE PRACTITIONER

## 2019-01-01 PROCEDURE — 0 DIATRIZOATE MEGLUMINE & SODIUM PER 1 ML: Performed by: INTERNAL MEDICINE

## 2019-01-01 PROCEDURE — 96413 CHEMO IV INFUSION 1 HR: CPT | Performed by: NURSE PRACTITIONER

## 2019-01-01 PROCEDURE — 99214 OFFICE O/P EST MOD 30 MIN: CPT | Performed by: NURSE PRACTITIONER

## 2019-01-01 PROCEDURE — 85610 PROTHROMBIN TIME: CPT | Performed by: INTERNAL MEDICINE

## 2019-01-01 PROCEDURE — 63710000001 DIPHENHYDRAMINE PER 50 MG: Performed by: INTERNAL MEDICINE

## 2019-01-01 PROCEDURE — 83540 ASSAY OF IRON: CPT | Performed by: INTERNAL MEDICINE

## 2019-01-01 PROCEDURE — 0100U HC BIOFIRE FILMARRAY RESP PANEL 2: CPT | Performed by: EMERGENCY MEDICINE

## 2019-01-01 PROCEDURE — 25010000002 IRON SUCROSE PER 1 MG: Performed by: INTERNAL MEDICINE

## 2019-01-01 PROCEDURE — 83036 HEMOGLOBIN GLYCOSYLATED A1C: CPT | Performed by: HOSPITALIST

## 2019-01-01 PROCEDURE — 96377 APPLICATON ON-BODY INJECTOR: CPT | Performed by: INTERNAL MEDICINE

## 2019-01-01 PROCEDURE — 0 FLUDEOXYGLUCOSE F18 SOLUTION: Performed by: INTERNAL MEDICINE

## 2019-01-01 PROCEDURE — 25010000002 ALTEPLASE PER 1 MG: Performed by: NURSE PRACTITIONER

## 2019-01-01 PROCEDURE — 25010000002 MAGNESIUM SULFATE 2 GM/50ML SOLUTION: Performed by: HOSPITALIST

## 2019-01-01 PROCEDURE — 83605 ASSAY OF LACTIC ACID: CPT | Performed by: EMERGENCY MEDICINE

## 2019-01-01 PROCEDURE — 99233 SBSQ HOSP IP/OBS HIGH 50: CPT | Performed by: INTERNAL MEDICINE

## 2019-01-01 PROCEDURE — 87205 SMEAR GRAM STAIN: CPT | Performed by: HOSPITALIST

## 2019-01-01 PROCEDURE — 87206 SMEAR FLUORESCENT/ACID STAI: CPT | Performed by: INTERNAL MEDICINE

## 2019-01-01 PROCEDURE — 25010000002 VANCOMYCIN PER 500 MG: Performed by: INTERNAL MEDICINE

## 2019-01-01 PROCEDURE — 87081 CULTURE SCREEN ONLY: CPT | Performed by: INTERNAL MEDICINE

## 2019-01-01 PROCEDURE — 87040 BLOOD CULTURE FOR BACTERIA: CPT | Performed by: NURSE PRACTITIONER

## 2019-01-01 PROCEDURE — 87102 FUNGUS ISOLATION CULTURE: CPT | Performed by: INTERNAL MEDICINE

## 2019-01-01 PROCEDURE — 99255 IP/OBS CONSLTJ NEW/EST HI 80: CPT | Performed by: INTERNAL MEDICINE

## 2019-01-01 PROCEDURE — 85046 RETICYTE/HGB CONCENTRATE: CPT | Performed by: INTERNAL MEDICINE

## 2019-01-01 PROCEDURE — 87641 MR-STAPH DNA AMP PROBE: CPT | Performed by: HOSPITALIST

## 2019-01-01 PROCEDURE — 84132 ASSAY OF SERUM POTASSIUM: CPT | Performed by: INTERNAL MEDICINE

## 2019-01-01 PROCEDURE — 83735 ASSAY OF MAGNESIUM: CPT | Performed by: NURSE PRACTITIONER

## 2019-01-01 PROCEDURE — 85007 BL SMEAR W/DIFF WBC COUNT: CPT | Performed by: HOSPITALIST

## 2019-01-01 PROCEDURE — 84100 ASSAY OF PHOSPHORUS: CPT | Performed by: NURSE PRACTITIONER

## 2019-01-01 PROCEDURE — 96372 THER/PROPH/DIAG INJ SC/IM: CPT

## 2019-01-01 PROCEDURE — 83615 LACTATE (LD) (LDH) ENZYME: CPT | Performed by: INTERNAL MEDICINE

## 2019-01-01 PROCEDURE — 99215 OFFICE O/P EST HI 40 MIN: CPT | Performed by: INTERNAL MEDICINE

## 2019-01-01 PROCEDURE — 36415 COLL VENOUS BLD VENIPUNCTURE: CPT

## 2019-01-01 PROCEDURE — 96365 THER/PROPH/DIAG IV INF INIT: CPT | Performed by: NURSE PRACTITIONER

## 2019-01-01 PROCEDURE — 93005 ELECTROCARDIOGRAM TRACING: CPT

## 2019-01-01 PROCEDURE — 25010000002 FUROSEMIDE PER 20 MG: Performed by: INTERNAL MEDICINE

## 2019-01-01 PROCEDURE — 89051 BODY FLUID CELL COUNT: CPT | Performed by: INTERNAL MEDICINE

## 2019-01-01 PROCEDURE — 88112 CYTOPATH CELL ENHANCE TECH: CPT | Performed by: INTERNAL MEDICINE

## 2019-01-01 PROCEDURE — 83930 ASSAY OF BLOOD OSMOLALITY: CPT | Performed by: HOSPITALIST

## 2019-01-01 PROCEDURE — 25010000002 EPINEPHRINE PER 0.1 MG: Performed by: INTERNAL MEDICINE

## 2019-01-01 PROCEDURE — 76942 ECHO GUIDE FOR BIOPSY: CPT

## 2019-01-01 PROCEDURE — 77063 BREAST TOMOSYNTHESIS BI: CPT | Performed by: RADIOLOGY

## 2019-01-01 PROCEDURE — 25010000002 DEXAMETHASONE SODIUM PHOSPHATE 20 MG/5ML SOLUTION 5 ML VIAL: Performed by: INTERNAL MEDICINE

## 2019-01-01 PROCEDURE — 84300 ASSAY OF URINE SODIUM: CPT | Performed by: HOSPITALIST

## 2019-01-01 PROCEDURE — 96372 THER/PROPH/DIAG INJ SC/IM: CPT | Performed by: INTERNAL MEDICINE

## 2019-01-01 PROCEDURE — 84145 PROCALCITONIN (PCT): CPT | Performed by: NURSE PRACTITIONER

## 2019-01-01 PROCEDURE — 84466 ASSAY OF TRANSFERRIN: CPT | Performed by: INTERNAL MEDICINE

## 2019-01-01 PROCEDURE — G0463 HOSPITAL OUTPT CLINIC VISIT: HCPCS | Performed by: INTERNAL MEDICINE

## 2019-01-01 PROCEDURE — 25010000002 ATROPINE PER 0.01 MG: Performed by: NURSE PRACTITIONER

## 2019-01-01 PROCEDURE — 99213 OFFICE O/P EST LOW 20 MIN: CPT | Performed by: NURSE PRACTITIONER

## 2019-01-01 PROCEDURE — 85014 HEMATOCRIT: CPT | Performed by: NURSE PRACTITIONER

## 2019-01-01 PROCEDURE — 25010000002 ONDANSETRON PER 1 MG: Performed by: EMERGENCY MEDICINE

## 2019-01-01 PROCEDURE — 85610 PROTHROMBIN TIME: CPT | Performed by: NURSE PRACTITIONER

## 2019-01-01 PROCEDURE — 97110 THERAPEUTIC EXERCISES: CPT | Performed by: PHYSICAL THERAPIST

## 2019-01-01 PROCEDURE — 96366 THER/PROPH/DIAG IV INF ADDON: CPT | Performed by: NURSE PRACTITIONER

## 2019-01-01 PROCEDURE — 84145 PROCALCITONIN (PCT): CPT | Performed by: EMERGENCY MEDICINE

## 2019-01-01 PROCEDURE — 99253 IP/OBS CNSLTJ NEW/EST LOW 45: CPT | Performed by: INTERNAL MEDICINE

## 2019-01-01 PROCEDURE — 25010000002 DEXAMETHASONE SODIUM PHOSPHATE 100 MG/10ML SOLUTION: Performed by: NURSE PRACTITIONER

## 2019-01-01 PROCEDURE — 25010000002 PALONOSETRON PER 25 MCG: Performed by: NURSE PRACTITIONER

## 2019-01-01 PROCEDURE — 87015 SPECIMEN INFECT AGNT CONCNTJ: CPT | Performed by: INTERNAL MEDICINE

## 2019-01-01 PROCEDURE — 36600 WITHDRAWAL OF ARTERIAL BLOOD: CPT

## 2019-01-01 PROCEDURE — 0W3P8ZZ CONTROL BLEEDING IN GASTROINTESTINAL TRACT, VIA NATURAL OR ARTIFICIAL OPENING ENDOSCOPIC: ICD-10-PCS | Performed by: INTERNAL MEDICINE

## 2019-01-01 PROCEDURE — 0100U HC BIOFIRE FILMARRAY RESP PANEL 2: CPT | Performed by: NURSE PRACTITIONER

## 2019-01-01 PROCEDURE — 85730 THROMBOPLASTIN TIME PARTIAL: CPT | Performed by: NURSE PRACTITIONER

## 2019-01-01 PROCEDURE — 83036 HEMOGLOBIN GLYCOSYLATED A1C: CPT | Performed by: INTERNAL MEDICINE

## 2019-01-01 PROCEDURE — 94640 AIRWAY INHALATION TREATMENT: CPT | Performed by: INTERNAL MEDICINE

## 2019-01-01 PROCEDURE — 80069 RENAL FUNCTION PANEL: CPT | Performed by: HOSPITALIST

## 2019-01-01 PROCEDURE — 83605 ASSAY OF LACTIC ACID: CPT | Performed by: NURSE PRACTITIONER

## 2019-01-01 PROCEDURE — 43238 EGD US FINE NEEDLE BX/ASPIR: CPT | Performed by: INTERNAL MEDICINE

## 2019-01-01 PROCEDURE — 82728 ASSAY OF FERRITIN: CPT | Performed by: INTERNAL MEDICINE

## 2019-01-01 PROCEDURE — 88173 CYTOPATH EVAL FNA REPORT: CPT | Performed by: INTERNAL MEDICINE

## 2019-01-01 PROCEDURE — 97161 PT EVAL LOW COMPLEX 20 MIN: CPT | Performed by: PHYSICAL THERAPIST

## 2019-01-01 PROCEDURE — 99205 OFFICE O/P NEW HI 60 MIN: CPT | Performed by: INTERNAL MEDICINE

## 2019-01-01 PROCEDURE — 99231 SBSQ HOSP IP/OBS SF/LOW 25: CPT | Performed by: INTERNAL MEDICINE

## 2019-01-01 PROCEDURE — 25010000002 CEFTRIAXONE PER 250 MG: Performed by: EMERGENCY MEDICINE

## 2019-01-01 PROCEDURE — 25010000002 PROPOFOL 10 MG/ML EMULSION: Performed by: ANESTHESIOLOGY

## 2019-01-01 PROCEDURE — 25010000002 PALONOSETRON 0.25 MG/5ML SOLUTION PREFILLED SYRINGE: Performed by: INTERNAL MEDICINE

## 2019-01-01 PROCEDURE — 84443 ASSAY THYROID STIM HORMONE: CPT | Performed by: INTERNAL MEDICINE

## 2019-01-01 PROCEDURE — 94618 PULMONARY STRESS TESTING: CPT

## 2019-01-01 PROCEDURE — 99223 1ST HOSP IP/OBS HIGH 75: CPT | Performed by: INTERNAL MEDICINE

## 2019-01-01 PROCEDURE — 96523 IRRIG DRUG DELIVERY DEVICE: CPT | Performed by: INTERNAL MEDICINE

## 2019-01-01 PROCEDURE — 86923 COMPATIBILITY TEST ELECTRIC: CPT

## 2019-01-01 PROCEDURE — 99204 OFFICE O/P NEW MOD 45 MIN: CPT | Performed by: INTERNAL MEDICINE

## 2019-01-01 PROCEDURE — 86900 BLOOD TYPING SEROLOGIC ABO: CPT

## 2019-01-01 PROCEDURE — 94760 N-INVAS EAR/PLS OXIMETRY 1: CPT

## 2019-01-01 PROCEDURE — 25010000002 MORPHINE PER 10 MG: Performed by: EMERGENCY MEDICINE

## 2019-01-01 PROCEDURE — 99223 1ST HOSP IP/OBS HIGH 75: CPT | Performed by: THORACIC SURGERY (CARDIOTHORACIC VASCULAR SURGERY)

## 2019-01-01 PROCEDURE — 85007 BL SMEAR W/DIFF WBC COUNT: CPT | Performed by: EMERGENCY MEDICINE

## 2019-01-01 PROCEDURE — 81001 URINALYSIS AUTO W/SCOPE: CPT | Performed by: EMERGENCY MEDICINE

## 2019-01-01 PROCEDURE — 93306 TTE W/DOPPLER COMPLETE: CPT | Performed by: INTERNAL MEDICINE

## 2019-01-01 PROCEDURE — 96409 CHEMO IV PUSH SNGL DRUG: CPT | Performed by: INTERNAL MEDICINE

## 2019-01-01 PROCEDURE — 94660 CPAP INITIATION&MGMT: CPT

## 2019-01-01 PROCEDURE — P9016 RBC LEUKOCYTES REDUCED: HCPCS

## 2019-01-01 RX ORDER — FLUOROURACIL 50 MG/ML
320 INJECTION, SOLUTION INTRAVENOUS ONCE
Status: CANCELLED | OUTPATIENT
Start: 2019-01-01

## 2019-01-01 RX ORDER — MONTELUKAST SODIUM 10 MG/1
10 TABLET ORAL NIGHTLY
Status: DISCONTINUED | OUTPATIENT
Start: 2019-01-01 | End: 2019-01-01 | Stop reason: HOSPADM

## 2019-01-01 RX ORDER — ATROPINE SULFATE 0.4 MG/ML
0.25 AMPUL (ML) INJECTION
Status: DISCONTINUED | OUTPATIENT
Start: 2019-01-01 | End: 2019-01-01 | Stop reason: HOSPADM

## 2019-01-01 RX ORDER — DIPHENHYDRAMINE HYDROCHLORIDE 50 MG/ML
50 INJECTION INTRAMUSCULAR; INTRAVENOUS AS NEEDED
Status: DISCONTINUED | OUTPATIENT
Start: 2019-01-01 | End: 2019-01-01 | Stop reason: HOSPADM

## 2019-01-01 RX ORDER — FAMOTIDINE 10 MG/ML
20 INJECTION, SOLUTION INTRAVENOUS ONCE
Status: COMPLETED | OUTPATIENT
Start: 2019-01-01 | End: 2019-01-01

## 2019-01-01 RX ORDER — FAMOTIDINE 10 MG/ML
20 INJECTION, SOLUTION INTRAVENOUS ONCE
Status: CANCELLED
Start: 2019-01-01 | End: 2019-01-01

## 2019-01-01 RX ORDER — ACETAMINOPHEN 160 MG/5ML
650 SOLUTION ORAL EVERY 4 HOURS PRN
Status: DISCONTINUED | OUTPATIENT
Start: 2019-01-01 | End: 2019-01-01 | Stop reason: HOSPADM

## 2019-01-01 RX ORDER — DIPHENHYDRAMINE HYDROCHLORIDE 50 MG/ML
50 INJECTION INTRAMUSCULAR; INTRAVENOUS AS NEEDED
Status: CANCELLED | OUTPATIENT
Start: 2019-01-01

## 2019-01-01 RX ORDER — LIDOCAINE HYDROCHLORIDE 10 MG/ML
20 INJECTION, SOLUTION INFILTRATION; PERINEURAL ONCE
Status: COMPLETED | OUTPATIENT
Start: 2019-01-01 | End: 2019-01-01

## 2019-01-01 RX ORDER — MAGNESIUM SULFATE HEPTAHYDRATE 40 MG/ML
2 INJECTION, SOLUTION INTRAVENOUS AS NEEDED
Status: DISCONTINUED | OUTPATIENT
Start: 2019-01-01 | End: 2019-01-01 | Stop reason: HOSPADM

## 2019-01-01 RX ORDER — FAMOTIDINE 10 MG/ML
20 INJECTION, SOLUTION INTRAVENOUS AS NEEDED
Status: CANCELLED | OUTPATIENT
Start: 2019-01-01

## 2019-01-01 RX ORDER — ALBUTEROL SULFATE 2.5 MG/3ML
2.5 SOLUTION RESPIRATORY (INHALATION) EVERY 6 HOURS PRN
COMMUNITY
End: 2019-01-01 | Stop reason: SDUPTHER

## 2019-01-01 RX ORDER — LORATADINE 10 MG/1
5 TABLET ORAL DAILY
COMMUNITY
End: 2019-01-01 | Stop reason: ALTCHOICE

## 2019-01-01 RX ORDER — SODIUM CHLORIDE 0.9 % (FLUSH) 0.9 %
10 SYRINGE (ML) INJECTION AS NEEDED
Status: DISCONTINUED | OUTPATIENT
Start: 2019-01-01 | End: 2019-01-01 | Stop reason: HOSPADM

## 2019-01-01 RX ORDER — SODIUM CHLORIDE 9 MG/ML
125 INJECTION, SOLUTION INTRAVENOUS CONTINUOUS
Status: DISCONTINUED | OUTPATIENT
Start: 2019-01-01 | End: 2019-01-01

## 2019-01-01 RX ORDER — MORPHINE SULFATE 2 MG/ML
4 INJECTION, SOLUTION INTRAMUSCULAR; INTRAVENOUS ONCE
Status: COMPLETED | OUTPATIENT
Start: 2019-01-01 | End: 2019-01-01

## 2019-01-01 RX ORDER — CETIRIZINE HYDROCHLORIDE 10 MG/1
10 TABLET ORAL DAILY
Status: DISCONTINUED | OUTPATIENT
Start: 2019-01-01 | End: 2019-01-01 | Stop reason: HOSPADM

## 2019-01-01 RX ORDER — SODIUM CHLORIDE, SODIUM LACTATE, POTASSIUM CHLORIDE, CALCIUM CHLORIDE 600; 310; 30; 20 MG/100ML; MG/100ML; MG/100ML; MG/100ML
30 INJECTION, SOLUTION INTRAVENOUS CONTINUOUS PRN
Status: DISCONTINUED | OUTPATIENT
Start: 2019-01-01 | End: 2019-01-01 | Stop reason: HOSPADM

## 2019-01-01 RX ORDER — PALONOSETRON 0.05 MG/ML
0.25 INJECTION, SOLUTION INTRAVENOUS ONCE
Status: CANCELLED | OUTPATIENT
Start: 2019-01-01

## 2019-01-01 RX ORDER — ONDANSETRON 4 MG/1
4 TABLET, FILM COATED ORAL EVERY 6 HOURS PRN
Status: DISCONTINUED | OUTPATIENT
Start: 2019-01-01 | End: 2019-01-01 | Stop reason: HOSPADM

## 2019-01-01 RX ORDER — SODIUM CHLORIDE 0.9 % (FLUSH) 0.9 %
10 SYRINGE (ML) INJECTION AS NEEDED
Status: CANCELLED | OUTPATIENT
Start: 2019-01-01

## 2019-01-01 RX ORDER — DEXTROSE MONOHYDRATE 50 MG/ML
250 INJECTION, SOLUTION INTRAVENOUS ONCE
Status: CANCELLED | OUTPATIENT
Start: 2019-01-01

## 2019-01-01 RX ORDER — IPRATROPIUM BROMIDE AND ALBUTEROL SULFATE 2.5; .5 MG/3ML; MG/3ML
3 SOLUTION RESPIRATORY (INHALATION) ONCE
Status: COMPLETED | OUTPATIENT
Start: 2019-01-01 | End: 2019-01-01

## 2019-01-01 RX ORDER — HYDROXYZINE HYDROCHLORIDE 25 MG/1
25 TABLET, FILM COATED ORAL EVERY 8 HOURS PRN
Status: DISCONTINUED | OUTPATIENT
Start: 2019-01-01 | End: 2019-01-01 | Stop reason: HOSPADM

## 2019-01-01 RX ORDER — ATORVASTATIN CALCIUM 10 MG/1
10 TABLET, FILM COATED ORAL DAILY
Status: DISCONTINUED | OUTPATIENT
Start: 2019-01-01 | End: 2019-01-01 | Stop reason: HOSPADM

## 2019-01-01 RX ORDER — HYDROCODONE BITARTRATE AND ACETAMINOPHEN 5; 325 MG/1; MG/1
1 TABLET ORAL EVERY 6 HOURS PRN
Qty: 120 TABLET | Refills: 0 | Status: SHIPPED | OUTPATIENT
Start: 2019-01-01 | End: 2019-01-01

## 2019-01-01 RX ORDER — ASPIRIN 325 MG
325 TABLET ORAL DAILY
Status: DISCONTINUED | OUTPATIENT
Start: 2019-01-01 | End: 2019-01-01

## 2019-01-01 RX ORDER — FLUTICASONE PROPIONATE 50 MCG
2 SPRAY, SUSPENSION (ML) NASAL DAILY
Status: DISCONTINUED | OUTPATIENT
Start: 2019-01-01 | End: 2019-01-01 | Stop reason: HOSPADM

## 2019-01-01 RX ORDER — ATROPINE SULFATE 0.4 MG/ML
0.25 AMPUL (ML) INJECTION
Status: CANCELLED | OUTPATIENT
Start: 2019-01-01

## 2019-01-01 RX ORDER — ACETAMINOPHEN 325 MG/1
650 TABLET ORAL ONCE
Status: COMPLETED | OUTPATIENT
Start: 2019-01-01 | End: 2019-01-01

## 2019-01-01 RX ORDER — OXYCODONE HYDROCHLORIDE 5 MG/1
10 TABLET ORAL EVERY 4 HOURS PRN
Status: DISCONTINUED | OUTPATIENT
Start: 2019-01-01 | End: 2019-01-01 | Stop reason: HOSPADM

## 2019-01-01 RX ORDER — BLOOD-GLUCOSE METER
1 EACH MISCELLANEOUS 2 TIMES DAILY
Qty: 1 KIT | Refills: 1 | Status: SHIPPED | OUTPATIENT
Start: 2019-01-01 | End: 2019-01-01

## 2019-01-01 RX ORDER — LIDOCAINE HYDROCHLORIDE 20 MG/ML
INJECTION, SOLUTION INFILTRATION; PERINEURAL AS NEEDED
Status: DISCONTINUED | OUTPATIENT
Start: 2019-01-01 | End: 2019-01-01 | Stop reason: SURG

## 2019-01-01 RX ORDER — PROPOFOL 10 MG/ML
VIAL (ML) INTRAVENOUS CONTINUOUS PRN
Status: DISCONTINUED | OUTPATIENT
Start: 2019-01-01 | End: 2019-01-01 | Stop reason: SURG

## 2019-01-01 RX ORDER — FLUOROURACIL 50 MG/ML
400 INJECTION, SOLUTION INTRAVENOUS ONCE
Status: COMPLETED | OUTPATIENT
Start: 2019-01-01 | End: 2019-01-01

## 2019-01-01 RX ORDER — METHYLPREDNISOLONE SODIUM SUCCINATE 125 MG/2ML
125 INJECTION, POWDER, LYOPHILIZED, FOR SOLUTION INTRAMUSCULAR; INTRAVENOUS ONCE
Status: COMPLETED | OUTPATIENT
Start: 2019-01-01 | End: 2019-01-01

## 2019-01-01 RX ORDER — ACETAMINOPHEN 325 MG/1
650 TABLET ORAL EVERY 4 HOURS PRN
Status: DISCONTINUED | OUTPATIENT
Start: 2019-01-01 | End: 2019-01-01 | Stop reason: HOSPADM

## 2019-01-01 RX ORDER — LISINOPRIL AND HYDROCHLOROTHIAZIDE 20; 12.5 MG/1; MG/1
1 TABLET ORAL DAILY
Qty: 90 TABLET | Refills: 1 | Status: SHIPPED | OUTPATIENT
Start: 2019-01-01 | End: 2019-01-01

## 2019-01-01 RX ORDER — NICOTINE POLACRILEX 4 MG
15 LOZENGE BUCCAL
Status: DISCONTINUED | OUTPATIENT
Start: 2019-01-01 | End: 2019-01-01 | Stop reason: HOSPADM

## 2019-01-01 RX ORDER — POTASSIUM CHLORIDE 20 MEQ/1
TABLET, EXTENDED RELEASE ORAL
Qty: 33 TABLET | Refills: 1 | Status: ON HOLD | OUTPATIENT
Start: 2019-01-01 | End: 2019-01-01 | Stop reason: SDUPTHER

## 2019-01-01 RX ORDER — FLUOROURACIL 50 MG/ML
400 INJECTION, SOLUTION INTRAVENOUS ONCE
Status: CANCELLED | OUTPATIENT
Start: 2019-01-01

## 2019-01-01 RX ORDER — SIMVASTATIN 20 MG
20 TABLET ORAL NIGHTLY
Qty: 90 TABLET | Refills: 3 | Status: SHIPPED | OUTPATIENT
Start: 2019-01-01

## 2019-01-01 RX ORDER — MAGNESIUM SULFATE HEPTAHYDRATE 40 MG/ML
4 INJECTION, SOLUTION INTRAVENOUS AS NEEDED
Status: DISCONTINUED | OUTPATIENT
Start: 2019-01-01 | End: 2019-01-01 | Stop reason: HOSPADM

## 2019-01-01 RX ORDER — ONDANSETRON 4 MG/1
8 TABLET, FILM COATED ORAL 3 TIMES DAILY PRN
Status: DISCONTINUED | OUTPATIENT
Start: 2019-01-01 | End: 2019-01-01 | Stop reason: HOSPADM

## 2019-01-01 RX ORDER — DEXTROSE MONOHYDRATE 25 G/50ML
25 INJECTION, SOLUTION INTRAVENOUS
Status: DISCONTINUED | OUTPATIENT
Start: 2019-01-01 | End: 2019-01-01 | Stop reason: HOSPADM

## 2019-01-01 RX ORDER — ACETYLCYSTEINE 200 MG/ML
4 SOLUTION ORAL; RESPIRATORY (INHALATION)
Status: DISCONTINUED | OUTPATIENT
Start: 2019-01-01 | End: 2019-01-01 | Stop reason: HOSPADM

## 2019-01-01 RX ORDER — POTASSIUM CHLORIDE 750 MG/1
40 CAPSULE, EXTENDED RELEASE ORAL ONCE
Status: COMPLETED | OUTPATIENT
Start: 2019-01-01 | End: 2019-01-01

## 2019-01-01 RX ORDER — INSULIN GLARGINE 100 [IU]/ML
10 INJECTION, SOLUTION SUBCUTANEOUS NIGHTLY
Status: DISCONTINUED | OUTPATIENT
Start: 2019-01-01 | End: 2019-01-01

## 2019-01-01 RX ORDER — HYDROCODONE BITARTRATE AND ACETAMINOPHEN 5; 325 MG/1; MG/1
1 TABLET ORAL EVERY 4 HOURS PRN
Status: DISCONTINUED | OUTPATIENT
Start: 2019-01-01 | End: 2019-01-01

## 2019-01-01 RX ORDER — OXYCODONE HYDROCHLORIDE 10 MG/1
10 TABLET ORAL EVERY 4 HOURS PRN
Qty: 60 TABLET | Refills: 0 | Status: SHIPPED | OUTPATIENT
Start: 2019-01-01 | End: 2019-01-01 | Stop reason: SDUPTHER

## 2019-01-01 RX ORDER — OXYCODONE HYDROCHLORIDE 10 MG/1
10 TABLET ORAL EVERY 4 HOURS PRN
Qty: 120 TABLET | Refills: 0 | Status: SHIPPED | OUTPATIENT
Start: 2019-01-01 | End: 2020-01-01 | Stop reason: SDUPTHER

## 2019-01-01 RX ORDER — ALBUTEROL SULFATE 2.5 MG/3ML
2.5 SOLUTION RESPIRATORY (INHALATION) EVERY 6 HOURS PRN
Status: DISCONTINUED | OUTPATIENT
Start: 2019-01-01 | End: 2019-01-01 | Stop reason: HOSPADM

## 2019-01-01 RX ORDER — MONTELUKAST SODIUM 10 MG/1
10 TABLET ORAL NIGHTLY
COMMUNITY
End: 2019-01-01 | Stop reason: SDUPTHER

## 2019-01-01 RX ORDER — SIMVASTATIN 20 MG
20 TABLET ORAL NIGHTLY
COMMUNITY
End: 2019-01-01 | Stop reason: SDUPTHER

## 2019-01-01 RX ORDER — HEPARIN SODIUM 5000 [USP'U]/ML
40-80 INJECTION, SOLUTION INTRAVENOUS; SUBCUTANEOUS EVERY 6 HOURS PRN
Status: DISCONTINUED | OUTPATIENT
Start: 2019-01-01 | End: 2019-01-01

## 2019-01-01 RX ORDER — INSULIN GLARGINE 100 [IU]/ML
16 INJECTION, SOLUTION SUBCUTANEOUS NIGHTLY
Status: DISCONTINUED | OUTPATIENT
Start: 2019-01-01 | End: 2019-01-01 | Stop reason: HOSPADM

## 2019-01-01 RX ORDER — ALBUTEROL SULFATE 2.5 MG/3ML
2.5 SOLUTION RESPIRATORY (INHALATION) EVERY 6 HOURS PRN
Qty: 30 VIAL | Refills: 3 | Status: SHIPPED | OUTPATIENT
Start: 2019-01-01 | End: 2019-01-01 | Stop reason: SDUPTHER

## 2019-01-01 RX ORDER — ALBUTEROL SULFATE 2.5 MG/3ML
SOLUTION RESPIRATORY (INHALATION)
Qty: 1 VIAL | Refills: 2 | Status: SHIPPED | OUTPATIENT
Start: 2019-01-01 | End: 2019-01-01 | Stop reason: SDUPTHER

## 2019-01-01 RX ORDER — DEXTROSE MONOHYDRATE 50 MG/ML
250 INJECTION, SOLUTION INTRAVENOUS ONCE
Status: COMPLETED | OUTPATIENT
Start: 2019-01-01 | End: 2019-01-01

## 2019-01-01 RX ORDER — MORPHINE SULFATE 15 MG/1
15 TABLET, FILM COATED, EXTENDED RELEASE ORAL 2 TIMES DAILY
Status: DISCONTINUED | OUTPATIENT
Start: 2019-01-01 | End: 2019-01-01 | Stop reason: HOSPADM

## 2019-01-01 RX ORDER — DABIGATRAN ETEXILATE 150 MG/1
150 CAPSULE ORAL 2 TIMES DAILY
Status: DISCONTINUED | OUTPATIENT
Start: 2019-01-01 | End: 2019-01-01

## 2019-01-01 RX ORDER — HEPARIN SODIUM 10000 [USP'U]/100ML
18 INJECTION, SOLUTION INTRAVENOUS
Status: DISCONTINUED | OUTPATIENT
Start: 2019-01-01 | End: 2019-01-01

## 2019-01-01 RX ORDER — BISACODYL 5 MG/1
10 TABLET, DELAYED RELEASE ORAL ONCE
Status: COMPLETED | OUTPATIENT
Start: 2019-01-01 | End: 2019-01-01

## 2019-01-01 RX ORDER — TRAZODONE HYDROCHLORIDE 50 MG/1
50 TABLET ORAL NIGHTLY
Qty: 90 TABLET | Refills: 3 | Status: SHIPPED | OUTPATIENT
Start: 2019-01-01

## 2019-01-01 RX ORDER — METHYLPREDNISOLONE 4 MG/1
TABLET ORAL
Qty: 21 EACH | Refills: 0 | Status: SHIPPED | OUTPATIENT
Start: 2019-01-01 | End: 2019-01-01 | Stop reason: SDUPTHER

## 2019-01-01 RX ORDER — ATROPINE SULFATE 1 MG/ML
0.25 INJECTION, SOLUTION INTRAMUSCULAR; INTRAVENOUS; SUBCUTANEOUS
Status: CANCELLED | OUTPATIENT
Start: 2019-01-01

## 2019-01-01 RX ORDER — DABIGATRAN ETEXILATE 150 MG/1
150 CAPSULE ORAL EVERY 12 HOURS SCHEDULED
Status: DISCONTINUED | OUTPATIENT
Start: 2019-01-01 | End: 2019-01-01 | Stop reason: HOSPADM

## 2019-01-01 RX ORDER — ACETAMINOPHEN 650 MG/1
650 SUPPOSITORY RECTAL EVERY 4 HOURS PRN
Status: DISCONTINUED | OUTPATIENT
Start: 2019-01-01 | End: 2019-01-01 | Stop reason: HOSPADM

## 2019-01-01 RX ORDER — PANTOPRAZOLE SODIUM 40 MG/1
40 TABLET, DELAYED RELEASE ORAL
Status: DISCONTINUED | OUTPATIENT
Start: 2019-01-01 | End: 2019-01-01 | Stop reason: HOSPADM

## 2019-01-01 RX ORDER — PANTOPRAZOLE SODIUM 40 MG/1
40 TABLET, DELAYED RELEASE ORAL
Qty: 60 TABLET | Refills: 0 | Status: SHIPPED | OUTPATIENT
Start: 2019-01-01 | End: 2019-01-01 | Stop reason: SDUPTHER

## 2019-01-01 RX ORDER — OXYCODONE HYDROCHLORIDE 10 MG/1
10 TABLET ORAL EVERY 4 HOURS PRN
Qty: 120 TABLET | Refills: 0 | Status: SHIPPED | OUTPATIENT
Start: 2019-01-01 | End: 2019-01-01 | Stop reason: SDUPTHER

## 2019-01-01 RX ORDER — INSULIN GLARGINE 100 [IU]/ML
12 INJECTION, SOLUTION SUBCUTANEOUS NIGHTLY
Status: DISCONTINUED | OUTPATIENT
Start: 2019-01-01 | End: 2019-01-01

## 2019-01-01 RX ORDER — SODIUM CHLORIDE 9 MG/ML
20 INJECTION, SOLUTION INTRAVENOUS CONTINUOUS
Status: DISCONTINUED | OUTPATIENT
Start: 2019-01-01 | End: 2019-01-01 | Stop reason: HOSPADM

## 2019-01-01 RX ORDER — DABIGATRAN ETEXILATE 150 MG/1
150 CAPSULE ORAL 2 TIMES DAILY
Qty: 180 CAPSULE | Refills: 1 | Status: SHIPPED | OUTPATIENT
Start: 2019-01-01 | End: 2020-01-01 | Stop reason: SDUPTHER

## 2019-01-01 RX ORDER — PANTOPRAZOLE SODIUM 40 MG/1
40 TABLET, DELAYED RELEASE ORAL DAILY
Qty: 90 TABLET | Refills: 2 | Status: SHIPPED | OUTPATIENT
Start: 2019-01-01

## 2019-01-01 RX ORDER — SODIUM CHLORIDE 0.9 % (FLUSH) 0.9 %
3 SYRINGE (ML) INJECTION EVERY 12 HOURS SCHEDULED
Status: DISCONTINUED | OUTPATIENT
Start: 2019-01-01 | End: 2019-01-01 | Stop reason: HOSPADM

## 2019-01-01 RX ORDER — HYDROCHLOROTHIAZIDE 12.5 MG/1
12.5 TABLET ORAL DAILY
Qty: 90 TABLET | Refills: 2 | Status: ON HOLD | OUTPATIENT
Start: 2019-01-01 | End: 2020-01-01 | Stop reason: SDUPTHER

## 2019-01-01 RX ORDER — PALONOSETRON 0.05 MG/ML
0.25 INJECTION, SOLUTION INTRAVENOUS ONCE
Status: COMPLETED | OUTPATIENT
Start: 2019-01-01 | End: 2019-01-01

## 2019-01-01 RX ORDER — SODIUM CHLORIDE 9 MG/ML
100 INJECTION, SOLUTION INTRAVENOUS CONTINUOUS
Status: DISCONTINUED | OUTPATIENT
Start: 2019-01-01 | End: 2019-01-01 | Stop reason: HOSPADM

## 2019-01-01 RX ORDER — MORPHINE SULFATE 30 MG/1
30 TABLET, FILM COATED, EXTENDED RELEASE ORAL EVERY 12 HOURS
Qty: 60 TABLET | Refills: 0 | Status: CANCELLED | OUTPATIENT
Start: 2019-01-01

## 2019-01-01 RX ORDER — PANTOPRAZOLE SODIUM 40 MG/1
40 TABLET, DELAYED RELEASE ORAL DAILY
COMMUNITY
End: 2019-01-01 | Stop reason: HOSPADM

## 2019-01-01 RX ORDER — LEVOFLOXACIN 500 MG/1
500 TABLET, FILM COATED ORAL DAILY
Status: DISCONTINUED | OUTPATIENT
Start: 2019-01-01 | End: 2019-01-01

## 2019-01-01 RX ORDER — ALBUTEROL SULFATE 2.5 MG/3ML
2.5 SOLUTION RESPIRATORY (INHALATION) EVERY 6 HOURS PRN
Qty: 30 VIAL | Refills: 0 | Status: SHIPPED | OUTPATIENT
Start: 2019-01-01 | End: 2019-01-01 | Stop reason: SDUPTHER

## 2019-01-01 RX ORDER — PANTOPRAZOLE SODIUM 40 MG/10ML
80 INJECTION, POWDER, LYOPHILIZED, FOR SOLUTION INTRAVENOUS ONCE
Status: COMPLETED | OUTPATIENT
Start: 2019-01-01 | End: 2019-01-01

## 2019-01-01 RX ORDER — SODIUM CHLORIDE 9 MG/ML
100 INJECTION, SOLUTION INTRAVENOUS CONTINUOUS
Status: DISCONTINUED | OUTPATIENT
Start: 2019-01-01 | End: 2019-01-01

## 2019-01-01 RX ORDER — MORPHINE SULFATE 30 MG/1
30 TABLET, FILM COATED, EXTENDED RELEASE ORAL EVERY 12 HOURS
Status: DISCONTINUED | OUTPATIENT
Start: 2019-01-01 | End: 2019-01-01 | Stop reason: HOSPADM

## 2019-01-01 RX ORDER — POTASSIUM CHLORIDE 1.5 G/1.77G
40 POWDER, FOR SOLUTION ORAL AS NEEDED
Status: DISCONTINUED | OUTPATIENT
Start: 2019-01-01 | End: 2019-01-01 | Stop reason: HOSPADM

## 2019-01-01 RX ORDER — PANTOPRAZOLE SODIUM 40 MG/1
40 TABLET, DELAYED RELEASE ORAL
Qty: 180 TABLET | Refills: 1 | Status: SHIPPED | OUTPATIENT
Start: 2019-01-01 | End: 2019-01-01

## 2019-01-01 RX ORDER — SODIUM CHLORIDE 0.9 % (FLUSH) 0.9 %
3-10 SYRINGE (ML) INJECTION AS NEEDED
Status: DISCONTINUED | OUTPATIENT
Start: 2019-01-01 | End: 2019-01-01 | Stop reason: HOSPADM

## 2019-01-01 RX ORDER — ATROPINE SULFATE 1 MG/ML
0.25 INJECTION, SOLUTION INTRAMUSCULAR; INTRAVENOUS; SUBCUTANEOUS
Status: DISCONTINUED | OUTPATIENT
Start: 2019-01-01 | End: 2019-01-01 | Stop reason: HOSPADM

## 2019-01-01 RX ORDER — MIRTAZAPINE 15 MG/1
15 TABLET, FILM COATED ORAL NIGHTLY
Qty: 30 TABLET | Refills: 5 | Status: ON HOLD | OUTPATIENT
Start: 2019-01-01 | End: 2019-01-01

## 2019-01-01 RX ORDER — ONDANSETRON HYDROCHLORIDE 8 MG/1
8 TABLET, FILM COATED ORAL 3 TIMES DAILY PRN
Qty: 60 TABLET | Refills: 2 | Status: SHIPPED | OUTPATIENT
Start: 2019-01-01

## 2019-01-01 RX ORDER — POLYETHYLENE GLYCOL 3350 17 G/17G
17 POWDER, FOR SOLUTION ORAL DAILY
Status: DISCONTINUED | OUTPATIENT
Start: 2019-01-01 | End: 2019-01-01 | Stop reason: HOSPADM

## 2019-01-01 RX ORDER — NITROGLYCERIN 0.4 MG/1
0.4 TABLET SUBLINGUAL
Status: DISCONTINUED | OUTPATIENT
Start: 2019-01-01 | End: 2019-01-01 | Stop reason: HOSPADM

## 2019-01-01 RX ORDER — BUDESONIDE 0.5 MG/2ML
0.5 INHALANT ORAL
Status: DISCONTINUED | OUTPATIENT
Start: 2019-01-01 | End: 2019-01-01 | Stop reason: HOSPADM

## 2019-01-01 RX ORDER — ONDANSETRON HYDROCHLORIDE 8 MG/1
8 TABLET, FILM COATED ORAL 3 TIMES DAILY PRN
Qty: 30 TABLET | Refills: 5 | Status: SHIPPED | OUTPATIENT
Start: 2019-01-01 | End: 2019-01-01 | Stop reason: SDUPTHER

## 2019-01-01 RX ORDER — GLIMEPIRIDE 2 MG/1
1 TABLET ORAL DAILY
Status: DISCONTINUED | OUTPATIENT
Start: 2019-01-01 | End: 2019-01-01 | Stop reason: HOSPADM

## 2019-01-01 RX ORDER — FAMOTIDINE 10 MG/ML
20 INJECTION, SOLUTION INTRAVENOUS ONCE
Status: CANCELLED | OUTPATIENT
Start: 2019-01-01

## 2019-01-01 RX ORDER — SODIUM CHLORIDE 0.9 % (FLUSH) 0.9 %
10 SYRINGE (ML) INJECTION EVERY 12 HOURS SCHEDULED
Status: DISCONTINUED | OUTPATIENT
Start: 2019-01-01 | End: 2019-01-01 | Stop reason: HOSPADM

## 2019-01-01 RX ORDER — MORPHINE SULFATE 15 MG/1
30 TABLET, FILM COATED, EXTENDED RELEASE ORAL EVERY 12 HOURS
Status: DISCONTINUED | OUTPATIENT
Start: 2019-01-01 | End: 2019-01-01 | Stop reason: HOSPADM

## 2019-01-01 RX ORDER — ONDANSETRON 2 MG/ML
4 INJECTION INTRAMUSCULAR; INTRAVENOUS EVERY 6 HOURS PRN
Status: DISCONTINUED | OUTPATIENT
Start: 2019-01-01 | End: 2019-01-01 | Stop reason: HOSPADM

## 2019-01-01 RX ORDER — SIMVASTATIN 20 MG
20 TABLET ORAL NIGHTLY
Qty: 90 TABLET | Refills: 0 | Status: SHIPPED | OUTPATIENT
Start: 2019-01-01 | End: 2019-01-01 | Stop reason: SDUPTHER

## 2019-01-01 RX ORDER — BUDESONIDE AND FORMOTEROL FUMARATE DIHYDRATE 160; 4.5 UG/1; UG/1
1 AEROSOL RESPIRATORY (INHALATION)
COMMUNITY
End: 2019-01-01 | Stop reason: ALTCHOICE

## 2019-01-01 RX ORDER — INSULIN GLARGINE 100 [IU]/ML
18 INJECTION, SOLUTION SUBCUTANEOUS NIGHTLY
Status: DISCONTINUED | OUTPATIENT
Start: 2019-01-01 | End: 2019-01-01

## 2019-01-01 RX ORDER — POTASSIUM CHLORIDE 750 MG/1
40 CAPSULE, EXTENDED RELEASE ORAL AS NEEDED
Status: DISCONTINUED | OUTPATIENT
Start: 2019-01-01 | End: 2019-01-01 | Stop reason: HOSPADM

## 2019-01-01 RX ORDER — BISACODYL 10 MG
10 SUPPOSITORY, RECTAL RECTAL DAILY PRN
Qty: 30 SUPPOSITORY | Refills: 0 | Status: SHIPPED | OUTPATIENT
Start: 2019-01-01 | End: 2019-01-01

## 2019-01-01 RX ORDER — CALCIUM CARBONATE 200(500)MG
2 TABLET,CHEWABLE ORAL ONCE
Status: COMPLETED | OUTPATIENT
Start: 2019-01-01 | End: 2019-01-01

## 2019-01-01 RX ORDER — NALOXONE HCL 0.4 MG/ML
0.4 VIAL (ML) INJECTION
Status: DISCONTINUED | OUTPATIENT
Start: 2019-01-01 | End: 2019-01-01 | Stop reason: HOSPADM

## 2019-01-01 RX ORDER — ALBUTEROL SULFATE 2.5 MG/3ML
2.5 SOLUTION RESPIRATORY (INHALATION)
Status: COMPLETED | OUTPATIENT
Start: 2019-01-01 | End: 2019-01-01

## 2019-01-01 RX ORDER — ALBUTEROL SULFATE 2.5 MG/3ML
2.5 SOLUTION RESPIRATORY (INHALATION) EVERY 6 HOURS PRN
Status: DISCONTINUED | OUTPATIENT
Start: 2019-01-01 | End: 2019-01-01 | Stop reason: SDUPTHER

## 2019-01-01 RX ORDER — ALBUTEROL SULFATE 90 UG/1
2 AEROSOL, METERED RESPIRATORY (INHALATION) EVERY 4 HOURS PRN
Qty: 1 INHALER | Refills: 0 | Status: SHIPPED | OUTPATIENT
Start: 2019-01-01

## 2019-01-01 RX ORDER — HYDROCHLOROTHIAZIDE 12.5 MG/1
12.5 TABLET ORAL DAILY
Qty: 90 TABLET | Refills: 2 | Status: SHIPPED | OUTPATIENT
Start: 2019-01-01 | End: 2019-01-01 | Stop reason: SDUPTHER

## 2019-01-01 RX ORDER — EPINEPHRINE 1 MG/ML
INJECTION, SOLUTION, CONCENTRATE INTRAVENOUS AS NEEDED
Status: DISCONTINUED | OUTPATIENT
Start: 2019-01-01 | End: 2019-01-01 | Stop reason: HOSPADM

## 2019-01-01 RX ORDER — ALBUTEROL SULFATE 2.5 MG/3ML
SOLUTION RESPIRATORY (INHALATION)
Qty: 75 ML | Refills: 1 | Status: SHIPPED | OUTPATIENT
Start: 2019-01-01 | End: 2020-01-01 | Stop reason: HOSPADM

## 2019-01-01 RX ORDER — FUROSEMIDE 10 MG/ML
20 INJECTION INTRAMUSCULAR; INTRAVENOUS ONCE
Status: COMPLETED | OUTPATIENT
Start: 2019-01-01 | End: 2019-01-01

## 2019-01-01 RX ORDER — LISINOPRIL 20 MG/1
20 TABLET ORAL DAILY
Qty: 90 TABLET | Refills: 3 | Status: SHIPPED | OUTPATIENT
Start: 2019-01-01 | End: 2020-01-01 | Stop reason: HOSPADM

## 2019-01-01 RX ORDER — PROPOFOL 10 MG/ML
VIAL (ML) INTRAVENOUS AS NEEDED
Status: DISCONTINUED | OUTPATIENT
Start: 2019-01-01 | End: 2019-01-01 | Stop reason: SURG

## 2019-01-01 RX ORDER — MAGNESIUM CARB/ALUMINUM HYDROX 105-160MG
TABLET,CHEWABLE ORAL ONCE
COMMUNITY
End: 2019-01-01

## 2019-01-01 RX ORDER — PREDNISONE 20 MG/1
20 TABLET ORAL 2 TIMES DAILY
Qty: 10 TABLET | Refills: 0 | Status: ON HOLD | OUTPATIENT
Start: 2019-01-01 | End: 2019-01-01

## 2019-01-01 RX ORDER — HYDROCODONE BITARTRATE AND ACETAMINOPHEN 5; 325 MG/1; MG/1
1 TABLET ORAL EVERY 8 HOURS PRN
Qty: 80 TABLET | Refills: 0 | Status: SHIPPED | OUTPATIENT
Start: 2019-01-01 | End: 2019-01-01 | Stop reason: SDUPTHER

## 2019-01-01 RX ORDER — POTASSIUM CHLORIDE 7.45 MG/ML
10 INJECTION INTRAVENOUS
Status: COMPLETED | OUTPATIENT
Start: 2019-01-01 | End: 2019-01-01

## 2019-01-01 RX ORDER — ALBUTEROL SULFATE 1.25 MG/3ML
1 SOLUTION RESPIRATORY (INHALATION) EVERY 6 HOURS PRN
Qty: 100 VIAL | Refills: 5 | Status: SHIPPED | OUTPATIENT
Start: 2019-01-01

## 2019-01-01 RX ORDER — INSULIN GLARGINE 100 [IU]/ML
10 INJECTION, SOLUTION SUBCUTANEOUS NIGHTLY
Status: DISCONTINUED | OUTPATIENT
Start: 2019-01-01 | End: 2019-01-01 | Stop reason: HOSPADM

## 2019-01-01 RX ORDER — MULTIVIT WITH MINERALS/LUTEIN
1 TABLET ORAL DAILY
COMMUNITY
End: 2019-01-01 | Stop reason: HOSPADM

## 2019-01-01 RX ORDER — MONTELUKAST SODIUM 10 MG/1
10 TABLET ORAL NIGHTLY
Qty: 90 TABLET | Refills: 3 | Status: SHIPPED | OUTPATIENT
Start: 2019-01-01

## 2019-01-01 RX ORDER — LISINOPRIL 20 MG/1
20 TABLET ORAL DAILY
Status: DISCONTINUED | OUTPATIENT
Start: 2019-01-01 | End: 2019-01-01 | Stop reason: HOSPADM

## 2019-01-01 RX ORDER — POTASSIUM CHLORIDE 20 MEQ/1
TABLET, EXTENDED RELEASE ORAL
Qty: 33 TABLET | Refills: 1
Start: 2019-01-01 | End: 2019-01-01

## 2019-01-01 RX ORDER — PEN NEEDLE, DIABETIC 30 GX3/16"
NEEDLE, DISPOSABLE MISCELLANEOUS
Qty: 30 EACH | Refills: 0 | Status: SHIPPED | OUTPATIENT
Start: 2019-01-01 | End: 2019-01-01

## 2019-01-01 RX ORDER — CEFDINIR 300 MG/1
300 CAPSULE ORAL 2 TIMES DAILY
Qty: 10 CAPSULE | Refills: 0 | Status: SHIPPED | OUTPATIENT
Start: 2019-01-01 | End: 2019-01-01

## 2019-01-01 RX ORDER — ONDANSETRON 2 MG/ML
4 INJECTION INTRAMUSCULAR; INTRAVENOUS ONCE
Status: COMPLETED | OUTPATIENT
Start: 2019-01-01 | End: 2019-01-01

## 2019-01-01 RX ORDER — DABIGATRAN ETEXILATE 150 MG/1
150 CAPSULE ORAL 2 TIMES DAILY
Qty: 60 CAPSULE | Refills: 6 | Status: SHIPPED | OUTPATIENT
Start: 2019-01-01 | End: 2019-01-01 | Stop reason: SDUPTHER

## 2019-01-01 RX ORDER — OXYCODONE HYDROCHLORIDE AND ACETAMINOPHEN 5; 325 MG/1; MG/1
1 TABLET ORAL ONCE
Status: COMPLETED | OUTPATIENT
Start: 2019-01-01 | End: 2019-01-01

## 2019-01-01 RX ORDER — FLUOROURACIL 50 MG/ML
320 INJECTION, SOLUTION INTRAVENOUS ONCE
Status: COMPLETED | OUTPATIENT
Start: 2019-01-01 | End: 2019-01-01

## 2019-01-01 RX ORDER — MORPHINE SULFATE 30 MG/1
30 TABLET, FILM COATED, EXTENDED RELEASE ORAL EVERY 12 HOURS
Qty: 60 TABLET | Refills: 0 | Status: SHIPPED | OUTPATIENT
Start: 2019-01-01 | End: 2019-01-01 | Stop reason: SDUPTHER

## 2019-01-01 RX ORDER — SODIUM CHLORIDE 0.9 % (FLUSH) 0.9 %
3 SYRINGE (ML) INJECTION AS NEEDED
Status: DISCONTINUED | OUTPATIENT
Start: 2019-01-01 | End: 2019-01-01

## 2019-01-01 RX ORDER — HYDROMORPHONE HYDROCHLORIDE 1 MG/ML
0.5 INJECTION, SOLUTION INTRAMUSCULAR; INTRAVENOUS; SUBCUTANEOUS
Status: DISCONTINUED | OUTPATIENT
Start: 2019-01-01 | End: 2019-01-01 | Stop reason: HOSPADM

## 2019-01-01 RX ORDER — PANTOPRAZOLE SODIUM 40 MG/1
40 TABLET, DELAYED RELEASE ORAL
Qty: 180 TABLET | Refills: 1 | Status: ON HOLD | OUTPATIENT
Start: 2019-01-01 | End: 2019-01-01 | Stop reason: SDUPTHER

## 2019-01-01 RX ORDER — LIDOCAINE HYDROCHLORIDE 10 MG/ML
0.5 INJECTION, SOLUTION INFILTRATION; PERINEURAL ONCE AS NEEDED
Status: DISCONTINUED | OUTPATIENT
Start: 2019-01-01 | End: 2019-01-01

## 2019-01-01 RX ORDER — ASPIRIN 81 MG/1
81 TABLET ORAL DAILY
Status: DISCONTINUED | OUTPATIENT
Start: 2019-01-01 | End: 2019-01-01

## 2019-01-01 RX ORDER — ALBUTEROL SULFATE 2.5 MG/3ML
SOLUTION RESPIRATORY (INHALATION)
Qty: 75 VIAL | Refills: 2 | Status: SHIPPED | OUTPATIENT
Start: 2019-01-01 | End: 2019-01-01 | Stop reason: SDUPTHER

## 2019-01-01 RX ORDER — MORPHINE SULFATE 15 MG/1
15 TABLET, FILM COATED, EXTENDED RELEASE ORAL 2 TIMES DAILY
Qty: 60 TABLET | Refills: 0 | Status: SHIPPED | OUTPATIENT
Start: 2019-01-01 | End: 2019-01-01 | Stop reason: SDUPTHER

## 2019-01-01 RX ORDER — POTASSIUM CHLORIDE 20 MEQ/1
TABLET, EXTENDED RELEASE ORAL
Qty: 33 TABLET | Refills: 0 | OUTPATIENT
Start: 2019-01-01

## 2019-01-01 RX ORDER — CEFTRIAXONE SODIUM 2 G/50ML
2 INJECTION, SOLUTION INTRAVENOUS EVERY 24 HOURS
Status: DISCONTINUED | OUTPATIENT
Start: 2019-01-01 | End: 2019-01-01

## 2019-01-01 RX ORDER — ONDANSETRON HYDROCHLORIDE 8 MG/1
8 TABLET, FILM COATED ORAL 3 TIMES DAILY PRN
Status: DISCONTINUED | OUTPATIENT
Start: 2019-01-01 | End: 2019-01-01 | Stop reason: HOSPADM

## 2019-01-01 RX ORDER — MAGNESIUM CARB/ALUMINUM HYDROX 105-160MG
150 TABLET,CHEWABLE ORAL ONCE
Status: COMPLETED | OUTPATIENT
Start: 2019-01-01 | End: 2019-01-01

## 2019-01-01 RX ORDER — PANTOPRAZOLE SODIUM 40 MG/1
40 TABLET, DELAYED RELEASE ORAL DAILY
Status: DISCONTINUED | OUTPATIENT
Start: 2019-01-01 | End: 2019-01-01 | Stop reason: HOSPADM

## 2019-01-01 RX ORDER — SENNA AND DOCUSATE SODIUM 50; 8.6 MG/1; MG/1
2 TABLET, FILM COATED ORAL 2 TIMES DAILY PRN
Status: DISCONTINUED | OUTPATIENT
Start: 2019-01-01 | End: 2019-01-01 | Stop reason: HOSPADM

## 2019-01-01 RX ORDER — ATORVASTATIN CALCIUM 10 MG/1
10 TABLET, FILM COATED ORAL DAILY
Status: DISCONTINUED | OUTPATIENT
Start: 2019-01-01 | End: 2019-01-01

## 2019-01-01 RX ORDER — ACETAMINOPHEN 500 MG
1000 TABLET ORAL ONCE
Status: DISCONTINUED | OUTPATIENT
Start: 2019-01-01 | End: 2019-01-01

## 2019-01-01 RX ORDER — MAGNESIUM CARB/ALUMINUM HYDROX 105-160MG
150 TABLET,CHEWABLE ORAL DAILY PRN
Status: DISCONTINUED | OUTPATIENT
Start: 2019-01-01 | End: 2019-01-01

## 2019-01-01 RX ORDER — PANTOPRAZOLE SODIUM 40 MG/1
40 TABLET, DELAYED RELEASE ORAL DAILY
Qty: 90 TABLET | Refills: 1 | Status: SHIPPED | OUTPATIENT
Start: 2019-01-01 | End: 2019-01-01

## 2019-01-01 RX ORDER — TRAZODONE HYDROCHLORIDE 50 MG/1
50 TABLET ORAL NIGHTLY
Qty: 90 TABLET | Refills: 3 | Status: SHIPPED | OUTPATIENT
Start: 2019-01-01 | End: 2019-01-01 | Stop reason: SDUPTHER

## 2019-01-01 RX ORDER — ALBUTEROL SULFATE 90 UG/1
2 AEROSOL, METERED RESPIRATORY (INHALATION) EVERY 4 HOURS PRN
Status: ON HOLD | COMMUNITY
End: 2019-01-01 | Stop reason: SDUPTHER

## 2019-01-01 RX ORDER — POTASSIUM CHLORIDE 20 MEQ/1
20 TABLET, EXTENDED RELEASE ORAL DAILY
Qty: 30 TABLET | Refills: 5 | Status: SHIPPED | OUTPATIENT
Start: 2019-01-01

## 2019-01-01 RX ORDER — BISACODYL 10 MG
10 SUPPOSITORY, RECTAL RECTAL ONCE
Status: COMPLETED | OUTPATIENT
Start: 2019-01-01 | End: 2019-01-01

## 2019-01-01 RX ORDER — PANTOPRAZOLE SODIUM 40 MG/1
40 TABLET, DELAYED RELEASE ORAL
Status: DISCONTINUED | OUTPATIENT
Start: 2019-01-01 | End: 2019-01-01

## 2019-01-01 RX ORDER — ACETAMINOPHEN 325 MG/1
650 TABLET ORAL EVERY 6 HOURS PRN
Status: DISCONTINUED | OUTPATIENT
Start: 2019-01-01 | End: 2019-01-01 | Stop reason: HOSPADM

## 2019-01-01 RX ORDER — SODIUM CHLORIDE 0.9 % (FLUSH) 0.9 %
10 SYRINGE (ML) INJECTION AS NEEDED
Status: DISCONTINUED | OUTPATIENT
Start: 2019-01-01 | End: 2019-01-01

## 2019-01-01 RX ORDER — ACETAMINOPHEN 160 MG/5ML
650 SOLUTION ORAL ONCE
Status: COMPLETED | OUTPATIENT
Start: 2019-01-01 | End: 2019-01-01

## 2019-01-01 RX ORDER — MORPHINE SULFATE 30 MG/1
30 TABLET, FILM COATED, EXTENDED RELEASE ORAL EVERY 12 HOURS
Qty: 60 TABLET | Refills: 0 | Status: SHIPPED | OUTPATIENT
Start: 2019-01-01 | End: 2020-01-01 | Stop reason: SDUPTHER

## 2019-01-01 RX ORDER — DIPHENHYDRAMINE HCL 12.5MG/5ML
25 LIQUID (ML) ORAL ONCE
Status: COMPLETED | OUTPATIENT
Start: 2019-01-01 | End: 2019-01-01

## 2019-01-01 RX ORDER — VANCOMYCIN HYDROCHLORIDE 1 G/200ML
15 INJECTION, SOLUTION INTRAVENOUS EVERY 12 HOURS
Status: DISCONTINUED | OUTPATIENT
Start: 2019-01-01 | End: 2019-01-01

## 2019-01-01 RX ORDER — ALBUTEROL SULFATE 90 UG/1
AEROSOL, METERED RESPIRATORY (INHALATION)
Qty: 54 G | Refills: 1 | Status: SHIPPED | OUTPATIENT
Start: 2019-01-01 | End: 2019-01-01

## 2019-01-01 RX ORDER — DOXYCYCLINE 100 MG/1
100 CAPSULE ORAL ONCE
Status: COMPLETED | OUTPATIENT
Start: 2019-01-01 | End: 2019-01-01

## 2019-01-01 RX ORDER — IPRATROPIUM BROMIDE AND ALBUTEROL SULFATE 2.5; .5 MG/3ML; MG/3ML
3 SOLUTION RESPIRATORY (INHALATION)
Status: DISCONTINUED | OUTPATIENT
Start: 2019-01-01 | End: 2019-01-01 | Stop reason: HOSPADM

## 2019-01-01 RX ORDER — MORPHINE SULFATE 15 MG/1
15 TABLET, FILM COATED, EXTENDED RELEASE ORAL 2 TIMES DAILY
Qty: 60 TABLET | Refills: 0 | Status: SHIPPED | OUTPATIENT
Start: 2019-01-01 | End: 2019-01-01

## 2019-01-01 RX ORDER — HYDROCODONE BITARTRATE AND ACETAMINOPHEN 5; 325 MG/1; MG/1
1 TABLET ORAL EVERY 6 HOURS PRN
Status: DISCONTINUED | OUTPATIENT
Start: 2019-01-01 | End: 2019-01-01 | Stop reason: HOSPADM

## 2019-01-01 RX ORDER — HEPARIN SODIUM 5000 [USP'U]/ML
80 INJECTION, SOLUTION INTRAVENOUS; SUBCUTANEOUS ONCE
Status: COMPLETED | OUTPATIENT
Start: 2019-01-01 | End: 2019-01-01

## 2019-01-01 RX ORDER — POTASSIUM CHLORIDE 7.45 MG/ML
10 INJECTION INTRAVENOUS ONCE
Status: COMPLETED | OUTPATIENT
Start: 2019-01-01 | End: 2019-01-01

## 2019-01-01 RX ORDER — LEVALBUTEROL INHALATION SOLUTION 0.63 MG/3ML
0.63 SOLUTION RESPIRATORY (INHALATION) EVERY 6 HOURS PRN
Status: DISCONTINUED | OUTPATIENT
Start: 2019-01-01 | End: 2020-01-01 | Stop reason: HOSPADM

## 2019-01-01 RX ORDER — ALBUTEROL SULFATE 2.5 MG/3ML
2.5 SOLUTION RESPIRATORY (INHALATION) EVERY 4 HOURS PRN
Status: DISCONTINUED | OUTPATIENT
Start: 2019-01-01 | End: 2019-01-01 | Stop reason: HOSPADM

## 2019-01-01 RX ORDER — DOXYCYCLINE 100 MG/1
100 CAPSULE ORAL 2 TIMES DAILY
Qty: 14 CAPSULE | Refills: 0 | Status: ON HOLD | OUTPATIENT
Start: 2019-01-01 | End: 2019-01-01

## 2019-01-01 RX ORDER — AZITHROMYCIN 250 MG/1
TABLET, FILM COATED ORAL
Qty: 6 TABLET | Refills: 0 | Status: SHIPPED | OUTPATIENT
Start: 2019-01-01 | End: 2019-01-01

## 2019-01-01 RX ORDER — MONTELUKAST SODIUM 10 MG/1
10 TABLET ORAL NIGHTLY
Qty: 90 TABLET | Refills: 0 | Status: SHIPPED | OUTPATIENT
Start: 2019-01-01 | End: 2019-01-01 | Stop reason: SDUPTHER

## 2019-01-01 RX ORDER — FAMOTIDINE 10 MG/ML
20 INJECTION, SOLUTION INTRAVENOUS AS NEEDED
Status: COMPLETED | OUTPATIENT
Start: 2019-01-01 | End: 2019-01-01

## 2019-01-01 RX ORDER — ALBUTEROL SULFATE 2.5 MG/3ML
2.5 SOLUTION RESPIRATORY (INHALATION) ONCE AS NEEDED
Status: DISCONTINUED | OUTPATIENT
Start: 2019-01-01 | End: 2019-01-01 | Stop reason: HOSPADM

## 2019-01-01 RX ORDER — FERROUS SULFATE 325(65) MG
325 TABLET ORAL
COMMUNITY
End: 2019-01-01 | Stop reason: SINTOL

## 2019-01-01 RX ORDER — ALPRAZOLAM 0.5 MG/1
0.5 TABLET ORAL 2 TIMES DAILY PRN
Qty: 60 TABLET | Refills: 0 | Status: SHIPPED | OUTPATIENT
Start: 2019-01-01 | End: 2019-01-01 | Stop reason: RX

## 2019-01-01 RX ORDER — INSULIN GLARGINE 100 [IU]/ML
12 INJECTION, SOLUTION SUBCUTANEOUS NIGHTLY
Status: DISCONTINUED | OUTPATIENT
Start: 2019-01-01 | End: 2019-01-01 | Stop reason: HOSPADM

## 2019-01-01 RX ORDER — ALBUTEROL SULFATE 90 UG/1
2 AEROSOL, METERED RESPIRATORY (INHALATION) EVERY 4 HOURS PRN
COMMUNITY
End: 2020-01-01 | Stop reason: HOSPADM

## 2019-01-01 RX ORDER — DIPHENHYDRAMINE HCL 25 MG
25 CAPSULE ORAL ONCE
Status: COMPLETED | OUTPATIENT
Start: 2019-01-01 | End: 2019-01-01

## 2019-01-01 RX ORDER — PREDNISONE 20 MG/1
40 TABLET ORAL
Status: COMPLETED | OUTPATIENT
Start: 2019-01-01 | End: 2019-01-01

## 2019-01-01 RX ORDER — HEPARIN SODIUM (PORCINE) LOCK FLUSH IV SOLN 100 UNIT/ML 100 UNIT/ML
500 SOLUTION INTRAVENOUS AS NEEDED
Status: CANCELLED | OUTPATIENT
Start: 2019-01-01

## 2019-01-01 RX ORDER — METHYLPREDNISOLONE 4 MG/1
TABLET ORAL
Qty: 21 EACH | Refills: 0 | Status: SHIPPED | OUTPATIENT
Start: 2019-01-01 | End: 2020-01-01

## 2019-01-01 RX ORDER — CEFTRIAXONE SODIUM 1 G/50ML
1 INJECTION, SOLUTION INTRAVENOUS ONCE
Status: COMPLETED | OUTPATIENT
Start: 2019-01-01 | End: 2019-01-01

## 2019-01-01 RX ORDER — INSULIN GLARGINE 100 [IU]/ML
15 INJECTION, SOLUTION SUBCUTANEOUS NIGHTLY
Status: DISCONTINUED | OUTPATIENT
Start: 2019-01-01 | End: 2019-01-01

## 2019-01-01 RX ORDER — POTASSIUM CHLORIDE 750 MG/1
40 CAPSULE, EXTENDED RELEASE ORAL EVERY 4 HOURS
Status: COMPLETED | OUTPATIENT
Start: 2019-01-01 | End: 2019-01-01

## 2019-01-01 RX ORDER — FAMOTIDINE 10 MG/ML
20 INJECTION, SOLUTION INTRAVENOUS AS NEEDED
Status: DISCONTINUED | OUTPATIENT
Start: 2019-01-01 | End: 2019-01-01 | Stop reason: HOSPADM

## 2019-01-01 RX ADMIN — DEXTROSE 300 MG: 5 SOLUTION INTRAVENOUS at 18:02

## 2019-01-01 RX ADMIN — POTASSIUM CHLORIDE 10 MEQ: 10 INJECTION, SOLUTION INTRAVENOUS at 13:00

## 2019-01-01 RX ADMIN — BISACODYL 10 MG: 10 SUPPOSITORY RECTAL at 20:44

## 2019-01-01 RX ADMIN — INSULIN GLARGINE 12 UNITS: 100 INJECTION, SOLUTION SUBCUTANEOUS at 21:24

## 2019-01-01 RX ADMIN — FAMOTIDINE 20 MG: 10 INJECTION INTRAVENOUS at 11:07

## 2019-01-01 RX ADMIN — CEFEPIME HYDROCHLORIDE 2 G: 2 INJECTION, POWDER, FOR SOLUTION INTRAVENOUS at 10:37

## 2019-01-01 RX ADMIN — IPRATROPIUM BROMIDE AND ALBUTEROL SULFATE 3 ML: 2.5; .5 SOLUTION RESPIRATORY (INHALATION) at 07:41

## 2019-01-01 RX ADMIN — DOXYCYCLINE 100 MG: 100 CAPSULE ORAL at 20:08

## 2019-01-01 RX ADMIN — OXYCODONE HYDROCHLORIDE 10 MG: 5 TABLET ORAL at 00:23

## 2019-01-01 RX ADMIN — MORPHINE SULFATE 30 MG: 15 TABLET, FILM COATED, EXTENDED RELEASE ORAL at 01:05

## 2019-01-01 RX ADMIN — HEPARIN SODIUM 2500 UNITS: 5000 INJECTION INTRAVENOUS; SUBCUTANEOUS at 08:12

## 2019-01-01 RX ADMIN — CEFEPIME HYDROCHLORIDE 2 G: 2 INJECTION, POWDER, FOR SOLUTION INTRAVENOUS at 12:40

## 2019-01-01 RX ADMIN — ASPIRIN 325 MG: 325 TABLET ORAL at 08:19

## 2019-01-01 RX ADMIN — OXYCODONE HYDROCHLORIDE 10 MG: 5 TABLET ORAL at 05:36

## 2019-01-01 RX ADMIN — INSULIN LISPRO 2 UNITS: 100 INJECTION, SOLUTION INTRAVENOUS; SUBCUTANEOUS at 11:40

## 2019-01-01 RX ADMIN — FLUOROURACIL 700 MG: 50 INJECTION, SOLUTION INTRAVENOUS at 13:28

## 2019-01-01 RX ADMIN — MONTELUKAST SODIUM 10 MG: 10 TABLET, FILM COATED ORAL at 00:50

## 2019-01-01 RX ADMIN — ATORVASTATIN CALCIUM 10 MG: 10 TABLET, FILM COATED ORAL at 10:02

## 2019-01-01 RX ADMIN — POTASSIUM CHLORIDE 40 MEQ: 750 CAPSULE, EXTENDED RELEASE ORAL at 18:43

## 2019-01-01 RX ADMIN — IOPAMIDOL 95 ML: 755 INJECTION, SOLUTION INTRAVENOUS at 19:07

## 2019-01-01 RX ADMIN — OXALIPLATIN 145 MG: 5 INJECTION, SOLUTION, CONCENTRATE INTRAVENOUS at 10:39

## 2019-01-01 RX ADMIN — BUDESONIDE 0.5 MG: 0.5 INHALANT RESPIRATORY (INHALATION) at 10:11

## 2019-01-01 RX ADMIN — MONTELUKAST SODIUM 10 MG: 10 TABLET, FILM COATED ORAL at 20:08

## 2019-01-01 RX ADMIN — DEXAMETHASONE SODIUM PHOSPHATE 12 MG: 10 INJECTION, SOLUTION INTRAMUSCULAR; INTRAVENOUS at 10:37

## 2019-01-01 RX ADMIN — FILGRASTIM 300 MCG: 300 INJECTION, SOLUTION INTRAVENOUS; SUBCUTANEOUS at 11:08

## 2019-01-01 RX ADMIN — INSULIN LISPRO 2 UNITS: 100 INJECTION, SOLUTION INTRAVENOUS; SUBCUTANEOUS at 08:26

## 2019-01-01 RX ADMIN — MORPHINE SULFATE 15 MG: 15 TABLET, FILM COATED, EXTENDED RELEASE ORAL at 00:06

## 2019-01-01 RX ADMIN — ACETYLCYSTEINE 4 ML: 200 SOLUTION ORAL; RESPIRATORY (INHALATION) at 12:24

## 2019-01-01 RX ADMIN — OXYCODONE HYDROCHLORIDE 10 MG: 5 TABLET ORAL at 02:37

## 2019-01-01 RX ADMIN — MORPHINE SULFATE 15 MG: 15 TABLET, FILM COATED, EXTENDED RELEASE ORAL at 09:05

## 2019-01-01 RX ADMIN — IPRATROPIUM BROMIDE AND ALBUTEROL SULFATE 3 ML: 2.5; .5 SOLUTION RESPIRATORY (INHALATION) at 15:37

## 2019-01-01 RX ADMIN — SODIUM CHLORIDE, PRESERVATIVE FREE 10 ML: 5 INJECTION INTRAVENOUS at 10:18

## 2019-01-01 RX ADMIN — TIMOLOL MALEATE 1 DROP: 2.5 SOLUTION/ DROPS OPHTHALMIC at 08:59

## 2019-01-01 RX ADMIN — CEFEPIME HYDROCHLORIDE 2 G: 2 INJECTION, POWDER, FOR SOLUTION INTRAVENOUS at 18:29

## 2019-01-01 RX ADMIN — IPRATROPIUM BROMIDE AND ALBUTEROL SULFATE 3 ML: 2.5; .5 SOLUTION RESPIRATORY (INHALATION) at 20:25

## 2019-01-01 RX ADMIN — ASPIRIN 81 MG: 81 TABLET, COATED ORAL at 08:34

## 2019-01-01 RX ADMIN — BUDESONIDE 0.5 MG: 0.5 INHALANT RESPIRATORY (INHALATION) at 20:26

## 2019-01-01 RX ADMIN — ATORVASTATIN CALCIUM 10 MG: 10 TABLET, FILM COATED ORAL at 08:26

## 2019-01-01 RX ADMIN — MORPHINE SULFATE 15 MG: 15 TABLET, FILM COATED, EXTENDED RELEASE ORAL at 21:23

## 2019-01-01 RX ADMIN — PHENYLEPHRINE HYDROCHLORIDE 100 MCG: 10 INJECTION INTRAVENOUS at 09:51

## 2019-01-01 RX ADMIN — APIXABAN 5 MG: 5 TABLET, FILM COATED ORAL at 22:23

## 2019-01-01 RX ADMIN — INSULIN LISPRO 2 UNITS: 100 INJECTION, SOLUTION INTRAVENOUS; SUBCUTANEOUS at 11:30

## 2019-01-01 RX ADMIN — DEXTROSE MONOHYDRATE 250 ML: 5 INJECTION, SOLUTION INTRAVENOUS at 09:12

## 2019-01-01 RX ADMIN — Medication 10 ML: at 12:09

## 2019-01-01 RX ADMIN — BUDESONIDE 0.5 MG: 0.5 INHALANT RESPIRATORY (INHALATION) at 10:03

## 2019-01-01 RX ADMIN — POLYETHYLENE GLYCOL 3350 17 G: 17 POWDER, FOR SOLUTION ORAL at 09:34

## 2019-01-01 RX ADMIN — IPRATROPIUM BROMIDE AND ALBUTEROL SULFATE 3 ML: 2.5; .5 SOLUTION RESPIRATORY (INHALATION) at 20:38

## 2019-01-01 RX ADMIN — INSULIN LISPRO 4 UNITS: 100 INJECTION, SOLUTION INTRAVENOUS; SUBCUTANEOUS at 22:24

## 2019-01-01 RX ADMIN — CETIRIZINE HYDROCHLORIDE 10 MG: 10 TABLET, FILM COATED ORAL at 08:58

## 2019-01-01 RX ADMIN — ACETYLCYSTEINE 4 ML: 200 SOLUTION ORAL; RESPIRATORY (INHALATION) at 11:33

## 2019-01-01 RX ADMIN — POLYETHYLENE GLYCOL 3350 17 G: 17 POWDER, FOR SOLUTION ORAL at 08:41

## 2019-01-01 RX ADMIN — DEXTROSE MONOHYDRATE 250 ML: 50 INJECTION, SOLUTION INTRAVENOUS at 14:46

## 2019-01-01 RX ADMIN — INSULIN LISPRO 3 UNITS: 100 INJECTION, SOLUTION INTRAVENOUS; SUBCUTANEOUS at 12:17

## 2019-01-01 RX ADMIN — ATORVASTATIN CALCIUM 10 MG: 10 TABLET, FILM COATED ORAL at 08:15

## 2019-01-01 RX ADMIN — MAGNESIUM SULFATE 2 G: 2 INJECTION INTRAVENOUS at 16:23

## 2019-01-01 RX ADMIN — SODIUM CHLORIDE 20 ML/HR: 9 INJECTION, SOLUTION INTRAVENOUS at 08:40

## 2019-01-01 RX ADMIN — POTASSIUM CHLORIDE 10 MEQ: 10 INJECTION, SOLUTION INTRAVENOUS at 14:01

## 2019-01-01 RX ADMIN — POLYETHYLENE GLYCOL 3350 17 G: 17 POWDER, FOR SOLUTION ORAL at 20:42

## 2019-01-01 RX ADMIN — ONDANSETRON 8 MG: 4 TABLET, FILM COATED ORAL at 18:30

## 2019-01-01 RX ADMIN — OXYCODONE HYDROCHLORIDE 10 MG: 5 TABLET ORAL at 10:37

## 2019-01-01 RX ADMIN — Medication 10 ML: at 14:02

## 2019-01-01 RX ADMIN — BUDESONIDE 0.5 MG: 0.5 INHALANT RESPIRATORY (INHALATION) at 21:01

## 2019-01-01 RX ADMIN — TIMOLOL MALEATE 1 DROP: 2.5 SOLUTION/ DROPS OPHTHALMIC at 10:03

## 2019-01-01 RX ADMIN — FLUTICASONE PROPIONATE 2 SPRAY: 50 SPRAY, METERED NASAL at 22:22

## 2019-01-01 RX ADMIN — DABIGATRAN ETEXILATE MESYLATE 150 MG: 150 CAPSULE ORAL at 09:29

## 2019-01-01 RX ADMIN — PHENYLEPHRINE HYDROCHLORIDE 100 MCG: 10 INJECTION INTRAVENOUS at 09:41

## 2019-01-01 RX ADMIN — INSULIN LISPRO 2 UNITS: 100 INJECTION, SOLUTION INTRAVENOUS; SUBCUTANEOUS at 20:47

## 2019-01-01 RX ADMIN — OXYCODONE HYDROCHLORIDE 10 MG: 5 TABLET ORAL at 11:30

## 2019-01-01 RX ADMIN — PROPOFOL 100 MG: 10 INJECTION, EMULSION INTRAVENOUS at 09:25

## 2019-01-01 RX ADMIN — ASPIRIN 81 MG: 81 TABLET, COATED ORAL at 08:33

## 2019-01-01 RX ADMIN — INSULIN LISPRO 2 UNITS: 100 INJECTION, SOLUTION INTRAVENOUS; SUBCUTANEOUS at 13:11

## 2019-01-01 RX ADMIN — FILGRASTIM 300 MCG: 300 INJECTION, SOLUTION INTRAVENOUS; SUBCUTANEOUS at 20:52

## 2019-01-01 RX ADMIN — OXYCODONE HYDROCHLORIDE 10 MG: 5 TABLET ORAL at 09:27

## 2019-01-01 RX ADMIN — IPRATROPIUM BROMIDE AND ALBUTEROL SULFATE 3 ML: 2.5; .5 SOLUTION RESPIRATORY (INHALATION) at 17:31

## 2019-01-01 RX ADMIN — Medication 500 UNITS: at 10:18

## 2019-01-01 RX ADMIN — POTASSIUM CHLORIDE 40 MEQ: 750 CAPSULE, EXTENDED RELEASE ORAL at 16:18

## 2019-01-01 RX ADMIN — FUROSEMIDE 20 MG: 20 INJECTION, SOLUTION INTRAMUSCULAR; INTRAVENOUS at 17:50

## 2019-01-01 RX ADMIN — BUDESONIDE 0.5 MG: 0.5 INHALANT RESPIRATORY (INHALATION) at 12:22

## 2019-01-01 RX ADMIN — ATORVASTATIN CALCIUM 10 MG: 10 TABLET, FILM COATED ORAL at 09:29

## 2019-01-01 RX ADMIN — MORPHINE SULFATE 30 MG: 30 TABLET, FILM COATED, EXTENDED RELEASE ORAL at 08:49

## 2019-01-01 RX ADMIN — OXYCODONE HYDROCHLORIDE 10 MG: 5 TABLET ORAL at 05:23

## 2019-01-01 RX ADMIN — INSULIN LISPRO 2 UNITS: 100 INJECTION, SOLUTION INTRAVENOUS; SUBCUTANEOUS at 12:50

## 2019-01-01 RX ADMIN — VANCOMYCIN HYDROCHLORIDE 1250 MG: 10 INJECTION, POWDER, LYOPHILIZED, FOR SOLUTION INTRAVENOUS at 14:12

## 2019-01-01 RX ADMIN — MORPHINE SULFATE 30 MG: 15 TABLET, FILM COATED, EXTENDED RELEASE ORAL at 01:31

## 2019-01-01 RX ADMIN — PANTOPRAZOLE SODIUM 40 MG: 40 TABLET, DELAYED RELEASE ORAL at 08:23

## 2019-01-01 RX ADMIN — SODIUM CHLORIDE, PRESERVATIVE FREE 3 ML: 5 INJECTION INTRAVENOUS at 08:30

## 2019-01-01 RX ADMIN — ALBUTEROL SULFATE 2.5 MG: 2.5 SOLUTION RESPIRATORY (INHALATION) at 21:53

## 2019-01-01 RX ADMIN — ONDANSETRON 8 MG: 4 TABLET, FILM COATED ORAL at 14:17

## 2019-01-01 RX ADMIN — SODIUM CHLORIDE, PRESERVATIVE FREE 3 ML: 5 INJECTION INTRAVENOUS at 22:32

## 2019-01-01 RX ADMIN — PANTOPRAZOLE SODIUM 40 MG: 40 TABLET, DELAYED RELEASE ORAL at 07:09

## 2019-01-01 RX ADMIN — FLUOROURACIL 3110 MG: 50 INJECTION, SOLUTION INTRAVENOUS at 11:45

## 2019-01-01 RX ADMIN — MORPHINE SULFATE 15 MG: 15 TABLET, FILM COATED, EXTENDED RELEASE ORAL at 20:45

## 2019-01-01 RX ADMIN — OXYCODONE HYDROCHLORIDE 10 MG: 5 TABLET ORAL at 16:08

## 2019-01-01 RX ADMIN — MORPHINE SULFATE 15 MG: 15 TABLET, FILM COATED, EXTENDED RELEASE ORAL at 21:04

## 2019-01-01 RX ADMIN — ALBUTEROL SULFATE 2.5 MG: 2.5 SOLUTION RESPIRATORY (INHALATION) at 08:39

## 2019-01-01 RX ADMIN — INSULIN GLARGINE 12 UNITS: 100 INJECTION, SOLUTION SUBCUTANEOUS at 21:14

## 2019-01-01 RX ADMIN — TIMOLOL MALEATE 1 DROP: 2.5 SOLUTION/ DROPS OPHTHALMIC at 08:56

## 2019-01-01 RX ADMIN — SODIUM CHLORIDE, PRESERVATIVE FREE 10 ML: 5 INJECTION INTRAVENOUS at 15:15

## 2019-01-01 RX ADMIN — POLYETHYLENE GLYCOL 3350 17 G: 17 POWDER, FOR SOLUTION ORAL at 09:06

## 2019-01-01 RX ADMIN — IPRATROPIUM BROMIDE AND ALBUTEROL SULFATE 3 ML: 2.5; .5 SOLUTION RESPIRATORY (INHALATION) at 11:46

## 2019-01-01 RX ADMIN — FLUTICASONE PROPIONATE 2 SPRAY: 50 SPRAY, METERED NASAL at 09:30

## 2019-01-01 RX ADMIN — ANTACID TABLETS 2 TABLET: 500 TABLET, CHEWABLE ORAL at 11:31

## 2019-01-01 RX ADMIN — MORPHINE SULFATE 30 MG: 15 TABLET, FILM COATED, EXTENDED RELEASE ORAL at 20:15

## 2019-01-01 RX ADMIN — MORPHINE SULFATE 15 MG: 15 TABLET, FILM COATED, EXTENDED RELEASE ORAL at 21:02

## 2019-01-01 RX ADMIN — SODIUM CHLORIDE, PRESERVATIVE FREE 10 ML: 5 INJECTION INTRAVENOUS at 12:29

## 2019-01-01 RX ADMIN — PALONOSETRON 0.25 MG: 0.05 INJECTION, SOLUTION INTRAVENOUS at 09:15

## 2019-01-01 RX ADMIN — INSULIN LISPRO 3 UNITS: 100 INJECTION, SOLUTION INTRAVENOUS; SUBCUTANEOUS at 17:11

## 2019-01-01 RX ADMIN — PANTOPRAZOLE SODIUM 40 MG: 40 TABLET, DELAYED RELEASE ORAL at 08:26

## 2019-01-01 RX ADMIN — INSULIN LISPRO 3 UNITS: 100 INJECTION, SOLUTION INTRAVENOUS; SUBCUTANEOUS at 12:30

## 2019-01-01 RX ADMIN — ATORVASTATIN CALCIUM 10 MG: 10 TABLET, FILM COATED ORAL at 08:35

## 2019-01-01 RX ADMIN — CEFEPIME HYDROCHLORIDE 2 G: 2 INJECTION, POWDER, FOR SOLUTION INTRAVENOUS at 18:28

## 2019-01-01 RX ADMIN — INSULIN LISPRO 2 UNITS: 100 INJECTION, SOLUTION INTRAVENOUS; SUBCUTANEOUS at 17:11

## 2019-01-01 RX ADMIN — TIMOLOL MALEATE 1 DROP: 2.5 SOLUTION/ DROPS OPHTHALMIC at 08:13

## 2019-01-01 RX ADMIN — MAGNESIUM SULFATE 2 G: 2 INJECTION INTRAVENOUS at 18:16

## 2019-01-01 RX ADMIN — ALTEPLASE: 2.2 INJECTION, POWDER, LYOPHILIZED, FOR SOLUTION INTRAVENOUS at 08:12

## 2019-01-01 RX ADMIN — BUDESONIDE 0.5 MG: 0.5 INHALANT RESPIRATORY (INHALATION) at 09:12

## 2019-01-01 RX ADMIN — ATROPINE SULFATE 0.25 MG: 0.4 INJECTION, SOLUTION INTRAMUSCULAR; INTRAVENOUS; SUBCUTANEOUS at 10:48

## 2019-01-01 RX ADMIN — LEUCOVORIN CALCIUM 520 MG: 350 INJECTION, POWDER, LYOPHILIZED, FOR SOLUTION INTRAMUSCULAR; INTRAVENOUS at 12:44

## 2019-01-01 RX ADMIN — PANTOPRAZOLE SODIUM 40 MG: 40 TABLET, DELAYED RELEASE ORAL at 06:44

## 2019-01-01 RX ADMIN — PANTOPRAZOLE SODIUM 40 MG: 40 TABLET, DELAYED RELEASE ORAL at 06:50

## 2019-01-01 RX ADMIN — INSULIN LISPRO 2 UNITS: 100 INJECTION, SOLUTION INTRAVENOUS; SUBCUTANEOUS at 21:23

## 2019-01-01 RX ADMIN — DIPHENHYDRAMINE HYDROCHLORIDE 25 MG: 25 CAPSULE ORAL at 18:27

## 2019-01-01 RX ADMIN — BUDESONIDE 0.5 MG: 0.5 INHALANT RESPIRATORY (INHALATION) at 21:47

## 2019-01-01 RX ADMIN — HEPARIN SODIUM 18 UNITS/KG/HR: 10000 INJECTION, SOLUTION INTRAVENOUS at 18:51

## 2019-01-01 RX ADMIN — METHYLPREDNISOLONE SODIUM SUCCINATE 125 MG: 125 INJECTION, POWDER, FOR SOLUTION INTRAMUSCULAR; INTRAVENOUS at 17:18

## 2019-01-01 RX ADMIN — INSULIN LISPRO 2 UNITS: 100 INJECTION, SOLUTION INTRAVENOUS; SUBCUTANEOUS at 17:19

## 2019-01-01 RX ADMIN — PANTOPRAZOLE SODIUM 40 MG: 40 TABLET, DELAYED RELEASE ORAL at 17:15

## 2019-01-01 RX ADMIN — ATORVASTATIN CALCIUM 10 MG: 10 TABLET, FILM COATED ORAL at 09:04

## 2019-01-01 RX ADMIN — MORPHINE SULFATE 15 MG: 15 TABLET, FILM COATED, EXTENDED RELEASE ORAL at 08:06

## 2019-01-01 RX ADMIN — DEXTROSE MONOHYDRATE 290 MG: 5 INJECTION, SOLUTION INTRAVENOUS at 14:01

## 2019-01-01 RX ADMIN — DEXTROSE MONOHYDRATE 700 MG: 5 INJECTION, SOLUTION INTRAVENOUS at 11:49

## 2019-01-01 RX ADMIN — SODIUM CHLORIDE, PRESERVATIVE FREE 10 ML: 5 INJECTION INTRAVENOUS at 18:00

## 2019-01-01 RX ADMIN — TIMOLOL MALEATE 1 DROP: 2.5 SOLUTION/ DROPS OPHTHALMIC at 08:15

## 2019-01-01 RX ADMIN — INSULIN GLARGINE 10 UNITS: 100 INJECTION, SOLUTION SUBCUTANEOUS at 20:32

## 2019-01-01 RX ADMIN — FILGRASTIM 300 MCG: 300 INJECTION, SOLUTION INTRAVENOUS; SUBCUTANEOUS at 11:17

## 2019-01-01 RX ADMIN — SODIUM CHLORIDE 100 ML/HR: 9 INJECTION, SOLUTION INTRAVENOUS at 01:11

## 2019-01-01 RX ADMIN — Medication 500 UNITS: at 10:48

## 2019-01-01 RX ADMIN — ATORVASTATIN CALCIUM 10 MG: 10 TABLET, FILM COATED ORAL at 11:36

## 2019-01-01 RX ADMIN — ACETYLCYSTEINE 4 ML: 200 SOLUTION ORAL; RESPIRATORY (INHALATION) at 08:32

## 2019-01-01 RX ADMIN — PALONOSETRON 0.25 MG: 0.05 INJECTION, SOLUTION INTRAVENOUS at 10:49

## 2019-01-01 RX ADMIN — DEXTROSE MONOHYDRATE 250 ML: 5 INJECTION, SOLUTION INTRAVENOUS at 10:49

## 2019-01-01 RX ADMIN — DABIGATRAN ETEXILATE MESYLATE 150 MG: 150 CAPSULE ORAL at 21:11

## 2019-01-01 RX ADMIN — POTASSIUM CHLORIDE 40 MEQ: 750 CAPSULE, EXTENDED RELEASE ORAL at 00:57

## 2019-01-01 RX ADMIN — DEXAMETHASONE SODIUM PHOSPHATE 12 MG: 10 INJECTION, SOLUTION INTRAMUSCULAR; INTRAVENOUS at 09:02

## 2019-01-01 RX ADMIN — CETIRIZINE HYDROCHLORIDE 10 MG: 10 TABLET, FILM COATED ORAL at 11:40

## 2019-01-01 RX ADMIN — MORPHINE SULFATE 30 MG: 15 TABLET, FILM COATED, EXTENDED RELEASE ORAL at 21:11

## 2019-01-01 RX ADMIN — IOPAMIDOL 100 ML: 755 INJECTION, SOLUTION INTRAVENOUS at 14:27

## 2019-01-01 RX ADMIN — ASPIRIN 325 MG: 325 TABLET ORAL at 08:30

## 2019-01-01 RX ADMIN — LEUCOVORIN CALCIUM 700 MG: 350 INJECTION, POWDER, LYOPHILIZED, FOR SUSPENSION INTRAMUSCULAR; INTRAVENOUS at 18:01

## 2019-01-01 RX ADMIN — IOPAMIDOL 95 ML: 755 INJECTION, SOLUTION INTRAVENOUS at 19:45

## 2019-01-01 RX ADMIN — Medication 150 ML: at 09:48

## 2019-01-01 RX ADMIN — HEPARIN SODIUM 5000 UNITS: 5000 INJECTION INTRAVENOUS; SUBCUTANEOUS at 01:11

## 2019-01-01 RX ADMIN — OXALIPLATIN 150 MG: 5 INJECTION, SOLUTION, CONCENTRATE INTRAVENOUS at 09:24

## 2019-01-01 RX ADMIN — INSULIN LISPRO 2 UNITS: 100 INJECTION, SOLUTION INTRAVENOUS; SUBCUTANEOUS at 08:34

## 2019-01-01 RX ADMIN — HEPARIN SODIUM 5000 UNITS: 5000 INJECTION INTRAVENOUS; SUBCUTANEOUS at 18:49

## 2019-01-01 RX ADMIN — FLUOROURACIL 520 MG: 50 INJECTION, SOLUTION INTRAVENOUS at 12:46

## 2019-01-01 RX ADMIN — IPRATROPIUM BROMIDE AND ALBUTEROL SULFATE 3 ML: 2.5; .5 SOLUTION RESPIRATORY (INHALATION) at 15:29

## 2019-01-01 RX ADMIN — MORPHINE SULFATE 15 MG: 15 TABLET, FILM COATED, EXTENDED RELEASE ORAL at 08:16

## 2019-01-01 RX ADMIN — CEFEPIME HYDROCHLORIDE 2 G: 2 INJECTION, POWDER, FOR SOLUTION INTRAVENOUS at 04:14

## 2019-01-01 RX ADMIN — MORPHINE SULFATE 30 MG: 15 TABLET, FILM COATED, EXTENDED RELEASE ORAL at 10:12

## 2019-01-01 RX ADMIN — SODIUM CHLORIDE 1100 ML: 9 INJECTION, SOLUTION INTRAVENOUS at 12:19

## 2019-01-01 RX ADMIN — INSULIN GLARGINE 16 UNITS: 100 INJECTION, SOLUTION SUBCUTANEOUS at 21:15

## 2019-01-01 RX ADMIN — CEFEPIME HYDROCHLORIDE 2 G: 2 INJECTION, POWDER, FOR SOLUTION INTRAVENOUS at 18:21

## 2019-01-01 RX ADMIN — PEGFILGRASTIM 6 MG: KIT SUBCUTANEOUS at 10:48

## 2019-01-01 RX ADMIN — INSULIN GLARGINE 16 UNITS: 100 INJECTION, SOLUTION SUBCUTANEOUS at 21:39

## 2019-01-01 RX ADMIN — POLYETHYLENE GLYCOL 3350 17 G: 17 POWDER, FOR SOLUTION ORAL at 08:34

## 2019-01-01 RX ADMIN — CEFEPIME HYDROCHLORIDE 2 G: 2 INJECTION, POWDER, FOR SOLUTION INTRAVENOUS at 03:21

## 2019-01-01 RX ADMIN — FLUOROURACIL 670 MG: 50 INJECTION, SOLUTION INTRAVENOUS at 16:13

## 2019-01-01 RX ADMIN — CETIRIZINE HYDROCHLORIDE 10 MG: 10 TABLET, FILM COATED ORAL at 10:02

## 2019-01-01 RX ADMIN — OXYCODONE HYDROCHLORIDE 10 MG: 5 TABLET ORAL at 04:39

## 2019-01-01 RX ADMIN — IOPAMIDOL 85 ML: 612 INJECTION, SOLUTION INTRAVENOUS at 11:35

## 2019-01-01 RX ADMIN — Medication 500 UNITS: at 15:15

## 2019-01-01 RX ADMIN — CEFTRIAXONE SODIUM 2 G: 2 INJECTION, SOLUTION INTRAVENOUS at 18:12

## 2019-01-01 RX ADMIN — MONTELUKAST SODIUM 10 MG: 10 TABLET, FILM COATED ORAL at 21:41

## 2019-01-01 RX ADMIN — SODIUM CHLORIDE, PRESERVATIVE FREE 3 ML: 5 INJECTION INTRAVENOUS at 21:25

## 2019-01-01 RX ADMIN — FLUOROURACIL 3110 MG: 50 INJECTION, SOLUTION INTRAVENOUS at 14:29

## 2019-01-01 RX ADMIN — Medication 300 UNITS: at 16:07

## 2019-01-01 RX ADMIN — TIMOLOL MALEATE 1 DROP: 2.5 SOLUTION/ DROPS OPHTHALMIC at 08:35

## 2019-01-01 RX ADMIN — BUDESONIDE 0.5 MG: 0.5 INHALANT RESPIRATORY (INHALATION) at 09:24

## 2019-01-01 RX ADMIN — OXYCODONE HYDROCHLORIDE 10 MG: 5 TABLET ORAL at 05:17

## 2019-01-01 RX ADMIN — DIATRIZOATE MEGLUMINE AND DIATRIZOATE SODIUM 30 ML: 660; 100 LIQUID ORAL; RECTAL at 10:40

## 2019-01-01 RX ADMIN — SODIUM CHLORIDE 8 MG/HR: 900 INJECTION INTRAVENOUS at 16:17

## 2019-01-01 RX ADMIN — TIMOLOL MALEATE 1 DROP: 2.5 SOLUTION/ DROPS OPHTHALMIC at 08:24

## 2019-01-01 RX ADMIN — SODIUM CHLORIDE, PRESERVATIVE FREE 10 ML: 5 INJECTION INTRAVENOUS at 10:03

## 2019-01-01 RX ADMIN — OXYCODONE HYDROCHLORIDE 10 MG: 5 TABLET ORAL at 17:54

## 2019-01-01 RX ADMIN — Medication 10 ML: at 11:45

## 2019-01-01 RX ADMIN — LIDOCAINE HYDROCHLORIDE 50 MG: 20 INJECTION, SOLUTION INFILTRATION; PERINEURAL at 10:45

## 2019-01-01 RX ADMIN — BUDESONIDE 0.5 MG: 0.5 INHALANT RESPIRATORY (INHALATION) at 08:32

## 2019-01-01 RX ADMIN — IRINOTECAN HYDROCHLORIDE 235 MG: 20 INJECTION, SOLUTION INTRAVENOUS at 09:56

## 2019-01-01 RX ADMIN — INSULIN LISPRO 4 UNITS: 100 INJECTION, SOLUTION INTRAVENOUS; SUBCUTANEOUS at 12:20

## 2019-01-01 RX ADMIN — POTASSIUM CHLORIDE 40 MEQ: 750 CAPSULE, EXTENDED RELEASE ORAL at 18:13

## 2019-01-01 RX ADMIN — ACETYLCYSTEINE 4 ML: 200 SOLUTION ORAL; RESPIRATORY (INHALATION) at 20:59

## 2019-01-01 RX ADMIN — PANTOPRAZOLE SODIUM 40 MG: 40 TABLET, DELAYED RELEASE ORAL at 06:53

## 2019-01-01 RX ADMIN — OXYCODONE HYDROCHLORIDE 10 MG: 5 TABLET ORAL at 07:12

## 2019-01-01 RX ADMIN — DEXAMETHASONE SODIUM PHOSPHATE 12 MG: 10 INJECTION, SOLUTION INTRAMUSCULAR; INTRAVENOUS at 09:59

## 2019-01-01 RX ADMIN — POTASSIUM CHLORIDE 40 MEQ: 750 CAPSULE, EXTENDED RELEASE ORAL at 14:19

## 2019-01-01 RX ADMIN — SODIUM CHLORIDE 20 ML/HR: 9 INJECTION, SOLUTION INTRAVENOUS at 18:37

## 2019-01-01 RX ADMIN — PEGFILGRASTIM 6 MG: KIT SUBCUTANEOUS at 13:27

## 2019-01-01 RX ADMIN — Medication 150 ML: at 09:27

## 2019-01-01 RX ADMIN — INSULIN LISPRO 2 UNITS: 100 INJECTION, SOLUTION INTRAVENOUS; SUBCUTANEOUS at 21:40

## 2019-01-01 RX ADMIN — LEUCOVORIN CALCIUM 670 MG: 350 INJECTION, POWDER, LYOPHILIZED, FOR SOLUTION INTRAMUSCULAR; INTRAVENOUS at 13:54

## 2019-01-01 RX ADMIN — INSULIN LISPRO 8 UNITS: 100 INJECTION, SOLUTION INTRAVENOUS; SUBCUTANEOUS at 20:44

## 2019-01-01 RX ADMIN — TIMOLOL MALEATE 1 DROP: 2.5 SOLUTION/ DROPS OPHTHALMIC at 22:23

## 2019-01-01 RX ADMIN — FLUTICASONE PROPIONATE 2 SPRAY: 50 SPRAY, METERED NASAL at 08:59

## 2019-01-01 RX ADMIN — SODIUM CHLORIDE, PRESERVATIVE FREE 500 UNITS: 5 INJECTION INTRAVENOUS at 12:10

## 2019-01-01 RX ADMIN — OXYCODONE HYDROCHLORIDE 10 MG: 5 TABLET ORAL at 15:22

## 2019-01-01 RX ADMIN — VANCOMYCIN HYDROCHLORIDE 1250 MG: 10 INJECTION, POWDER, LYOPHILIZED, FOR SOLUTION INTRAVENOUS at 02:07

## 2019-01-01 RX ADMIN — ALTEPLASE: 2.2 INJECTION, POWDER, LYOPHILIZED, FOR SOLUTION INTRAVENOUS at 09:15

## 2019-01-01 RX ADMIN — OXYCODONE HYDROCHLORIDE 10 MG: 5 TABLET ORAL at 22:15

## 2019-01-01 RX ADMIN — ATROPINE SULFATE 0.25 MG: 0.4 INJECTION, SOLUTION INTRAMUSCULAR; INTRAVENOUS; SUBCUTANEOUS at 12:37

## 2019-01-01 RX ADMIN — MONTELUKAST SODIUM 10 MG: 10 TABLET, FILM COATED ORAL at 21:14

## 2019-01-01 RX ADMIN — ACETYLCYSTEINE 4 ML: 200 SOLUTION ORAL; RESPIRATORY (INHALATION) at 15:15

## 2019-01-01 RX ADMIN — SODIUM CHLORIDE 125 ML/HR: 9 INJECTION, SOLUTION INTRAVENOUS at 09:02

## 2019-01-01 RX ADMIN — CEFTRIAXONE SODIUM 2 G: 2 INJECTION, SOLUTION INTRAVENOUS at 17:13

## 2019-01-01 RX ADMIN — POTASSIUM & SODIUM PHOSPHATES POWDER PACK 280-160-250 MG 2 PACKET: 280-160-250 PACK at 15:18

## 2019-01-01 RX ADMIN — OXYCODONE HYDROCHLORIDE 10 MG: 5 TABLET ORAL at 23:34

## 2019-01-01 RX ADMIN — APIXABAN 5 MG: 5 TABLET, FILM COATED ORAL at 08:35

## 2019-01-01 RX ADMIN — AZITHROMYCIN MONOHYDRATE 500 MG: 500 INJECTION, POWDER, LYOPHILIZED, FOR SOLUTION INTRAVENOUS at 17:46

## 2019-01-01 RX ADMIN — POTASSIUM CHLORIDE 40 MEQ: 750 CAPSULE, EXTENDED RELEASE ORAL at 22:31

## 2019-01-01 RX ADMIN — IPRATROPIUM BROMIDE AND ALBUTEROL SULFATE 3 ML: 2.5; .5 SOLUTION RESPIRATORY (INHALATION) at 11:33

## 2019-01-01 RX ADMIN — CEFEPIME HYDROCHLORIDE 2 G: 2 INJECTION, POWDER, FOR SOLUTION INTRAVENOUS at 13:16

## 2019-01-01 RX ADMIN — OXYCODONE HYDROCHLORIDE 10 MG: 5 TABLET ORAL at 19:43

## 2019-01-01 RX ADMIN — LISINOPRIL 20 MG: 20 TABLET ORAL at 10:02

## 2019-01-01 RX ADMIN — PEGFILGRASTIM 6 MG: KIT SUBCUTANEOUS at 10:18

## 2019-01-01 RX ADMIN — IPRATROPIUM BROMIDE AND ALBUTEROL SULFATE 3 ML: 2.5; .5 SOLUTION RESPIRATORY (INHALATION) at 19:19

## 2019-01-01 RX ADMIN — FLUOROURACIL 520 MG: 50 INJECTION, SOLUTION INTRAVENOUS at 11:45

## 2019-01-01 RX ADMIN — MONTELUKAST SODIUM 10 MG: 10 TABLET, FILM COATED ORAL at 21:38

## 2019-01-01 RX ADMIN — FLUOROURACIL 520 MG: 50 INJECTION, SOLUTION INTRAVENOUS at 14:29

## 2019-01-01 RX ADMIN — ALBUTEROL SULFATE 2.5 MG: 2.5 SOLUTION RESPIRATORY (INHALATION) at 17:40

## 2019-01-01 RX ADMIN — MORPHINE SULFATE 15 MG: 15 TABLET, FILM COATED, EXTENDED RELEASE ORAL at 08:33

## 2019-01-01 RX ADMIN — PANTOPRAZOLE SODIUM 40 MG: 40 TABLET, DELAYED RELEASE ORAL at 08:49

## 2019-01-01 RX ADMIN — OXYCODONE HYDROCHLORIDE 10 MG: 5 TABLET ORAL at 02:40

## 2019-01-01 RX ADMIN — LIDOCAINE HYDROCHLORIDE 16 ML: 10 INJECTION, SOLUTION INFILTRATION; PERINEURAL at 11:46

## 2019-01-01 RX ADMIN — INSULIN LISPRO 3 UNITS: 100 INJECTION, SOLUTION INTRAVENOUS; SUBCUTANEOUS at 21:02

## 2019-01-01 RX ADMIN — INSULIN LISPRO 2 UNITS: 100 INJECTION, SOLUTION INTRAVENOUS; SUBCUTANEOUS at 17:30

## 2019-01-01 RX ADMIN — SODIUM CHLORIDE 125 ML/HR: 9 INJECTION, SOLUTION INTRAVENOUS at 08:26

## 2019-01-01 RX ADMIN — BUDESONIDE 0.5 MG: 0.5 INHALANT RESPIRATORY (INHALATION) at 15:57

## 2019-01-01 RX ADMIN — FLUOROURACIL 3110 MG: 50 INJECTION, SOLUTION INTRAVENOUS at 12:46

## 2019-01-01 RX ADMIN — BUDESONIDE 0.5 MG: 0.5 INHALANT RESPIRATORY (INHALATION) at 19:24

## 2019-01-01 RX ADMIN — OXYCODONE HYDROCHLORIDE 10 MG: 5 TABLET ORAL at 10:54

## 2019-01-01 RX ADMIN — FLUOROURACIL 4180 MG: 50 INJECTION, SOLUTION INTRAVENOUS at 13:28

## 2019-01-01 RX ADMIN — SODIUM CHLORIDE, PRESERVATIVE FREE 10 ML: 5 INJECTION INTRAVENOUS at 00:30

## 2019-01-01 RX ADMIN — INSULIN LISPRO 2 UNITS: 100 INJECTION, SOLUTION INTRAVENOUS; SUBCUTANEOUS at 20:33

## 2019-01-01 RX ADMIN — BUDESONIDE 0.5 MG: 0.5 INHALANT RESPIRATORY (INHALATION) at 20:51

## 2019-01-01 RX ADMIN — PREDNISONE 40 MG: 20 TABLET ORAL at 08:56

## 2019-01-01 RX ADMIN — HEPARIN SODIUM 21 UNITS/KG/HR: 10000 INJECTION, SOLUTION INTRAVENOUS at 08:37

## 2019-01-01 RX ADMIN — FILGRASTIM 300 MCG: 300 INJECTION, SOLUTION INTRAVENOUS; SUBCUTANEOUS at 18:28

## 2019-01-01 RX ADMIN — INSULIN LISPRO 6 UNITS: 100 INJECTION, SOLUTION INTRAVENOUS; SUBCUTANEOUS at 16:55

## 2019-01-01 RX ADMIN — FLUOROURACIL 670 MG: 50 INJECTION, SOLUTION INTRAVENOUS at 14:39

## 2019-01-01 RX ADMIN — OXYCODONE HYDROCHLORIDE 10 MG: 5 TABLET ORAL at 04:41

## 2019-01-01 RX ADMIN — POTASSIUM CHLORIDE 40 MEQ: 750 CAPSULE, EXTENDED RELEASE ORAL at 12:17

## 2019-01-01 RX ADMIN — LEUCOVORIN CALCIUM 520 MG: 350 INJECTION, POWDER, LYOPHILIZED, FOR SOLUTION INTRAMUSCULAR; INTRAVENOUS at 10:50

## 2019-01-01 RX ADMIN — APIXABAN 10 MG: 5 TABLET, FILM COATED ORAL at 21:38

## 2019-01-01 RX ADMIN — CETIRIZINE HYDROCHLORIDE 10 MG: 10 TABLET, FILM COATED ORAL at 09:04

## 2019-01-01 RX ADMIN — ATORVASTATIN CALCIUM 10 MG: 10 TABLET, FILM COATED ORAL at 08:34

## 2019-01-01 RX ADMIN — SODIUM CHLORIDE 500 ML: 9 INJECTION, SOLUTION INTRAVENOUS at 00:09

## 2019-01-01 RX ADMIN — MORPHINE SULFATE 30 MG: 15 TABLET, FILM COATED, EXTENDED RELEASE ORAL at 01:02

## 2019-01-01 RX ADMIN — CEFTRIAXONE SODIUM 1 G: 1 INJECTION, SOLUTION INTRAVENOUS at 22:13

## 2019-01-01 RX ADMIN — SODIUM CHLORIDE, PRESERVATIVE FREE 10 ML: 5 INJECTION INTRAVENOUS at 21:24

## 2019-01-01 RX ADMIN — MAGNESIUM SULFATE 2 G: 2 INJECTION INTRAVENOUS at 20:34

## 2019-01-01 RX ADMIN — ATORVASTATIN CALCIUM 10 MG: 10 TABLET, FILM COATED ORAL at 08:33

## 2019-01-01 RX ADMIN — HEPARIN SODIUM 23 UNITS/KG/HR: 10000 INJECTION, SOLUTION INTRAVENOUS at 12:46

## 2019-01-01 RX ADMIN — ATORVASTATIN CALCIUM 10 MG: 10 TABLET, FILM COATED ORAL at 22:23

## 2019-01-01 RX ADMIN — APIXABAN 5 MG: 5 TABLET, FILM COATED ORAL at 00:57

## 2019-01-01 RX ADMIN — APIXABAN 10 MG: 5 TABLET, FILM COATED ORAL at 08:15

## 2019-01-01 RX ADMIN — MONTELUKAST SODIUM 10 MG: 10 TABLET, FILM COATED ORAL at 21:45

## 2019-01-01 RX ADMIN — MONTELUKAST SODIUM 10 MG: 10 TABLET, FILM COATED ORAL at 21:10

## 2019-01-01 RX ADMIN — PANTOPRAZOLE SODIUM 40 MG: 40 TABLET, DELAYED RELEASE ORAL at 08:58

## 2019-01-01 RX ADMIN — INSULIN LISPRO 2 UNITS: 100 INJECTION, SOLUTION INTRAVENOUS; SUBCUTANEOUS at 21:10

## 2019-01-01 RX ADMIN — POTASSIUM CHLORIDE 10 MEQ: 10 INJECTION, SOLUTION INTRAVENOUS at 11:52

## 2019-01-01 RX ADMIN — IPRATROPIUM BROMIDE AND ALBUTEROL SULFATE 3 ML: 2.5; .5 SOLUTION RESPIRATORY (INHALATION) at 15:15

## 2019-01-01 RX ADMIN — ASPIRIN 325 MG: 325 TABLET ORAL at 08:12

## 2019-01-01 RX ADMIN — BUDESONIDE 0.5 MG: 0.5 INHALANT RESPIRATORY (INHALATION) at 20:46

## 2019-01-01 RX ADMIN — POTASSIUM CHLORIDE 10 MEQ: 10 INJECTION, SOLUTION INTRAVENOUS at 10:49

## 2019-01-01 RX ADMIN — INSULIN LISPRO 2 UNITS: 100 INJECTION, SOLUTION INTRAVENOUS; SUBCUTANEOUS at 08:19

## 2019-01-01 RX ADMIN — INSULIN LISPRO 3 UNITS: 100 INJECTION, SOLUTION INTRAVENOUS; SUBCUTANEOUS at 18:42

## 2019-01-01 RX ADMIN — MORPHINE SULFATE 15 MG: 15 TABLET, FILM COATED, EXTENDED RELEASE ORAL at 08:35

## 2019-01-01 RX ADMIN — METFORMIN HYDROCHLORIDE 1000 MG: 1000 TABLET ORAL at 09:05

## 2019-01-01 RX ADMIN — PANTOPRAZOLE SODIUM 40 MG: 40 TABLET, DELAYED RELEASE ORAL at 06:38

## 2019-01-01 RX ADMIN — INSULIN LISPRO 6 UNITS: 100 INJECTION, SOLUTION INTRAVENOUS; SUBCUTANEOUS at 17:39

## 2019-01-01 RX ADMIN — IOPAMIDOL 85 ML: 612 INJECTION, SOLUTION INTRAVENOUS at 09:38

## 2019-01-01 RX ADMIN — SODIUM CHLORIDE 8 MG/HR: 900 INJECTION INTRAVENOUS at 03:03

## 2019-01-01 RX ADMIN — DEXTROSE MONOHYDRATE 250 ML: 5 INJECTION, SOLUTION INTRAVENOUS at 09:57

## 2019-01-01 RX ADMIN — ACETYLCYSTEINE 4 ML: 200 SOLUTION ORAL; RESPIRATORY (INHALATION) at 08:29

## 2019-01-01 RX ADMIN — OXALIPLATIN 150 MG: 5 INJECTION, SOLUTION, CONCENTRATE INTRAVENOUS at 09:46

## 2019-01-01 RX ADMIN — OXYCODONE HYDROCHLORIDE 10 MG: 5 TABLET ORAL at 18:38

## 2019-01-01 RX ADMIN — MONTELUKAST SODIUM 10 MG: 10 TABLET, FILM COATED ORAL at 21:23

## 2019-01-01 RX ADMIN — PANTOPRAZOLE SODIUM 40 MG: 40 TABLET, DELAYED RELEASE ORAL at 05:55

## 2019-01-01 RX ADMIN — LIDOCAINE HYDROCHLORIDE 17 ML: 10 INJECTION, SOLUTION INFILTRATION; PERINEURAL at 10:15

## 2019-01-01 RX ADMIN — ACETYLCYSTEINE 4 ML: 200 SOLUTION ORAL; RESPIRATORY (INHALATION) at 20:26

## 2019-01-01 RX ADMIN — OXYCODONE HYDROCHLORIDE 10 MG: 5 TABLET ORAL at 19:19

## 2019-01-01 RX ADMIN — INSULIN LISPRO 4 UNITS: 100 INJECTION, SOLUTION INTRAVENOUS; SUBCUTANEOUS at 21:14

## 2019-01-01 RX ADMIN — MONTELUKAST SODIUM 10 MG: 10 TABLET, FILM COATED ORAL at 22:23

## 2019-01-01 RX ADMIN — PREDNISONE 40 MG: 20 TABLET ORAL at 10:12

## 2019-01-01 RX ADMIN — POLYETHYLENE GLYCOL 3350 17 G: 17 POWDER, FOR SOLUTION ORAL at 16:25

## 2019-01-01 RX ADMIN — IOPAMIDOL 95 ML: 755 INJECTION, SOLUTION INTRAVENOUS at 18:35

## 2019-01-01 RX ADMIN — MORPHINE SULFATE 30 MG: 30 TABLET, FILM COATED, EXTENDED RELEASE ORAL at 10:02

## 2019-01-01 RX ADMIN — MONTELUKAST SODIUM 10 MG: 10 TABLET, FILM COATED ORAL at 20:31

## 2019-01-01 RX ADMIN — PANTOPRAZOLE SODIUM 40 MG: 40 TABLET, DELAYED RELEASE ORAL at 06:28

## 2019-01-01 RX ADMIN — ASPIRIN 81 MG: 81 TABLET, COATED ORAL at 08:15

## 2019-01-01 RX ADMIN — DEXTROSE MONOHYDRATE 315 MG: 5 INJECTION, SOLUTION INTRAVENOUS at 11:36

## 2019-01-01 RX ADMIN — OXYCODONE HYDROCHLORIDE AND ACETAMINOPHEN 1 TABLET: 5; 325 TABLET ORAL at 12:18

## 2019-01-01 RX ADMIN — SODIUM CHLORIDE, PRESERVATIVE FREE 10 ML: 5 INJECTION INTRAVENOUS at 22:22

## 2019-01-01 RX ADMIN — MORPHINE SULFATE 30 MG: 15 TABLET, FILM COATED, EXTENDED RELEASE ORAL at 14:19

## 2019-01-01 RX ADMIN — ACETYLCYSTEINE 4 ML: 200 SOLUTION ORAL; RESPIRATORY (INHALATION) at 17:13

## 2019-01-01 RX ADMIN — BUDESONIDE 0.5 MG: 0.5 INHALANT RESPIRATORY (INHALATION) at 21:00

## 2019-01-01 RX ADMIN — OXYCODONE HYDROCHLORIDE 10 MG: 5 TABLET ORAL at 14:39

## 2019-01-01 RX ADMIN — ACETYLCYSTEINE 4 ML: 200 SOLUTION ORAL; RESPIRATORY (INHALATION) at 11:46

## 2019-01-01 RX ADMIN — SODIUM CHLORIDE: 9 INJECTION, SOLUTION INTRAVENOUS at 09:08

## 2019-01-01 RX ADMIN — INSULIN LISPRO 2 UNITS: 100 INJECTION, SOLUTION INTRAVENOUS; SUBCUTANEOUS at 17:56

## 2019-01-01 RX ADMIN — INSULIN LISPRO 5 UNITS: 100 INJECTION, SOLUTION INTRAVENOUS; SUBCUTANEOUS at 21:07

## 2019-01-01 RX ADMIN — PANTOPRAZOLE SODIUM 40 MG: 40 TABLET, DELAYED RELEASE ORAL at 06:35

## 2019-01-01 RX ADMIN — ATROPINE SULFATE 0.25 MG: 0.4 INJECTION, SOLUTION INTRAMUSCULAR; INTRAVENOUS; SUBCUTANEOUS at 11:24

## 2019-01-01 RX ADMIN — AZITHROMYCIN MONOHYDRATE 500 MG: 500 INJECTION, POWDER, LYOPHILIZED, FOR SOLUTION INTRAVENOUS at 18:42

## 2019-01-01 RX ADMIN — CETIRIZINE HYDROCHLORIDE 10 MG: 10 TABLET, FILM COATED ORAL at 08:34

## 2019-01-01 RX ADMIN — MAGNESIUM SULFATE 2 G: 2 INJECTION INTRAVENOUS at 03:39

## 2019-01-01 RX ADMIN — PANTOPRAZOLE SODIUM 40 MG: 40 TABLET, DELAYED RELEASE ORAL at 05:23

## 2019-01-01 RX ADMIN — SODIUM CHLORIDE, PRESERVATIVE FREE 10 ML: 5 INJECTION INTRAVENOUS at 21:12

## 2019-01-01 RX ADMIN — CETIRIZINE HYDROCHLORIDE 10 MG: 10 TABLET, FILM COATED ORAL at 22:23

## 2019-01-01 RX ADMIN — SODIUM CHLORIDE, PRESERVATIVE FREE 3 ML: 5 INJECTION INTRAVENOUS at 10:55

## 2019-01-01 RX ADMIN — INSULIN GLARGINE 15 UNITS: 100 INJECTION, SOLUTION SUBCUTANEOUS at 21:10

## 2019-01-01 RX ADMIN — SODIUM CHLORIDE, PRESERVATIVE FREE 10 ML: 5 INJECTION INTRAVENOUS at 08:57

## 2019-01-01 RX ADMIN — FILGRASTIM 300 MCG: 300 INJECTION, SOLUTION INTRAVENOUS; SUBCUTANEOUS at 17:19

## 2019-01-01 RX ADMIN — LISINOPRIL: 20 TABLET ORAL at 08:58

## 2019-01-01 RX ADMIN — OXALIPLATIN 150 MG: 5 INJECTION, SOLUTION INTRAVENOUS at 15:34

## 2019-01-01 RX ADMIN — CETIRIZINE HYDROCHLORIDE 10 MG: 10 TABLET, FILM COATED ORAL at 09:29

## 2019-01-01 RX ADMIN — APIXABAN 5 MG: 5 TABLET, FILM COATED ORAL at 10:02

## 2019-01-01 RX ADMIN — IOPAMIDOL 95 ML: 755 INJECTION, SOLUTION INTRAVENOUS at 18:32

## 2019-01-01 RX ADMIN — DEXTROSE MONOHYDRATE 250 ML: 5 INJECTION, SOLUTION INTRAVENOUS at 09:56

## 2019-01-01 RX ADMIN — DABIGATRAN ETEXILATE MESYLATE 150 MG: 150 CAPSULE ORAL at 01:14

## 2019-01-01 RX ADMIN — DEXTROSE MONOHYDRATE 315 MG: 5 INJECTION, SOLUTION INTRAVENOUS at 11:49

## 2019-01-01 RX ADMIN — ACETYLCYSTEINE 4 ML: 200 SOLUTION ORAL; RESPIRATORY (INHALATION) at 19:19

## 2019-01-01 RX ADMIN — ALTEPLASE: 2.2 INJECTION, POWDER, LYOPHILIZED, FOR SOLUTION INTRAVENOUS at 09:21

## 2019-01-01 RX ADMIN — HEPARIN SODIUM 2500 UNITS: 5000 INJECTION INTRAVENOUS; SUBCUTANEOUS at 16:54

## 2019-01-01 RX ADMIN — IPRATROPIUM BROMIDE AND ALBUTEROL SULFATE 3 ML: 2.5; .5 SOLUTION RESPIRATORY (INHALATION) at 12:24

## 2019-01-01 RX ADMIN — MORPHINE SULFATE 30 MG: 30 TABLET, FILM COATED, EXTENDED RELEASE ORAL at 21:41

## 2019-01-01 RX ADMIN — SODIUM CHLORIDE 100 ML/HR: 9 INJECTION, SOLUTION INTRAVENOUS at 22:41

## 2019-01-01 RX ADMIN — METFORMIN HYDROCHLORIDE 1000 MG: 1000 TABLET ORAL at 17:51

## 2019-01-01 RX ADMIN — CETIRIZINE HYDROCHLORIDE 10 MG: 10 TABLET, FILM COATED ORAL at 08:30

## 2019-01-01 RX ADMIN — TIMOLOL MALEATE 1 DROP: 2.5 SOLUTION/ DROPS OPHTHALMIC at 09:36

## 2019-01-01 RX ADMIN — PANTOPRAZOLE SODIUM 40 MG: 40 TABLET, DELAYED RELEASE ORAL at 05:36

## 2019-01-01 RX ADMIN — FLUOROURACIL 2090 MG: 50 INJECTION, SOLUTION INTRAVENOUS at 20:23

## 2019-01-01 RX ADMIN — DEXAMETHASONE SODIUM PHOSPHATE 12 MG: 10 INJECTION, SOLUTION INTRAMUSCULAR; INTRAVENOUS at 09:14

## 2019-01-01 RX ADMIN — INSULIN LISPRO 4 UNITS: 100 INJECTION, SOLUTION INTRAVENOUS; SUBCUTANEOUS at 17:13

## 2019-01-01 RX ADMIN — OXYCODONE HYDROCHLORIDE 10 MG: 5 TABLET ORAL at 06:42

## 2019-01-01 RX ADMIN — MORPHINE SULFATE 30 MG: 30 TABLET, FILM COATED, EXTENDED RELEASE ORAL at 20:34

## 2019-01-01 RX ADMIN — IRON SUCROSE 300 MG: 20 INJECTION, SOLUTION INTRAVENOUS at 15:14

## 2019-01-01 RX ADMIN — PEGFILGRASTIM 6 MG: KIT SUBCUTANEOUS at 12:29

## 2019-01-01 RX ADMIN — FLUOROURACIL 4000 MG: 50 INJECTION, SOLUTION INTRAVENOUS at 16:13

## 2019-01-01 RX ADMIN — DABIGATRAN ETEXILATE MESYLATE 150 MG: 150 CAPSULE ORAL at 21:07

## 2019-01-01 RX ADMIN — INSULIN LISPRO 2 UNITS: 100 INJECTION, SOLUTION INTRAVENOUS; SUBCUTANEOUS at 08:32

## 2019-01-01 RX ADMIN — MAGNESIUM SULFATE 2 G: 2 INJECTION INTRAVENOUS at 01:16

## 2019-01-01 RX ADMIN — BUDESONIDE 0.5 MG: 0.5 INHALANT RESPIRATORY (INHALATION) at 07:17

## 2019-01-01 RX ADMIN — FLUOROURACIL 700 MG: 50 INJECTION, SOLUTION INTRAVENOUS at 20:08

## 2019-01-01 RX ADMIN — TIMOLOL MALEATE 1 DROP: 2.5 SOLUTION/ DROPS OPHTHALMIC at 09:30

## 2019-01-01 RX ADMIN — FLUOROURACIL 2090 MG: 50 INJECTION, SOLUTION INTRAVENOUS at 19:24

## 2019-01-01 RX ADMIN — INSULIN LISPRO 2 UNITS: 100 INJECTION, SOLUTION INTRAVENOUS; SUBCUTANEOUS at 12:14

## 2019-01-01 RX ADMIN — POTASSIUM CHLORIDE 40 MEQ: 750 CAPSULE, EXTENDED RELEASE ORAL at 17:50

## 2019-01-01 RX ADMIN — PANTOPRAZOLE SODIUM 40 MG: 40 TABLET, DELAYED RELEASE ORAL at 08:15

## 2019-01-01 RX ADMIN — FAMOTIDINE 20 MG: 10 INJECTION INTRAVENOUS at 09:12

## 2019-01-01 RX ADMIN — BUDESONIDE 0.5 MG: 0.5 INHALANT RESPIRATORY (INHALATION) at 21:28

## 2019-01-01 RX ADMIN — ALBUTEROL SULFATE 2.5 MG: 2.5 SOLUTION RESPIRATORY (INHALATION) at 18:16

## 2019-01-01 RX ADMIN — IPRATROPIUM BROMIDE AND ALBUTEROL SULFATE 3 ML: 2.5; .5 SOLUTION RESPIRATORY (INHALATION) at 11:32

## 2019-01-01 RX ADMIN — CEFEPIME HYDROCHLORIDE 2 G: 2 INJECTION, POWDER, FOR SOLUTION INTRAVENOUS at 01:20

## 2019-01-01 RX ADMIN — APIXABAN 10 MG: 5 TABLET, FILM COATED ORAL at 08:33

## 2019-01-01 RX ADMIN — OXYCODONE HYDROCHLORIDE 10 MG: 5 TABLET ORAL at 18:00

## 2019-01-01 RX ADMIN — OXYCODONE HYDROCHLORIDE 10 MG: 5 TABLET ORAL at 06:40

## 2019-01-01 RX ADMIN — INSULIN GLARGINE 10 UNITS: 100 INJECTION, SOLUTION SUBCUTANEOUS at 21:40

## 2019-01-01 RX ADMIN — DEXAMETHASONE SODIUM PHOSPHATE 12 MG: 10 INJECTION, SOLUTION INTRAMUSCULAR; INTRAVENOUS at 09:15

## 2019-01-01 RX ADMIN — OXYCODONE HYDROCHLORIDE 10 MG: 5 TABLET ORAL at 08:24

## 2019-01-01 RX ADMIN — INSULIN LISPRO 4 UNITS: 100 INJECTION, SOLUTION INTRAVENOUS; SUBCUTANEOUS at 12:47

## 2019-01-01 RX ADMIN — Medication 500 UNITS: at 20:42

## 2019-01-01 RX ADMIN — MORPHINE SULFATE 30 MG: 30 TABLET, FILM COATED, EXTENDED RELEASE ORAL at 22:24

## 2019-01-01 RX ADMIN — SODIUM CHLORIDE, PRESERVATIVE FREE 500 UNITS: 5 INJECTION INTRAVENOUS at 14:02

## 2019-01-01 RX ADMIN — MORPHINE SULFATE 15 MG: 15 TABLET, FILM COATED, EXTENDED RELEASE ORAL at 21:10

## 2019-01-01 RX ADMIN — MORPHINE SULFATE 15 MG: 15 TABLET, FILM COATED, EXTENDED RELEASE ORAL at 00:09

## 2019-01-01 RX ADMIN — PALONOSETRON 0.25 MG: 0.05 INJECTION, SOLUTION INTRAVENOUS at 09:13

## 2019-01-01 RX ADMIN — OXYCODONE HYDROCHLORIDE 10 MG: 5 TABLET ORAL at 05:59

## 2019-01-01 RX ADMIN — OXALIPLATIN 140 MG: 5 INJECTION, SOLUTION, CONCENTRATE INTRAVENOUS at 11:57

## 2019-01-01 RX ADMIN — FLUOROURACIL 4000 MG: 50 INJECTION, SOLUTION INTRAVENOUS at 14:39

## 2019-01-01 RX ADMIN — POLYETHYLENE GLYCOL 3350 17 G: 17 POWDER, FOR SOLUTION ORAL at 09:54

## 2019-01-01 RX ADMIN — FAMOTIDINE 20 MG: 10 INJECTION INTRAVENOUS at 10:24

## 2019-01-01 RX ADMIN — PALONOSETRON 0.25 MG: 0.05 INJECTION, SOLUTION INTRAVENOUS at 09:58

## 2019-01-01 RX ADMIN — PREDNISONE 40 MG: 20 TABLET ORAL at 11:19

## 2019-01-01 RX ADMIN — SODIUM CHLORIDE 8 MG/HR: 900 INJECTION INTRAVENOUS at 15:23

## 2019-01-01 RX ADMIN — ACETYLCYSTEINE 4 ML: 200 SOLUTION ORAL; RESPIRATORY (INHALATION) at 08:34

## 2019-01-01 RX ADMIN — PEGFILGRASTIM 6 MG: KIT SUBCUTANEOUS at 12:09

## 2019-01-01 RX ADMIN — POLYETHYLENE GLYCOL 3350 17 G: 17 POWDER, FOR SOLUTION ORAL at 08:33

## 2019-01-01 RX ADMIN — INSULIN LISPRO 2 UNITS: 100 INJECTION, SOLUTION INTRAVENOUS; SUBCUTANEOUS at 21:15

## 2019-01-01 RX ADMIN — MORPHINE SULFATE 15 MG: 15 TABLET, FILM COATED, EXTENDED RELEASE ORAL at 08:30

## 2019-01-01 RX ADMIN — LEUCOVORIN CALCIUM 650 MG: 350 INJECTION, POWDER, LYOPHILIZED, FOR SOLUTION INTRAMUSCULAR; INTRAVENOUS at 14:01

## 2019-01-01 RX ADMIN — BUDESONIDE 0.5 MG: 0.5 INHALANT RESPIRATORY (INHALATION) at 19:14

## 2019-01-01 RX ADMIN — BUDESONIDE 0.5 MG: 0.5 INHALANT RESPIRATORY (INHALATION) at 20:39

## 2019-01-01 RX ADMIN — MONTELUKAST SODIUM 10 MG: 10 TABLET, FILM COATED ORAL at 20:46

## 2019-01-01 RX ADMIN — DEXAMETHASONE SODIUM PHOSPHATE: 4 INJECTION, SOLUTION INTRAMUSCULAR; INTRAVENOUS at 14:47

## 2019-01-01 RX ADMIN — METFORMIN HYDROCHLORIDE 1000 MG: 1000 TABLET ORAL at 11:37

## 2019-01-01 RX ADMIN — BUDESONIDE 0.5 MG: 0.5 INHALANT RESPIRATORY (INHALATION) at 19:34

## 2019-01-01 RX ADMIN — LEVALBUTEROL INHALATION SOLUTION 0.63 MG: 0.63 SOLUTION RESPIRATORY (INHALATION) at 10:06

## 2019-01-01 RX ADMIN — IPRATROPIUM BROMIDE AND ALBUTEROL SULFATE 3 ML: 2.5; .5 SOLUTION RESPIRATORY (INHALATION) at 08:34

## 2019-01-01 RX ADMIN — SODIUM CHLORIDE, PRESERVATIVE FREE 10 ML: 5 INJECTION INTRAVENOUS at 11:19

## 2019-01-01 RX ADMIN — INSULIN GLARGINE 16 UNITS: 100 INJECTION, SOLUTION SUBCUTANEOUS at 21:45

## 2019-01-01 RX ADMIN — MORPHINE SULFATE 15 MG: 15 TABLET, FILM COATED, EXTENDED RELEASE ORAL at 21:45

## 2019-01-01 RX ADMIN — MORPHINE SULFATE 30 MG: 15 TABLET, FILM COATED, EXTENDED RELEASE ORAL at 09:34

## 2019-01-01 RX ADMIN — ALBUTEROL SULFATE 2.5 MG: 2.5 SOLUTION RESPIRATORY (INHALATION) at 21:00

## 2019-01-01 RX ADMIN — SODIUM CHLORIDE, PRESERVATIVE FREE 3 ML: 5 INJECTION INTRAVENOUS at 08:59

## 2019-01-01 RX ADMIN — MONTELUKAST SODIUM 10 MG: 10 TABLET, FILM COATED ORAL at 00:57

## 2019-01-01 RX ADMIN — TIMOLOL MALEATE 1 DROP: 2.5 SOLUTION/ DROPS OPHTHALMIC at 08:49

## 2019-01-01 RX ADMIN — POTASSIUM CHLORIDE 40 MEQ: 750 CAPSULE, EXTENDED RELEASE ORAL at 20:33

## 2019-01-01 RX ADMIN — PANTOPRAZOLE SODIUM 80 MG: 40 INJECTION, POWDER, LYOPHILIZED, FOR SOLUTION INTRAVENOUS at 03:03

## 2019-01-01 RX ADMIN — INSULIN LISPRO 2 UNITS: 100 INJECTION, SOLUTION INTRAVENOUS; SUBCUTANEOUS at 08:15

## 2019-01-01 RX ADMIN — DABIGATRAN ETEXILATE MESYLATE 150 MG: 150 CAPSULE ORAL at 20:15

## 2019-01-01 RX ADMIN — BUDESONIDE 0.5 MG: 0.5 INHALANT RESPIRATORY (INHALATION) at 07:32

## 2019-01-01 RX ADMIN — POTASSIUM CHLORIDE 40 MEQ: 750 CAPSULE, EXTENDED RELEASE ORAL at 05:17

## 2019-01-01 RX ADMIN — LISINOPRIL 20 MG: 20 TABLET ORAL at 09:29

## 2019-01-01 RX ADMIN — OXYCODONE HYDROCHLORIDE 10 MG: 5 TABLET ORAL at 14:44

## 2019-01-01 RX ADMIN — PROPOFOL 300 MCG/KG/MIN: 10 INJECTION, EMULSION INTRAVENOUS at 09:25

## 2019-01-01 RX ADMIN — ONDANSETRON 4 MG: 2 INJECTION INTRAMUSCULAR; INTRAVENOUS at 18:37

## 2019-01-01 RX ADMIN — MORPHINE SULFATE 15 MG: 15 TABLET, FILM COATED, EXTENDED RELEASE ORAL at 21:14

## 2019-01-01 RX ADMIN — TIMOLOL MALEATE 1 DROP: 2.5 SOLUTION/ DROPS OPHTHALMIC at 11:38

## 2019-01-01 RX ADMIN — INSULIN LISPRO 3 UNITS: 100 INJECTION, SOLUTION INTRAVENOUS; SUBCUTANEOUS at 21:44

## 2019-01-01 RX ADMIN — SODIUM CHLORIDE, PRESERVATIVE FREE 10 ML: 5 INJECTION INTRAVENOUS at 10:13

## 2019-01-01 RX ADMIN — ACETYLCYSTEINE 4 ML: 200 SOLUTION ORAL; RESPIRATORY (INHALATION) at 07:41

## 2019-01-01 RX ADMIN — ACETYLCYSTEINE 4 ML: 200 SOLUTION ORAL; RESPIRATORY (INHALATION) at 10:52

## 2019-01-01 RX ADMIN — Medication 500 UNITS: at 12:29

## 2019-01-01 RX ADMIN — POTASSIUM CHLORIDE 40 MEQ: 750 CAPSULE, EXTENDED RELEASE ORAL at 08:35

## 2019-01-01 RX ADMIN — MORPHINE SULFATE 15 MG: 15 TABLET, FILM COATED, EXTENDED RELEASE ORAL at 11:37

## 2019-01-01 RX ADMIN — IPRATROPIUM BROMIDE AND ALBUTEROL SULFATE 3 ML: 2.5; .5 SOLUTION RESPIRATORY (INHALATION) at 21:48

## 2019-01-01 RX ADMIN — OXYCODONE HYDROCHLORIDE 10 MG: 5 TABLET ORAL at 04:43

## 2019-01-01 RX ADMIN — FAMOTIDINE 20 MG: 10 INJECTION, SOLUTION INTRAVENOUS at 15:35

## 2019-01-01 RX ADMIN — ATORVASTATIN CALCIUM 10 MG: 10 TABLET, FILM COATED ORAL at 08:24

## 2019-01-01 RX ADMIN — DABIGATRAN ETEXILATE MESYLATE 150 MG: 150 CAPSULE ORAL at 10:12

## 2019-01-01 RX ADMIN — INSULIN GLARGINE 12 UNITS: 100 INJECTION, SOLUTION SUBCUTANEOUS at 00:05

## 2019-01-01 RX ADMIN — PANTOPRAZOLE SODIUM 40 MG: 40 TABLET, DELAYED RELEASE ORAL at 06:51

## 2019-01-01 RX ADMIN — SODIUM CHLORIDE 8 MG/HR: 900 INJECTION INTRAVENOUS at 09:34

## 2019-01-01 RX ADMIN — IPRATROPIUM BROMIDE AND ALBUTEROL SULFATE 3 ML: 2.5; .5 SOLUTION RESPIRATORY (INHALATION) at 16:44

## 2019-01-01 RX ADMIN — MORPHINE SULFATE 15 MG: 15 TABLET, FILM COATED, EXTENDED RELEASE ORAL at 09:21

## 2019-01-01 RX ADMIN — OXYCODONE HYDROCHLORIDE 10 MG: 5 TABLET ORAL at 13:18

## 2019-01-01 RX ADMIN — INSULIN LISPRO 4 UNITS: 100 INJECTION, SOLUTION INTRAVENOUS; SUBCUTANEOUS at 21:24

## 2019-01-01 RX ADMIN — BUDESONIDE 0.5 MG: 0.5 INHALANT RESPIRATORY (INHALATION) at 13:55

## 2019-01-01 RX ADMIN — LIDOCAINE HYDROCHLORIDE 40 MG: 20 INJECTION, SOLUTION INFILTRATION; PERINEURAL at 09:25

## 2019-01-01 RX ADMIN — POLYETHYLENE GLYCOL 3350 17 G: 17 POWDER, FOR SOLUTION ORAL at 11:37

## 2019-01-01 RX ADMIN — INSULIN GLARGINE 10 UNITS: 100 INJECTION, SOLUTION SUBCUTANEOUS at 22:24

## 2019-01-01 RX ADMIN — POTASSIUM CHLORIDE 40 MEQ: 750 CAPSULE, EXTENDED RELEASE ORAL at 09:29

## 2019-01-01 RX ADMIN — BUDESONIDE 0.5 MG: 0.5 INHALANT RESPIRATORY (INHALATION) at 19:51

## 2019-01-01 RX ADMIN — LEUCOVORIN CALCIUM 700 MG: 350 INJECTION, POWDER, LYOPHILIZED, FOR SOLUTION INTRAMUSCULAR; INTRAVENOUS at 11:36

## 2019-01-01 RX ADMIN — VANCOMYCIN HYDROCHLORIDE 1250 MG: 10 INJECTION, POWDER, LYOPHILIZED, FOR SOLUTION INTRAVENOUS at 03:14

## 2019-01-01 RX ADMIN — OXYCODONE HYDROCHLORIDE 10 MG: 5 TABLET ORAL at 12:24

## 2019-01-01 RX ADMIN — INSULIN GLARGINE 16 UNITS: 100 INJECTION, SOLUTION SUBCUTANEOUS at 21:14

## 2019-01-01 RX ADMIN — VANCOMYCIN HYDROCHLORIDE 1250 MG: 10 INJECTION, POWDER, LYOPHILIZED, FOR SOLUTION INTRAVENOUS at 21:14

## 2019-01-01 RX ADMIN — PALONOSETRON HYDROCHLORIDE 0.25 MG: 0.25 INJECTION, SOLUTION INTRAVENOUS at 10:40

## 2019-01-01 RX ADMIN — Medication 500 UNITS: at 13:27

## 2019-01-01 RX ADMIN — ACETYLCYSTEINE 4 ML: 200 SOLUTION ORAL; RESPIRATORY (INHALATION) at 15:37

## 2019-01-01 RX ADMIN — METFORMIN HYDROCHLORIDE 1000 MG: 1000 TABLET ORAL at 17:20

## 2019-01-01 RX ADMIN — OXYCODONE HYDROCHLORIDE 10 MG: 5 TABLET ORAL at 09:02

## 2019-01-01 RX ADMIN — PALONOSETRON HYDROCHLORIDE 0.25 MG: 0.25 INJECTION, SOLUTION INTRAVENOUS at 09:00

## 2019-01-01 RX ADMIN — FLUOROURACIL 650 MG: 50 INJECTION, SOLUTION INTRAVENOUS at 15:39

## 2019-01-01 RX ADMIN — OXYCODONE HYDROCHLORIDE 10 MG: 5 TABLET ORAL at 07:39

## 2019-01-01 RX ADMIN — Medication 500 UNITS: at 17:11

## 2019-01-01 RX ADMIN — AZITHROMYCIN MONOHYDRATE 500 MG: 500 INJECTION, POWDER, LYOPHILIZED, FOR SOLUTION INTRAVENOUS at 23:29

## 2019-01-01 RX ADMIN — ATORVASTATIN CALCIUM 10 MG: 10 TABLET, FILM COATED ORAL at 08:56

## 2019-01-01 RX ADMIN — POTASSIUM CHLORIDE 40 MEQ: 750 CAPSULE, EXTENDED RELEASE ORAL at 12:59

## 2019-01-01 RX ADMIN — METFORMIN HYDROCHLORIDE 1000 MG: 1000 TABLET ORAL at 17:30

## 2019-01-01 RX ADMIN — FLUOROURACIL 4000 MG: 50 INJECTION, SOLUTION INTRAVENOUS at 15:40

## 2019-01-01 RX ADMIN — SODIUM CHLORIDE, PRESERVATIVE FREE 10 ML: 5 INJECTION INTRAVENOUS at 08:42

## 2019-01-01 RX ADMIN — SODIUM CHLORIDE 520 MG: 900 INJECTION, SOLUTION INTRAVENOUS at 09:52

## 2019-01-01 RX ADMIN — CEFEPIME HYDROCHLORIDE 2 G: 2 INJECTION, POWDER, FOR SOLUTION INTRAVENOUS at 06:44

## 2019-01-01 RX ADMIN — SODIUM CHLORIDE, PRESERVATIVE FREE 10 ML: 5 INJECTION INTRAVENOUS at 08:26

## 2019-01-01 RX ADMIN — SODIUM CHLORIDE 100 ML/HR: 9 INJECTION, SOLUTION INTRAVENOUS at 10:08

## 2019-01-01 RX ADMIN — SODIUM CHLORIDE, POTASSIUM CHLORIDE, SODIUM LACTATE AND CALCIUM CHLORIDE 30 ML/HR: 600; 310; 30; 20 INJECTION, SOLUTION INTRAVENOUS at 10:06

## 2019-01-01 RX ADMIN — ACETAMINOPHEN ORAL SOLUTION 650 MG: 325 SOLUTION ORAL at 12:03

## 2019-01-01 RX ADMIN — MORPHINE SULFATE 15 MG: 15 TABLET, FILM COATED, EXTENDED RELEASE ORAL at 08:58

## 2019-01-01 RX ADMIN — MORPHINE SULFATE 15 MG: 15 TABLET, FILM COATED, EXTENDED RELEASE ORAL at 08:15

## 2019-01-01 RX ADMIN — IPRATROPIUM BROMIDE AND ALBUTEROL SULFATE 3 ML: 2.5; .5 SOLUTION RESPIRATORY (INHALATION) at 16:49

## 2019-01-01 RX ADMIN — SODIUM CHLORIDE, PRESERVATIVE FREE 10 ML: 5 INJECTION INTRAVENOUS at 21:56

## 2019-01-01 RX ADMIN — DEXTROSE MONOHYDRATE 250 ML: 5 INJECTION, SOLUTION INTRAVENOUS at 09:00

## 2019-01-01 RX ADMIN — TIMOLOL MALEATE 1 DROP: 2.5 SOLUTION/ DROPS OPHTHALMIC at 08:33

## 2019-01-01 RX ADMIN — MORPHINE SULFATE 15 MG: 15 TABLET, FILM COATED, EXTENDED RELEASE ORAL at 08:13

## 2019-01-01 RX ADMIN — INSULIN LISPRO 5 UNITS: 100 INJECTION, SOLUTION INTRAVENOUS; SUBCUTANEOUS at 17:12

## 2019-01-01 RX ADMIN — ATROPINE SULFATE 0.25 MG: 0.4 INJECTION, SOLUTION INTRAMUSCULAR; INTRAVENOUS; SUBCUTANEOUS at 13:54

## 2019-01-01 RX ADMIN — POLYETHYLENE GLYCOL 3350 17 G: 17 POWDER, FOR SOLUTION ORAL at 11:19

## 2019-01-01 RX ADMIN — FLUDEOXYGLUCOSE F18 1 DOSE: 300 INJECTION INTRAVENOUS at 13:05

## 2019-01-01 RX ADMIN — TIMOLOL MALEATE 1 DROP: 2.5 SOLUTION/ DROPS OPHTHALMIC at 09:06

## 2019-01-01 RX ADMIN — INSULIN LISPRO 2 UNITS: 100 INJECTION, SOLUTION INTRAVENOUS; SUBCUTANEOUS at 08:13

## 2019-01-01 RX ADMIN — PROPOFOL 125 MG: 10 INJECTION, EMULSION INTRAVENOUS at 10:45

## 2019-01-01 RX ADMIN — FLUTICASONE PROPIONATE 2 SPRAY: 50 SPRAY, METERED NASAL at 08:15

## 2019-01-01 RX ADMIN — FLUTICASONE PROPIONATE 2 SPRAY: 50 SPRAY, METERED NASAL at 08:49

## 2019-01-01 RX ADMIN — INSULIN LISPRO 3 UNITS: 100 INJECTION, SOLUTION INTRAVENOUS; SUBCUTANEOUS at 08:41

## 2019-01-01 RX ADMIN — OXYCODONE HYDROCHLORIDE 10 MG: 5 TABLET ORAL at 02:39

## 2019-01-01 RX ADMIN — SODIUM CHLORIDE 700 ML: 9 INJECTION, SOLUTION INTRAVENOUS at 13:47

## 2019-01-01 RX ADMIN — POTASSIUM CHLORIDE 40 MEQ: 750 CAPSULE, EXTENDED RELEASE ORAL at 18:23

## 2019-01-01 RX ADMIN — MORPHINE SULFATE 30 MG: 15 TABLET, FILM COATED, EXTENDED RELEASE ORAL at 13:03

## 2019-01-01 RX ADMIN — OXYCODONE HYDROCHLORIDE 10 MG: 5 TABLET ORAL at 15:35

## 2019-01-01 RX ADMIN — ATROPINE SULFATE 0.25 MG: 0.4 INJECTION, SOLUTION INTRAMUSCULAR; INTRAVENOUS; SUBCUTANEOUS at 13:58

## 2019-01-01 RX ADMIN — ACETYLCYSTEINE 4 ML: 200 SOLUTION ORAL; RESPIRATORY (INHALATION) at 15:29

## 2019-01-01 RX ADMIN — BUDESONIDE 0.5 MG: 0.5 INHALANT RESPIRATORY (INHALATION) at 19:28

## 2019-01-01 RX ADMIN — FLUTICASONE PROPIONATE 2 SPRAY: 50 SPRAY, METERED NASAL at 08:35

## 2019-01-01 RX ADMIN — ACETYLCYSTEINE 4 ML: 200 SOLUTION ORAL; RESPIRATORY (INHALATION) at 11:34

## 2019-01-01 RX ADMIN — APIXABAN 10 MG: 5 TABLET, FILM COATED ORAL at 13:10

## 2019-01-01 RX ADMIN — FAMOTIDINE 20 MG: 10 INJECTION INTRAVENOUS at 09:15

## 2019-01-01 RX ADMIN — POLYETHYLENE GLYCOL 3350 17 G: 17 POWDER, FOR SOLUTION ORAL at 08:24

## 2019-01-01 RX ADMIN — ONDANSETRON 4 MG: 4 TABLET, FILM COATED ORAL at 18:01

## 2019-01-01 RX ADMIN — OXYCODONE HYDROCHLORIDE 10 MG: 5 TABLET ORAL at 14:07

## 2019-01-01 RX ADMIN — SODIUM CHLORIDE, PRESERVATIVE FREE 10 ML: 5 INJECTION INTRAVENOUS at 01:15

## 2019-01-01 RX ADMIN — MORPHINE SULFATE 15 MG: 15 TABLET, FILM COATED, EXTENDED RELEASE ORAL at 08:19

## 2019-01-01 RX ADMIN — ALTEPLASE: KIT at 16:10

## 2019-01-01 RX ADMIN — IRINOTECAN HYDROCHLORIDE 235 MG: 20 INJECTION, SOLUTION INTRAVENOUS at 10:50

## 2019-01-01 RX ADMIN — SODIUM CHLORIDE 125 ML/HR: 9 INJECTION, SOLUTION INTRAVENOUS at 16:10

## 2019-01-01 RX ADMIN — APIXABAN 10 MG: 5 TABLET, FILM COATED ORAL at 08:34

## 2019-01-01 RX ADMIN — ATORVASTATIN CALCIUM 10 MG: 10 TABLET, FILM COATED ORAL at 08:58

## 2019-01-01 RX ADMIN — PANTOPRAZOLE SODIUM 40 MG: 40 TABLET, DELAYED RELEASE ORAL at 05:54

## 2019-01-01 RX ADMIN — SODIUM CHLORIDE, PRESERVATIVE FREE 10 ML: 5 INJECTION INTRAVENOUS at 10:48

## 2019-01-01 RX ADMIN — CETIRIZINE HYDROCHLORIDE 10 MG: 10 TABLET, FILM COATED ORAL at 08:49

## 2019-01-01 RX ADMIN — OXYCODONE HYDROCHLORIDE 10 MG: 5 TABLET ORAL at 03:34

## 2019-01-01 RX ADMIN — IPRATROPIUM BROMIDE AND ALBUTEROL SULFATE 3 ML: 2.5; .5 SOLUTION RESPIRATORY (INHALATION) at 17:12

## 2019-01-01 RX ADMIN — INSULIN LISPRO 3 UNITS: 100 INJECTION, SOLUTION INTRAVENOUS; SUBCUTANEOUS at 17:51

## 2019-01-01 RX ADMIN — FLUOROURACIL 4180 MG: 50 INJECTION, SOLUTION INTRAVENOUS at 13:08

## 2019-01-01 RX ADMIN — TIMOLOL MALEATE 1 DROP: 2.5 SOLUTION/ DROPS OPHTHALMIC at 08:26

## 2019-01-01 RX ADMIN — DEXTROSE MONOHYDRATE 235 MG: 5 INJECTION, SOLUTION INTRAVENOUS at 12:45

## 2019-01-01 RX ADMIN — BUDESONIDE 0.5 MG: 0.5 INHALANT RESPIRATORY (INHALATION) at 08:39

## 2019-01-01 RX ADMIN — AZITHROMYCIN MONOHYDRATE 500 MG: 500 INJECTION, POWDER, LYOPHILIZED, FOR SOLUTION INTRAVENOUS at 17:59

## 2019-01-01 RX ADMIN — MORPHINE SULFATE 15 MG: 15 TABLET, FILM COATED, EXTENDED RELEASE ORAL at 20:08

## 2019-01-01 RX ADMIN — OXYCODONE HYDROCHLORIDE 10 MG: 5 TABLET ORAL at 17:13

## 2019-01-01 RX ADMIN — DIPHENHYDRAMINE HYDROCHLORIDE 25 MG: 25 SOLUTION ORAL at 15:14

## 2019-01-01 RX ADMIN — INSULIN LISPRO 4 UNITS: 100 INJECTION, SOLUTION INTRAVENOUS; SUBCUTANEOUS at 12:35

## 2019-01-01 RX ADMIN — TIMOLOL MALEATE 1 DROP: 2.5 SOLUTION/ DROPS OPHTHALMIC at 08:42

## 2019-01-01 RX ADMIN — PANTOPRAZOLE SODIUM 40 MG: 40 TABLET, DELAYED RELEASE ORAL at 18:21

## 2019-01-01 RX ADMIN — MAGNESIUM SULFATE 2 G: 2 INJECTION INTRAVENOUS at 05:55

## 2019-01-01 RX ADMIN — SODIUM CHLORIDE, PRESERVATIVE FREE 3 ML: 5 INJECTION INTRAVENOUS at 00:10

## 2019-01-01 RX ADMIN — MORPHINE SULFATE 30 MG: 15 TABLET, FILM COATED, EXTENDED RELEASE ORAL at 12:48

## 2019-01-01 RX ADMIN — POTASSIUM CHLORIDE 40 MEQ: 750 CAPSULE, EXTENDED RELEASE ORAL at 12:41

## 2019-01-01 RX ADMIN — TIMOLOL MALEATE 1 DROP: 2.5 SOLUTION/ DROPS OPHTHALMIC at 08:19

## 2019-01-01 RX ADMIN — TIMOLOL MALEATE 1 DROP: 2.5 SOLUTION/ DROPS OPHTHALMIC at 11:20

## 2019-01-01 RX ADMIN — APIXABAN 10 MG: 5 TABLET, FILM COATED ORAL at 20:31

## 2019-01-01 RX ADMIN — OXYCODONE HYDROCHLORIDE 10 MG: 5 TABLET ORAL at 18:31

## 2019-01-01 RX ADMIN — MORPHINE SULFATE 15 MG: 15 TABLET, FILM COATED, EXTENDED RELEASE ORAL at 08:26

## 2019-01-01 RX ADMIN — INSULIN LISPRO 2 UNITS: 100 INJECTION, SOLUTION INTRAVENOUS; SUBCUTANEOUS at 12:10

## 2019-01-01 RX ADMIN — ACETYLCYSTEINE 4 ML: 200 SOLUTION ORAL; RESPIRATORY (INHALATION) at 21:48

## 2019-01-01 RX ADMIN — FLUDEOXYGLUCOSE F18 1 DOSE: 300 INJECTION INTRAVENOUS at 11:07

## 2019-01-01 RX ADMIN — OXYCODONE HYDROCHLORIDE 10 MG: 5 TABLET ORAL at 01:16

## 2019-01-01 RX ADMIN — OXYCODONE HYDROCHLORIDE 10 MG: 5 TABLET ORAL at 23:56

## 2019-01-01 RX ADMIN — DEXAMETHASONE SODIUM PHOSPHATE 12 MG: 10 INJECTION, SOLUTION INTRAMUSCULAR; INTRAVENOUS at 10:51

## 2019-01-01 RX ADMIN — INSULIN LISPRO 6 UNITS: 100 INJECTION, SOLUTION INTRAVENOUS; SUBCUTANEOUS at 08:15

## 2019-01-01 RX ADMIN — SODIUM CHLORIDE 100 ML/HR: 9 INJECTION, SOLUTION INTRAVENOUS at 14:29

## 2019-01-01 RX ADMIN — IPRATROPIUM BROMIDE AND ALBUTEROL SULFATE 3 ML: 2.5; .5 SOLUTION RESPIRATORY (INHALATION) at 08:32

## 2019-01-01 RX ADMIN — BUDESONIDE 0.5 MG: 0.5 INHALANT RESPIRATORY (INHALATION) at 09:45

## 2019-01-01 RX ADMIN — BUDESONIDE 0.5 MG: 0.5 INHALANT RESPIRATORY (INHALATION) at 08:01

## 2019-01-01 RX ADMIN — MORPHINE SULFATE 15 MG: 15 TABLET, FILM COATED, EXTENDED RELEASE ORAL at 21:15

## 2019-01-01 RX ADMIN — ONDANSETRON HYDROCHLORIDE 4 MG: 2 SOLUTION INTRAMUSCULAR; INTRAVENOUS at 00:21

## 2019-01-01 RX ADMIN — INSULIN LISPRO 2 UNITS: 100 INJECTION, SOLUTION INTRAVENOUS; SUBCUTANEOUS at 12:08

## 2019-01-01 RX ADMIN — SODIUM CHLORIDE, PRESERVATIVE FREE 3 ML: 5 INJECTION INTRAVENOUS at 15:20

## 2019-01-01 RX ADMIN — MORPHINE SULFATE 30 MG: 15 TABLET, FILM COATED, EXTENDED RELEASE ORAL at 08:40

## 2019-01-01 RX ADMIN — INSULIN GLARGINE 16 UNITS: 100 INJECTION, SOLUTION SUBCUTANEOUS at 20:45

## 2019-01-01 RX ADMIN — SODIUM CHLORIDE, PRESERVATIVE FREE 10 ML: 5 INJECTION INTRAVENOUS at 21:44

## 2019-01-01 RX ADMIN — FLUOROURACIL 700 MG: 50 INJECTION, SOLUTION INTRAVENOUS at 13:08

## 2019-01-01 RX ADMIN — ACETAMINOPHEN 650 MG: 325 TABLET, FILM COATED ORAL at 15:13

## 2019-01-01 RX ADMIN — IPRATROPIUM BROMIDE AND ALBUTEROL SULFATE 3 ML: 2.5; .5 SOLUTION RESPIRATORY (INHALATION) at 21:00

## 2019-01-01 RX ADMIN — AZITHROMYCIN MONOHYDRATE 500 MG: 500 INJECTION, POWDER, LYOPHILIZED, FOR SOLUTION INTRAVENOUS at 17:00

## 2019-01-01 RX ADMIN — OXYCODONE HYDROCHLORIDE 10 MG: 5 TABLET ORAL at 04:29

## 2019-01-01 RX ADMIN — MONTELUKAST SODIUM 10 MG: 10 TABLET, FILM COATED ORAL at 21:01

## 2019-01-01 RX ADMIN — PROPOFOL 140 MCG/KG/MIN: 10 INJECTION, EMULSION INTRAVENOUS at 10:45

## 2019-01-01 RX ADMIN — OXYCODONE HYDROCHLORIDE 10 MG: 5 TABLET ORAL at 08:06

## 2019-01-01 RX ADMIN — MORPHINE SULFATE 4 MG: 2 INJECTION, SOLUTION INTRAMUSCULAR; INTRAVENOUS at 18:38

## 2019-01-01 RX ADMIN — METHYLPREDNISOLONE SODIUM SUCCINATE 125 MG: 125 INJECTION, POWDER, FOR SOLUTION INTRAMUSCULAR; INTRAVENOUS at 20:33

## 2019-01-01 RX ADMIN — ONDANSETRON HYDROCHLORIDE 4 MG: 2 SOLUTION INTRAMUSCULAR; INTRAVENOUS at 12:10

## 2019-01-01 RX ADMIN — OXYCODONE HYDROCHLORIDE 10 MG: 5 TABLET ORAL at 19:36

## 2019-01-01 RX ADMIN — IPRATROPIUM BROMIDE AND ALBUTEROL SULFATE 3 ML: 2.5; .5 SOLUTION RESPIRATORY (INHALATION) at 13:54

## 2019-01-01 RX ADMIN — INSULIN LISPRO 2 UNITS: 100 INJECTION, SOLUTION INTRAVENOUS; SUBCUTANEOUS at 21:39

## 2019-01-01 RX ADMIN — DEXTROSE MONOHYDRATE 300 MG: 5 INJECTION, SOLUTION INTRAVENOUS at 12:59

## 2019-01-01 RX ADMIN — VANCOMYCIN HYDROCHLORIDE 1250 MG: 10 INJECTION, POWDER, LYOPHILIZED, FOR SOLUTION INTRAVENOUS at 13:39

## 2019-01-01 RX ADMIN — ACETYLCYSTEINE 4 ML: 200 SOLUTION ORAL; RESPIRATORY (INHALATION) at 13:55

## 2019-01-01 RX ADMIN — LISINOPRIL 20 MG: 20 TABLET ORAL at 08:48

## 2019-01-01 RX ADMIN — BUDESONIDE 0.5 MG: 0.5 INHALANT RESPIRATORY (INHALATION) at 07:37

## 2019-01-01 RX ADMIN — PEGFILGRASTIM 6 MG: KIT SUBCUTANEOUS at 13:56

## 2019-01-01 RX ADMIN — SODIUM CHLORIDE 125 ML/HR: 9 INJECTION, SOLUTION INTRAVENOUS at 01:08

## 2019-01-01 RX ADMIN — INSULIN LISPRO 5 UNITS: 100 INJECTION, SOLUTION INTRAVENOUS; SUBCUTANEOUS at 17:59

## 2019-01-01 RX ADMIN — CETIRIZINE HYDROCHLORIDE 10 MG: 10 TABLET, FILM COATED ORAL at 08:15

## 2019-01-01 RX ADMIN — MONTELUKAST SODIUM 10 MG: 10 TABLET, FILM COATED ORAL at 20:34

## 2019-01-01 RX ADMIN — OXYCODONE HYDROCHLORIDE 10 MG: 5 TABLET ORAL at 18:26

## 2019-01-01 RX ADMIN — IOPAMIDOL 95 ML: 755 INJECTION, SOLUTION INTRAVENOUS at 18:01

## 2019-01-01 RX ADMIN — MONTELUKAST SODIUM 10 MG: 10 TABLET, FILM COATED ORAL at 21:04

## 2019-01-01 RX ADMIN — INSULIN LISPRO 8 UNITS: 100 INJECTION, SOLUTION INTRAVENOUS; SUBCUTANEOUS at 17:46

## 2019-01-01 RX ADMIN — INSULIN GLARGINE 16 UNITS: 100 INJECTION, SOLUTION SUBCUTANEOUS at 20:47

## 2019-01-01 RX ADMIN — ONDANSETRON 4 MG: 2 INJECTION INTRAMUSCULAR; INTRAVENOUS at 13:51

## 2019-01-01 RX ADMIN — BUDESONIDE 0.5 MG: 0.5 INHALANT RESPIRATORY (INHALATION) at 09:13

## 2019-01-01 RX ADMIN — PALONOSETRON 0.25 MG: 0.25 INJECTION, SOLUTION INTRAVENOUS at 09:56

## 2019-01-01 RX ADMIN — CETIRIZINE HYDROCHLORIDE 10 MG: 10 TABLET, FILM COATED ORAL at 08:16

## 2019-01-01 RX ADMIN — CEFEPIME HYDROCHLORIDE 2 G: 2 INJECTION, POWDER, FOR SOLUTION INTRAVENOUS at 02:43

## 2019-01-01 RX ADMIN — MORPHINE SULFATE 15 MG: 15 TABLET, FILM COATED, EXTENDED RELEASE ORAL at 21:38

## 2019-01-01 RX ADMIN — HEPARIN SODIUM 19 UNITS/KG/HR: 10000 INJECTION, SOLUTION INTRAVENOUS at 15:40

## 2019-01-01 RX ADMIN — INSULIN GLARGINE 10 UNITS: 100 INJECTION, SOLUTION SUBCUTANEOUS at 21:03

## 2019-01-01 RX ADMIN — OXYCODONE HYDROCHLORIDE 10 MG: 5 TABLET ORAL at 05:55

## 2019-01-01 RX ADMIN — INSULIN LISPRO 4 UNITS: 100 INJECTION, SOLUTION INTRAVENOUS; SUBCUTANEOUS at 17:53

## 2019-01-01 RX ADMIN — SODIUM CHLORIDE, PRESERVATIVE FREE 500 UNITS: 5 INJECTION INTRAVENOUS at 12:06

## 2019-01-01 RX ADMIN — CETIRIZINE HYDROCHLORIDE 10 MG: 10 TABLET, FILM COATED ORAL at 15:01

## 2019-01-01 RX ADMIN — IPRATROPIUM BROMIDE AND ALBUTEROL SULFATE 3 ML: 2.5; .5 SOLUTION RESPIRATORY (INHALATION) at 08:29

## 2019-01-01 RX ADMIN — DEXTROSE MONOHYDRATE 300 MG: 5 INJECTION, SOLUTION INTRAVENOUS at 13:55

## 2019-01-01 RX ADMIN — INSULIN LISPRO 5 UNITS: 100 INJECTION, SOLUTION INTRAVENOUS; SUBCUTANEOUS at 11:58

## 2019-01-01 RX ADMIN — PANTOPRAZOLE SODIUM 40 MG: 40 TABLET, DELAYED RELEASE ORAL at 17:56

## 2019-01-01 RX ADMIN — APIXABAN 10 MG: 5 TABLET, FILM COATED ORAL at 21:15

## 2019-01-01 RX ADMIN — ACETYLCYSTEINE 4 ML: 200 SOLUTION ORAL; RESPIRATORY (INHALATION) at 16:45

## 2019-01-01 RX ADMIN — INSULIN LISPRO 5 UNITS: 100 INJECTION, SOLUTION INTRAVENOUS; SUBCUTANEOUS at 21:24

## 2019-01-01 RX ADMIN — APIXABAN 5 MG: 5 TABLET, FILM COATED ORAL at 08:48

## 2019-01-01 RX ADMIN — POTASSIUM CHLORIDE 10 MEQ: 7.46 INJECTION, SOLUTION INTRAVENOUS at 18:16

## 2019-01-01 RX ADMIN — BISACODYL 10 MG: 5 TABLET ORAL at 14:10

## 2019-01-01 RX ADMIN — ATORVASTATIN CALCIUM 10 MG: 10 TABLET, FILM COATED ORAL at 08:16

## 2019-01-01 RX ADMIN — AZITHROMYCIN MONOHYDRATE 500 MG: 500 INJECTION, POWDER, LYOPHILIZED, FOR SOLUTION INTRAVENOUS at 17:52

## 2019-01-01 RX ADMIN — INSULIN LISPRO 4 UNITS: 100 INJECTION, SOLUTION INTRAVENOUS; SUBCUTANEOUS at 08:58

## 2019-01-01 RX ADMIN — SODIUM CHLORIDE 1000 ML: 9 INJECTION, SOLUTION INTRAVENOUS at 22:34

## 2019-01-01 RX ADMIN — SODIUM CHLORIDE, PRESERVATIVE FREE 10 ML: 5 INJECTION INTRAVENOUS at 11:12

## 2019-01-01 RX ADMIN — APIXABAN 10 MG: 5 TABLET, FILM COATED ORAL at 21:14

## 2019-01-01 RX ADMIN — VANCOMYCIN HYDROCHLORIDE 1000 MG: 1 INJECTION, SOLUTION INTRAVENOUS at 06:39

## 2019-01-01 RX ADMIN — FLUTICASONE PROPIONATE 2 SPRAY: 50 SPRAY, METERED NASAL at 08:26

## 2019-01-01 RX ADMIN — OXALIPLATIN 110 MG: 5 INJECTION, SOLUTION, CONCENTRATE INTRAVENOUS at 10:29

## 2019-01-01 RX ADMIN — TIMOLOL MALEATE 1 DROP: 2.5 SOLUTION/ DROPS OPHTHALMIC at 08:07

## 2019-01-01 RX ADMIN — ATROPINE SULFATE 0.25 MG: 0.4 INJECTION, SOLUTION INTRAMUSCULAR; INTRAVENOUS; SUBCUTANEOUS at 09:50

## 2019-01-01 RX ADMIN — SODIUM CHLORIDE, PRESERVATIVE FREE 10 ML: 5 INJECTION INTRAVENOUS at 11:22

## 2019-01-01 RX ADMIN — FAMOTIDINE 20 MG: 10 INJECTION INTRAVENOUS at 10:36

## 2019-01-01 RX ADMIN — OXALIPLATIN 145 MG: 5 INJECTION, SOLUTION, CONCENTRATE INTRAVENOUS at 11:40

## 2019-01-01 RX ADMIN — FLUTICASONE PROPIONATE 2 SPRAY: 50 SPRAY, METERED NASAL at 10:03

## 2019-01-01 RX ADMIN — INSULIN LISPRO 2 UNITS: 100 INJECTION, SOLUTION INTRAVENOUS; SUBCUTANEOUS at 11:35

## 2019-01-01 RX ADMIN — PREDNISONE 40 MG: 20 TABLET ORAL at 08:24

## 2019-01-01 RX ADMIN — DABIGATRAN ETEXILATE MESYLATE 150 MG: 150 CAPSULE ORAL at 09:34

## 2019-01-01 RX ADMIN — LEUCOVORIN CALCIUM 670 MG: 350 INJECTION, POWDER, LYOPHILIZED, FOR SOLUTION INTRAMUSCULAR; INTRAVENOUS at 12:58

## 2019-01-01 RX ADMIN — APIXABAN 5 MG: 5 TABLET, FILM COATED ORAL at 21:41

## 2019-01-01 RX ADMIN — SODIUM CHLORIDE, PRESERVATIVE FREE 10 ML: 5 INJECTION INTRAVENOUS at 01:41

## 2019-01-01 RX ADMIN — BUDESONIDE 0.5 MG: 0.5 INHALANT RESPIRATORY (INHALATION) at 20:48

## 2019-01-01 RX ADMIN — CEFEPIME HYDROCHLORIDE 2 G: 2 INJECTION, POWDER, FOR SOLUTION INTRAVENOUS at 10:55

## 2019-01-01 RX ADMIN — MORPHINE SULFATE 15 MG: 15 TABLET, FILM COATED, EXTENDED RELEASE ORAL at 20:47

## 2019-01-01 RX ADMIN — SODIUM CHLORIDE, PRESERVATIVE FREE 10 ML: 5 INJECTION INTRAVENOUS at 09:35

## 2019-01-01 RX ADMIN — DEXTROSE MONOHYDRATE 250 ML: 5 INJECTION, SOLUTION INTRAVENOUS at 10:37

## 2019-01-01 RX ADMIN — ATORVASTATIN CALCIUM 10 MG: 10 TABLET, FILM COATED ORAL at 08:48

## 2019-01-01 RX ADMIN — ALBUTEROL SULFATE 2.5 MG: 2.5 SOLUTION RESPIRATORY (INHALATION) at 08:56

## 2019-01-01 RX ADMIN — MONTELUKAST SODIUM 10 MG: 10 TABLET, FILM COATED ORAL at 21:15

## 2019-01-01 RX ADMIN — ONDANSETRON HYDROCHLORIDE 4 MG: 2 SOLUTION INTRAMUSCULAR; INTRAVENOUS at 22:30

## 2019-01-01 RX ADMIN — ATORVASTATIN CALCIUM 10 MG: 10 TABLET, FILM COATED ORAL at 08:30

## 2019-01-01 RX ADMIN — ACETAMINOPHEN 650 MG: 325 TABLET, FILM COATED ORAL at 18:27

## 2019-01-01 RX ADMIN — PANTOPRAZOLE SODIUM 40 MG: 40 TABLET, DELAYED RELEASE ORAL at 06:43

## 2019-01-01 RX ADMIN — ASPIRIN 81 MG: 81 TABLET, COATED ORAL at 13:10

## 2019-01-01 RX ADMIN — ATORVASTATIN CALCIUM 10 MG: 10 TABLET, FILM COATED ORAL at 08:12

## 2019-01-01 RX ADMIN — IPRATROPIUM BROMIDE AND ALBUTEROL SULFATE 3 ML: 2.5; .5 SOLUTION RESPIRATORY (INHALATION) at 10:52

## 2019-01-01 RX ADMIN — OXYCODONE HYDROCHLORIDE 10 MG: 5 TABLET ORAL at 02:17

## 2019-01-01 RX ADMIN — TIMOLOL MALEATE 1 DROP: 2.5 SOLUTION/ DROPS OPHTHALMIC at 08:34

## 2019-01-01 RX ADMIN — DABIGATRAN ETEXILATE MESYLATE 150 MG: 150 CAPSULE ORAL at 08:40

## 2019-01-01 RX ADMIN — LISINOPRIL: 20 TABLET ORAL at 08:17

## 2019-01-01 RX ADMIN — PANTOPRAZOLE SODIUM 40 MG: 40 TABLET, DELAYED RELEASE ORAL at 05:13

## 2019-01-01 RX ADMIN — INSULIN LISPRO 2 UNITS: 100 INJECTION, SOLUTION INTRAVENOUS; SUBCUTANEOUS at 17:32

## 2019-01-01 RX ADMIN — ATORVASTATIN CALCIUM 10 MG: 10 TABLET, FILM COATED ORAL at 08:19

## 2019-01-01 RX ADMIN — PREDNISONE 40 MG: 20 TABLET ORAL at 08:40

## 2019-01-01 RX ADMIN — INSULIN LISPRO 5 UNITS: 100 INJECTION, SOLUTION INTRAVENOUS; SUBCUTANEOUS at 00:28

## 2019-01-01 RX ADMIN — TIMOLOL MALEATE 1 DROP: 2.5 SOLUTION/ DROPS OPHTHALMIC at 10:13

## 2019-01-08 ENCOUNTER — CONSULT (OUTPATIENT)
Dept: ORTHOPEDIC SURGERY | Facility: CLINIC | Age: 64
End: 2019-01-08

## 2019-01-08 VITALS — TEMPERATURE: 97.4 F | WEIGHT: 169 LBS | BODY MASS INDEX: 28.85 KG/M2 | HEIGHT: 64 IN

## 2019-01-08 DIAGNOSIS — S83.282A OTHER TEAR OF LATERAL MENISCUS OF LEFT KNEE AS CURRENT INJURY, INITIAL ENCOUNTER: Primary | ICD-10-CM

## 2019-01-08 PROCEDURE — 99024 POSTOP FOLLOW-UP VISIT: CPT | Performed by: ORTHOPAEDIC SURGERY

## 2019-01-08 NOTE — PROGRESS NOTES
New left Knee      Patient: Linette Ghosh        YOB: 1955    Medical Record Number: [id number]        Chief Complaints: left knee pain  Chief Complaint   Patient presents with   • Left Knee - Establish Care, Pain           History of Present Illness: This is a   This patient had seen Samantha Salinas with complaints of left knee pain she did do an MRI which shows a lateral meniscus tear as well as a lateral tibial plateau cyst she states her knee does not hurt her all now since her exam with Samantha her knee has not bothered her she states she really doesn't know why she's here      Allergies:   Allergies   Allergen Reactions   • Penicillins        Medications:   Home Medications:  Current Outpatient Medications on File Prior to Visit   Medication Sig   • albuterol (PROVENTIL HFA;VENTOLIN HFA) 108 (90 BASE) MCG/ACT inhaler Inhale 2 puffs Every 4 (Four) Hours As Needed for Wheezing or Shortness of Air.   • albuterol (PROVENTIL) (2.5 MG/3ML) 0.083% nebulizer solution USE ONE VIAL VIA NEBULIZER BY MOUTH EVERY 6 TO 8 HOURS AS NEEDED   • aspirin 325 MG tablet Take  by mouth daily.   • desloratadine (CLARINEX) 5 MG tablet Take 1 tablet by mouth Daily.   • fluticasone (FLONASE) 50 MCG/ACT nasal spray 2 sprays into each nostril Daily.   • fluticasone (FLONASE) 50 MCG/ACT nasal spray USE 2 SPRAYS IN EACH NOSTRIL DAILY   • hydrOXYzine (ATARAX) 25 MG tablet TAKE 1 TABLET BY MOUTH EVERY EIGHT HOURS AS NEEDED FOR ITCHING   • lisinopril-hydrochlorothiazide (PRINZIDE,ZESTORETIC) 20-12.5 MG per tablet TAKE 2 TABLETS BY MOUTH DAILY   • metFORMIN (GLUCOPHAGE) 1000 MG tablet TAKE 1 TABLET BY MOUTH TWICE DAILY WITH MEALS   • montelukast (SINGULAIR) 10 MG tablet TAKE 1 TABLET BY MOUTH EVERY NIGHT   • Multiple Vitamins-Minerals (CENTRUM SILVER PO) Take  by mouth daily.   • ondansetron (ZOFRAN) 4 MG tablet Take 1 tablet by mouth Every 8 (Eight) Hours As Needed for Nausea or Vomiting.   • pantoprazole (PROTONIX) 40 MG EC  "tablet Take 1 tablet by mouth Daily.   • simvastatin (ZOCOR) 20 MG tablet TAKE 1 TABLET BY MOUTH EVERY NIGHT   • SYMBICORT 160-4.5 MCG/ACT inhaler INHALE TWO PUFFS BY MOUTH TWICE A DAY **RINSE MOUTH AFTER USE**   • timolol (TIMOPTIC) 0.25 % ophthalmic solution Administer 1 drop to both eyes Daily.     No current facility-administered medications on file prior to visit.      Current Medications:  Scheduled Meds:  Continuous Infusions:  No current facility-administered medications for this visit.   PRN Meds:.    Past Medical History:   Diagnosis Date   • Asthma    • Carotid artery stenosis    • Diabetes mellitus (CMS/HCC)    • GERD (gastroesophageal reflux disease)    • Glaucoma    • Hydronephrosis    • Hyperlipidemia    • Hypertension    • Intracranial hemorrhage (CMS/HCC)    • Pneumonia    • Polyp of sigmoid colon    • Vitamin D deficiency         Past Surgical History:   Procedure Laterality Date   • GALLBLADDER SURGERY     • HERNIA REPAIR     • HYSTERECTOMY          Social History     Occupational History   • Occupation: [employer]   Tobacco Use   • Smoking status: Current Every Day Smoker     Packs/day: 1.00     Years: 30.00     Pack years: 30.00   • Smokeless tobacco: Never Used   Substance and Sexual Activity   • Alcohol use: No   • Drug use: No   • Sexual activity: Defer    Social History     Social History Narrative   • Not on file        Family History   Problem Relation Age of Onset   • Hypertension Mother    • Diabetes Mother    • Hypertension Sister    • Diabetes Sister    • Hypertension Brother    • Diabetes Brother    • Lung cancer Maternal Uncle    • Stroke Maternal Grandfather              Review of Systems:     Review of Systems      Physical Exam: 63 y.o. female  General Appearance:    Alert, cooperative, in no acute distress                 Vitals:    01/08/19 1439   Temp: 97.4 °F (36.3 °C)   Weight: 76.7 kg (169 lb)   Height: 162.6 cm (64\")      Patient is alert and read ×3 no acute distress appears her " above-listed at height weight and age.  Affect is normal respiratory rate is normal unlabored. Heart rate regular rate rhythm, sclera, dentition and hearing are normal for the purpose of this exam.        Ortho Exam    Procedures             Radiology:   AP, Lateral and merchant views of the left knee  were ordered/reviewed to evauateknee pain.  These were ordered by Samantha Salinas I did review these they look pretty normal she has some mild genu valgum on the right some mild patellofemoral OA she has an MRI which shows lateral meniscus tear as well as a cyst and lateral tibial plateau degenerative in origin  Imaging Results (most recent)     None        Assessment/Plan:  Again, patient states she doesn't know why she's here I explained her I did look at her x-ray she does have meniscus tear has that cyst she is asymptomatic at this point I would not do anything different at this time if her symptoms return or get her in here for reexamination consider an injection if that fails we'll consider arthroscopy she did have to leave indicated a bus stop so I did let her go with a very quick discussion therefore this will be no charge

## 2019-01-14 RX ORDER — ALBUTEROL SULFATE 2.5 MG/3ML
SOLUTION RESPIRATORY (INHALATION)
Qty: 3 ML | Refills: 1 | Status: SHIPPED | OUTPATIENT
Start: 2019-01-14 | End: 2019-01-01

## 2019-01-19 ENCOUNTER — HOSPITAL ENCOUNTER (EMERGENCY)
Facility: HOSPITAL | Age: 64
Discharge: HOME | End: 2019-01-19
Attending: EMERGENCY MEDICINE | Admitting: EMERGENCY MEDICINE

## 2019-01-19 ENCOUNTER — APPOINTMENT (OUTPATIENT)
Dept: GENERAL RADIOLOGY | Facility: HOSPITAL | Age: 64
End: 2019-01-19

## 2019-01-19 VITALS
RESPIRATION RATE: 16 BRPM | TEMPERATURE: 97.8 F | SYSTOLIC BLOOD PRESSURE: 113 MMHG | BODY MASS INDEX: 28.12 KG/M2 | HEART RATE: 96 BPM | DIASTOLIC BLOOD PRESSURE: 75 MMHG | HEIGHT: 65 IN | OXYGEN SATURATION: 97 %

## 2019-01-19 DIAGNOSIS — E87.6 HYPOKALEMIA: ICD-10-CM

## 2019-01-19 DIAGNOSIS — J18.9 PNEUMONIA OF LEFT LOWER LOBE DUE TO INFECTIOUS ORGANISM: Primary | ICD-10-CM

## 2019-01-19 LAB
ALBUMIN SERPL-MCNC: 4 G/DL (ref 3.5–5.2)
ALBUMIN/GLOB SERPL: 1.1 G/DL
ALP SERPL-CCNC: 61 U/L (ref 39–117)
ALT SERPL W P-5'-P-CCNC: 11 U/L (ref 1–33)
ANION GAP SERPL CALCULATED.3IONS-SCNC: 14.2 MMOL/L
AST SERPL-CCNC: 12 U/L (ref 1–32)
BASOPHILS # BLD AUTO: 0.02 10*3/MM3 (ref 0–0.2)
BASOPHILS NFR BLD AUTO: 0.3 % (ref 0–1.5)
BILIRUB SERPL-MCNC: 0.2 MG/DL (ref 0.1–1.2)
BUN BLD-MCNC: 12 MG/DL (ref 8–23)
BUN/CREAT SERPL: 11.9 (ref 7–25)
CALCIUM SPEC-SCNC: 10 MG/DL (ref 8.6–10.5)
CHLORIDE SERPL-SCNC: 95 MMOL/L (ref 98–107)
CO2 SERPL-SCNC: 27.8 MMOL/L (ref 22–29)
CREAT BLD-MCNC: 1.01 MG/DL (ref 0.57–1)
DEPRECATED RDW RBC AUTO: 47.3 FL (ref 37–54)
EOSINOPHIL # BLD AUTO: 0.07 10*3/MM3 (ref 0–0.7)
EOSINOPHIL NFR BLD AUTO: 1.1 % (ref 0.3–6.2)
ERYTHROCYTE [DISTWIDTH] IN BLOOD BY AUTOMATED COUNT: 15.3 % (ref 11.7–13)
GFR SERPL CREATININE-BSD FRML MDRD: 55 ML/MIN/1.73
GLOBULIN UR ELPH-MCNC: 3.6 GM/DL
GLUCOSE BLD-MCNC: 133 MG/DL (ref 65–99)
HCT VFR BLD AUTO: 39.9 % (ref 35.6–45.5)
HGB BLD-MCNC: 12.1 G/DL (ref 11.9–15.5)
IMM GRANULOCYTES # BLD AUTO: 0.02 10*3/MM3 (ref 0–0.03)
IMM GRANULOCYTES NFR BLD AUTO: 0.3 % (ref 0–0.5)
LYMPHOCYTES # BLD AUTO: 2.73 10*3/MM3 (ref 0.9–4.8)
LYMPHOCYTES NFR BLD AUTO: 41.4 % (ref 19.6–45.3)
MCH RBC QN AUTO: 25.6 PG (ref 26.9–32)
MCHC RBC AUTO-ENTMCNC: 30.3 G/DL (ref 32.4–36.3)
MCV RBC AUTO: 84.4 FL (ref 80.5–98.2)
MONOCYTES # BLD AUTO: 0.6 10*3/MM3 (ref 0.2–1.2)
MONOCYTES NFR BLD AUTO: 9.1 % (ref 5–12)
NEUTROPHILS # BLD AUTO: 3.15 10*3/MM3 (ref 1.9–8.1)
NEUTROPHILS NFR BLD AUTO: 47.8 % (ref 42.7–76)
NT-PROBNP SERPL-MCNC: 17.3 PG/ML (ref 5–900)
PLATELET # BLD AUTO: 385 10*3/MM3 (ref 140–500)
PMV BLD AUTO: 9.5 FL (ref 6–12)
POTASSIUM BLD-SCNC: 3 MMOL/L (ref 3.5–5.2)
PROT SERPL-MCNC: 7.6 G/DL (ref 6–8.5)
RBC # BLD AUTO: 4.73 10*6/MM3 (ref 3.9–5.2)
SODIUM BLD-SCNC: 137 MMOL/L (ref 136–145)
TROPONIN T SERPL-MCNC: <0.01 NG/ML (ref 0–0.03)
WBC NRBC COR # BLD: 6.59 10*3/MM3 (ref 4.5–10.7)

## 2019-01-19 PROCEDURE — 94640 AIRWAY INHALATION TREATMENT: CPT

## 2019-01-19 PROCEDURE — 80053 COMPREHEN METABOLIC PANEL: CPT | Performed by: EMERGENCY MEDICINE

## 2019-01-19 PROCEDURE — 99284 EMERGENCY DEPT VISIT MOD MDM: CPT

## 2019-01-19 PROCEDURE — 25010000002 CEFTRIAXONE PER 250 MG: Performed by: EMERGENCY MEDICINE

## 2019-01-19 PROCEDURE — 96365 THER/PROPH/DIAG IV INF INIT: CPT

## 2019-01-19 PROCEDURE — 93010 ELECTROCARDIOGRAM REPORT: CPT | Performed by: INTERNAL MEDICINE

## 2019-01-19 PROCEDURE — 96361 HYDRATE IV INFUSION ADD-ON: CPT

## 2019-01-19 PROCEDURE — 71046 X-RAY EXAM CHEST 2 VIEWS: CPT

## 2019-01-19 PROCEDURE — 94799 UNLISTED PULMONARY SVC/PX: CPT

## 2019-01-19 PROCEDURE — 93005 ELECTROCARDIOGRAM TRACING: CPT | Performed by: EMERGENCY MEDICINE

## 2019-01-19 PROCEDURE — 85025 COMPLETE CBC W/AUTO DIFF WBC: CPT | Performed by: EMERGENCY MEDICINE

## 2019-01-19 PROCEDURE — 84484 ASSAY OF TROPONIN QUANT: CPT | Performed by: EMERGENCY MEDICINE

## 2019-01-19 PROCEDURE — 83880 ASSAY OF NATRIURETIC PEPTIDE: CPT | Performed by: EMERGENCY MEDICINE

## 2019-01-19 RX ORDER — CEFTRIAXONE SODIUM 1 G/50ML
1 INJECTION, SOLUTION INTRAVENOUS ONCE
Status: COMPLETED | OUTPATIENT
Start: 2019-01-19 | End: 2019-01-19

## 2019-01-19 RX ORDER — POTASSIUM CHLORIDE 750 MG/1
40 CAPSULE, EXTENDED RELEASE ORAL ONCE
Status: COMPLETED | OUTPATIENT
Start: 2019-01-19 | End: 2019-01-19

## 2019-01-19 RX ORDER — AZITHROMYCIN 250 MG/1
TABLET, FILM COATED ORAL
Qty: 6 TABLET | Refills: 0 | OUTPATIENT
Start: 2019-01-19 | End: 2019-03-07

## 2019-01-19 RX ORDER — IPRATROPIUM BROMIDE AND ALBUTEROL SULFATE 2.5; .5 MG/3ML; MG/3ML
3 SOLUTION RESPIRATORY (INHALATION) ONCE
Status: COMPLETED | OUTPATIENT
Start: 2019-01-19 | End: 2019-01-19

## 2019-01-19 RX ORDER — SODIUM CHLORIDE 0.9 % (FLUSH) 0.9 %
10 SYRINGE (ML) INJECTION AS NEEDED
Status: DISCONTINUED | OUTPATIENT
Start: 2019-01-19 | End: 2019-01-20 | Stop reason: HOSPADM

## 2019-01-19 RX ORDER — POTASSIUM CHLORIDE 750 MG/1
10 TABLET, FILM COATED, EXTENDED RELEASE ORAL 2 TIMES DAILY
Qty: 10 TABLET | Refills: 0 | OUTPATIENT
Start: 2019-01-19 | End: 2019-03-07

## 2019-01-19 RX ADMIN — IPRATROPIUM BROMIDE AND ALBUTEROL SULFATE 3 ML: 2.5; .5 SOLUTION RESPIRATORY (INHALATION) at 20:56

## 2019-01-19 RX ADMIN — SODIUM CHLORIDE 500 ML: 9 INJECTION, SOLUTION INTRAVENOUS at 20:05

## 2019-01-19 RX ADMIN — CEFTRIAXONE SODIUM 1 G: 1 INJECTION, SOLUTION INTRAVENOUS at 21:44

## 2019-01-19 RX ADMIN — POTASSIUM CHLORIDE 40 MEQ: 750 CAPSULE, EXTENDED RELEASE ORAL at 21:43

## 2019-01-20 NOTE — ED PROVIDER NOTES
EMERGENCY DEPARTMENT ENCOUNTER    CHIEF COMPLAINT  Chief Complaint: SOA with productive cough  History given by: Pt  History limited by: none  Room Number: 02/02  PMD: Yessenia Joe MD      HPI:  Pt is a 63 y.o. female, Hx of Asthma and DM, who presents complaining of SOA and productive cough with green sputum for two weeks, progressively worsening. Pt has been taking her albuterol inhaler for the past week for Sx. Pt states for the past week she has had intermittent chest pain, described as tightness, and dizziness (lightheadedness). Pt also c/o sinus congestion and sinus headache but denies orthopnea, sore throat, leg swelling, N/V, abd pain, or melena. Pt smokes 1 PPD.     Duration:  Two weeks  Onset: gradual  Timing: constant  Location: respiratory  Radiation: none  Quality: SOA with productive cough with green sputum  Intensity/Severity: mild  Progression: progressively worsening  Associated Symptoms: sinus congestion, sinus headache, intermittent chest pain, described as tightness and dizziness (lightheadedness) for the past week  Aggravating Factors: none  Alleviating Factors: none  Previous Episodes: Pt has Hx of Asthma  Treatment before arrival: Pt has been taking her albuterol inhaler for the past week for Sx.    PAST MEDICAL HISTORY  Active Ambulatory Problems     Diagnosis Date Noted   • Moderate persistent asthma without complication 03/09/2016   • Asthma 03/09/2016   • Benign essential hypertension 03/09/2016   • Hyperlipidemia 03/09/2016   • Arthralgia of hip 03/09/2016   • Pain of thigh 03/09/2016   • Microscopic hematuria 03/09/2016   • Type 2 diabetes mellitus (CMS/HCC) 03/09/2016   • Tobacco use 09/13/2016   • Chronic kidney disease, stage III (moderate) (CMS/[facility]) 03/14/2017   • Iron deficiency anemia 03/14/2017     Resolved Ambulatory Problems     Diagnosis Date Noted   • Abnormal kidney function study 03/09/2016     Past Medical History:   Diagnosis Date   • Asthma    • Carotid artery  stenosis    • Diabetes mellitus (CMS/HCC)    • GERD (gastroesophageal reflux disease)    • Glaucoma    • Hydronephrosis    • Hyperlipidemia    • Hypertension    • Intracranial hemorrhage (CMS/HCC)    • Pneumonia    • Polyp of sigmoid colon    • Vitamin D deficiency        PAST SURGICAL HISTORY  Past Surgical History:   Procedure Laterality Date   • GALLBLADDER SURGERY     • HERNIA REPAIR     • HYSTERECTOMY         FAMILY HISTORY  Family History   Problem Relation Age of Onset   • Hypertension Mother    • Diabetes Mother    • Hypertension Sister    • Diabetes Sister    • Hypertension Brother    • Diabetes Brother    • Lung cancer Maternal Uncle    • Stroke Maternal Grandfather        SOCIAL HISTORY  Social History     Socioeconomic History   • Marital status: Unknown     Spouse name: Not on file   • Number of children: 0   • Years of education: Not on file   • Highest education level: Not on file   Social Needs   • Financial resource strain: Not on file   • Food insecurity - worry: Not on file   • Food insecurity - inability: Not on file   • Transportation needs - medical: Not on file   • Transportation needs - non-medical: Not on file   Occupational History   • Occupation: [employer]   Tobacco Use   • Smoking status: Current Every Day Smoker     Packs/day: 1.00     Years: 30.00     Pack years: 30.00   • Smokeless tobacco: Never Used   Substance and Sexual Activity   • Alcohol use: No   • Drug use: No   • Sexual activity: Defer   Other Topics Concern   • Not on file   Social History Narrative   • Not on file       ALLERGIES  Penicillins    REVIEW OF SYSTEMS  Review of Systems   Constitutional: Negative for fever.   HENT: Positive for congestion and sinus pressure (headache). Negative for sore throat.    Eyes: Negative.    Respiratory: Positive for cough (productive with green sputum for two weeks ) and shortness of breath (2 weeks).    Cardiovascular: Positive for chest pain (intermittent for one week, described as a  tightness).   Gastrointestinal: Negative for abdominal pain, blood in stool, diarrhea and vomiting.   Genitourinary: Negative for dysuria.   Musculoskeletal: Negative for neck pain.   Skin: Negative for rash.   Neurological: Positive for dizziness (intermittent for one week) and light-headedness (intermittent for one week). Negative for weakness, numbness and headaches.   Hematological: Negative.    Psychiatric/Behavioral: Negative.    All other systems reviewed and are negative.      PHYSICAL EXAM  ED Triage Vitals   Temp Heart Rate Resp BP SpO2   01/19/19 1900 01/19/19 1900 01/19/19 1900 01/19/19 1944 01/19/19 1900   97.8 °F (36.6 °C) (!) 123 18 111/74 97 %      Temp src Heart Rate Source Patient Position BP Location FiO2 (%)   -- -- -- -- --              Physical Exam   Constitutional: She is oriented to person, place, and time. She appears distressed (mild).   HENT:   Head: Normocephalic and atraumatic.   Mouth/Throat: Oropharynx is clear and moist. No oropharyngeal exudate.   Eyes: EOM are normal. Pupils are equal, round, and reactive to light.   Neck: Normal range of motion. Neck supple.   Cardiovascular: Normal rate and regular rhythm.   No murmur heard.  Pulmonary/Chest: Effort normal. No respiratory distress. She has decreased breath sounds. She has no wheezes. She has rales (occasional mild basilar ).   Abdominal: Soft. Bowel sounds are normal. She exhibits no distension. There is no tenderness.   Musculoskeletal: Normal range of motion. She exhibits no edema (BLE) or tenderness (calf).   Neurological: She is alert and oriented to person, place, and time. She has normal sensation and normal strength.   Skin: Skin is warm and dry. No rash noted.   Psychiatric: Mood and affect normal.   Nursing note and vitals reviewed.      LAB RESULTS  Lab Results (last 24 hours)     Procedure Component Value Units Date/Time    CBC & Differential [[id number]] Collected:  01/19/19 2003    Specimen:  Blood Updated:  01/19/19  2023    Narrative:       The following orders were created for panel order CBC & Differential.  Procedure                               Abnormality         Status                     ---------                               -----------         ------                     CBC Auto Differential[[id number]]        Abnormal            Final result                 Please view results for these tests on the individual orders.    Comprehensive Metabolic Panel [[id number]]  (Abnormal) Collected:  01/19/19 2003    Specimen:  Blood Updated:  01/19/19 2044     Glucose 133 mg/dL      BUN 12 mg/dL      Creatinine 1.01 mg/dL      Sodium 137 mmol/L      Potassium 3.0 mmol/L      Chloride 95 mmol/L      CO2 27.8 mmol/L      Calcium 10.0 mg/dL      Total Protein 7.6 g/dL      Albumin 4.00 g/dL      ALT (SGPT) 11 U/L      AST (SGOT) 12 U/L      Alkaline Phosphatase 61 U/L      Total Bilirubin 0.2 mg/dL      eGFR Non African Amer 55 mL/min/1.73      Globulin 3.6 gm/dL      A/G Ratio 1.1 g/dL      BUN/Creatinine Ratio 11.9     Anion Gap 14.2 mmol/L     Troponin [[id number]]  (Normal) Collected:  01/19/19 2003    Specimen:  Blood Updated:  01/19/19 2044     Troponin T <0.010 ng/mL     Narrative:       Troponin T Reference Ranges:  Less than 0.03 ng/mL:    Negative for AMI  0.03 to 0.09 ng/mL:      Indeterminant for AMI  Greater than 0.09 ng/mL: Positive for AMI    BNP [[id number]]  (Normal) Collected:  01/19/19 2003    Specimen:  Blood Updated:  01/19/19 2042     proBNP 17.3 pg/mL     Narrative:       Among patients with dyspnea, NT-proBNP is highly sensitive for the detection of acute congestive heart failure. In addition NT-proBNP of <300 pg/ml effectively rules out acute congestive heart failure with 99% negative predictive value.    CBC Auto Differential [[id number]]  (Abnormal) Collected:  01/19/19 2003    Specimen:  Blood Updated:  01/19/19 2023     WBC 6.59 10*3/mm3      RBC 4.73 10*6/mm3      Hemoglobin 12.1 g/dL      Hematocrit 39.9 %       MCV 84.4 fL      MCH 25.6 pg      MCHC 30.3 g/dL      RDW 15.3 %      RDW-SD 47.3 fl      MPV 9.5 fL      Platelets 385 10*3/mm3      Neutrophil % 47.8 %      Lymphocyte % 41.4 %      Monocyte % 9.1 %      Eosinophil % 1.1 %      Basophil % 0.3 %      Immature Grans % 0.3 %      Neutrophils, Absolute 3.15 10*3/mm3      Lymphocytes, Absolute 2.73 10*3/mm3      Monocytes, Absolute 0.60 10*3/mm3      Eosinophils, Absolute 0.07 10*3/mm3      Basophils, Absolute 0.02 10*3/mm3      Immature Grans, Absolute 0.02 10*3/mm3           I ordered the above labs and reviewed the results    RADIOLOGY  XR Chest 2 View   Final Result   1. Left lower lobe density suspicious for early/mild pneumonia.       This report was finalized on 1/19/2019 8:41 PM by Franklin Chandler M.D.               I ordered the above noted radiological studies. Interpreted by radiologist. Discussed with radiologist (). Reviewed by me in PACS.       PROCEDURES  Procedures  EKG          EKG time: 2021  Rhythm/Rate: NSR, 97  P waves and HI: Nml  QRS, axis: Nml   ST and T waves: nonspecific T wave changes     Interpreted Contemporaneously by me, independently viewed  No significant changes compared to prior 4/17/2017      PROGRESS AND CONSULTS      1952  Ordered lab work, CXR, and EKG for further evaluation. Ordered IVF's and duo-neb.    2103  Ordered Micro-K and IVPB Rocephin    2122  Pt recheck. On re-exam. Pt's lungs are diminished but clear. Notified pt of lab work, including negative troponin, normal BNP, and CMP that shows low potassium, unremarkable EKG, and CXR results that shows mild PNA in left lower lobe. Will give dose of Rocephin here along with potassium before discharge. Discussed plan to discharge pt home with prescription of Abx and potassium. I instructed pt to f/u with PCP. Pt understands and agrees with plan, all concerns addressed.       MEDICAL DECISION MAKING  Results were reviewed/discussed with the patient and they were also made  aware of online access. Pt also made aware that some labs, such as cultures, will not be resulted during ER visit and follow up with PMD is necessary.     MDM  Number of Diagnoses or Management Options     Amount and/or Complexity of Data Reviewed  Clinical lab tests: ordered and reviewed (CMP: BUN-12, creatinine-1.01; BNP-17.3; Troponin<0.01)  Tests in the radiology section of CPT®: ordered and reviewed (CXR results that shows mild PNA in left lower lobe)  Tests in the medicine section of CPT®: reviewed and ordered (See EKG results in procedure. )  Independent visualization of images, tracings, or specimens: yes           DIAGNOSIS  Final diagnoses:   Pneumonia of left lower lobe due to infectious organism (CMS/HCC)   Hypokalemia       DISPOSITION  DISCHARGE    Patient discharged in stable condition.    Reviewed implications of results, diagnosis, meds, responsibility to follow up, warning signs and symptoms of possible worsening, potential complications and reasons to return to ER.    Patient/Family voiced understanding of above instructions.    Discussed plan for discharge, as there is no emergent indication for admission. Patient referred to primary care provider for BP management due to today's BP. Pt/family is agreeable and understands need for follow up and repeat testing.  Pt is aware that discharge does not mean that nothing is wrong but it indicates no emergency is present that requires admission and they must continue care with follow-up as given below or physician of their choice.     FOLLOW-UP  Yessenia Joe MD  [address] [address] [phone]    Schedule an appointment as soon as possible for a visit            Medication List      New Prescriptions    azithromycin 250 MG tablet  Commonly known as:  ZITHROMAX  Take 2 tablets the first day, then 1 tablet daily for 4 days.     potassium chloride 10 MEQ CR tablet  Commonly known as:  K-DUR  Take 1 tablet by mouth 2 (Two)  Times a Day.              Latest Documented Vital Signs:  As of 10:17 PM  BP- 111/74 HR- 106 Temp- 97.8 °F (36.6 °C) O2 sat- 97%    --  Documentation assistance provided by autumn Cuba for Dr. Lamb.  Information recorded by the scribe was done at my direction and has been verified and validated by me.                  Thom Cuba  01/19/19 [id number]       Daniel Lamb MD  01/19/19 [id number]

## 2019-01-20 NOTE — DISCHARGE INSTRUCTIONS
Take medications as directed and follow up with your family doctor next week.  Please return to the ED if symptoms worsen with increasing trouble breathing or a high fever.

## 2019-01-21 ENCOUNTER — TELEPHONE (OUTPATIENT)
Dept: INTERNAL MEDICINE | Facility: CLINIC | Age: 64
End: 2019-01-21

## 2019-01-21 NOTE — TELEPHONE ENCOUNTER
Pt called about an er f/u for pneumonia, informed if she feels like she is trending upward then we do not need to see her until the end or February, first week of march for a 6 week f/u for repeat cxr. Pt understands that if she is not seeing improvement within the next 5-10 days to let us know and we can see her for an evaluation.

## 2019-02-13 RX ORDER — LISINOPRIL AND HYDROCHLOROTHIAZIDE 20; 12.5 MG/1; MG/1
2 TABLET ORAL DAILY
Qty: 180 TABLET | Refills: 1 | Status: SHIPPED | OUTPATIENT
Start: 2019-02-13 | End: 2019-02-13 | Stop reason: SDUPTHER

## 2019-02-13 RX ORDER — LISINOPRIL AND HYDROCHLOROTHIAZIDE 20; 12.5 MG/1; MG/1
2 TABLET ORAL DAILY
Qty: 60 TABLET | Refills: 0 | Status: SHIPPED | OUTPATIENT
Start: 2019-02-13 | End: 2019-01-01 | Stop reason: HOSPADM

## 2019-03-07 ENCOUNTER — TELEPHONE (OUTPATIENT)
Dept: INTERNAL MEDICINE | Facility: CLINIC | Age: 64
End: 2019-03-07

## 2019-03-07 ENCOUNTER — HOSPITAL ENCOUNTER (EMERGENCY)
Facility: HOSPITAL | Age: 64
Discharge: HOME | End: 2019-03-07
Attending: EMERGENCY MEDICINE | Admitting: EMERGENCY MEDICINE

## 2019-03-07 ENCOUNTER — APPOINTMENT (OUTPATIENT)
Dept: GENERAL RADIOLOGY | Facility: HOSPITAL | Age: 64
End: 2019-03-07

## 2019-03-07 VITALS
TEMPERATURE: 98.2 F | SYSTOLIC BLOOD PRESSURE: 105 MMHG | BODY MASS INDEX: 26.84 KG/M2 | HEIGHT: 66 IN | WEIGHT: 167 LBS | HEART RATE: 89 BPM | DIASTOLIC BLOOD PRESSURE: 72 MMHG | RESPIRATION RATE: 24 BRPM | OXYGEN SATURATION: 97 %

## 2019-03-07 DIAGNOSIS — R06.00 DYSPNEA, UNSPECIFIED TYPE: Primary | ICD-10-CM

## 2019-03-07 LAB
ALBUMIN SERPL-MCNC: 4.1 G/DL (ref 3.5–5.2)
ALBUMIN/GLOB SERPL: 1.2 G/DL
ALP SERPL-CCNC: 52 U/L (ref 39–117)
ALT SERPL W P-5'-P-CCNC: 15 U/L (ref 1–33)
ANION GAP SERPL CALCULATED.3IONS-SCNC: 12.3 MMOL/L
AST SERPL-CCNC: 22 U/L (ref 1–32)
BASOPHILS # BLD AUTO: 0.02 10*3/MM3 (ref 0–0.2)
BASOPHILS NFR BLD AUTO: 0.4 % (ref 0–1.5)
BILIRUB SERPL-MCNC: 0.2 MG/DL (ref 0.1–1.2)
BUN BLD-MCNC: 10 MG/DL (ref 8–23)
BUN/CREAT SERPL: 12.8 (ref 7–25)
CALCIUM SPEC-SCNC: 9.7 MG/DL (ref 8.6–10.5)
CHLORIDE SERPL-SCNC: 94 MMOL/L (ref 98–107)
CO2 SERPL-SCNC: 25.7 MMOL/L (ref 22–29)
CREAT BLD-MCNC: 0.78 MG/DL (ref 0.57–1)
DEPRECATED RDW RBC AUTO: 42.2 FL (ref 37–54)
EOSINOPHIL # BLD AUTO: 0.01 10*3/MM3 (ref 0–0.4)
EOSINOPHIL NFR BLD AUTO: 0.2 % (ref 0.3–6.2)
ERYTHROCYTE [DISTWIDTH] IN BLOOD BY AUTOMATED COUNT: 14.6 % (ref 12.3–15.4)
GFR SERPL CREATININE-BSD FRML MDRD: 75 ML/MIN/1.73
GLOBULIN UR ELPH-MCNC: 3.3 GM/DL
GLUCOSE BLD-MCNC: 155 MG/DL (ref 65–99)
GLUCOSE BLDC GLUCOMTR-MCNC: 151 MG/DL (ref 70–130)
HCT VFR BLD AUTO: 37.9 % (ref 34–46.6)
HGB BLD-MCNC: 11.7 G/DL (ref 12–15.9)
HOLD SPECIMEN: NORMAL
HOLD SPECIMEN: NORMAL
IMM GRANULOCYTES # BLD AUTO: 0.01 10*3/MM3 (ref 0–0.05)
IMM GRANULOCYTES NFR BLD AUTO: 0.2 % (ref 0–0.5)
LYMPHOCYTES # BLD AUTO: 2.37 10*3/MM3 (ref 0.7–3.1)
LYMPHOCYTES NFR BLD AUTO: 53.1 % (ref 19.6–45.3)
MCH RBC QN AUTO: 25 PG (ref 26.6–33)
MCHC RBC AUTO-ENTMCNC: 30.9 G/DL (ref 31.5–35.7)
MCV RBC AUTO: 81 FL (ref 79–97)
MONOCYTES # BLD AUTO: 0.33 10*3/MM3 (ref 0.1–0.9)
MONOCYTES NFR BLD AUTO: 7.4 % (ref 5–12)
NEUTROPHILS # BLD AUTO: 1.72 10*3/MM3 (ref 1.4–7)
NEUTROPHILS NFR BLD AUTO: 38.7 % (ref 42.7–76)
NRBC BLD AUTO-RTO: 0 /100 WBC (ref 0–0)
NT-PROBNP SERPL-MCNC: 14.2 PG/ML (ref 0–900)
PLATELET # BLD AUTO: 271 10*3/MM3 (ref 140–450)
PMV BLD AUTO: 10.9 FL (ref 6–12)
POTASSIUM BLD-SCNC: 4 MMOL/L (ref 3.5–5.2)
PROT SERPL-MCNC: 7.4 G/DL (ref 6–8.5)
RBC # BLD AUTO: 4.68 10*6/MM3 (ref 3.77–5.28)
SODIUM BLD-SCNC: 132 MMOL/L (ref 136–145)
TROPONIN T SERPL-MCNC: <0.01 NG/ML (ref 0–0.03)
WBC NRBC COR # BLD: 4.46 10*3/MM3 (ref 3.4–10.8)
WHOLE BLOOD HOLD SPECIMEN: NORMAL
WHOLE BLOOD HOLD SPECIMEN: NORMAL

## 2019-03-07 PROCEDURE — 93005 ELECTROCARDIOGRAM TRACING: CPT | Performed by: EMERGENCY MEDICINE

## 2019-03-07 PROCEDURE — 94799 UNLISTED PULMONARY SVC/PX: CPT

## 2019-03-07 PROCEDURE — 82962 GLUCOSE BLOOD TEST: CPT

## 2019-03-07 PROCEDURE — 94640 AIRWAY INHALATION TREATMENT: CPT

## 2019-03-07 PROCEDURE — 85025 COMPLETE CBC W/AUTO DIFF WBC: CPT | Performed by: EMERGENCY MEDICINE

## 2019-03-07 PROCEDURE — 84484 ASSAY OF TROPONIN QUANT: CPT | Performed by: EMERGENCY MEDICINE

## 2019-03-07 PROCEDURE — 83880 ASSAY OF NATRIURETIC PEPTIDE: CPT | Performed by: EMERGENCY MEDICINE

## 2019-03-07 PROCEDURE — 99284 EMERGENCY DEPT VISIT MOD MDM: CPT

## 2019-03-07 PROCEDURE — 93010 ELECTROCARDIOGRAM REPORT: CPT | Performed by: INTERNAL MEDICINE

## 2019-03-07 PROCEDURE — 71046 X-RAY EXAM CHEST 2 VIEWS: CPT

## 2019-03-07 PROCEDURE — 96360 HYDRATION IV INFUSION INIT: CPT

## 2019-03-07 PROCEDURE — 80053 COMPREHEN METABOLIC PANEL: CPT | Performed by: EMERGENCY MEDICINE

## 2019-03-07 RX ORDER — IPRATROPIUM BROMIDE AND ALBUTEROL SULFATE 2.5; .5 MG/3ML; MG/3ML
3 SOLUTION RESPIRATORY (INHALATION) ONCE
Status: COMPLETED | OUTPATIENT
Start: 2019-03-07 | End: 2019-03-07

## 2019-03-07 RX ORDER — SODIUM CHLORIDE 0.9 % (FLUSH) 0.9 %
10 SYRINGE (ML) INJECTION AS NEEDED
Status: DISCONTINUED | OUTPATIENT
Start: 2019-03-07 | End: 2019-03-07 | Stop reason: HOSPADM

## 2019-03-07 RX ORDER — SODIUM CHLORIDE 9 MG/ML
125 INJECTION, SOLUTION INTRAVENOUS CONTINUOUS
Status: DISCONTINUED | OUTPATIENT
Start: 2019-03-07 | End: 2019-03-07 | Stop reason: HOSPADM

## 2019-03-07 RX ADMIN — SODIUM CHLORIDE 500 ML: 9 INJECTION, SOLUTION INTRAVENOUS at 12:55

## 2019-03-07 RX ADMIN — IPRATROPIUM BROMIDE AND ALBUTEROL SULFATE 3 ML: 2.5; .5 SOLUTION RESPIRATORY (INHALATION) at 13:32

## 2019-03-07 NOTE — DISCHARGE INSTRUCTIONS
You are advised to follow closely with Dr Joe and your lung doctor in 2-3 days for recheck, final results of lab work and imaging testing, and further testing/treatment as needed.    Please use symbicort twice daily as previously directed  Please use albuterol inhaler or nebulized every 4-6 hours as needed for shortness of air    Drink at least 6-8 glasses of fluids daily    Quit smoking    Please return to the emergency department immediately with chest pain different than usual for you, persistent or worsening shortness of air, abdominal pain, persistent vomiting/fever, blood in emesis or stool, lightheadedness/fainting, problems with speech, one sided weakness/numbness, new incontinence, problems with vision,  or for worsening of symptoms or other concerns.

## 2019-03-07 NOTE — TELEPHONE ENCOUNTER
Pt called and stated that she is soa and feels like she still has pneumonia, stated she would like an apt here today or she would go to the ER is unavailable.     I informed her that we only had two doctors today and that a trip to the ER would be best so she isn't waiting and that they could do repeat imagining and get her started on abx's if needed. Informed pt to keep us updated on her condition.

## 2019-03-07 NOTE — TELEPHONE ENCOUNTER
Pt called and left a voicemail stating that she went to the ER for soa, needs to schedule a hospital f/u, can some body please call her and get her one scheduled next week with , please and thank you.

## 2019-03-07 NOTE — ED TRIAGE NOTES
Patient c/o soa, generalized weakness and nonproductive cough, patient dx with pneumonia in January, denies feeling better after PO abx. NAD noted upon arrival to ED

## 2019-03-07 NOTE — ED PROVIDER NOTES
" EMERGENCY DEPARTMENT ENCOUNTER    CHIEF COMPLAINT  Chief Complaint: dyspnea  History given by: patient  History limited by: none  Room Number: 33/33  PMD: Yessenia Joe MD      HPI:  Pt is a 63 y.o. female who presents complaining of on going SOA since d/c from ED 19. She reports she completed her abx as prescribed from that time w/o much improvement. She notices the SOA when she talking and feels like she cannot get her words out because of the SOA. She also noticed light headedness this morning. She has an associated dry cough and myalgias that began around 5 days ago and believes she had a cold but denies fever, CP, leg pain or swelling, recent falls, and other complaints. She is a smoker, but denies ETOH or illicit drug use. She denies f/u w/ her PCP after being seen in the ED in January and states she is unable to get into see her because \"they are busy\" and cannot get her in. Pt has hx of asthma and is supposed to use Symbicort daily, but reports she does not. She is also prescribed an albuterol inhaler, but does not use it. She also reports she does not check her BGL as often as she should.    Duration/Onset/Timin weeks  Location: n/a  Radiation: n/a  Quality: shortness of air  Intensity/Severity: moderate  Associated Symptoms: dry cough, myalgias  Aggravating or Alleviating Factors: talking  Previous Episodes: pt reports these symptoms are similar to prior 19      PAST MEDICAL HISTORY  Active Ambulatory Problems     Diagnosis Date Noted   • Moderate persistent asthma without complication 2016   • Asthma 2016   • Benign essential hypertension 2016   • Hyperlipidemia 2016   • Arthralgia of hip 2016   • Pain of thigh 2016   • Microscopic hematuria 2016   • Type 2 diabetes mellitus (CMS/HCC) 2016   • Tobacco use 2016   • Chronic kidney disease, stage III (moderate) (CMS/HCC) 2017   • Iron deficiency anemia 2017     Resolved " Ambulatory Problems     Diagnosis Date Noted   • Abnormal kidney function study 03/09/2016     Past Medical History:   Diagnosis Date   • Asthma    • Carotid artery stenosis    • Diabetes mellitus (CMS/HCC)    • GERD (gastroesophageal reflux disease)    • Glaucoma    • Hydronephrosis    • Hyperlipidemia    • Hypertension    • Intracranial hemorrhage (CMS/HCC)    • Pneumonia    • Polyp of sigmoid colon    • Vitamin D deficiency        PAST SURGICAL HISTORY  Past Surgical History:   Procedure Laterality Date   • GALLBLADDER SURGERY     • HERNIA REPAIR     • HYSTERECTOMY         FAMILY HISTORY  Family History   Problem Relation Age of Onset   • Hypertension Mother    • Diabetes Mother    • Hypertension Sister    • Diabetes Sister    • Hypertension Brother    • Diabetes Brother    • Lung cancer Maternal Uncle    • Stroke Maternal Grandfather        SOCIAL HISTORY  Social History     Socioeconomic History   • Marital status: Single     Spouse name: Not on file   • Number of children: 0   • Years of education: Not on file   • Highest education level: Not on file   Social Needs   • Financial resource strain: Not on file   • Food insecurity - worry: Not on file   • Food insecurity - inability: Not on file   • Transportation needs - medical: Not on file   • Transportation needs - non-medical: Not on file   Occupational History   • Occupation: [employer]   Tobacco Use   • Smoking status: Current Every Day Smoker     Packs/day: 1.00     Years: 30.00     Pack years: 30.00   • Smokeless tobacco: Never Used   Substance and Sexual Activity   • Alcohol use: No   • Drug use: No   • Sexual activity: Defer   Other Topics Concern   • Not on file   Social History Narrative   • Not on file       ALLERGIES  Penicillins    REVIEW OF SYSTEMS  Review of Systems   Constitutional: Negative for chills and fever.   HENT: Negative for rhinorrhea and sore throat.    Eyes: Negative for visual disturbance.   Respiratory: Positive for cough, chest  tightness and shortness of breath.    Cardiovascular: Negative for chest pain, palpitations and leg swelling.   Gastrointestinal: Negative for abdominal pain, diarrhea and vomiting.   Endocrine: Negative.    Genitourinary: Negative for decreased urine volume, dysuria and frequency.   Musculoskeletal: Positive for myalgias. Negative for neck pain.   Skin: Negative for rash.   Neurological: Negative for syncope and headaches.   Psychiatric/Behavioral: Negative.    All other systems reviewed and are negative.      PHYSICAL EXAM  ED Triage Vitals   Temp Heart Rate Resp BP SpO2   03/07/19 1124 03/07/19 1124 03/07/19 1124 03/07/19 1124 03/07/19 1124   98.2 °F (36.8 °C) 102 18 116/81 93 %      Temp src Heart Rate Source Patient Position BP Location FiO2 (%)   03/07/19 1124 03/07/19 1138 03/07/19 1124 03/07/19 1124 --   Tympanic Monitor Sitting Right arm        Physical Exam   Constitutional: She is oriented to person, place, and time.  Non-toxic appearance. She appears distressed (mild).   HENT:   Head: Normocephalic and atraumatic.   Eyes: EOM are normal.   Neck: Normal range of motion. Neck supple.   Cardiovascular: Normal rate, regular rhythm, normal heart sounds and intact distal pulses.   No murmur heard.  Pulses:       Posterior tibial pulses are 2+ on the right side, and 2+ on the left side.   Pulmonary/Chest: Effort normal. No respiratory distress. She has rhonchi (bilateral bases). She has rales (bilateral bases).   100% on RA   Abdominal: Soft. Bowel sounds are normal. There is no tenderness. There is no rebound and no guarding.   Musculoskeletal: Normal range of motion. She exhibits no edema.   Neurological: She is alert and oriented to person, place, and time.   Skin: Skin is warm and dry.   Psychiatric: Affect normal.   Nursing note and vitals reviewed.      LAB RESULTS  Lab Results (last 24 hours)     Procedure Component Value Units Date/Time    CBC & Differential [[id number]] Collected:  03/07/19 1216     Specimen:  Blood Updated:  03/07/19 1232    Narrative:       The following orders were created for panel order CBC & Differential.  Procedure                               Abnormality         Status                     ---------                               -----------         ------                     CBC Auto Differential[[id number]]        Abnormal            Final result                 Please view results for these tests on the individual orders.    Comprehensive Metabolic Panel [[id number]]  (Abnormal) Collected:  03/07/19 1216    Specimen:  Blood Updated:  03/07/19 1302     Glucose 155 mg/dL      BUN 10 mg/dL      Creatinine 0.78 mg/dL      Sodium 132 mmol/L      Potassium 4.0 mmol/L      Chloride 94 mmol/L      CO2 25.7 mmol/L      Calcium 9.7 mg/dL      Total Protein 7.4 g/dL      Albumin 4.10 g/dL      ALT (SGPT) 15 U/L      AST (SGOT) 22 U/L      Comment: Specimen hemolyzed.  Results may be affected.        Alkaline Phosphatase 52 U/L      Total Bilirubin 0.2 mg/dL      eGFR Non African Amer 75 mL/min/1.73      Globulin 3.3 gm/dL      A/G Ratio 1.2 g/dL      BUN/Creatinine Ratio 12.8     Anion Gap 12.3 mmol/L     Narrative:       GFR Normal >60  Chronic Kidney Disease <60  Kidney Failure <15    BNP [[id number]]  (Normal) Collected:  03/07/19 1216    Specimen:  Blood Updated:  03/07/19 1250     proBNP 14.2 pg/mL     Narrative:       Among patients with dyspnea, NT-proBNP is highly sensitive for the detection of acute congestive heart failure. In addition NT-proBNP of <300 pg/ml effectively rules out acute congestive heart failure with 99% negative predictive value.    Troponin [[id number]]  (Normal) Collected:  03/07/19 1216    Specimen:  Blood Updated:  03/07/19 1254     Troponin T <0.010 ng/mL     Narrative:       Troponin T Reference Range:  <= 0.03 ng/mL-   Negative for AMI  >0.03 ng/mL-     Abnormal for myocardial necrosis.  Clinicians would have to utilize clinical acumen, EKG, Troponin and serial  changes to determine if it is an Acute Myocardial Infarction or myocardial injury due to an underlying chronic condition.     CBC Auto Differential [[id number]]  (Abnormal) Collected:  03/07/19 1216    Specimen:  Blood Updated:  03/07/19 1232     WBC 4.46 10*3/mm3      RBC 4.68 10*6/mm3      Hemoglobin 11.7 g/dL      Hematocrit 37.9 %      MCV 81.0 fL      MCH 25.0 pg      MCHC 30.9 g/dL      RDW 14.6 %      RDW-SD 42.2 fl      MPV 10.9 fL      Platelets 271 10*3/mm3      Neutrophil % 38.7 %      Lymphocyte % 53.1 %      Monocyte % 7.4 %      Eosinophil % 0.2 %      Basophil % 0.4 %      Immature Grans % 0.2 %      Neutrophils, Absolute 1.72 10*3/mm3      Lymphocytes, Absolute 2.37 10*3/mm3      Monocytes, Absolute 0.33 10*3/mm3      Eosinophils, Absolute 0.01 10*3/mm3      Basophils, Absolute 0.02 10*3/mm3      Immature Grans, Absolute 0.01 10*3/mm3      nRBC 0.0 /100 WBC     POC Glucose Once [[id number]]  (Abnormal) Collected:  03/07/19 1217    Specimen:  Blood Updated:  03/07/19 1219     Glucose 151 mg/dL           I ordered the above labs and reviewed the results    RADIOLOGY  XR Chest 2 View   There are no pulmonary airspace consolidations to suggest  pneumonia. There are no effusions or evidence for CHF. The thoracic  aorta is tortuous. Contour of the mediastinum and heart is unchanged  since previous radiographs. No acute abnormality is seen.           I ordered the above noted radiological studies. Interpreted by radiologist. Reviewed by me in PACS.       PROCEDURES  Procedures    EKG          EKG time: 1211  Rhythm/Rate: NSR 90s  P waves and KS: nml  QRS, axis: nml   ST and T waves: nml     Interpreted Contemporaneously by me, independently viewed  unchanged compared to prior 01/19/19    PROGRESS AND CONSULTS     1419  ED tech ambulated the pt and advised pt did not become hypoxic or distressed and tolerated well. Will d/c as previously discussed w/ pt.    MEDICAL DECISION MAKING  Results were  reviewed/discussed with the patient and they were also made aware of online access. Pt also made aware that some labs, such as cultures, will not be resulted during ER visit and follow up with PMD is necessary.     MDM  Number of Diagnoses or Management Options  Dyspnea, unspecified type:      Amount and/or Complexity of Data Reviewed  Clinical lab tests: reviewed (unremarkable)  Tests in the radiology section of CPT®: reviewed (CXR-negative acute)  Tests in the medicine section of CPT®: reviewed (See EKG procedure note.)  Independent visualization of images, tracings, or specimens: yes    Patient Progress  Patient progress: stable         DIAGNOSIS  Final diagnoses:   Dyspnea, unspecified type       DISPOSITION  DISCHARGE    Patient discharged in stable condition.    Reviewed implications of results, diagnosis, meds, responsibility to follow up, warning signs and symptoms of possible worsening, potential complications and reasons to return to ED.    Patient/Family voiced understanding of above instructions.    Discussed plan for discharge, as there is no emergent indication for admission. Patient referred to primary care provider for BP management due to today's BP. Pt/family is agreeable and understands need for follow up and repeat testing.  Pt is aware that discharge does not mean that nothing is wrong but it indicates no emergency is present that requires admission and they must continue care with follow-up as given below or physician of their choice.     FOLLOW-UP  Yessenia Joe MD  [address] [address] [phone]    Schedule an appointment as soon as possible for a visit in 3 days  EVEN IF WELL    your pulmonologist    Schedule an appointment as soon as possible for a visit in 3 days  EVEN IF WELL         Medication List      Stop    azithromycin 250 MG tablet  Commonly known as:  ZITHROMAX     ondansetron 4 MG tablet  Commonly known as:  ZOFRAN     potassium chloride 10 MEQ CR  tablet  Commonly known as:  K-DUR          Latest Documented Vital Signs:  As of 2:39 PM  BP- 105/72 HR- 89 Temp- 98.2 °F (36.8 °C) (Tympanic) O2 sat- 97%    --  Documentation assistance provided by autumn Pereira for Dr. Ardon.  Information recorded by the scribe was done at my direction and has been verified and validated by me.     Charo Pereira  03/07/19 2350       Ml Ardon MD  03/08/19 1118

## 2019-03-08 RX ORDER — MONTELUKAST SODIUM 10 MG/1
TABLET ORAL
Qty: 90 TABLET | Refills: 1 | Status: SHIPPED | OUTPATIENT
Start: 2019-03-08 | End: 2019-01-01

## 2019-03-08 RX ORDER — SIMVASTATIN 20 MG
TABLET ORAL
Qty: 90 TABLET | Refills: 1 | Status: SHIPPED | OUTPATIENT
Start: 2019-03-08 | End: 2019-01-01

## 2019-03-18 ENCOUNTER — OFFICE VISIT (OUTPATIENT)
Dept: INTERNAL MEDICINE | Facility: CLINIC | Age: 64
End: 2019-03-18

## 2019-03-18 VITALS
HEIGHT: 66 IN | SYSTOLIC BLOOD PRESSURE: 126 MMHG | BODY MASS INDEX: 26.84 KG/M2 | HEART RATE: 89 BPM | DIASTOLIC BLOOD PRESSURE: 74 MMHG | RESPIRATION RATE: 18 BRPM | WEIGHT: 167 LBS | OXYGEN SATURATION: 98 %

## 2019-03-18 DIAGNOSIS — R06.02 SHORTNESS OF BREATH: Primary | ICD-10-CM

## 2019-03-18 DIAGNOSIS — J45.40 MODERATE PERSISTENT ASTHMA WITHOUT COMPLICATION: ICD-10-CM

## 2019-03-18 DIAGNOSIS — Z72.0 TOBACCO USE: ICD-10-CM

## 2019-03-18 DIAGNOSIS — D50.9 IRON DEFICIENCY ANEMIA, UNSPECIFIED IRON DEFICIENCY ANEMIA TYPE: ICD-10-CM

## 2019-03-18 PROCEDURE — 99214 OFFICE O/P EST MOD 30 MIN: CPT | Performed by: INTERNAL MEDICINE

## 2019-03-18 RX ORDER — FLUTICASONE PROPIONATE 50 MCG
SPRAY, SUSPENSION (ML) NASAL
Qty: 16 G | Refills: 3 | Status: SHIPPED | OUTPATIENT
Start: 2019-03-18 | End: 2019-03-18 | Stop reason: SDUPTHER

## 2019-03-18 RX ORDER — FLUTICASONE PROPIONATE 50 MCG
2 SPRAY, SUSPENSION (ML) NASAL DAILY
Qty: 3 BOTTLE | Refills: 3 | Status: SHIPPED | OUTPATIENT
Start: 2019-03-18

## 2019-03-18 RX ORDER — BUPROPION HYDROCHLORIDE 150 MG/1
150 TABLET ORAL DAILY
Qty: 30 TABLET | Refills: 4 | Status: SHIPPED | OUTPATIENT
Start: 2019-03-18 | End: 2019-01-01

## 2019-03-18 RX ORDER — POTASSIUM CHLORIDE 750 MG/1
10 TABLET, EXTENDED RELEASE ORAL 2 TIMES DAILY
Refills: 0 | COMMUNITY
Start: 2019-01-19 | End: 2019-01-01

## 2019-03-18 RX ORDER — HYDROXYZINE HYDROCHLORIDE 25 MG/1
25 TABLET, FILM COATED ORAL EVERY 8 HOURS PRN
Qty: 90 TABLET | Refills: 1 | Status: ON HOLD | OUTPATIENT
Start: 2019-03-18 | End: 2019-01-01

## 2019-03-18 RX ORDER — DESLORATADINE 5 MG/1
5 TABLET ORAL DAILY
Qty: 90 TABLET | Refills: 1 | Status: SHIPPED | OUTPATIENT
Start: 2019-03-18 | End: 2020-01-01 | Stop reason: SDUPTHER

## 2019-03-18 RX ORDER — DESLORATADINE 5 MG/1
5 TABLET ORAL DAILY
Qty: 90 TABLET | Refills: 1 | Status: SHIPPED | OUTPATIENT
Start: 2019-03-18 | End: 2019-03-18 | Stop reason: SDUPTHER

## 2019-03-18 NOTE — PROGRESS NOTES
Chief Complaint  Linette Ghosh is a 63 y.o. female who presents for Shortness of Breath (States she still feels short of breath. ); Pneumonia; and Follow-up (Hospital f/u )  .    History of Present Illness   Linette is here for follow up on pneumonia and shortness of breath.  She was treated in the ER in Jan for pneumonia.  She was seen again in the ER earlier this month for continued soa.  Her repeat CXR showed clearing of the previous pneumonia.  She was not using her symbicort regularly.  She has had a lot of stress. 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  Her niece passed away in December.  She is trying to reduce her smoking for her health and for the kids she is taking care of.  She tried the gum but didn't like it.  She has both a cough and shortness of breath.  No fevers.  No night sweats.      Review of Systems   Constitution: Positive for malaise/fatigue. Negative for chills, fever and night sweats.   Cardiovascular: Positive for dyspnea on exertion. Negative for chest pain.   Respiratory: Positive for cough, shortness of breath and wheezing.        Patient Active Problem List   Diagnosis   • Moderate persistent asthma without complication   • Asthma   • Benign essential hypertension   • Hyperlipidemia   • Arthralgia of hip   • Pain of thigh   • Microscopic hematuria   • Type 2 diabetes mellitus (CMS/HCC)   • Tobacco use   • Chronic kidney disease, stage III (moderate) (CMS/HCC)   • Iron deficiency anemia       Past Medical History:   Diagnosis Date   • Asthma    • Carotid artery stenosis    • Diabetes mellitus (CMS/HCC)    • GERD (gastroesophageal reflux disease)    • Glaucoma    • Hydronephrosis    • Hyperlipidemia    • Hypertension    • Intracranial hemorrhage (CMS/HCC)    • Pneumonia    • Polyp of sigmoid colon    • Vitamin D deficiency        Past Surgical History:   Procedure Laterality Date   • GALLBLADDER SURGERY     • HERNIA REPAIR     • HYSTERECTOMY         Family  History   Problem Relation Age of Onset   • Hypertension Mother    • Diabetes Mother    • Hypertension Sister    • Diabetes Sister    • Hypertension Brother    • Diabetes Brother    • Lung cancer Maternal Uncle    • Stroke Maternal Grandfather        Social History     Socioeconomic History   • Marital status: Single     Spouse name: Not on file   • Number of children: 0   • Years of education: Not on file   • Highest education level: Not on file   Social Needs   • Financial resource strain: Not on file   • Food insecurity - worry: Not on file   • Food insecurity - inability: Not on file   • Transportation needs - medical: Not on file   • Transportation needs - non-medical: Not on file   Occupational History   • Occupation: [employer]   Tobacco Use   • Smoking status: Current Every Day Smoker     Packs/day: 1.00     Years: 30.00     Pack years: 30.00   • Smokeless tobacco: Never Used   Substance and Sexual Activity   • Alcohol use: No   • Drug use: No   • Sexual activity: Defer   Other Topics Concern   • Not on file   Social History Narrative   • Not on file       Current Outpatient Medications on File Prior to Visit   Medication Sig Dispense Refill   • albuterol (PROVENTIL HFA;VENTOLIN HFA) 108 (90 BASE) MCG/ACT inhaler Inhale 2 puffs Every 4 (Four) Hours As Needed for Wheezing or Shortness of Air. 18 g 3   • albuterol (PROVENTIL) (2.5 MG/3ML) 0.083% nebulizer solution USE ONE VIAL VIA NEBULIZER BY MOUTH EVERY 6 TO 8 HOURS AS NEEDED 3 mL 1   • aspirin 325 MG tablet Take  by mouth daily.     • lisinopril-hydrochlorothiazide (PRINZIDE,ZESTORETIC) 20-12.5 MG per tablet Take 2 tablets by mouth Daily. 60 tablet 0   • metFORMIN (GLUCOPHAGE) 1000 MG tablet TAKE 1 TABLET BY MOUTH TWICE DAILY WITH MEALS 180 tablet 3   • montelukast (SINGULAIR) 10 MG tablet TAKE 1 TABLET BY MOUTH EVERY NIGHT 90 tablet 1   • Multiple Vitamins-Minerals (CENTRUM SILVER PO) Take  by mouth daily.     • pantoprazole (PROTONIX) 40 MG EC tablet Take 1  "tablet by mouth Daily. 90 tablet 3   • potassium chloride (K-DUR,KLOR-CON) 10 MEQ CR tablet Take 10 mEq by mouth 2 (Two) Times a Day.  0   • simvastatin (ZOCOR) 20 MG tablet TAKE 1 TABLET BY MOUTH EVERY NIGHT 90 tablet 1   • SYMBICORT 160-4.5 MCG/ACT inhaler INHALE TWO PUFFS BY MOUTH TWICE A DAY **RINSE MOUTH AFTER USE** 10.2 g 10   • timolol (TIMOPTIC) 0.25 % ophthalmic solution Administer 1 drop to both eyes Daily.     • [DISCONTINUED] fluticasone (FLONASE) 50 MCG/ACT nasal spray 2 sprays into each nostril Daily. 3 bottle 3   • [DISCONTINUED] hydrOXYzine (ATARAX) 25 MG tablet TAKE 1 TABLET BY MOUTH EVERY EIGHT HOURS AS NEEDED FOR ITCHING 270 tablet 2   • [DISCONTINUED] desloratadine (CLARINEX) 5 MG tablet Take 1 tablet by mouth Daily. 90 tablet 3   • [DISCONTINUED] fluticasone (FLONASE) 50 MCG/ACT nasal spray USE 2 SPRAYS IN EACH NOSTRIL DAILY 16 g 3     No current facility-administered medications on file prior to visit.        Allergies   Allergen Reactions   • Penicillins        Vitals:    03/18/19 1037   BP: 126/74   Pulse: 89   Resp: 18   SpO2: 98%   Weight: 75.8 kg (167 lb)   Height: 167.6 cm (65.98\")       Body mass index is 26.97 kg/m².    Objective   Physical Exam   Constitutional: She is oriented to person, place, and time. She appears well-developed and well-nourished. No distress.   HENT:   Head: Normocephalic and atraumatic.   Mouth/Throat: Oropharynx is clear and moist. No oropharyngeal exudate.   Eyes: Conjunctivae are normal. No scleral icterus.   Neck: Normal range of motion. Neck supple.   Cardiovascular: Normal rate, regular rhythm and normal heart sounds. Exam reveals no gallop and no friction rub.   No murmur heard.  Pulmonary/Chest: Effort normal. No accessory muscle usage. No respiratory distress. She has wheezes in the right middle field, the right lower field, the left middle field and the left lower field. She has no rhonchi. She has no rales.   Musculoskeletal: She exhibits no edema. "   Lymphadenopathy:     She has no cervical adenopathy.   Neurological: She is alert and oriented to person, place, and time. No cranial nerve deficit.   Psychiatric: She has a normal mood and affect. Her behavior is normal. Judgment and thought content normal.       Results for orders placed or performed during the hospital encounter of 03/07/19   Comprehensive Metabolic Panel   Result Value Ref Range    Glucose 155 (H) 65 - 99 mg/dL    BUN 10 8 - 23 mg/dL    Creatinine 0.78 0.57 - 1.00 mg/dL    Sodium 132 (L) 136 - 145 mmol/L    Potassium 4.0 3.5 - 5.2 mmol/L    Chloride 94 (L) 98 - 107 mmol/L    CO2 25.7 22.0 - 29.0 mmol/L    Calcium 9.7 8.6 - 10.5 mg/dL    Total Protein 7.4 6.0 - 8.5 g/dL    Albumin 4.10 3.50 - 5.20 g/dL    ALT (SGPT) 15 1 - 33 U/L    AST (SGOT) 22 1 - 32 U/L    Alkaline Phosphatase 52 39 - 117 U/L    Total Bilirubin 0.2 0.1 - 1.2 mg/dL    eGFR Non African Amer 75 >60 mL/min/1.73    Globulin 3.3 gm/dL    A/G Ratio 1.2 g/dL    BUN/Creatinine Ratio 12.8 7.0 - 25.0    Anion Gap 12.3 mmol/L   BNP   Result Value Ref Range    proBNP 14.2 0.0 - 900.0 pg/mL   Troponin   Result Value Ref Range    Troponin T <0.010 0.000 - 0.030 ng/mL   CBC Auto Differential   Result Value Ref Range    WBC 4.46 3.40 - 10.80 10*3/mm3    RBC 4.68 3.77 - 5.28 10*6/mm3    Hemoglobin 11.7 (L) 12.0 - 15.9 g/dL    Hematocrit 37.9 34.0 - 46.6 %    MCV 81.0 79.0 - 97.0 fL    MCH 25.0 (L) 26.6 - 33.0 pg    MCHC 30.9 (L) 31.5 - 35.7 g/dL    RDW 14.6 12.3 - 15.4 %    RDW-SD 42.2 37.0 - 54.0 fl    MPV 10.9 6.0 - 12.0 fL    Platelets 271 140 - 450 10*3/mm3    Neutrophil % 38.7 (L) 42.7 - 76.0 %    Lymphocyte % 53.1 (H) 19.6 - 45.3 %    Monocyte % 7.4 5.0 - 12.0 %    Eosinophil % 0.2 (L) 0.3 - 6.2 %    Basophil % 0.4 0.0 - 1.5 %    Immature Grans % 0.2 0.0 - 0.5 %    Neutrophils, Absolute 1.72 1.40 - 7.00 10*3/mm3    Lymphocytes, Absolute 2.37 0.70 - 3.10 10*3/mm3    Monocytes, Absolute 0.33 0.10 - 0.90 10*3/mm3    Eosinophils, Absolute  0.01 0.00 - 0.40 10*3/mm3    Basophils, Absolute 0.02 0.00 - 0.20 10*3/mm3    Immature Grans, Absolute 0.01 0.00 - 0.05 10*3/mm3    nRBC 0.0 0.0 - 0.0 /100 WBC   POC Glucose Once   Result Value Ref Range    Glucose 151 (H) 70 - 130 mg/dL   Light Blue Top   Result Value Ref Range    Extra Tube hold for add-on    Green Top (Gel)   Result Value Ref Range    Extra Tube Hold for add-ons.    Lavender Top   Result Value Ref Range    Extra Tube hold for add-on    Gold Top - SST   Result Value Ref Range    Extra Tube Hold for add-ons.        Assessment/Plan   Diagnoses and all orders for this visit:    Shortness of breath  -     CT Chest With Contrast; Future    Iron deficiency anemia, unspecified iron deficiency anemia type  -     Iron Profile  -     Ferritin  -     CBC & Differential    Tobacco use  -     CT Chest With Contrast; Future    Moderate persistent asthma without complication    Other orders  -     potassium chloride (K-DUR,KLOR-CON) 10 MEQ CR tablet; Take 10 mEq by mouth 2 (Two) Times a Day.  -     hydrOXYzine (ATARAX) 25 MG tablet; Take 1 tablet by mouth Every 8 (Eight) Hours As Needed for Itching.  -     buPROPion XL (WELLBUTRIN XL) 150 MG 24 hr tablet; Take 1 tablet by mouth Daily.  -     desloratadine (CLARINEX) 5 MG tablet; Take 1 tablet by mouth Daily.  -     fluticasone (FLONASE) 50 MCG/ACT nasal spray; 2 sprays into the nostril(s) as directed by provider Daily.        Discussion/Summary  Linette is here for acute care for new issues.   Her soa has increased of late.  Unfortunately, she doesn't use her symbicort as prescribed all the time.  She is still smoking but attempting to cut back.  She would like to try wellbutrin to see if this could help. We discussed chantix, but the nightmare factor really bothered her.  We are going to get a CT chest for further evaluation given the ongoing and worsening SOA.  She has had an iron deficiency anemia and has stopped her iron.  Her hgb in the ER was a little lower.   We are going to repeat these labs.  Her stool was negative for blood in October.    No Follow-up on file.

## 2019-03-19 ENCOUNTER — TELEPHONE (OUTPATIENT)
Dept: INTERNAL MEDICINE | Facility: CLINIC | Age: 64
End: 2019-03-19

## 2019-03-19 LAB
BASOPHILS # BLD AUTO: 0.03 10*3/MM3 (ref 0–0.2)
BASOPHILS NFR BLD AUTO: 0.5 % (ref 0–1.5)
EOSINOPHIL # BLD AUTO: 0.04 10*3/MM3 (ref 0–0.4)
EOSINOPHIL NFR BLD AUTO: 0.7 % (ref 0.3–6.2)
ERYTHROCYTE [DISTWIDTH] IN BLOOD BY AUTOMATED COUNT: 15.5 % (ref 12.3–15.4)
FERRITIN SERPL-MCNC: 12 NG/ML (ref 13–150)
HCT VFR BLD AUTO: 38 % (ref 34–46.6)
HGB BLD-MCNC: 11.2 G/DL (ref 12–15.9)
IMM GRANULOCYTES # BLD AUTO: 0.02 10*3/MM3 (ref 0–0.05)
IMM GRANULOCYTES NFR BLD AUTO: 0.3 % (ref 0–0.5)
IRON SATN MFR SERPL: 6 % (ref 20–50)
IRON SERPL-MCNC: 32 MCG/DL (ref 37–145)
LYMPHOCYTES # BLD AUTO: 2.09 10*3/MM3 (ref 0.7–3.1)
LYMPHOCYTES NFR BLD AUTO: 35.7 % (ref 19.6–45.3)
MCH RBC QN AUTO: 25 PG (ref 26.6–33)
MCHC RBC AUTO-ENTMCNC: 29.5 G/DL (ref 31.5–35.7)
MCV RBC AUTO: 84.8 FL (ref 79–97)
MONOCYTES # BLD AUTO: 0.48 10*3/MM3 (ref 0.1–0.9)
MONOCYTES NFR BLD AUTO: 8.2 % (ref 5–12)
NEUTROPHILS # BLD AUTO: 3.19 10*3/MM3 (ref 1.4–7)
NEUTROPHILS NFR BLD AUTO: 54.6 % (ref 42.7–76)
NRBC BLD AUTO-RTO: 0.2 /100 WBC (ref 0–0)
PLATELET # BLD AUTO: 401 10*3/MM3 (ref 140–450)
RBC # BLD AUTO: 4.48 10*6/MM3 (ref 3.77–5.28)
TIBC SERPL-MCNC: 530 MCG/DL
UIBC SERPL-MCNC: 498 MCG/DL
WBC # BLD AUTO: 5.85 10*3/MM3 (ref 3.4–10.8)

## 2019-03-19 NOTE — TELEPHONE ENCOUNTER
----- Message from Yessenia Joe MD sent at 3/19/2019  4:37 PM EDT -----  Please call - her iron levels are low.  I would like her to restart her oral iron that she had been on.  Repeat labs in 6 weeks. CBC, TIBC, iron, and ferritin.        She has had a negative celiac panel.  She has had two negative stool - one in 2017 and one in 2018.

## 2019-03-20 RX ORDER — BUDESONIDE AND FORMOTEROL FUMARATE DIHYDRATE 160; 4.5 UG/1; UG/1
AEROSOL RESPIRATORY (INHALATION)
Qty: 10.2 G | Refills: 9 | Status: SHIPPED | OUTPATIENT
Start: 2019-03-20 | End: 2019-01-01 | Stop reason: SINTOL

## 2019-03-31 ENCOUNTER — HOSPITAL ENCOUNTER (OUTPATIENT)
Dept: CT IMAGING | Facility: HOSPITAL | Age: 64
Discharge: HOME | End: 2019-03-31
Admitting: INTERNAL MEDICINE

## 2019-03-31 DIAGNOSIS — Z72.0 TOBACCO USE: ICD-10-CM

## 2019-03-31 DIAGNOSIS — R06.02 SHORTNESS OF BREATH: ICD-10-CM

## 2019-03-31 PROCEDURE — 71260 CT THORAX DX C+: CPT

## 2019-03-31 PROCEDURE — 25010000002 IOPAMIDOL 61 % SOLUTION: Performed by: INTERNAL MEDICINE

## 2019-03-31 PROCEDURE — 82565 ASSAY OF CREATININE: CPT

## 2019-03-31 RX ADMIN — IOPAMIDOL 75 ML: 612 INJECTION, SOLUTION INTRAVENOUS at 09:40

## 2019-04-01 LAB — CREAT BLDA-MCNC: 1 MG/DL (ref 0.6–1.3)

## 2019-04-02 ENCOUNTER — TELEPHONE (OUTPATIENT)
Dept: INTERNAL MEDICINE | Facility: CLINIC | Age: 64
End: 2019-04-02

## 2019-04-02 NOTE — TELEPHONE ENCOUNTER
----- Message from Yessenia Joe MD sent at 4/2/2019  9:08 AM EDT -----  Please call - her ct shows some tiny nodules that were seen in 2015 but are a little more prominent.  I would like for her to follow up with Dr. Berhane Rodriguez (pulmonologist) whom she has seen in the past so he can follow these findings as well.  Please fax a copy of the CT to Dr. Rodriguez as well.

## 2019-04-12 ENCOUNTER — TRANSCRIBE ORDERS (OUTPATIENT)
Dept: ADMINISTRATIVE | Facility: HOSPITAL | Age: 64
End: 2019-04-12

## 2019-04-12 DIAGNOSIS — R91.1 LUNG NODULE: Primary | ICD-10-CM

## 2019-04-15 ENCOUNTER — TRANSCRIBE ORDERS (OUTPATIENT)
Dept: ADMINISTRATIVE | Facility: HOSPITAL | Age: 64
End: 2019-04-15

## 2019-04-15 DIAGNOSIS — R91.1 LUNG NODULE: Primary | ICD-10-CM

## 2019-04-30 ENCOUNTER — TELEPHONE (OUTPATIENT)
Dept: INTERNAL MEDICINE | Facility: CLINIC | Age: 64
End: 2019-04-30

## 2019-04-30 DIAGNOSIS — H91.90 HEARING LOSS, UNSPECIFIED HEARING LOSS TYPE, UNSPECIFIED LATERALITY: Primary | ICD-10-CM

## 2019-06-01 NOTE — ED NOTES
Pt states last BM was this AM. Has been having difficulty with bowel movements while taking iron. Was not on any stool softener.      Nena Turner RN  06/01/19 3019

## 2019-06-01 NOTE — ED NOTES
"Pt states right side and right arm feel \"heavy.\" states right sided lower back pain. Not in pain at this moment. Also states dizzy.     Nena Turner RN  06/01/19 8110    "

## 2019-06-01 NOTE — ED TRIAGE NOTES
Patient to er with c/o heavy feeling on right side of body and arm. Patient also c/o sharp pain in right side, and reported she is dizzy at times. Patient reported this has been coming and going for the last few months.

## 2019-06-01 NOTE — ED PROVIDER NOTES
EMERGENCY DEPARTMENT ENCOUNTER    CHIEF COMPLAINT  Chief Complaint: chest pain  History given by: patient  History limited by: none  Time Seen: 1623  Room Number: 31/31  PMD: Yessenia Joe MD      HPI:  Pt is a 63 y.o. female who presents w/ R sided chest pain under her R breast that began 2-3 days ago. She also reports intermittent episodes of SOA and cough (for over a month), but denies fever, chills, sore throat, n/v/d, and other complaints. Pt is due to f/u w/ nephrology for CT abd due to cyst on kidney.    H/o asthma, denies h/o DM, HTN, and other PMHx.    Duration: 2-3 days  Timing: episodic  Location: R sided chest wall  Radiation: none  Quality: 'pain'  Intensity/Severity: moderate  Progression: unchanged   Associated Symptoms: SOA, cough  Aggravating Factors: none  Alleviating Factors: none  Previous Episodes: none  Treatment before arrival: none    PAST MEDICAL HISTORY  Active Ambulatory Problems     Diagnosis Date Noted   • Moderate persistent asthma without complication 03/09/2016   • Asthma 03/09/2016   • Benign essential hypertension 03/09/2016   • Hyperlipidemia 03/09/2016   • Arthralgia of hip 03/09/2016   • Pain of thigh 03/09/2016   • Microscopic hematuria 03/09/2016   • Type 2 diabetes mellitus (CMS/HCC) 03/09/2016   • Tobacco use 09/13/2016   • Chronic kidney disease, stage III (moderate) (CMS/HCC) 03/14/2017   • Iron deficiency anemia 03/14/2017     Resolved Ambulatory Problems     Diagnosis Date Noted   • Abnormal kidney function study 03/09/2016     Past Medical History:   Diagnosis Date   • Asthma    • Carotid artery stenosis    • Diabetes mellitus (CMS/HCC)    • GERD (gastroesophageal reflux disease)    • Glaucoma    • Hydronephrosis    • Hyperlipidemia    • Hypertension    • Intracranial hemorrhage (CMS/HCC)    • Pneumonia    • Polyp of sigmoid colon    • Vitamin D deficiency        PAST SURGICAL HISTORY  Past Surgical History:   Procedure Laterality Date   • CHOLECYSTECTOMY     •  GALLBLADDER SURGERY     • HERNIA REPAIR     • HYSTERECTOMY         FAMILY HISTORY  Family History   Problem Relation Age of Onset   • Hypertension Mother    • Diabetes Mother    • Hypertension Sister    • Diabetes Sister    • Hypertension Brother    • Diabetes Brother    • Lung cancer Maternal Uncle    • Stroke Maternal Grandfather        SOCIAL HISTORY  Social History     Socioeconomic History   • Marital status: Single     Spouse name: Not on file   • Number of children: 0   • Years of education: Not on file   • Highest education level: Not on file   Occupational History   • Occupation: [employer]   Tobacco Use   • Smoking status: Current Every Day Smoker     Packs/day: 1.00     Years: 30.00     Pack years: 30.00   • Smokeless tobacco: Never Used   Substance and Sexual Activity   • Alcohol use: No   • Drug use: No   • Sexual activity: Defer         ALLERGIES  Penicillins    REVIEW OF SYSTEMS  Review of Systems   Constitutional: Negative for chills and fever.   HENT: Negative for sore throat.    Respiratory: Negative for shortness of breath.    Cardiovascular: Negative for chest pain.   Gastrointestinal: Negative for nausea and vomiting.   Genitourinary: Negative for dysuria.   Musculoskeletal: Negative for back pain.        + R sided chest wall pain   Skin: Negative for rash.   Neurological: Negative for dizziness.   Psychiatric/Behavioral: The patient is not nervous/anxious.        PHYSICAL EXAM  ED Triage Vitals [06/01/19 1602]   Temp Heart Rate Resp BP SpO2   98.7 °F (37.1 °C) 115 16 120/82 96 %       Physical Exam   Constitutional: She is well-developed, well-nourished, and in no distress.   HENT:   Head: Normocephalic.   Mouth/Throat: Mucous membranes are normal.   Eyes: No scleral icterus.   Neck: Normal range of motion.   Cardiovascular: Normal rate, regular rhythm and normal heart sounds.   Pulmonary/Chest: Effort normal and breath sounds normal.   Musculoskeletal: Normal range of motion.   Neurological: She is  alert.   Skin: Skin is warm and dry.   Psychiatric: Mood and affect normal.   Nursing note and vitals reviewed.      LAB RESULTS  Recent Results (from the past 24 hour(s))   Comprehensive Metabolic Panel    Collection Time: 06/01/19  5:13 PM   Result Value Ref Range    Glucose 178 (H) 65 - 99 mg/dL    BUN 12 8 - 23 mg/dL    Creatinine 0.89 0.57 - 1.00 mg/dL    Sodium 134 (L) 136 - 145 mmol/L    Potassium 3.5 3.5 - 5.2 mmol/L    Chloride 92 (L) 98 - 107 mmol/L    CO2 30.7 (H) 22.0 - 29.0 mmol/L    Calcium 9.4 8.6 - 10.5 mg/dL    Total Protein 7.1 6.0 - 8.5 g/dL    Albumin 3.90 3.50 - 5.20 g/dL    ALT (SGPT) 11 1 - 33 U/L    AST (SGOT) 14 1 - 32 U/L    Alkaline Phosphatase 68 39 - 117 U/L    Total Bilirubin <0.2 (L) 0.2 - 1.2 mg/dL    eGFR Non African Amer 64 >60 mL/min/1.73    Globulin 3.2 gm/dL    A/G Ratio 1.2 g/dL    BUN/Creatinine Ratio 13.5 7.0 - 25.0    Anion Gap 11.3 mmol/L   D-dimer, Quantitative    Collection Time: 06/01/19  5:13 PM   Result Value Ref Range    D-Dimer, Quantitative 0.78 (H) 0.00 - 0.49 MCGFEU/mL   Troponin    Collection Time: 06/01/19  5:13 PM   Result Value Ref Range    Troponin T <0.010 0.000 - 0.030 ng/mL   CBC Auto Differential    Collection Time: 06/01/19  5:13 PM   Result Value Ref Range    WBC 7.24 3.40 - 10.80 10*3/mm3    RBC 4.62 3.77 - 5.28 10*6/mm3    Hemoglobin 11.8 (L) 12.0 - 15.9 g/dL    Hematocrit 39.1 34.0 - 46.6 %    MCV 84.6 79.0 - 97.0 fL    MCH 25.5 (L) 26.6 - 33.0 pg    MCHC 30.2 (L) 31.5 - 35.7 g/dL    RDW 16.8 (H) 12.3 - 15.4 %    RDW-SD 52.1 37.0 - 54.0 fl    MPV 11.6 6.0 - 12.0 fL    Platelets 335 140 - 450 10*3/mm3    Neutrophil % 55.8 42.7 - 76.0 %    Lymphocyte % 30.4 19.6 - 45.3 %    Monocyte % 9.8 5.0 - 12.0 %    Eosinophil % 2.9 0.3 - 6.2 %    Basophil % 0.8 0.0 - 1.5 %    Immature Grans % 0.3 0.0 - 0.5 %    Neutrophils, Absolute 4.04 1.70 - 7.00 10*3/mm3    Lymphocytes, Absolute 2.20 0.70 - 3.10 10*3/mm3    Monocytes, Absolute 0.71 0.10 - 0.90 10*3/mm3     Eosinophils, Absolute 0.21 0.00 - 0.40 10*3/mm3    Basophils, Absolute 0.06 0.00 - 0.20 10*3/mm3    Immature Grans, Absolute 0.02 0.00 - 0.05 10*3/mm3    nRBC 0.1 0.0 - 0.2 /100 WBC       I ordered the above labs and reviewed the results    RADIOLOGY  CT Angiogram Chest With Contrast   Preliminary Result   1. No evidence of pulmonary embolism. Small peripheral pulmonary emboli   cannot be excluded.   2. Increasing nodularity appreciated involving the major fissure on the   right. Coronal reconstructions also demonstrate pleural thickening and   nodularity involving the minor fissure on the right. Stable appearance   of a small noncalcified pleural-based nodule involving the left lower   lobe.   3. New infiltrate with mild consolidation involving the posterior sulcus   on the right medially and there is a small pleural effusion on the   right. Faint patchy infiltrate is appreciated involving the posterior   sulcus on the left laterally and there is a small area of ground glass   appearance appreciated involving the right upper lobe superolaterally,   unchanged. A short-term follow-up CT examination of the chest is   recommended in order to ensure resolution of the multifocal areas of   pleural thickening/nodularity on the right. Underlying malignancy cannot   be excluded.   4. The left adrenal gland is mildly prominent as compared to earlier   examinations, nonspecific. A CT examination of the abdomen and pelvis   with contrast is suggested. Also, the tail of the pancreas is prominent.   It is more prominent as compared to earlier examinations and is   worrisome for a mass involving the tail of the pancreas. The tail of the   pancreas measures approximately 2.9 cm in AP dimension. On the CT   angiogram of the chest performed on 07/19/2015. The tail of the pancreas   measured approximately 11 to 12 mm in size at the same location.       Hepatic cyst. The largest involves the left lobe of the liver anteriorly   and  "measures 3.5 cm in AP dimension. It measured approximately 2.8 cm in   AP dimension on the CT examination of the abdomen and pelvis of   01/19/2013 and appears unchanged versus 03/31/2019.       The above information was called to and discussed with Blanca Blancas.       CHECK CHECK               Radiation dose reduction techniques were utilized, including automated   exposure control and exposure modulation based on body size.              XR Chest 2 View   Final Result        I ordered the above noted radiological studies and reviewed the images on the PACS system.    EKG    ekg was interpreted by Dr. Soto, see Dr. Soto's note for interpretation.      PROGRESS AND CONSULTS    1915: Reviewed pt's history and workup with Dr. Soto.  At bedside evaluation, they agree with the plan of care.    2000: Care transferred to Dr Soto pending final disposition      COURSE & MEDICAL DECISION MAKING  Pertinent Labs and Imaging studies that were ordered and reviewed are noted above.  Results were reviewed/discussed with the patient and they were also made aware of online assess.   Pt also made aware that some labs, such as cultures, will not be resulted during ER visit and follow up with PMD is necessary.     MEDICATIONS GIVEN IN ER  Medications   iopamidol (ISOVUE-370) 76 % injection 100 mL (95 mL Intravenous Given by Other 6/1/19 1835)       /77   Pulse 90   Temp 98.7 °F (37.1 °C)   Resp 16   Ht 165.1 cm (65\")   Wt 67.6 kg (149 lb)   SpO2 98%   BMI 24.79 kg/m²       I personally reviewed the past medical history, past surgical history, social history, family history, current medications and allergies as they appear in this chart.  The scribe's note accurately reflects the work and decisions made by me.     Documentation assistance provided by autumn Pereira for DARA White on 6/1/2019 at 3:52 PM. Information recorded by the autumn was done at my direction and has been verified and " validated by me.     Charo Pereira  06/01/19 1948       Nena Blancas, THADDEUS  06/02/19 5661

## 2019-06-02 NOTE — ED PROVIDER NOTES
MD ATTESTATION NOTE    The JOSÉ and I have discussed this patient's history, physical exam, and treatment plan.  I have reviewed the documentation and personally had a face to face interaction with the patient. I affirm the documentation and agree with the treatment and plan.  The attached note describes my personal findings.      Patient had a variety of complaints, but the most significant was right flank pain and pain under her breast on the right.  This is been ongoing for 2 to 3 days, it is mostly with movement.  There is no difficulty breathing or fever or exertional dyspnea.    She has a complex past medical history which I reviewed on epic.  She is been followed by Dr. Berhane Leach for surveillance of some irregular scarring from a previous pneumonia.    On physical exam she is in no distress with stable vital signs.  Her cardiac work-up and CT of the chest showed no PE.  There are some findings on CT which have changed slightly since her last CT chest.  Those include some increased nodularity bilaterally, particularly in the right major minor fissures, as well as some small fluid in the right base and adenopathy.    Clinically she does not have pneumonia, and I have encouraged her to follow-up with Dr. Leach next week to see what his thoughts are on further evaluation and treatment.     Bryan Soto MD  06/01/19 9548

## 2019-06-03 NOTE — PROGRESS NOTES
Prior patient of Dr. Joe. Contacted by Dr. Acosta. Patient had a CTA by ER to r/o pulm embolism. She has an abnormality noted on the tail of the pancreas that is suspicious for possible pancreatic cancer v other. It is advised that she have a dedicated pancreatic protocol ct abd/ pelvis. This is ordered for the pateint. Called patient and explained findings and additional testing needed. She will f/u with me after results are available.   JOSELITO

## 2019-06-11 PROBLEM — K86.89 PANCREATIC MASS: Status: ACTIVE | Noted: 2019-01-01

## 2019-06-11 NOTE — PROGRESS NOTES
Chief Complaint   Patient presents with   • Mass     pancreatic mass    • Abdominal Pain       History of Present Illness   Linette Ghosh is a 63 y.o. female new to me presents for a follow up on CT results. Patient was in the ER with shortness of breath and right side discomfort. She was noted to have a mass on her pancreas. She then had a dedicated ct abdomen that did reveal a large pancreatic mass that is likely an adenocarcinoma. She is having continued right side pain. This does affect her ability to work. She has to sit for a prolonged preriod and pain intensifies with this. She is having normal bm. Glucose is elevated. She has asthma but stopped symbicort related to glaucoma. She is wheezing. She does smoke. Has not had success w/ patch, lozenge, or welbutrin.       The following portions of the patient's history were reviewed and updated as appropriate: allergies, current medications, past family history, past medical history, past social history, past surgical history and problem list.  Current Outpatient Medications on File Prior to Visit   Medication Sig Dispense Refill   • albuterol (PROVENTIL) (2.5 MG/3ML) 0.083% nebulizer solution USE ONE VIAL VIA NEBULIZER BY MOUTH EVERY 6 TO 8 HOURS AS NEEDED 3 mL 1   • ALBUTEROL SULFATE  (90 Base) MCG/ACT inhaler INHALE 2 PUFFS ORALLY EVERY FOUR HOURS AS NEEDED FOR WHEEZING OR SHORTNESS OF AIR. 54 g 1   • aspirin 325 MG tablet Take  by mouth daily.     • desloratadine (CLARINEX) 5 MG tablet Take 1 tablet by mouth Daily. 90 tablet 1   • ferrous sulfate 325 (65 FE) MG tablet Take 325 mg by mouth Daily With Breakfast.     • fluticasone (FLONASE) 50 MCG/ACT nasal spray 2 sprays into the nostril(s) as directed by provider Daily. 3 bottle 3   • hydrOXYzine (ATARAX) 25 MG tablet Take 1 tablet by mouth Every 8 (Eight) Hours As Needed for Itching. 90 tablet 1   • lisinopril-hydrochlorothiazide (PRINZIDE,ZESTORETIC) 20-12.5 MG per tablet Take 2 tablets by mouth Daily.  60 tablet 0   • magnesium citrate 1.745 GM/30ML solution solution Take  by mouth 1 (One) Time.     • metFORMIN (GLUCOPHAGE) 1000 MG tablet TAKE 1 TABLET BY MOUTH TWICE DAILY WITH MEALS 180 tablet 3   • montelukast (SINGULAIR) 10 MG tablet TAKE 1 TABLET BY MOUTH EVERY NIGHT 90 tablet 1   • Multiple Vitamins-Minerals (CENTRUM SILVER PO) Take  by mouth daily.     • pantoprazole (PROTONIX) 40 MG EC tablet TAKE 1 TABLET BY MOUTH DAILY 90 tablet 1   • simvastatin (ZOCOR) 20 MG tablet TAKE 1 TABLET BY MOUTH EVERY NIGHT 90 tablet 1   • SYMBICORT 160-4.5 MCG/ACT inhaler INHALE TWO PUFFS BY MOUTH TWICE A DAY *RINSE MOUTH AFTER USE* 10.2 g 9   • timolol (TIMOPTIC) 0.25 % ophthalmic solution Administer 1 drop to both eyes Daily.     • buPROPion XL (WELLBUTRIN XL) 150 MG 24 hr tablet Take 1 tablet by mouth Daily. 30 tablet 4   • potassium chloride (K-DUR,KLOR-CON) 10 MEQ CR tablet Take 10 mEq by mouth 2 (Two) Times a Day.  0     No current facility-administered medications on file prior to visit.      Review of Systems   Constitutional: Positive for fatigue.   HENT: Negative.    Eyes: Negative.    Respiratory: Negative.    Cardiovascular: Negative.    Gastrointestinal: Positive for abdominal pain.   Endocrine: Negative.    Genitourinary: Negative.    Musculoskeletal: Negative.    Skin: Negative.    Allergic/Immunologic: Negative.    Neurological: Negative.    Hematological: Negative.    Psychiatric/Behavioral: Negative.        Objective   Physical Exam   Constitutional: She is oriented to person, place, and time. She appears well-developed and well-nourished.   HENT:   Head: Normocephalic and atraumatic.   Right Ear: External ear normal.   Left Ear: External ear normal.   Nose: Nose normal.   Mouth/Throat: Oropharynx is clear and moist.   Eyes: Conjunctivae and EOM are normal. Pupils are equal, round, and reactive to light.   Neck: Normal range of motion. Neck supple.   Cardiovascular: Normal rate, regular rhythm, normal heart  sounds and intact distal pulses.   Pulmonary/Chest: Effort normal. She has wheezes.   Abdominal: Soft. There is tenderness.   Musculoskeletal: Normal range of motion.   Neurological: She is alert and oriented to person, place, and time.   Skin: Skin is warm and dry.   Psychiatric: She has a normal mood and affect. Her behavior is normal. Judgment and thought content normal.   Nursing note and vitals reviewed.       /70   Pulse 113   Wt 67.1 kg (148 lb)   SpO2 97%   BMI 24.63 kg/m²     Assessment/Plan   Diagnoses and all orders for this visit:    Pancreatic mass  -     Cancel: Ambulatory Referral to General Surgery    Moderate persistent asthma without complication  -     Ambulatory Referral to Pulmonology    Other orders  -     HYDROcodone-acetaminophen (NORCO) 5-325 MG per tablet; Take 1 tablet by mouth Every 8 (Eight) Hours As Needed for Moderate Pain .      Patient w/ large pancreatic mass, suspicious for adenocarcinoma. At length discussion w/ patient today regarding findings. She will start hydrocodone for pain relief. She would like to take time off work given pain and she will get FMLA forms for completion for this. She will be referred to GI for an endoscopic ultrasound. Discussed case w/ gen surg and it does not appear to be resectable. Will submit oncology referral if appropriate after biopsy. She will get a ca 19-9. She will start pulmicort  and f/u w/ pulmonary given new onset increased intraocular pressure. She will continue gtt for this and will request an optho report.

## 2019-06-13 NOTE — TELEPHONE ENCOUNTER
----- Message from Raine Ghosh MD sent at 6/11/2019  4:40 PM EDT -----  Please advise siobhan that a new inhaler has been sent to her pharmacy. 2 puffs 2 times daily. Rinse mouth after using.     Pt informed

## 2019-06-17 NOTE — ANESTHESIA PREPROCEDURE EVALUATION
Anesthesia Evaluation     Patient summary reviewed   NPO Solid Status: > 8 hours  NPO Liquid Status: > 8 hours           Airway   Mallampati: II  TM distance: >3 FB  Neck ROM: full  No difficulty expected  Dental - normal exam     Pulmonary     breath sounds clear to auscultation  Cardiovascular   Exercise tolerance: good (4-7 METS)    Rhythm: regular  Rate: normal        Neuro/Psych  GI/Hepatic/Renal/Endo      Musculoskeletal     Abdominal    Substance History      OB/GYN          Other                        Anesthesia Plan    ASA 3     MAC     intravenous induction   Anesthetic plan, all risks, benefits, and alternatives have been provided, discussed and informed consent has been obtained with: patient.

## 2019-06-17 NOTE — DISCHARGE INSTRUCTIONS
For the next 24 hours patient needs to be with a responsible adult.    For 24 hours DO NOT drive, operate machinery, appliances, drink alcohol, make important decisions or sign legal documents.    Start with a light or bland diet if you are feeling sick to your stomach otherwise advance to regular diet as tolerated.    Follow recommendations on procedure report if provided by your doctor.    Call Dr Varela for problems [phone]    Problems may include but not limited to: large amounts of bleeding, trouble breathing, repeated vomiting, severe unrelieved pain, fever or chills.

## 2019-06-17 NOTE — H&P
[facility]  Pre Procedure History & Physical    Chief Complaint:   Abnormal CT scan of pancreas    Subjective     HPI:   63 y.o. female here today for EUS for pancreatic mass.  Recent CT scan A/P with large mass in tail of pancreas with vascular involvement and possible infiltration in to L adrenal gland.      Past Medical History:   Past Medical History:   Diagnosis Date   • Asthma    • Carotid artery stenosis    • Diabetes mellitus (CMS/HCC)    • GERD (gastroesophageal reflux disease)    • Glaucoma    • Hydronephrosis    • Hyperlipidemia    • Hypertension    • Intracranial hemorrhage (CMS/HCC)    • Pneumonia    • Polyp of sigmoid colon    • Vitamin D deficiency        Family History:  Family History   Problem Relation Age of Onset   • Hypertension Mother    • Diabetes Mother    • Hypertension Sister    • Diabetes Sister    • Hypertension Brother    • Diabetes Brother    • Lung cancer Maternal Uncle    • Stroke Maternal Grandfather        Social History:   reports that she has been smoking.  She has a 30.00 pack-year smoking history. She has never used smokeless tobacco. She reports that she does not drink alcohol or use drugs.    Medications:   Medications Prior to Admission   Medication Sig Dispense Refill Last Dose   • albuterol (PROVENTIL) (2.5 MG/3ML) 0.083% nebulizer solution USE ONE VIAL VIA NEBULIZER BY MOUTH EVERY 6 TO 8 HOURS AS NEEDED 3 mL 1 Past Week at Unknown time   • ALBUTEROL SULFATE  (90 Base) MCG/ACT inhaler INHALE 2 PUFFS ORALLY EVERY FOUR HOURS AS NEEDED FOR WHEEZING OR SHORTNESS OF AIR. 54 g 1 Past Week at Unknown time   • aspirin 325 MG tablet Take  by mouth daily.   6/16/2019 at Unknown time   • desloratadine (CLARINEX) 5 MG tablet Take 1 tablet by mouth Daily. 90 tablet 1 6/17/2019 at Unknown time   • fluticasone (FLONASE) 50 MCG/ACT nasal spray 2 sprays into the nostril(s) as directed by provider Daily. 3 bottle 3 6/16/2019 at Unknown time   •  "HYDROcodone-acetaminophen (NORCO) 5-325 MG per tablet Take 1 tablet by mouth Every 8 (Eight) Hours As Needed for Moderate Pain . 80 tablet 0 6/16/2019 at Unknown time   • hydrOXYzine (ATARAX) 25 MG tablet Take 1 tablet by mouth Every 8 (Eight) Hours As Needed for Itching. 90 tablet 1 Past Month at Unknown time   • lisinopril-hydrochlorothiazide (PRINZIDE,ZESTORETIC) 20-12.5 MG per tablet Take 2 tablets by mouth Daily. 60 tablet 0 6/17/2019 at Unknown time   • metFORMIN (GLUCOPHAGE) 1000 MG tablet TAKE 1 TABLET BY MOUTH TWICE DAILY WITH MEALS 180 tablet 3 6/17/2019 at Unknown time   • montelukast (SINGULAIR) 10 MG tablet TAKE 1 TABLET BY MOUTH EVERY NIGHT 90 tablet 1 6/16/2019 at Unknown time   • Multiple Vitamins-Minerals (CENTRUM SILVER PO) Take  by mouth daily.   6/16/2019 at Unknown time   • pantoprazole (PROTONIX) 40 MG EC tablet TAKE 1 TABLET BY MOUTH DAILY 90 tablet 1 6/17/2019 at Unknown time   • simvastatin (ZOCOR) 20 MG tablet TAKE 1 TABLET BY MOUTH EVERY NIGHT 90 tablet 1 6/16/2019 at Unknown time   • timolol (TIMOPTIC) 0.25 % ophthalmic solution Administer 1 drop to both eyes Daily.   6/17/2019 at Unknown time   • budesonide (PULMICORT FLEXHALER) 180 MCG/ACT inhaler Inhale 2 puffs 2 (Two) Times a Day. Rinse mouth after using 1 each 11    • buPROPion XL (WELLBUTRIN XL) 150 MG 24 hr tablet Take 1 tablet by mouth Daily. 30 tablet 4 6/3/2019   • ferrous sulfate 325 (65 FE) MG tablet Take 325 mg by mouth Daily With Breakfast.   6/3/2019   • magnesium citrate 1.745 GM/30ML solution solution Take  by mouth 1 (One) Time.   6/15/2019   • potassium chloride (K-DUR,KLOR-CON) 10 MEQ CR tablet Take 10 mEq by mouth 2 (Two) Times a Day.  0 Unknown       Allergies:  Penicillins    ROS:    Pertinent items are noted in HPI     Objective     Blood pressure 121/88, pulse 89, temperature 97.9 °F (36.6 °C), temperature source Oral, resp. rate 24, height 165.1 cm (65\"), weight 65.5 kg (144 lb 6.4 oz), SpO2 98 %.    Physical " Exam   Constitutional: Pt is oriented to person, place, and time and well-developed, well-nourished, and in no distress.   HENT:   Mouth/Throat: Oropharynx is clear and moist.   Neck: Normal range of motion. Neck supple.   Cardiovascular: Normal rate, regular rhythm and normal heart sounds.    Pulmonary/Chest: Effort normal and breath sounds normal. No respiratory distress. No  wheezes.   Abdominal: Soft. Bowel sounds are normal.   Skin: Skin is warm and dry.   Psychiatric: Mood, memory, affect and judgment normal.     Assessment/Plan     Diagnosis:  Pancreatic mass    Anticipated Surgical Procedure:  EGD/EUS    The risks, benefits, and alternatives of this procedure have been discussed with the patient or the responsible party- the patient understands and agrees to proceed.

## 2019-06-17 NOTE — ANESTHESIA POSTPROCEDURE EVALUATION
Patient: Linette Ghosh    Procedure Summary     Date:  06/17/19 Room / Location:  [facility] ENDOSCOPY 10 /  [facility]    Anesthesia Start:  1041 Anesthesia Stop:  1114    Procedure:  ENDOSCOPIC ULTRASOUND (UPPER) WITH FINE NEEDLE ASPIRATION OF PANCREATIC MASS (N/A ) Diagnosis:       Pancreatic mass      (Pancreatic mass [K86.9])    Surgeon:  Ayan Varela MD Provider:  Josue Guadarrama MD    Anesthesia Type:  MAC ASA Status:  3          Anesthesia Type: MAC  Last vitals  BP   95/78 (06/17/19 1143)   Temp   36.6 °C (97.9 °F) (06/17/19 0946)   Pulse   83 (06/17/19 1143)   Resp   16 (06/17/19 1143)     SpO2   100 % (06/17/19 1143)     Post Anesthesia Care and Evaluation    Patient location during evaluation: bedside  Patient participation: complete - patient participated  Level of consciousness: awake and alert  Pain score: 0  Pain management: adequate  Airway patency: patent  Anesthetic complications: No anesthetic complications  PONV Status: none  Cardiovascular status: acceptable  Respiratory status: acceptable  Hydration status: acceptable  Post Neuraxial Block status: Motor and sensory function returned to baseline

## 2019-06-18 NOTE — PROGRESS NOTES
FNA positive for adenocarcinoma  Pt made aware of results today in Dr Rodriguez's office  Please arrange for referral to [facility] first available provider

## 2019-06-19 NOTE — TELEPHONE ENCOUNTER
----- Message from Ayan Varela MD sent at 6/18/2019  4:20 PM EDT -----  FNA positive for adenocarcinoma  Pt made aware of results today in Dr Rodriguez's office  Please arrange for referral to [facility] first available provider

## 2019-06-21 PROBLEM — C25.2 MALIGNANT NEOPLASM OF TAIL OF PANCREAS (HCC): Status: ACTIVE | Noted: 2019-01-01

## 2019-06-21 NOTE — PROGRESS NOTES
REFERRING PROVIDER:    Raine Ghosh MD  [address]  [address] [address]    REASON FOR CONSULTATION: Adenocarcinoma involving the tail of the pancreas     HISTORY OF PRESENT ILLNESS:  Linette Ghosh is a 63 y.o. female who is referred today for newly diagnosed adenocarcinoma involving the tail of the pancreas.    She has had about a 40 pound weight loss in the past 6 months associated with worsening bilateral flank pain.  She does note that the pain is worse with eating.  She was taking hydrocodone a couple of times per day with some improvement.    Due to worsening pain, she presented to the emergency department on 6/1/2019.  A chest x-ray was unremarkable.  She had a CT angiogram of the chest showing no evidence for pulmonary embolism.  There was some nodularity involving the major fissure on the right.  There was some pleural thickening and nodularity involving the minor fissure on the right.  New infiltrate involving the posterior sulcus on the right medial he was noted to have a small pleural effusion on the right was noted as well.  A small groundglass area is appreciated in the right upper lobe.  The left adrenal gland was prominent in the tail the pancreas is prominent measuring 2.9 cm.  Some liver cysts were noted.    She had a follow-up CT scan of the abdomen and pelvis with contrast on 6/9/2019 showing a large hypoenhancing mass involving the entire tail of the pancreas which encases the splenic artery and occludes and splenic vein.  This abuts and possibly infiltrates the left adrenal gland.  No evidence for metastatic disease otherwise.    She saw Dr. Varela with gastroenterology and had an endoscopic ultrasound performed on 6/17/2019 with pathology confirming adenocarcinoma.  The CA 19-9 is elevated at 655.        She otherwise is doing okay.  She takes care of her 7 and 8-year-old great niece and nephew.  She has not been able to work recently because of pain.  She has a history of  iron deficiency and was taking iron but stopped it due to constipation.    She smokes about 1 pack/day.  She has a maternal history of lung cancer.        Past Medical History:   Diagnosis Date   • Asthma    • Carotid artery stenosis    • Diabetes mellitus (CMS/HCC)     Type 2   • GERD (gastroesophageal reflux disease)    • Glaucoma    • Hydronephrosis    • Hyperlipidemia    • Hypertension    • Intracranial hemorrhage (CMS/HCC)    • Pancreatic cancer (CMS/HCC) 06/2019   • Pneumonia    • Polyp of sigmoid colon    • Vitamin D deficiency        Past Surgical History:   Procedure Laterality Date   • CHOLECYSTECTOMY     • GALLBLADDER SURGERY     • HERNIA REPAIR      x3   • HYSTERECTOMY         SOCIAL HISTORY:   reports that she has been smoking.  She has a 30.00 pack-year smoking history. She has never used smokeless tobacco. She reports that she does not drink alcohol or use drugs.    FAMILY HISTORY:  family history includes Arthritis in her mother and sister; Diabetes in her brother, mother, and sister; Hypertension in her brother, brother, mother, sister, sister, and sister; Lung cancer in her maternal uncle; Stroke in her maternal grandfather; Thyroid disease in her sister.    ALLERGIES:  Allergies   Allergen Reactions   • Penicillins        MEDICATIONS:  The medication list has been reviewed with the patient by the medical assistant, and the list has been updated in the electronic medical record, which I reviewed.  Medication dosages and frequencies were confirmed to be accurate.    Review of Systems   Review of Systems   Constitutional: Positive for appetite change and unexpected weight change. Negative for chills, fatigue and fever.   HENT: Negative for congestion, dental problem, facial swelling, hearing loss, mouth sores, nosebleeds, rhinorrhea, sore throat, tinnitus, trouble swallowing and voice change.    Eyes: Negative.    Respiratory: Negative for cough, chest tightness, shortness of breath, wheezing and  "stridor.    Cardiovascular: Negative for chest pain, palpitations and leg swelling.   Gastrointestinal: Negative for abdominal distention, abdominal pain, blood in stool, constipation, diarrhea, nausea and vomiting.   Endocrine: Negative.    Genitourinary: Positive for flank pain. Negative for difficulty urinating, dysuria, frequency, hematuria, menstrual problem, pelvic pain, vaginal bleeding and vaginal discharge.   Musculoskeletal: Negative for arthralgias, back pain, joint swelling, myalgias, neck pain and neck stiffness.   Skin: Negative for color change, rash and wound.   Allergic/Immunologic: Negative for environmental allergies.   Neurological: Negative for dizziness, seizures, syncope, weakness, numbness and headaches.   Hematological: Negative for adenopathy. Does not bruise/bleed easily.   Psychiatric/Behavioral: Negative for agitation, confusion and sleep disturbance. The patient is nervous/anxious.    All other systems reviewed and are negative.      Vitals:    06/21/19 1526   BP: 124/84   Pulse: 119   Resp: 16   Temp: 97.9 °F (36.6 °C)   TempSrc: Oral   SpO2: 98%   Weight: 65 kg (143 lb 6.4 oz)   Height: 165.1 cm (65\")   PainSc: 9  Comment: Right side pain       Physical Exam   Constitutional: She is oriented to person, place, and time. She appears well-developed and well-nourished.   HENT:   Head: Normocephalic and atraumatic.   Nose: Nose normal.   Eyes: Conjunctivae and EOM are normal. Pupils are equal, round, and reactive to light.   Neck: Neck supple.   Cardiovascular: Normal rate, regular rhythm, S1 normal, S2 normal and normal heart sounds. Exam reveals no gallop and no friction rub.   No murmur heard.  Pulmonary/Chest: Effort normal and breath sounds normal. No stridor. No respiratory distress. She has no wheezes. She has no rhonchi. She has no rales. She exhibits no tenderness.   Abdominal: Soft. Bowel sounds are normal. She exhibits no distension and no mass. There is no tenderness. There is " no rebound and no guarding.   Musculoskeletal: Normal range of motion. She exhibits no edema.   Lymphadenopathy:     She has no cervical adenopathy.     She has no axillary adenopathy.        Right: No inguinal and no supraclavicular adenopathy present.        Left: No inguinal and no supraclavicular adenopathy present.   Neurological: She is alert and oriented to person, place, and time. No cranial nerve deficit or sensory deficit.   Skin: Skin is warm and dry. No rash noted. No erythema.   Psychiatric: She has a normal mood and affect. Her behavior is normal. Judgment and thought content normal.   Vitals reviewed.      DIAGNOSTIC DATA:    Results for orders placed or performed in visit on 06/21/19   CBC Auto Differential   Result Value Ref Range    WBC 8.87 3.40 - 10.80 10*3/mm3    RBC 4.85 3.77 - 5.28 10*6/mm3    Hemoglobin 12.5 12.0 - 15.9 g/dL    Hematocrit 39.2 34.0 - 46.6 %    MCV 80.8 79.0 - 97.0 fL    MCH 25.8 (L) 26.6 - 33.0 pg    MCHC 31.9 31.5 - 35.7 g/dL    RDW 14.8 12.3 - 15.4 %    RDW-SD 43.5 37.0 - 54.0 fl    MPV 10.0 6.0 - 12.0 fL    Platelets 375 140 - 450 10*3/mm3    Neutrophil % 53.6 42.7 - 76.0 %    Lymphocyte % 25.8 19.6 - 45.3 %    Monocyte % 8.3 5.0 - 12.0 %    Eosinophil % 11.6 (H) 0.3 - 6.2 %    Basophil % 0.5 0.0 - 1.5 %    Immature Grans % 0.2 0.0 - 0.5 %    Neutrophils, Absolute 4.75 1.70 - 7.00 10*3/mm3    Lymphocytes, Absolute 2.29 0.70 - 3.10 10*3/mm3    Monocytes, Absolute 0.74 0.10 - 0.90 10*3/mm3    Eosinophils, Absolute 1.03 (H) 0.00 - 0.40 10*3/mm3    Basophils, Absolute 0.04 0.00 - 0.20 10*3/mm3    Immature Grans, Absolute 0.02 0.00 - 0.05 10*3/mm3    nRBC 0.0 0.0 - 0.2 /100 WBC       IMAGING:    I personally reviewed CT images of the abdomen and pelvis from 6/9/2019.  There is a large tail of pancreas mass.    IMPRESSION:  Large hypoenhancing mass involving essentially the entire  tail of the pancreas consistent with a pancreatic adenocarcinoma. The  mass encases the splenic  artery and occludes the splenic vein. The mass  abuts and possibly infiltrates the left adrenal gland. There is no  compelling evidence of metastatic disease within the abdomen or pelvis.  Multiple simple liver cysts are present. There is a tiny right pleural  effusion and there is focal consolidation in the right posterior  costophrenic sulcus consistent with atelectasis and/or infiltrate.    ASSESSMENT:  This is a 63 y.o. female with:  1.  Large tail of pancreas adenocarcinoma encasing the splenic artery and including the splenic vein with possible left adrenal infiltration.  On CT imaging, there is no other obvious evidence for metastatic disease.  I discussed with her today obtaining a PET scan.  I discussed with her getting an opinion from Dr. Smiley with surgical oncology regarding the resectability of this.  We discussed that this will either be deemed to be not surgically resectable, surgically resectable, or borderline resectable.  We discussed the possibility of chemotherapy in the neoadjuvant setting.  We discussed that the only curative therapy is surgical resection and that otherwise chemotherapy will not be curative.    2.  Cancer related pain: She is taking hydrocodone/acetaminophen about twice daily which does help.  She requested a refill today and I did refill this for her today.  I discussed with her that only one office can prescribe opiate pain medication our office should be the only office that prescribes this.    3.  History of iron deficiency anemia: We will check iron studies, reticulocyte count, and a ferritin level today.    PLAN:   1.  I refilled her pain medication today.  2.  PET scan  3.  Refer to Dr. Smiley with [facility]  4.  Anemia evaluation  5.  I will see her back in the next couple of weeks for follow-up

## 2019-06-26 NOTE — TELEPHONE ENCOUNTER
----- Message from Omaira John sent at 6/26/2019 11:48 AM EDT -----  Regarding: Report Request-[facility]  Office needs copies of event on 6/17 with [email or web address]. Please fax.      [phone] Phone-Isidra-[facility] Office  [phone] Fax

## 2019-06-26 NOTE — TELEPHONE ENCOUNTER
Called number provided and it was to Isidra, assistant to dr Smiley, surgical oncologist with [facility].    [facility] faxed.

## 2019-06-27 NOTE — TELEPHONE ENCOUNTER
I spoke with her on the phone just now and briefly told her about her PET scan which shows evidence for more widespread disease than the CT scan indicated.  It turns out that she is on her way to see Dr. Smiley right now.  I communicated this information with him as well.  I will see her back next week as scheduled.

## 2019-07-03 NOTE — PROGRESS NOTES
[facility] GROUP OUTPATIENT FOLLOW UP CLINIC VISIT    REASON FOR FOLLOW-UP:    Metastatic adenocarcinoma of the pancreas    HISTORY OF PRESENT ILLNESS:  Linette Ghosh is a 63 y.o. female who returns today for follow up of the above issue.  She continues to have bilateral flank discomfort.  She states that this is unchanged from prior.    She did see Dr. Smiley with surgical oncology.  Mediport is planned for Monday.        ONCOLOGIC HISTORY:  She has had about a 40 pound weight loss in the past 6 months associated with worsening bilateral flank pain.  She does note that the pain is worse with eating.  She was taking hydrocodone a couple of times per day with some improvement.     Due to worsening pain, she presented to the emergency department on 6/1/2019.  A chest x-ray was unremarkable.  She had a CT angiogram of the chest showing no evidence for pulmonary embolism.  There was some nodularity involving the major fissure on the right.  There was some pleural thickening and nodularity involving the minor fissure on the right.  New infiltrate involving the posterior sulcus on the right medial he was noted to have a small pleural effusion on the right was noted as well.  A small groundglass area is appreciated in the right upper lobe.  The left adrenal gland was prominent in the tail the pancreas is prominent measuring 2.9 cm.  Some liver cysts were noted.     She had a follow-up CT scan of the abdomen and pelvis with contrast on 6/9/2019 showing a large hypoenhancing mass involving the entire tail of the pancreas which encases the splenic artery and occludes and splenic vein.  This abuts and possibly infiltrates the left adrenal gland.  No evidence for metastatic disease otherwise.     She saw Dr. Varela with gastroenterology and had an endoscopic ultrasound performed on 6/17/2019 with pathology confirming adenocarcinoma.  The CA 19-9 is elevated at 655.        She was seen initially on 6/21/2019.    A  "PET scan was requested and performed on 6/25/2019.  A 4 cm intensely hypermetabolic pancreatic tail mass is noted.  Unfortunately, extensive metastatic disease is noted with carcinomatosis throughout the abdomen and extensive metastatic disease throughout the right pleura.  Metastatic disease extending into the posterior mediastinum was noted as well.    She did see Dr. Smiley with surgical oncology.  This referral was made for the PET scan results returned in hopes that she had localized disease that could be resected.  He will place a Mediport on 7/8/2019.    ALLERGIES:  Allergies   Allergen Reactions   • Penicillins        MEDICATIONS:  The medication list has been reviewed with the patient by the medical assistant, and the list has been updated in the electronic medical record, which I reviewed.  Medication dosages and frequencies were confirmed to be accurate.    REVIEW OF SYSTEMS:  PAIN:  See Vital Signs below.  GENERAL: Decreased appetite.  Weight loss.  SKIN:  No rashes or non-healing lesions.  HEME/LYMPH:  No abnormal bleeding.  No palpable lymphadenopathy.  EYES:  No vision changes or diplopia.  ENT:  No sore throat or difficulty swallowing.  RESPIRATORY:  No cough, shortness of breath, hemoptysis, or wheezing.  CARDIOVASCULAR:  No chest pain, palpitations, orthopnea, or dyspnea on exertion.  GASTROINTESTINAL: Flank and abdominal pain.  GENITOURINARY:  No dysuria or hematuria.  MUSCULOSKELETAL:  No joint pain, swelling, or erythema.  NEUROLOGIC:  No dizziness, loss of consciousness, or seizures.  PSYCHIATRIC: Anxiety.    Vitals:    07/03/19 1213   BP: 116/80   Pulse: 98   Resp: 16   Temp: 98.3 °F (36.8 °C)   TempSrc: Oral   SpO2: 95%   Weight: 65.3 kg (143 lb 14.4 oz)   Height: 168 cm (66.14\")  Comment: new height   PainSc: 7  Comment: left side       PHYSICAL EXAMINATION:  GENERAL:  Well-developed well-nourished female; awake, alert and oriented, in no acute distress.  SKIN:  Warm and dry, without " rashes, purpura, or petechiae.  HEAD:  Normocephalic, atraumatic.  EYES:  Pupils equal, round and reactive to light.  Extraocular movements intact.  Conjunctivae normal.  EARS:  Hearing intact.  NOSE:  Septum midline.  No excoriations or nasal discharge.  MOUTH:  No stomatitis or ulcers.  Lips are normal.  THROAT:  Oropharynx without lesions or exudates.  NECK:  Supple with good range of motion; no thyromegaly or masses; no JVD or bruits.  LYMPHATICS:  No cervical, supraclavicular, axillary, or inguinal lymphadenopathy.  CHEST:  Lungs are clear to auscultation bilaterally.  No wheezes, rales, or rhonchi.  HEART:  Regular rate; normal rhythm.  No murmurs, gallops or rubs.  ABDOMEN:  Soft, non-tender, non-distended.  Normal active bowel sounds.  No organomegaly.  EXTREMITIES:  No clubbing, cyanosis, or edema.  NEUROLOGICAL:  No focal neurologic deficits.    DIAGNOSTIC DATA:  Results for orders placed or performed during the hospital encounter of 06/25/19   POC Glucose Once   Result Value Ref Range    Glucose 128 70 - 130 mg/dL       IMAGING: I personally reviewed PET scan images from 6/25/2019.  Large tail of pancreas mass with evidence for carcinomatosis.    IMPRESSION:  1. There is a 4 cm intensely hypermetabolic pancreatic tail mass and  there is extensive metastatic disease. There is hypermetabolic  carcinomatosis throughout the abdomen and there is extensive metastatic  disease throughout the right pleura. There is also metastatic disease  extending into the posterior mediastinum. There has been increase in the  small right pleural effusion.  2. The reticular nodular opacities at the left lower lobe are  nonspecific and may represent bronchiolitis or infectious infiltrates,  but metastatic disease cannot be excluded at this point.      ASSESSMENT:  This is a 63 y.o. female with:  1.  Metastatic pancreas cancer originating in the tail of the pancreas: There is evidence for carcinomatosis and extensive metastatic  disease throughout the right pleura with extension into the posterior mediastinum.  She is not a surgical candidate.  She did see Dr. Smiley with surgical oncology and Mediport is planned for Monday.  I discussed palliative therapy today with FOLFIRINOX.  I briefly outlined the treatment regimen and potential toxicities.  She will have a formal education session.  We will treat her every 2 weeks.  Repeat CT imaging after 4 doses or 2 months.  We do need to check BRCA mutation status at some point to see if she would be a candidate for olaparib.      PLAN:  1.  Mediport by Dr. Smiley on Monday  2.  Chemotherapy education session for FOLFIRINOX  3.  We will try to start therapy with FOLFIRINOX early next week after her port placement.  Treatment every 2 weeks.  Repeat CT imaging after 4 doses.    4.  She agrees to proceed with therapy.  Treatment is palliative.  She understands this.

## 2019-07-05 NOTE — PROGRESS NOTES
Subjective     PATIENT NAME:  Linette Ghosh  YOB: 1955  PATIENTS AGE:  63 y.o.  PATIENTS SEX:  female  DATE OF SERVICE:  07/05/2019  PROVIDER:  THADDEUS Olea      ____________________PATIENT EDUCATION____________________    PATIENT EDUCATION:  Today I met with the patient to discuss the chemotherapy regimen recommended for treatment of her pancreatice cancer.  The patient was given explanation of treatment premed side effects including office policy that prohibits patients to drive if sedating medications are administered, MD explanation given regarding benefits, side effects, toxicities and goals of treatment.  The patient received a Chemotherapy/Biotherapy Plan Summary including diagnosis and specific treatment plan.    SIDE EFFECTS:  Common side effects were discussed with the patient and her sister.  Discussion included hair loss/discoloration, anemia/fatigue, infection/chills/fever, appetite, bleeding risk/precautions, constipation, diarrhea, mouth sores, taste alteration, loss of appetite,nausea/vomiting, peripheral neuropathy, skin/nail changes, rash, muscle aches/weakness, photosensitivity, weight gain/loss, hearing loss, dizziness, menopausal symptoms, menstrrual irregularity, sterility, high blood pressure, heart damage, liver damage, lung damage, kidney damage, DVT/PE risk, fluid retention, pleural/pericardial effusion, somnolence, electrolyte/LFT imbalance, vein exercises and/or the possible need for vascular access/port placement.  The patient was advice that although uncommon, leakage of an infused medication from the vein or venous access device (port) may lead to skin breakdown and/or other tissue damage.  The patient was advised that he/she may have pain, bleeding, and/or bruising from the insertion of a needle in their vein or venous access device (port).  The patient was further advised that, in spite of proper technique, infection with redness and irritation may rarely  occur at the site where the needle was inserted.  The patient was advised that if complications occur, additional medical treatment is available.    Discussion also included side effects specific to drugs in the treatment plan, Flourouracil, Leucovorin, Oxaliplatin, Irinotecan specifically.    A total of 45 minutes were spent with the patient, with 100% of time spent in education and counseling.

## 2019-07-08 PROBLEM — Z45.2 ENCOUNTER FOR FITTING AND ADJUSTMENT OF VASCULAR CATHETER: Status: ACTIVE | Noted: 2019-01-01

## 2019-07-10 NOTE — PROGRESS NOTES
[facility] GROUP OUTPATIENT FOLLOW UP CLINIC VISIT    REASON FOR FOLLOW-UP:    Metastatic adenocarcinoma of the pancreas    HISTORY OF PRESENT ILLNESS:  Linette Ghosh is a 63 y.o. female with the above mentioned history here today cycle 1 chemotherapy FOLFIRINOX for her pancreatic cancer.  She is nervous about her treatment today and has questions about what to expect regarding side effects, which we have reviewed.  The patient also mentions that her pain is not controlled.  She complains of right upper quadrant pain as well as bilateral flank pain.  She is currently taking hydrocodone 5/325, 1 tablet every 2 hours.  She does report issues with constipation, and we discussed that the narcotic pain medication can worsen this.          ONCOLOGIC HISTORY:  She has had about a 40 pound weight loss in the past 6 months associated with worsening bilateral flank pain.  She does note that the pain is worse with eating.  She was taking hydrocodone a couple of times per day with some improvement.     Due to worsening pain, she presented to the emergency department on 6/1/2019.  A chest x-ray was unremarkable.  She had a CT angiogram of the chest showing no evidence for pulmonary embolism.  There was some nodularity involving the major fissure on the right.  There was some pleural thickening and nodularity involving the minor fissure on the right.  New infiltrate involving the posterior sulcus on the right medial he was noted to have a small pleural effusion on the right was noted as well.  A small groundglass area is appreciated in the right upper lobe.  The left adrenal gland was prominent in the tail the pancreas is prominent measuring 2.9 cm.  Some liver cysts were noted.     She had a follow-up CT scan of the abdomen and pelvis with contrast on 6/9/2019 showing a large hypoenhancing mass involving the entire tail of the pancreas which encases the splenic artery and occludes and splenic vein.  This abuts and  possibly infiltrates the left adrenal gland.  No evidence for metastatic disease otherwise.     She saw Dr. Varela with gastroenterology and had an endoscopic ultrasound performed on 6/17/2019 with pathology confirming adenocarcinoma.  The CA 19-9 is elevated at 655.        She was seen initially on 6/21/2019.    A PET scan was requested and performed on 6/25/2019.  A 4 cm intensely hypermetabolic pancreatic tail mass is noted.  Unfortunately, extensive metastatic disease is noted with carcinomatosis throughout the abdomen and extensive metastatic disease throughout the right pleura.  Metastatic disease extending into the posterior mediastinum was noted as well.    She did see Dr. Smiley with surgical oncology.  This referral was made for the PET scan results returned in hopes that she had localized disease that could be resected.  He will place a Mediport on 7/8/2019.    ALLERGIES:  Allergies   Allergen Reactions   • Penicillins        MEDICATIONS:  The medication list has been reviewed with the patient by the medical assistant, and the list has been updated in the electronic medical record, which I reviewed.  Medication dosages and frequencies were confirmed to be accurate.    Review of Systems   Constitutional: Positive for appetite change. Negative for chills, fatigue, fever and unexpected weight change.   HENT:   Negative for mouth sores, nosebleeds, sore throat and trouble swallowing.    Respiratory: Negative for cough and shortness of breath.    Cardiovascular: Negative for chest pain and leg swelling.   Gastrointestinal: Positive for abdominal pain and constipation. Negative for diarrhea, nausea and vomiting.   Endocrine: Negative for hot flashes.   Genitourinary: Negative for difficulty urinating.    Musculoskeletal: Positive for flank pain. Negative for arthralgias and myalgias.   Skin: Negative for rash.   Neurological: Negative for dizziness and extremity weakness.   Hematological: Negative for  "adenopathy. Does not bruise/bleed easily.   Psychiatric/Behavioral: Negative for sleep disturbance. The patient is nervous/anxious.        Vitals:    07/10/19 0826   BP: 136/90   Pulse: 97   Resp: 14   Temp: 97.7 °F (36.5 °C)   TempSrc: Oral   SpO2: 97%   Weight: 65.3 kg (144 lb)   Height: 168 cm (66.14\")   PainSc: 0-No pain       Physical Exam   Constitutional: She is oriented to person, place, and time. She appears well-developed and well-nourished. No distress.   HENT:   Head: Normocephalic and atraumatic.   Mouth/Throat: Oropharynx is clear and moist and mucous membranes are normal. No oropharyngeal exudate.   Eyes: Pupils are equal, round, and reactive to light.   Neck: Normal range of motion.   Cardiovascular: Normal rate, regular rhythm and normal heart sounds.   No murmur heard.  Pulmonary/Chest: Effort normal and breath sounds normal. No respiratory distress. She has no wheezes. She has no rhonchi. She has no rales.   Benign left chest Mediport.   Abdominal: Soft. Normal appearance and bowel sounds are normal. She exhibits no distension. There is no hepatosplenomegaly.   Musculoskeletal: Normal range of motion. She exhibits no edema.   Neurological: She is alert and oriented to person, place, and time.   Skin: Skin is warm and dry. No rash noted.   Psychiatric: She has a normal mood and affect.   Vitals reviewed.      DIAGNOSTIC DATA:  Results for orders placed or performed in visit on 07/10/19   Comprehensive metabolic panel   Result Value Ref Range    Glucose 164 (H) 65 - 99 mg/dL    BUN 12 8 - 23 mg/dL    Creatinine 0.83 0.57 - 1.00 mg/dL    Sodium 130 (L) 136 - 145 mmol/L    Potassium 3.9 3.5 - 5.2 mmol/L    Chloride 90 (L) 98 - 107 mmol/L    CO2 30.6 (H) 22.0 - 29.0 mmol/L    Calcium 9.8 8.6 - 10.5 mg/dL    Total Protein 7.2 6.0 - 8.5 g/dL    Albumin 3.90 3.50 - 5.20 g/dL    ALT (SGPT) 14 1 - 33 U/L    AST (SGOT) 13 1 - 32 U/L    Alkaline Phosphatase 70 39 - 117 U/L    Total Bilirubin 0.2 0.2 - 1.2 " mg/dL    eGFR Non African Amer 69 >60 mL/min/1.73    Globulin 3.3 gm/dL    A/G Ratio 1.2 g/dL    BUN/Creatinine Ratio 14.5 7.0 - 25.0    Anion Gap 9.4 5.0 - 15.0 mmol/L   CBC Auto Differential   Result Value Ref Range    WBC 7.53 3.40 - 10.80 10*3/mm3    RBC 4.43 3.77 - 5.28 10*6/mm3    Hemoglobin 11.1 (L) 12.0 - 15.9 g/dL    Hematocrit 35.7 34.0 - 46.6 %    MCV 80.6 79.0 - 97.0 fL    MCH 25.1 (L) 26.6 - 33.0 pg    MCHC 31.1 (L) 31.5 - 35.7 g/dL    RDW 14.4 12.3 - 15.4 %    RDW-SD 42.2 37.0 - 54.0 fl    MPV 9.7 6.0 - 12.0 fL    Platelets 355 140 - 450 10*3/mm3    Neutrophil % 65.6 42.7 - 76.0 %    Lymphocyte % 15.5 (L) 19.6 - 45.3 %    Monocyte % 10.8 5.0 - 12.0 %    Eosinophil % 6.5 (H) 0.3 - 6.2 %    Basophil % 1.2 0.0 - 1.5 %    Immature Grans % 0.4 0.0 - 0.5 %    Neutrophils, Absolute 4.94 1.70 - 7.00 10*3/mm3    Lymphocytes, Absolute 1.17 0.70 - 3.10 10*3/mm3    Monocytes, Absolute 0.81 0.10 - 0.90 10*3/mm3    Eosinophils, Absolute 0.49 (H) 0.00 - 0.40 10*3/mm3    Basophils, Absolute 0.09 0.00 - 0.20 10*3/mm3    Immature Grans, Absolute 0.03 0.00 - 0.05 10*3/mm3    nRBC 0.0 0.0 - 0.2 /100 WBC       IMAGING: I personally reviewed PET scan images from 6/25/2019.  Large tail of pancreas mass with evidence for carcinomatosis.    IMPRESSION:  1. There is a 4 cm intensely hypermetabolic pancreatic tail mass and  there is extensive metastatic disease. There is hypermetabolic  carcinomatosis throughout the abdomen and there is extensive metastatic  disease throughout the right pleura. There is also metastatic disease  extending into the posterior mediastinum. There has been increase in the  small right pleural effusion.  2. The reticular nodular opacities at the left lower lobe are  nonspecific and may represent bronchiolitis or infectious infiltrates,  but metastatic disease cannot be excluded at this point.      ASSESSMENT:  This is a 63 y.o. female with:  1.  Metastatic pancreas cancer originating in the tail of the  pancreas: There is evidence for carcinomatosis and extensive metastatic disease throughout the right pleura with extension into the posterior mediastinum.  She is not a surgical candidate.  She did see Dr. Smiley with surgical oncology and [facility] is planned for Monday.  Dr. Zhong recommended palliative therapy with FOLFIRINOX.  Patient has had a formal education. Plans to treat her every 2 weeks.  Repeat CT imaging after 4 doses or 2 months.  We do need to check BRCA mutation status at some point to see if she would be a candidate for olaparib.    · Cycle 1 day 1 FOLFIRINOX 7/10/2019    2.  Cancer related pain: currently taking Norco 5/325, one every 2 hours and pain is uncontrolled.  · We will change her pain medication to MS Contin 15 mg every 12 hours, and add oxycodone 10 mg, 1 every 4 hours as needed for breakthrough pain.      PLAN:  1.  Proceed with cycle 1 FOLFIRINOX today.  2.  Change pain medication MS Contin 15 mg every 12 hours  3.  Add oxycodone 10 mg, 1 tablet every 4 hours as needed for breakthrough pain.  4.  Return in 2 weeks for reevaluation by Shellie Tate nurse practitioner prior to her second cycle of FOLFIRINOX.  5.  We discussed signs and symptoms for which the patient to notify our office, or for any other problems or concerns.

## 2019-07-16 NOTE — TELEPHONE ENCOUNTER
Pt c/o feeling more fatigued than normal.  Says when she is doing normal every day activities, she is having to take more breaks.  Had her 1st chemo last week.  Explained to pt that it is not abnormal to feel tired after chemo but will bring in and check a cbc with RN review to make sure her hgb is not low.  She is not able to come in until tomorrow after noon.  MsSighter sent to appt desk to call and schedule.       ----- Message from Angy Hines sent at 7/16/2019 12:24 PM EDT -----  [phone]    Pt had her first chemo last week and she is feeling short of breath was told to call if that happened.

## 2019-07-17 PROBLEM — J90 PLEURAL EFFUSION ON RIGHT: Status: ACTIVE | Noted: 2019-01-01

## 2019-07-17 NOTE — PROGRESS NOTES
[facility] GROUP OUTPATIENT FOLLOW UP CLINIC VISIT    REASON FOR FOLLOW-UP:    Metastatic adenocarcinoma of the pancreas    HISTORY OF PRESENT ILLNESS:  Linette Ghosh is a 63 y.o. female with the above-mentioned history, who returns the office today for triage visit regarding shortness of breath and fatigue.  She recently initiated treatment for metastatic pancreatic cancer with FOLFIRINOX.  The patient called the office with reports of fatigue and shortness of breath and was brought in for further evaluation.  The patient reports today she has progressively declined since receiving treatment.  She has shortness of breath on exertion though does feel she recovers with rest.  She also feels as though her heart is racing.  This certainly worsens with exertion.  She denies chest pain.   Reports she is not eating and drinking adequately.  She does have mild nausea though this is controlled with her medications as needed.  She continues to have constipation utilizing magnesium citrate which does help her bowels move.  She denies fevers or chills.  She denies cardiac history.    ONCOLOGIC HISTORY:  She has had about a 40 pound weight loss in the past 6 months associated with worsening bilateral flank pain.  She does note that the pain is worse with eating.  She was taking hydrocodone a couple of times per day with some improvement.     Due to worsening pain, she presented to the emergency department on 6/1/2019.  A chest x-ray was unremarkable.  She had a CT angiogram of the chest showing no evidence for pulmonary embolism.  There was some nodularity involving the major fissure on the right.  There was some pleural thickening and nodularity involving the minor fissure on the right.  New infiltrate involving the posterior sulcus on the right medial he was noted to have a small pleural effusion on the right was noted as well.  A small groundglass area is appreciated in the right upper lobe.  The left adrenal gland  was prominent in the tail the pancreas is prominent measuring 2.9 cm.  Some liver cysts were noted.     She had a follow-up CT scan of the abdomen and pelvis with contrast on 6/9/2019 showing a large hypoenhancing mass involving the entire tail of the pancreas which encases the splenic artery and occludes and splenic vein.  This abuts and possibly infiltrates the left adrenal gland.  No evidence for metastatic disease otherwise.     She saw Dr. Varela with gastroenterology and had an endoscopic ultrasound performed on 6/17/2019 with pathology confirming adenocarcinoma.  The CA 19-9 is elevated at 655.        She was seen initially on 6/21/2019.    A PET scan was requested and performed on 6/25/2019.  A 4 cm intensely hypermetabolic pancreatic tail mass is noted.  Unfortunately, extensive metastatic disease is noted with carcinomatosis throughout the abdomen and extensive metastatic disease throughout the right pleura.  Metastatic disease extending into the posterior mediastinum was noted as well.    She did see Dr. Smiley with surgical oncology.  This referral was made for the PET scan results returned in hopes that she had localized disease that could be resected.  He will place a Mediport on 7/8/2019.    ALLERGIES:  Allergies   Allergen Reactions   • Penicillins        MEDICATIONS:  The medication list has been reviewed with the patient by the medical assistant, and the list has been updated in the electronic medical record, which I reviewed.  Medication dosages and frequencies were confirmed to be accurate.    I have reviewed the patient's medical history in detail and updated the computerized patient record.    Review of Systems   Constitutional: Positive for appetite change. Negative for chills, fatigue, fever and unexpected weight change.   HENT:   Negative for mouth sores, nosebleeds, sore throat and trouble swallowing.    Respiratory: Positive for shortness of breath. Negative for cough.     Cardiovascular: Positive for palpitations. Negative for chest pain and leg swelling.   Gastrointestinal: Positive for abdominal pain, constipation and nausea. Negative for diarrhea and vomiting.   Endocrine: Negative for hot flashes.   Genitourinary: Negative for difficulty urinating.    Musculoskeletal: Negative for arthralgias, flank pain and myalgias.   Skin: Negative for rash.   Neurological: Negative for dizziness and extremity weakness.   Hematological: Negative for adenopathy. Does not bruise/bleed easily.   Psychiatric/Behavioral: Negative for sleep disturbance. The patient is nervous/anxious.    Review of systems 7/17/2019 unchanged from previous office visit except as updated    Vitals:    07/17/19 1400   BP: 123/80   Pulse: (!) 135   Temp: 98.7 °F (37.1 °C)   TempSrc: Oral   SpO2: 95%   Weight: 63.5 kg (140 lb)   PainSc:   8       Physical Exam   Constitutional: She is oriented to person, place, and time. She appears well-developed. She appears ill.   HENT:   Head: Normocephalic and atraumatic.   Mouth/Throat: Oropharynx is clear and moist and mucous membranes are normal.   Eyes: Pupils are equal, round, and reactive to light.   Neck: Normal range of motion.   Cardiovascular: Regular rhythm and normal heart sounds. Tachycardia present.   Pulmonary/Chest: Effort normal and breath sounds normal. No respiratory distress. She has no wheezes. She has no rhonchi. She has no rales.   Benign left chest Mediport.   Abdominal: Soft. Normal appearance and bowel sounds are normal. She exhibits no distension. There is no hepatosplenomegaly.   Musculoskeletal: Normal range of motion. She exhibits no edema.   Neurological: She is alert and oriented to person, place, and time.   Skin: Skin is warm and dry. No rash noted.   Psychiatric: She has a normal mood and affect.   Vitals reviewed.  Physical exam 7/17/2019 unchanged from previous office visit except as updated    DIAGNOSTIC DATA:  Results for orders placed or  performed in visit on 07/17/19   Comprehensive metabolic panel   Result Value Ref Range    Glucose 204 (H) 74 - 124 mg/dL    BUN 11 6 - 20 mg/dL    Creatinine 0.82 0.60 - 1.10 mg/dL    Sodium 130 (L) 134 - 145 mmol/L    Potassium 3.8 3.5 - 4.7 mmol/L    Chloride 85 (L) 98 - 107 mmol/L    CO2 29.7 (H) 22.0 - 29.0 mmol/L    Calcium 9.7 8.5 - 10.2 mg/dL    Total Protein 7.5 6.3 - 8.0 g/dL    Albumin 4.00 3.50 - 5.20 g/dL    ALT (SGPT) 16 0 - 33 U/L    AST (SGOT) 15 0 - 32 U/L    Alkaline Phosphatase 80 38 - 116 U/L    Total Bilirubin 0.2 0.2 - 1.2 mg/dL    eGFR Non African Amer 70 >60 mL/min/1.73    Globulin 3.5 1.8 - 3.5 gm/dL    A/G Ratio 1.1 1.1 - 2.4 g/dL    BUN/Creatinine Ratio 13.4 7.3 - 30.0    Anion Gap 15.3 (H) 5.0 - 15.0 mmol/L   CBC Auto Differential   Result Value Ref Range    WBC 7.76 3.40 - 10.80 10*3/mm3    RBC 4.42 3.77 - 5.28 10*6/mm3    Hemoglobin 11.0 (L) 12.0 - 15.9 g/dL    Hematocrit 35.2 34.0 - 46.6 %    MCV 79.6 79.0 - 97.0 fL    MCH 24.9 (L) 26.6 - 33.0 pg    MCHC 31.3 (L) 31.5 - 35.7 g/dL    RDW 14.1 12.3 - 15.4 %    RDW-SD 40.8 37.0 - 54.0 fl    MPV 9.8 6.0 - 12.0 fL    Platelets 402 140 - 450 10*3/mm3    Neutrophil % 65.9 42.7 - 76.0 %    Lymphocyte % 22.6 19.6 - 45.3 %    Monocyte % 6.3 5.0 - 12.0 %    Eosinophil % 4.0 0.3 - 6.2 %    Basophil % 0.6 0.0 - 1.5 %    Immature Grans % 0.6 (H) 0.0 - 0.5 %    Neutrophils, Absolute 5.11 1.70 - 7.00 10*3/mm3    Lymphocytes, Absolute 1.75 0.70 - 3.10 10*3/mm3    Monocytes, Absolute 0.49 0.10 - 0.90 10*3/mm3    Eosinophils, Absolute 0.31 0.00 - 0.40 10*3/mm3    Basophils, Absolute 0.05 0.00 - 0.20 10*3/mm3    Immature Grans, Absolute 0.05 0.00 - 0.05 10*3/mm3    nRBC 0.0 0.0 - 0.2 /100 WBC       IMAGING: EKG performed in the office today, I reviewed these images.  Consistent with sinus tachycardia.  Patient does have infrequent PVCs.  No atrial fibrillation    ASSESSMENT:  This is a 63 y.o. female with:  1.  Metastatic pancreas cancer originating in  the tail of the pancreas: There is evidence for carcinomatosis and extensive metastatic disease throughout the right pleura with extension into the posterior mediastinum.  She is not a surgical candidate.  She did see Dr. Smiley with surgical oncology and [facility] is planned for Monday.  Dr. Zhong recommended palliative therapy with FOLFIRINOX.  Patient has had a formal education. Plans to treat her every 2 weeks.  Repeat CT imaging after 4 doses or 2 months.  We do need to check BRCA mutation status at some point to see if she would be a candidate for olaparib.    · Cycle 1 day 1 FOLFIRINOX 7/10/2019    2.  Cancer related pain: currently taking Norco 5/325, one every 2 hours and pain is uncontrolled.  · We will change her pain medication to MS Contin 15 mg every 12 hours, and add oxycodone 10 mg, 1 every 4 hours as needed for breakthrough pain.    3.  Tachycardia with associated shortness of breath.  EKG performed in the office today consistent with sinus tachycardia.  While the patient is not eating and drinking adequately, her heart rate did not improve with 500 MLS normal saline.  Creatinine and BUN are stable today, therefore, I do not think her tachycardia is related to dehydration.     Given her underlying metastatic pancreatic cancer, there is concern for pulmonary emboli.  We will transport to the emergency department for further evaluation.      PLAN:  1. EKG performed in the office today, I personally reviewed this as well as reviewed with Dr. Zhong as outlined above.  2. 500 mL normal saline given in the infusion area today.  3. Transport to the emergency department for further evaluation of tachycardia with concerns for pulmonary emboli.    Today's plan was discussed and reviewed with Dr. Zhong who is in agreement.      Wendy Daly, APRN  07/17/2019

## 2019-07-17 NOTE — PROGRESS NOTES
Pt presented to the office today for an RN review, c/o heart racing and fatigue.  SOA with exertion.   initially.  Reviewed with Natalya BLANCO, EKG ordered and IVF's started.   after 500cc's NS.  Port accessed but no blood return.  Per Natalya BLANCO, pt is to go to ER.  Left port in despite no blood return for ER use.  Pt escorted to the ER via wheelchair per MA.

## 2019-07-17 NOTE — ED PROVIDER NOTES
EMERGENCY DEPARTMENT ENCOUNTER    CHIEF COMPLAINT  Chief Complaint: SOA  History given by: pt  History limited by: nothing  Room Number: 40/40  PMD: Raine Ghosh MD      HPI:  Pt is a 63 y.o. female with recent diagnosis metastatic pancreatic CA who was sent to the ED by the CBC office for an evaluation. Pt c/o SOA starting one day ago. She reports tachycardia starting 2 days ago. She c/o chronic cough but denies extremity edema, fever, abd pain, and vomiting. She reports nausea from her chemotherapy. Pt takes morphine as needed for pain.     Duration:  One day  Onset: gradual  Timing: constant  Location: lungs  Radiation: none  Quality: Shortness of Air  Intensity/Severity: mild  Progression: unchanged  Associated Symptoms: tachycardia  Aggravating Factors: none  Alleviating Factors: none  Previous Episodes: none  Treatment before arrival: none    PAST MEDICAL HISTORY  Active Ambulatory Problems     Diagnosis Date Noted   • Moderate persistent asthma without complication 03/09/2016   • Asthma 03/09/2016   • Benign essential hypertension 03/09/2016   • Hyperlipidemia 03/09/2016   • Arthralgia of hip 03/09/2016   • Pain of thigh 03/09/2016   • Microscopic hematuria 03/09/2016   • Type 2 diabetes mellitus (CMS/HCC) 03/09/2016   • Tobacco use 09/13/2016   • Chronic kidney disease, stage III (moderate) (CMS/HCC) 03/14/2017   • Iron deficiency anemia 03/14/2017   • Pancreatic mass 06/11/2019   • Malignant neoplasm of tail of pancreas (CMS/HCC) 06/21/2019   • Encounter for fitting and adjustment of vascular catheter 07/08/2019     Resolved Ambulatory Problems     Diagnosis Date Noted   • Abnormal kidney function study 03/09/2016     Past Medical History:   Diagnosis Date   • Asthma    • Carotid artery stenosis    • Diabetes mellitus (CMS/HCC)    • GERD (gastroesophageal reflux disease)    • Glaucoma    • Hydronephrosis    • Hyperlipidemia    • Hypertension    • Intracranial hemorrhage (CMS/HCC)    • Pancreatic cancer  (CMS/[facility]) 06/2019   • Pneumonia    • Polyp of sigmoid colon    • Vitamin D deficiency        PAST SURGICAL HISTORY  Past Surgical History:   Procedure Laterality Date   • CHOLECYSTECTOMY     • GALLBLADDER SURGERY     • HERNIA REPAIR      x3   • HYSTERECTOMY         FAMILY HISTORY  Family History   Problem Relation Age of Onset   • Hypertension Mother    • Diabetes Mother    • Arthritis Mother    • Hypertension Sister    • Diabetes Sister    • Thyroid disease Sister    • Arthritis Sister    • Hypertension Brother    • Diabetes Brother    • Lung cancer Maternal Uncle    • Stroke Maternal Grandfather    • Hypertension Brother    • Hypertension Sister    • Hypertension Sister        SOCIAL HISTORY  Social History     Socioeconomic History   • Marital status: Single     Spouse name: Not on file   • Number of children: 0   • Years of education: College   • Highest education level: Not on file   Occupational History   • Occupation: Customer Service     Employer: [employer]   Tobacco Use   • Smoking status: Current Every Day Smoker     Packs/day: 1.00     Years: 30.00     Pack years: 30.00     Types: Cigarettes   • Smokeless tobacco: Never Used   Substance and Sexual Activity   • Alcohol use: No   • Drug use: No   • Sexual activity: Defer       ALLERGIES  Penicillins    REVIEW OF SYSTEMS  Review of Systems   Constitutional: Negative for chills and fever.   HENT: Negative for rhinorrhea and sore throat.    Eyes: Negative for visual disturbance.   Respiratory: Positive for shortness of breath. Negative for cough.    Cardiovascular: Positive for palpitations. Negative for chest pain and leg swelling.   Gastrointestinal: Negative for abdominal pain, diarrhea and vomiting.   Endocrine: Negative.    Genitourinary: Negative for decreased urine volume, dysuria and frequency.   Musculoskeletal: Negative for neck pain.   Skin: Negative for rash.   Neurological: Negative for syncope and headaches.   Psychiatric/Behavioral:  Negative.  Negative for confusion.   All other systems reviewed and are negative.      PHYSICAL EXAM  ED Triage Vitals   Temp Heart Rate Resp BP SpO2   07/17/19 1611 07/17/19 1611 07/17/19 1611 07/17/19 1700 07/17/19 1611   98.7 °F (37.1 °C) (!) 129 18 115/84 95 %      Temp src Heart Rate Source Patient Position BP Location FiO2 (%)   07/17/19 1611 07/17/19 1611 -- -- --   Tympanic Monitor          Physical Exam   Constitutional: She is oriented to person, place, and time.  Non-toxic appearance. She appears distressed (mild).   HENT:   Head: Normocephalic and atraumatic.   Eyes: EOM are normal.   Neck: Normal range of motion. Neck supple.   Cardiovascular: Regular rhythm, normal heart sounds and intact distal pulses. Tachycardia present.   No murmur heard.  Pulses:       Posterior tibial pulses are 2+ on the right side, and 2+ on the left side.   Pulmonary/Chest: Effort normal. No respiratory distress. She has decreased breath sounds (R base).   Not tachypnic. Saturating 97% on room air. Decreased breath sounds in the R base.   Abdominal: Soft. Bowel sounds are normal. There is no tenderness. There is no rebound and no guarding.   Musculoskeletal: Normal range of motion. She exhibits no edema.   Neurological: She is alert and oriented to person, place, and time.   Skin: Skin is warm and dry.   Psychiatric: Affect normal.   Nursing note and vitals reviewed.      LAB RESULTS  Lab Results (last 24 hours)     Procedure Component Value Units Date/Time    CBC and Differential [[id number]] Collected:  07/17/19 1429    Specimen:  Blood Updated:  07/17/19 1453    Narrative:       The following orders were created for panel order CBC and Differential.  Procedure                               Abnormality         Status                     ---------                               -----------         ------                     CBC Auto Differential[[id number]]        Abnormal            Final result                 Please view  results for these tests on the individual orders.    CBC Auto Differential [[id number]]  (Abnormal) Collected:  07/17/19 1429    Specimen:  Blood Updated:  07/17/19 1453     WBC 7.76 10*3/mm3      RBC 4.42 10*6/mm3      Hemoglobin 11.0 g/dL      Hematocrit 35.2 %      MCV 79.6 fL      MCH 24.9 pg      MCHC 31.3 g/dL      RDW 14.1 %      RDW-SD 40.8 fl      MPV 9.8 fL      Platelets 402 10*3/mm3      Neutrophil % 65.9 %      Lymphocyte % 22.6 %      Monocyte % 6.3 %      Eosinophil % 4.0 %      Basophil % 0.6 %      Immature Grans % 0.6 %      Neutrophils, Absolute 5.11 10*3/mm3      Lymphocytes, Absolute 1.75 10*3/mm3      Monocytes, Absolute 0.49 10*3/mm3      Eosinophils, Absolute 0.31 10*3/mm3      Basophils, Absolute 0.05 10*3/mm3      Immature Grans, Absolute 0.05 10*3/mm3      nRBC 0.0 /100 WBC     Comprehensive metabolic panel [[id number]]  (Abnormal) Collected:  07/17/19 1517    Specimen:  Blood Updated:  07/17/19 1538     Glucose 204 mg/dL      BUN 11 mg/dL      Creatinine 0.82 mg/dL      Sodium 130 mmol/L      Potassium 3.8 mmol/L      Chloride 85 mmol/L      CO2 29.7 mmol/L      Calcium 9.7 mg/dL      Total Protein 7.5 g/dL      Albumin 4.00 g/dL      ALT (SGPT) 16 U/L      AST (SGOT) 15 U/L      Alkaline Phosphatase 80 U/L      Total Bilirubin 0.2 mg/dL      eGFR Non African Amer 70 mL/min/1.73      Globulin 3.5 gm/dL      A/G Ratio 1.1 g/dL      BUN/Creatinine Ratio 13.4     Anion Gap 15.3 mmol/L     Protime-INR [[id number]]  (Normal) Collected:  07/17/19 1740    Specimen:  Blood Updated:  07/17/19 1801     Protime 13.7 Seconds      INR 1.08    BNP [[id number]]  (Normal) Collected:  07/17/19 1911    Specimen:  Blood Updated:  07/17/19 1926     proBNP 107.8 pg/mL     Narrative:       Among patients with dyspnea, NT-proBNP is highly sensitive for the detection of acute congestive heart failure. In addition NT-proBNP of <300 pg/ml effectively rules out acute congestive heart failure with 99% negative  predictive value.    Troponin [[id number]]  (Normal) Collected:  07/17/19 1911    Specimen:  Blood Updated:  07/17/19 1926     Troponin T <0.010 ng/mL     Narrative:       Troponin T Reference Range:  <= 0.03 ng/mL-   Negative for AMI  >0.03 ng/mL-     Abnormal for myocardial necrosis.  Clinicians would have to utilize clinical acumen, EKG, Troponin and serial changes to determine if it is an Acute Myocardial Infarction or myocardial injury due to an underlying chronic condition.           I ordered the above labs and reviewed the results    RADIOLOGY  CT Angiogram Chest With Contrast   Final Result       No pulmonary embolism. Increased right pleural effusion. Increased   pleural thickening suggesting progression of pleural metastatic disease.   Increased size of a subcarinal lymph node could be metastatic in nature.       Discussed by telephone with Dr. Ardon at 1850, 07/17/2019.       This report was finalized on 7/17/2019 6:54 PM by Dr. Natanael Whelan M.D.          XR Chest 2 View   Final Result   Interval increase in opacity at the right lower hemithorax,   as described, continued follow-up recommended.       This report was finalized on 7/17/2019 5:43 PM by Dr. Natanael Whelan M.D.               I ordered the above noted radiological studies. Interpreted by radiologist. Reviewed by me in PACS.       PROCEDURES  Procedures    EKG          EKG time: 1620  Rhythm/Rate: sinus tachycardia rate 120s  P waves and LA: frequent PVCs  QRS, axis: unremarkable   ST and T waves: unremarkable     Interpreted Contemporaneously by me, independently viewed  worsening tachycardia when compared to 6/1/19      PROGRESS AND CONSULTS      1629. Ordered CXR and labwork add-ons for workup.    1751. Discussed plan to order CT scan to differentiate PNA, fluid build-up, and blood clot. Discussed plan for probable admission. Pt agreeable to plan.     1753. Ordered CTA chest.    1854. Per Dr Whelan, CT chest shows no PE.  Worsening R pleural effusion with atelectasis to the RLL. R pleural metastases thickened.     1905. Placed call to hematology.     1908. Placed call to [facility].     1913. Discussed pt's case with Dr Valdez, hematology, who will see the pt in consult.     1957. Discussed case with Dr Sanderson, [facility], who agrees to admit the patient.       MEDICAL DECISION MAKING  Results were reviewed/discussed with the patient and they were also made aware of online access. Pt also made aware that some labs, such as cultures, will not be resulted during ER visit and follow up with PMD is necessary.     MDM  Number of Diagnoses or Management Options     Amount and/or Complexity of Data Reviewed  Clinical lab tests: ordered and reviewed (Troponin <0.010)  Tests in the radiology section of CPT®: ordered and reviewed (Interval increase in opacity at the right lower hemithorax seen on CXR)  Tests in the medicine section of CPT®: ordered and reviewed (See EKG procedure note)  Decide to obtain previous medical records or to obtain history from someone other than the patient: yes (Epic)  Review and summarize past medical records: yes (CBC office today, creatinine 0.80  seen by Cecy Sanches, had metastatic adenoca of the pancreas  PET scan 6/25/19, 4cm pancreatic mass, carcinomatosis of the abdomen and R pleura posterior mediastinum   metaport placed on July 8, 2019)  Discuss the patient with other providers: yes (Dr Sanderson, [facility]; Dr Valdez, hematology)    Patient Progress  Patient progress: stable         DIAGNOSIS  Final diagnoses:   Pleural effusion, right   Shortness of breath   Tachycardia       DISPOSITION  ADMISSION    Discussed treatment plan and reason for admission with pt/family and admitting physician.  Pt/family voiced understanding of the plan for admission for further testing/treatment as needed.     Latest Documented Vital Signs:  As of 9:32 PM  BP- 108/72 HR- 111 Temp- 98.7 °F (37.1 °C) (Tympanic) O2 sat-  95%    --  Documentation assistance provided by autumn Velásquez for Dr Reva MD.  Information recorded by the scribe was done at my direction and has been verified and validated by me.     Mi Velásquez  07/17/19 0056       Ml Ardon MD  07/21/19 2890

## 2019-07-18 PROBLEM — E87.1 HYPONATREMIA: Status: ACTIVE | Noted: 2019-01-01

## 2019-07-18 NOTE — PROGRESS NOTES
Discharge Planning Assessment  [facility]     Patient Name: Linette Ghosh  MRN: [id number]  Today's Date: 7/18/2019    Admit Date: 7/17/2019    Discharge Needs Assessment     Row Name 07/18/19 2202       Living Environment    Lives With  sibling(s);grandchild(dmitri)    Current Living Arrangements  home/apartment/condo    Primary Care Provided by  self    Provides Primary Care For  no one    Family Caregiver if Needed  sibling(s)    Family Caregiver Names  Desomnd     Quality of Family Relationships  involved;helpful       Transition Planning    Patient/Family Anticipates Transition to  home with family    Patient/Family Anticipated Services at Transition  none    Transportation Anticipated  family or friend will provide       Discharge Needs Assessment    Readmission Within the Last 30 Days  no previous admission in last 30 days    Concerns to be Addressed  no discharge needs identified;denies needs/concerns at this time    Equipment Currently Used at Home  nebulizer    Offered/Gave Vendor List  yes    Discharge Coordination/Progress  pt plans home; follow for needs        Discharge Plan     Row Name 07/18/19 8148       Plan    Plan  pt plans home; follow for needs    Patient/Family in Agreement with Plan  yes    Plan Comments  Met with pt and pt's brother Jerry bedside. Verified facesheet correct. Explained role of CCP. Pt lives with her brother in an apartment. Pt is IADLs no DME used. Pt has a nebulizer she uses at home when needed. Pt's PCP is Dr. Raine Ghosh and she uses [facility] for prescriptions with no issues. Pt reports no history of HH or SNF. Pt reports she plans home with her family. She currently denies needs. CCP to follow…Daniel, CSW        Destination      No service coordination in this encounter.      Durable Medical Equipment      No service coordination in this encounter.      Dialysis/Infusion      No service coordination in this encounter.      Home Medical Care      No  service coordination in this encounter.      Therapy      No service coordination in this encounter.      Community Resources      No service coordination in this encounter.          Demographic Summary     Row Name 07/18/19 1300       General Information    Admission Type  inpatient    Arrived From  home    Reason for Consult  discharge planning    Preferred Language  English     Used During This Interaction  no       Contact Information    Permission Granted to Share Info With  facility ;family/designee        Functional Status     Row Name 07/18/19 1301       Functional Status    Usual Activity Tolerance  moderate    Current Activity Tolerance  moderate       Functional Status, IADL    Medications  independent    Meal Preparation  independent    Housekeeping  independent    Laundry  independent    Shopping  independent       Mental Status    General Appearance WDL  WDL       Mental Status Summary    Recent Changes in Mental Status/Cognitive Functioning  no changes        Psychosocial    No documentation.       Abuse/Neglect    No documentation.       Legal    No documentation.       Substance Abuse    No documentation.       Patient Forms    No documentation.           Prema Sosa

## 2019-07-18 NOTE — H&P
Name: Linette Ghosh ADMIT: 2019   : 1955  PCP: Raine Ghosh MD    MRN: [id number] LOS: 0 days   AGE/SEX: 63 y.o. female  ROOM: E467/1     Chief Complaint   Patient presents with   • Shortness of Breath     pt was at [facility] office today, sent over to ED for further eval of S/S.   • Palpitations       Subjective   Ms. Ghosh is a 63 y.o. female with a history of pancreatic cancer and peritoneal carcinomatosis who presents to [facility] with palpitations after being evaluated in [facility] clinic.  She reports she has heart racing sensation and associated shortness of breath.  She reports no productive cough.  No fevers or chills.  No chest pressure orthopnea or worsening edema.  He does report right-sided pleuritic type rib pain with inspiration.  She does have abdominal pain which is unchanged and controlled with her current medications.  She reports no nausea vomiting or diarrhea currently.  No dysuria either.    History of Present Illness    Past Medical History:   Diagnosis Date   • Asthma    • Carotid artery stenosis    • Diabetes mellitus (CMS/HCC)     Type 2   • GERD (gastroesophageal reflux disease)    • Glaucoma    • Hydronephrosis    • Hyperlipidemia    • Hypertension    • Intracranial hemorrhage (CMS/HCC)    • Pancreatic cancer (CMS/HCC) 2019   • Pneumonia    • Polyp of sigmoid colon    • Vitamin D deficiency      Past Surgical History:   Procedure Laterality Date   • CHOLECYSTECTOMY     • GALLBLADDER SURGERY     • HERNIA REPAIR      x3   • HYSTERECTOMY       Family History   Problem Relation Age of Onset   • Hypertension Mother    • Diabetes Mother    • Arthritis Mother    • Hypertension Sister    • Diabetes Sister    • Thyroid disease Sister    • Arthritis Sister    • Hypertension Brother    • Diabetes Brother    • Lung cancer Maternal Uncle    • Stroke Maternal Grandfather    • Hypertension Brother    • Hypertension Sister    • Hypertension Sister      Social History      Tobacco Use   • Smoking status: Current Every Day Smoker     Packs/day: 1.00     Years: 30.00     Pack years: 30.00     Types: Cigarettes   • Smokeless tobacco: Never Used   Substance Use Topics   • Alcohol use: No   • Drug use: No     Medications Prior to Admission   Medication Sig Dispense Refill Last Dose   • albuterol (PROVENTIL) (2.5 MG/3ML) 0.083% nebulizer solution Take 2.5 mg by nebulization Every 6 (Six) Hours As Needed for Wheezing.      • albuterol sulfate  (90 Base) MCG/ACT inhaler Inhale 2 puffs Every 4 (Four) Hours As Needed for Wheezing.      • aspirin 325 MG tablet Take 325 mg by mouth Daily.   Taking   • budesonide (PULMICORT FLEXHALER) 180 MCG/ACT inhaler Inhale 2 puffs 2 (Two) Times a Day. Rinse mouth after using 1 each 11 Taking   • desloratadine (CLARINEX) 5 MG tablet Take 1 tablet by mouth Daily. 90 tablet 1 Taking   • fluticasone (FLONASE) 50 MCG/ACT nasal spray 2 sprays into the nostril(s) as directed by provider Daily. 3 bottle 3 Taking   • GARLIC PO Take 2 capsules by mouth Daily.   Taking   • HYDROcodone-acetaminophen (NORCO) 5-325 MG per tablet Take 1 tablet by mouth Every 6 (Six) Hours As Needed for Moderate Pain . 120 tablet 0 Taking   • hydrOXYzine (ATARAX) 25 MG tablet Take 1 tablet by mouth Every 8 (Eight) Hours As Needed for Itching. 90 tablet 1 Taking   • lisinopril-hydrochlorothiazide (PRINZIDE,ZESTORETIC) 20-12.5 MG per tablet Take 2 tablets by mouth Daily. 60 tablet 0 Taking   • metFORMIN (GLUCOPHAGE) 1000 MG tablet Take 1,000 mg by mouth 2 (Two) Times a Day With Meals.      • montelukast (SINGULAIR) 10 MG tablet Take 10 mg by mouth Every Night.      • Morphine (MS CONTIN) 15 MG 12 hr tablet Take 1 tablet by mouth 2 (Two) Times a Day for 30 days. 1 tablet 60 tablet 0    • Multiple Vitamins-Minerals (CENTRUM SILVER) tablet Take 1 tablet by mouth Daily.      • ondansetron (ZOFRAN) 8 MG tablet Take 1 tablet by mouth 3 (Three) Times a Day As Needed for Nausea or Vomiting.  30 tablet 5 Taking   • oxyCODONE (ROXICODONE) 10 MG tablet Take 1 tablet by mouth Every 4 (Four) Hours As Needed for Moderate Pain . 60 tablet 0    • pantoprazole (PROTONIX) 40 MG EC tablet Take 40 mg by mouth Daily.      • simvastatin (ZOCOR) 20 MG tablet Take 20 mg by mouth Every Night.      • timolol (TIMOPTIC) 0.25 % ophthalmic solution Administer 1 drop to both eyes Daily.   Taking   • vitamin E 100 UNIT capsule Take 100 Units by mouth Daily.   Taking     Allergies:    Allergies   Allergen Reactions   • Penicillins        Review of Systems   Constitutional: Negative for chills and fever.   HENT: Negative for sore throat and trouble swallowing.    Eyes: Negative for pain and visual disturbance.   Respiratory: Positive for cough and shortness of breath. Negative for wheezing.    Cardiovascular: Positive for palpitations. Negative for chest pain and leg swelling.   Gastrointestinal: Negative for constipation, diarrhea, nausea and vomiting.   Endocrine: Negative for cold intolerance and heat intolerance.   Genitourinary: Negative for difficulty urinating and dysuria.   Musculoskeletal: Negative for neck pain.   Skin: Negative for pallor and rash.   Allergic/Immunologic: Negative for environmental allergies and food allergies.   Neurological: Negative for tremors and speech difficulty.   Hematological: Negative for adenopathy. Does not bruise/bleed easily.   Psychiatric/Behavioral: Negative for agitation and confusion.        Objective    Vital Signs  Temp:  [97.9 °F (36.6 °C)-98.7 °F (37.1 °C)] 98.5 °F (36.9 °C)  Heart Rate:  [111-135] 120  Resp:  [16-18] 16  BP: ()/(69-94) 109/70  SpO2:  [93 %-98 %] 94 %  on   ;   Device (Oxygen Therapy): room air  Body mass index is 23.4 kg/m².    Physical Exam   Constitutional: She appears well-developed. No distress.   HENT:   Head: Atraumatic.   Nose: Nose normal.   Eyes: Conjunctivae and EOM are normal. Pupils are equal, round, and reactive to light.   Neck: Normal  range of motion. Neck supple.   Cardiovascular: Regular rhythm and intact distal pulses. Tachycardia present.   Pulmonary/Chest: Effort normal. She has decreased breath sounds in the right lower field. She has no wheezes. She has rales.   Abdominal: Soft. She exhibits no distension. There is tenderness (ruq). There is no rebound and no guarding.   Musculoskeletal: Normal range of motion. She exhibits no edema.   Neurological: She is alert.   Skin: Skin is warm and dry. She is not diaphoretic.   Psychiatric: She has a normal mood and affect. Her behavior is normal.   Nursing note and vitals reviewed.      Results Review:  I reviewed the patient's new clinical results.  I reviewed imaging, agree with interpretation.  I reviewed EKG/telemetry, sinus tachycardia, no acute ST change, PVC present.  I reviewed prior records.    Lab Results (last 24 hours)     Procedure Component Value Units Date/Time    CBC and Differential [[id number]] Collected:  07/17/19 1429    Specimen:  Blood Updated:  07/17/19 1453    Narrative:       The following orders were created for panel order CBC and Differential.  Procedure                               Abnormality         Status                     ---------                               -----------         ------                     CBC Auto Differential[[id number]]        Abnormal            Final result                 Please view results for these tests on the individual orders.    CBC Auto Differential [[id number]]  (Abnormal) Collected:  07/17/19 1429    Specimen:  Blood Updated:  07/17/19 1453     WBC 7.76 10*3/mm3      RBC 4.42 10*6/mm3      Hemoglobin 11.0 g/dL      Hematocrit 35.2 %      MCV 79.6 fL      MCH 24.9 pg      MCHC 31.3 g/dL      RDW 14.1 %      RDW-SD 40.8 fl      MPV 9.8 fL      Platelets 402 10*3/mm3      Neutrophil % 65.9 %      Lymphocyte % 22.6 %      Monocyte % 6.3 %      Eosinophil % 4.0 %      Basophil % 0.6 %      Immature Grans % 0.6 %      Neutrophils,  Absolute 5.11 10*3/mm3      Lymphocytes, Absolute 1.75 10*3/mm3      Monocytes, Absolute 0.49 10*3/mm3      Eosinophils, Absolute 0.31 10*3/mm3      Basophils, Absolute 0.05 10*3/mm3      Immature Grans, Absolute 0.05 10*3/mm3      nRBC 0.0 /100 WBC     Comprehensive metabolic panel [[id number]]  (Abnormal) Collected:  07/17/19 1517    Specimen:  Blood Updated:  07/17/19 1538     Glucose 204 mg/dL      BUN 11 mg/dL      Creatinine 0.82 mg/dL      Sodium 130 mmol/L      Potassium 3.8 mmol/L      Chloride 85 mmol/L      CO2 29.7 mmol/L      Calcium 9.7 mg/dL      Total Protein 7.5 g/dL      Albumin 4.00 g/dL      ALT (SGPT) 16 U/L      AST (SGOT) 15 U/L      Alkaline Phosphatase 80 U/L      Total Bilirubin 0.2 mg/dL      eGFR Non African Amer 70 mL/min/1.73      Globulin 3.5 gm/dL      A/G Ratio 1.1 g/dL      BUN/Creatinine Ratio 13.4     Anion Gap 15.3 mmol/L     Protime-INR [[id number]]  (Normal) Collected:  07/17/19 1740    Specimen:  Blood Updated:  07/17/19 1801     Protime 13.7 Seconds      INR 1.08    BNP [[id number]]  (Normal) Collected:  07/17/19 1911    Specimen:  Blood Updated:  07/17/19 1926     proBNP 107.8 pg/mL     Narrative:       Among patients with dyspnea, NT-proBNP is highly sensitive for the detection of acute congestive heart failure. In addition NT-proBNP of <300 pg/ml effectively rules out acute congestive heart failure with 99% negative predictive value.    Troponin [[id number]]  (Normal) Collected:  07/17/19 1911    Specimen:  Blood Updated:  07/17/19 1926     Troponin T <0.010 ng/mL     Narrative:       Troponin T Reference Range:  <= 0.03 ng/mL-   Negative for AMI  >0.03 ng/mL-     Abnormal for myocardial necrosis.  Clinicians would have to utilize clinical acumen, EKG, Troponin and serial changes to determine if it is an Acute Myocardial Infarction or myocardial injury due to an underlying chronic condition.           CT Angiogram Chest With Contrast   Final Result       No pulmonary embolism.  Increased right pleural effusion. Increased   pleural thickening suggesting progression of pleural metastatic disease.   Increased size of a subcarinal lymph node could be metastatic in nature.       Discussed by telephone with Dr. Ardon at 1850, 07/17/2019.       This report was finalized on 7/17/2019 6:54 PM by Dr. Natanael Whelan M.D.          XR Chest 2 View   Final Result   Interval increase in opacity at the right lower hemithorax,   as described, continued follow-up recommended.       This report was finalized on 7/17/2019 5:43 PM by Dr. Natanael Whelan M.D.          US Thoracentesis    (Results Pending)     Assessment/Plan      Active Hospital Problems    Diagnosis  POA   • **Pleural effusion on right [J90]  Yes   • Malignant neoplasm of tail of pancreas (CMS/HCC) [C25.2]  Yes   • Benign essential hypertension [I10]  Yes   • Type 2 diabetes mellitus (CMS/HCC) [E11.9]  Yes   • Asthma [J45.909]  Yes      Resolved Hospital Problems   No resolved problems to display.     · Right pleural effusion: CT imaging with right pleural effusion and concern for metastatic disease spreading to the chest.  This most likely represents malignant effusion.  Will order right-sided thoracentesis and send for infectious work-up and cytology.  Consult oncology.  · Palpitations/sinus tachycardia: Troponin negative and BNP is normal.  She does not appear volume overloaded on exam.  Expect this is related to her pleural effusion and pain.  Will check TSH profile as well.  Monitor on telemetry and will give 500 cc of IV fluids to see if this improves her tachycardia and his rate has increased to 120s.  · Asthma: No acute bronchospasm on exam.  Will resume home regimen.  · Pancreatic cancer  · Diabetes: Correctional insulin.  · Prophylaxis: SCD  · Full code    I discussed the patients findings and my recommendations with patient, nursing staff and consulting provider.    Efraín Sanderson MD  [facility]  07/17/19  11:28 PM

## 2019-07-18 NOTE — CONSULTS
Subjective     REASON FOR CONSULTATION:   Symptomatic right pleural effusion in a patient with metastatic pancreatic carcinoma recently started on palliative chemotherapy with FOLFIRINOX  Provide an opinion on any further workup or treatment                             REQUESTING PHYSICIAN: Elie Turcios MD    RECORDS OBTAINED:  Records of the patients history including those obtained from the referring provider were reviewed and summarized in detail.    HISTORY OF PRESENT ILLNESS:  The patient is a 63 y.o. year old female who is here for an opinion about the above issue.  She had recently been diagnosed with stage IV pancreatic carcinoma with abdominal carcinomatosis and pleural studding in the right chest on PET scan.  She received her first dose of palliative chemotherapy with FOLFIRINOX on 7/10/2019 at [facility] office.  She return for follow-up on 7/17/2019 and at that time was much more short of breath and very tachycardic.  She was sent to the emergency room with concern for possible pulmonary embolus.  Her CT angiogram of the chest was negative for pulmonary embolus but did show a significant right pleural effusion.    She underwent a ultrasound-guided thoracentesis earlier today with removal of a liter of fluid.  She reports that she is much more comfortable she is currently sitting up and breathing comfortably at rest without any supplemental oxygen.  She remains mildly tachycardic at 106 which is likely sinus tachycardia.  She feels that she is ready for discharge home.    She has a follow-up appointment already scheduled at [facility] office next week on 7/24/2019 for reevaluation and her next cycle of FOLFIRI Head.  We discussed with patient today that if her pleural effusion re-accumulates quickly that we may wish to refer her to thoracic surgery for consideration of a Pleurx drain.    History of Present Illness     Past Medical History:   Diagnosis Date   • Asthma    • Carotid artery stenosis    • Diabetes  mellitus (CMS/HCC)     Type 2   • GERD (gastroesophageal reflux disease)    • Glaucoma    • Hydronephrosis    • Hyperlipidemia    • Hypertension    • Intracranial hemorrhage (CMS/HCC)    • Pancreatic cancer (CMS/HCC) 06/2019   • Pneumonia    • Polyp of sigmoid colon    • Vitamin D deficiency         Past Surgical History:   Procedure Laterality Date   • CHOLECYSTECTOMY     • GALLBLADDER SURGERY     • HERNIA REPAIR      x3   • HYSTERECTOMY          No current facility-administered medications on file prior to encounter.      Current Outpatient Medications on File Prior to Encounter   Medication Sig Dispense Refill   • albuterol (PROVENTIL) (2.5 MG/3ML) 0.083% nebulizer solution Take 2.5 mg by nebulization Every 6 (Six) Hours As Needed for Wheezing.     • albuterol sulfate  (90 Base) MCG/ACT inhaler Inhale 2 puffs Every 4 (Four) Hours As Needed for Wheezing.     • aspirin 325 MG tablet Take 325 mg by mouth Daily.     • budesonide (PULMICORT FLEXHALER) 180 MCG/ACT inhaler Inhale 2 puffs 2 (Two) Times a Day. Rinse mouth after using 1 each 11   • desloratadine (CLARINEX) 5 MG tablet Take 1 tablet by mouth Daily. 90 tablet 1   • fluticasone (FLONASE) 50 MCG/ACT nasal spray 2 sprays into the nostril(s) as directed by provider Daily. 3 bottle 3   • GARLIC PO Take 2 capsules by mouth Daily.     • HYDROcodone-acetaminophen (NORCO) 5-325 MG per tablet Take 1 tablet by mouth Every 6 (Six) Hours As Needed for Moderate Pain . 120 tablet 0   • hydrOXYzine (ATARAX) 25 MG tablet Take 1 tablet by mouth Every 8 (Eight) Hours As Needed for Itching. 90 tablet 1   • lisinopril-hydrochlorothiazide (PRINZIDE,ZESTORETIC) 20-12.5 MG per tablet Take 2 tablets by mouth Daily. 60 tablet 0   • metFORMIN (GLUCOPHAGE) 1000 MG tablet Take 1,000 mg by mouth 2 (Two) Times a Day With Meals.     • montelukast (SINGULAIR) 10 MG tablet Take 10 mg by mouth Every Night.     • Morphine (MS CONTIN) 15 MG 12 hr tablet Take 1 tablet by mouth 2 (Two)  Times a Day for 30 days. 1 tablet 60 tablet 0   • Multiple Vitamins-Minerals (CENTRUM SILVER) tablet Take 1 tablet by mouth Daily.     • ondansetron (ZOFRAN) 8 MG tablet Take 1 tablet by mouth 3 (Three) Times a Day As Needed for Nausea or Vomiting. 30 tablet 5   • oxyCODONE (ROXICODONE) 10 MG tablet Take 1 tablet by mouth Every 4 (Four) Hours As Needed for Moderate Pain . 60 tablet 0   • pantoprazole (PROTONIX) 40 MG EC tablet Take 40 mg by mouth Daily.     • simvastatin (ZOCOR) 20 MG tablet Take 20 mg by mouth Every Night.     • timolol (TIMOPTIC) 0.25 % ophthalmic solution Administer 1 drop to both eyes Daily.     • vitamin E 100 UNIT capsule Take 100 Units by mouth Daily.          ALLERGIES:    Allergies   Allergen Reactions   • Penicillins Hives        Social History     Socioeconomic History   • Marital status: Single     Spouse name: Not on file   • Number of children: 0   • Years of education: College   • Highest education level: Not on file   Occupational History   • Occupation: Customer Service     Employer: [employer]   Tobacco Use   • Smoking status: Current Every Day Smoker     Packs/day: 1.00     Years: 30.00     Pack years: 30.00     Types: Cigarettes   • Smokeless tobacco: Never Used   Substance and Sexual Activity   • Alcohol use: No   • Drug use: No   • Sexual activity: Defer        Family History   Problem Relation Age of Onset   • Hypertension Mother    • Diabetes Mother    • Arthritis Mother    • Hypertension Sister    • Diabetes Sister    • Thyroid disease Sister    • Arthritis Sister    • Hypertension Brother    • Diabetes Brother    • Lung cancer Maternal Uncle    • Stroke Maternal Grandfather    • Hypertension Brother    • Hypertension Sister    • Hypertension Sister         Review of Systems   Constitutional: Positive for fatigue.   Respiratory: Positive for cough and shortness of breath.    Gastrointestinal: Positive for abdominal pain.        Objective     Vitals:    07/18/19  1212 07/18/19 1304 07/18/19 1418 07/18/19 1523   BP: 99/72 111/75     BP Location: Left arm Left arm     Patient Position: Sitting Lying     Pulse: 90 112 112 98   Resp:  18 16    Temp:  97.8 °F (36.6 °C)     TempSrc:  Oral     SpO2: 100% 95% 97%    Weight:       Height:         Current Status 7/10/2019   ECOG score 0       Physical Exam   Constitutional: She is oriented to person, place, and time. She appears well-developed and well-nourished. No distress.   HENT:   Head: Normocephalic.   Eyes: Conjunctivae and EOM are normal. Pupils are equal, round, and reactive to light. No scleral icterus.   Neck: Normal range of motion. Neck supple. No JVD present. No thyromegaly present.   Cardiovascular: Regular rhythm. Tachycardia present. Exam reveals no gallop and no friction rub.   No murmur heard.  Pulmonary/Chest: Breath sounds normal. She has no wheezes. She has no rales.   Decreased breath sounds and dullness to percussion in the right base.   Abdominal: Soft. She exhibits no distension and no mass. There is no tenderness.   Musculoskeletal: Normal range of motion. She exhibits no edema or deformity.   Lymphadenopathy:     She has no cervical adenopathy.   Neurological: She is alert and oriented to person, place, and time. She has normal reflexes. No cranial nerve deficit.   Skin: Skin is warm and dry. No rash noted. No erythema.   Psychiatric: She has a normal mood and affect. Her behavior is normal. Judgment normal.         RECENT LABS:  Hematology WBC   Date Value Ref Range Status   07/18/2019 5.27 3.40 - 10.80 10*3/mm3 Final     RBC   Date Value Ref Range Status   07/18/2019 3.85 3.77 - 5.28 10*6/mm3 Final     Hemoglobin   Date Value Ref Range Status   07/18/2019 9.6 (L) 12.0 - 15.9 g/dL Final     Hematocrit   Date Value Ref Range Status   07/18/2019 31.2 (L) 34.0 - 46.6 % Final     Platelets   Date Value Ref Range Status   07/18/2019 304 140 - 450 10*3/mm3 Final        Lab Results   Component Value Date     GLUCOSE 234 (H) 07/18/2019    BUN 8 07/18/2019    CREATININE 0.65 07/18/2019    EGFRIFNONA 92 07/18/2019    EGFRIFAFRI 67 09/18/2018    BCR 12.3 07/18/2019    K 3.6 07/18/2019    CO2 29.3 (H) 07/18/2019    CALCIUM 8.9 07/18/2019    PROTENTOTREF 7.4 09/18/2018    ALBUMIN 3.40 (L) 07/18/2019    LABIL2 1.4 09/18/2018    AST 11 07/18/2019    ALT 14 07/18/2019     ULTRASOUND-GUIDED THORACENTESIS performed on 07/18/2019.     HISTORY: 63-year-old female with shortness of breath.     FINDINGS: The patient was brought to the ultrasound suite and placed in  the seated upright position. A free-flowing pocket of pleural fluid was  identified in the right pleural space. The area was then prepped and  draped in the usual sterile fashion. The skin was anesthetized with 1%  Lidocaine without epinephrine.  Thoracentesis was then achieved using a  5F Impulsonic needle system. Approximately 1000 mL of clear juancho  fluid was  obtained. A portion was sent to the lab for pre-ordered studies. The  procedure was then ended with no immediate complications or patient  distress.     IMPRESSION:  Successful ultrasound-guided thoracentesis of the right  pleural space.     1000 mL of clear juancho fluid obtained.    CT ANGIOGRAM CHEST W CONTRAST- 7/17/2019     INDICATIONS: Possible pulmonary embolism  Radiation dose reduction  techniques were utilized, including automated exposure control and  exposure modulation based on body size.     TECHNIQUE: CT ANGIOGRAPHY OF THE CHEST WITH THREE-DIMENSIONAL  RECONSTRUCTIONS     COMPARISON: 06/25/2019, correlate with chest x-ray from 07/17/2019     FINDINGS:      No pulmonary embolism. No aortic dissection.     The heart size is normal without pericardial effusion. A subcarinal  lymph node which measures 1.3 cm in short axis, previously 0.6 cm, may  be metastatic or reactive in nature.     The airways appear clear.     Right pleural effusion, at least moderate in extent, has increased since  the prior exam, with  increased atelectasis in the right lower lung.  Lobulated right pleural thickening, especially at the lower right  hemithorax, corresponding in location to previously demonstrated pleural  metastatic disease, appears increased since the prior exam, compatible  with progression of pleural metastatic disease. For example, pleural  thickening is 1 cm noted on image 104 of series 2, posteriorly at the  right hemithorax, previously 6 mm in thickness at similar level on the  prior exam.     The lungs show no focal pulmonary consolidation or mass.     Upper abdominal structures show no acute findings. Hepatic cysts are  redemonstrated. Gallbladder is surgically absent. Nonspecific nodular  thickening of the left adrenal gland appears similar to prior exam,  continued follow-up recommended. Pancreatic tail mass is again apparent,  without obvious interval change.           Degenerative changes are seen in the spine. No acute fracture is  identified.     IMPRESSION:     No pulmonary embolism. Increased right pleural effusion. Increased  pleural thickening suggesting progression of pleural metastatic disease.  Increased size of a subcarinal lymph node could be metastatic in nature.      Assessment/Plan     1.  Recently diagnosed stage IV pancreatic carcinoma with diffuse abdominal carcinomatosis and pleural studding in the right chest leading to symptomatic pleural effusion.  Patient is much more comfortable following ultrasound-guided thoracentesis earlier today.  2.  Anemia of chronic disease    Recommendations  1.  Patient seems more comfortable and I think it would be reasonable for the patient be discharged home today or tomorrow.  We discussed with the patient that this pleural effusion is likely to recur relatively quickly and if so we may need to arrange additional outpatient thoracentesis and referral to thoracic surgery for consideration of a Pleurx drain.  2.  She already has follow-up scheduled at the [facility] office  next week on 7/24/2019 for reassessment and potentially a second cycle of palliative chemotherapy.    Thanks for allowing us to see this nice patient in consultation.

## 2019-07-18 NOTE — PROGRESS NOTES
Clinical Pharmacy Services: Medication History    Linette Ghosh is a 63 y.o. female presenting to [facility] for   Chief Complaint   Patient presents with   • Shortness of Breath     pt was at [facility] office today, sent over to ED for further eval of S/S.   • Palpitations       She  has a past medical history of Asthma, Carotid artery stenosis, Diabetes mellitus (CMS/[facility]), GERD (gastroesophageal reflux disease), Glaucoma, Hydronephrosis, Hyperlipidemia, Hypertension, Intracranial hemorrhage (CMS/[facility]), Pancreatic cancer (CMS/[facility]) (06/2019), Pneumonia, Polyp of sigmoid colon, and Vitamin D deficiency.    Allergies as of 07/17/2019 - Reviewed 07/17/2019   Allergen Reaction Noted   • Penicillins  03/09/2016       Medication information was obtained from: Patient  Pharmacy and Phone Number: [facility] [phone]    Prior to Admission Medications     Prescriptions Last Dose Informant Patient Reported? Taking?    albuterol (PROVENTIL) (2.5 MG/3ML) 0.083% nebulizer solution  Self Yes Yes    Take 2.5 mg by nebulization Every 6 (Six) Hours As Needed for Wheezing.    albuterol sulfate  (90 Base) MCG/ACT inhaler  Self Yes Yes    Inhale 2 puffs Every 4 (Four) Hours As Needed for Wheezing.    aspirin 325 MG tablet  Self Yes Yes    Take 325 mg by mouth Daily.    budesonide (PULMICORT FLEXHALER) 180 MCG/ACT inhaler  Self No Yes    Inhale 2 puffs 2 (Two) Times a Day. Rinse mouth after using    desloratadine (CLARINEX) 5 MG tablet  Self No Yes    Take 1 tablet by mouth Daily.    fluticasone (FLONASE) 50 MCG/ACT nasal spray  Self No Yes    2 sprays into the nostril(s) as directed by provider Daily.    GARLIC PO  Self Yes Yes    Take 2 capsules by mouth Daily.    HYDROcodone-acetaminophen (NORCO) 5-325 MG per tablet  Self No Yes    Take 1 tablet by mouth Every 6 (Six) Hours As Needed for Moderate Pain .    hydrOXYzine (ATARAX) 25 MG tablet  Self No Yes    Take 1 tablet by mouth Every 8 (Eight) Hours As Needed for  Itching.    lisinopril-hydrochlorothiazide (PRINZIDE,ZESTORETIC) 20-12.5 MG per tablet  Self No Yes    Take 2 tablets by mouth Daily.    metFORMIN (GLUCOPHAGE) 1000 MG tablet  Self Yes Yes    Take 1,000 mg by mouth 2 (Two) Times a Day With Meals.    montelukast (SINGULAIR) 10 MG tablet  Self Yes Yes    Take 10 mg by mouth Every Night.    Morphine (MS CONTIN) 15 MG 12 hr tablet  Self No Yes    Take 1 tablet by mouth 2 (Two) Times a Day for 30 days. 1 tablet    Multiple Vitamins-Minerals (CENTRUM SILVER) tablet  Self Yes Yes    Take 1 tablet by mouth Daily.    ondansetron (ZOFRAN) 8 MG tablet  Self No Yes    Take 1 tablet by mouth 3 (Three) Times a Day As Needed for Nausea or Vomiting.    oxyCODONE (ROXICODONE) 10 MG tablet  Self No Yes    Take 1 tablet by mouth Every 4 (Four) Hours As Needed for Moderate Pain .    pantoprazole (PROTONIX) 40 MG EC tablet  Self Yes Yes    Take 40 mg by mouth Daily.    simvastatin (ZOCOR) 20 MG tablet  Self Yes Yes    Take 20 mg by mouth Every Night.    timolol (TIMOPTIC) 0.25 % ophthalmic solution  Self Yes Yes    Administer 1 drop to both eyes Daily.    vitamin E 100 UNIT capsule  Self Yes Yes    Take 100 Units by mouth Daily.            Medication notes: Removed: Symbicort-patient is now using the Pulmicort  Wellbutrin and Potassium-patient reports she no longer takes this medication  Ferrous Sulfate-stopped taking due to constipation  Claritin-patient is taking Clarinex      This medication list is complete to the best of my knowledge as of 7/17/2019    Please call if questions.    Jenae Angulo, Medication History Technician  7/17/2019 8:00 PM

## 2019-07-18 NOTE — PROGRESS NOTES
"DAILY PROGRESS NOTE  [facility]    Patient Identification:  Name: Linette Ghosh  Age: 63 y.o.  Sex: female  :  1955  MRN: [id number]         Primary Care Physician: Raine Ghosh MD      Subjective  No new c/o.  Feels better, breathing easier after thoracentesis.      Objective:  General Appearance:  Comfortable, in no acute distress and not in pain (Thin, frail).    Vital signs: (most recent): Blood pressure 111/75, pulse 98, temperature 97.8 °F (36.6 °C), temperature source Oral, resp. rate 16, height 165.1 cm (65\"), weight 63.8 kg (140 lb 9.6 oz), SpO2 97 %.    Lungs:  Normal effort and normal respiratory rate.  She is not in respiratory distress.  No stridor.  There are decreased breath sounds and rhonchi (Scattered. ).  No rales or wheezes.  (Dull rt base. )  Heart: Tachycardia.  Regular rhythm.    Extremities: There is no dependent edema.    Neurological: Patient is alert and oriented to person, place and time.    Skin:  Warm and dry.                Vital signs in last 24 hours:  Temp:  [97.8 °F (36.6 °C)-98.7 °F (37.1 °C)] 97.8 °F (36.6 °C)  Heart Rate:  [] 98  Resp:  [16-20] 16  BP: ()/(67-94) 111/75    Intake/Output:    Intake/Output Summary (Last 24 hours) at 2019 1549  Last data filed at 2019 1304  Gross per 24 hour   Intake 920 ml   Output 1600 ml   Net -680 ml         Results from last 7 days   Lab Units 19  0422 19  1429   WBC 10*3/mm3 5.27 7.76   HEMOGLOBIN g/dL 9.6* 11.0*   PLATELETS 10*3/mm3 304 402     Results from last 7 days   Lab Units 19  0422 19  1517   SODIUM mmol/L 128* 130*   POTASSIUM mmol/L 3.6 3.8   CHLORIDE mmol/L 88* 85*   CO2 mmol/L 29.3* 29.7*   BUN mg/dL 8 11   CREATININE mg/dL 0.65 0.82   GLUCOSE mg/dL 234* 204*   Estimated Creatinine Clearance: 89.2 mL/min (by C-G formula based on SCr of 0.65 mg/dL).  Results from last 7 days   Lab Units 19  0422 19  1517   CALCIUM mg/dL 8.9 9.7   ALBUMIN g/dL " 3.40* 4.00   MAGNESIUM mg/dL 2.1  --      Results from last 7 days   Lab Units 07/18/19  0422 07/17/19  1517   ALBUMIN g/dL 3.40* 4.00   BILIRUBIN mg/dL 0.2 0.2   ALK PHOS U/L 69 80   AST (SGOT) U/L 11 15   ALT (SGPT) U/L 14 16       Assessment:    Pleural effusion on right - likely malignant. S/p thoracentesis - 1000 cc.  Check short term f/u for recurrence.     Malignant neoplasm of tail of pancreas     Asthma    Benign essential hypertension    Type 2 diabetes mellitus    Hyponatremia:  Check osm studies.  Monitor.       Plan:  Please see above.    Onc consult pending.   If f/u CXR stable OK for d/c.    Maikel Turcios MD  7/18/2019  3:49 PM

## 2019-07-19 NOTE — CONSULTS
"Adult Nutrition  Assessment/PES    Patient Name:  Linette Ghosh  YOB: 1955  MRN: [id number]  Admit Date:  7/17/2019    Assessment Date:  7/19/2019    Comments:  Nursing nutrition screen trigger due to h/o weight loss. Patient eating well in hospital.  States she has been using Ensure plus at home on occasion. Encouraged small frequent meals/snacks of high calorie high protein foods. Will initiate Boost Glucose control as supplement.     Reason for Assessment     Row Name 07/19/19 1358          Reason for Assessment    Reason For Assessment  identified at risk by screening criteria     Diagnosis  cancer diagnosis/related complications     Identified At Risk by Screening Criteria  MST SCORE 2+         Nutrition/Diet History     Row Name 07/19/19 1359          Nutrition/Diet History    Typical Food/Fluid Intake  History of pancreatic cancer and peritoneal carcinomatosis, currently recieves chemo FOLFIRINOX. 40# weight loss since Feb 2019.      Supplemental Drinks/Foods/Additives  Uses Ensure at home     Factors Affecting Nutritional Intake  early satiety         Anthropometrics     Row Name 07/19/19 1412 07/19/19 1359       Anthropometrics    Height  165.1 cm (65\")  165.1 cm (65\")    Weight  --  63.8 kg (140 lb 10.5 oz)       Ideal Body Weight (IBW)    Ideal Body Weight (IBW) (kg)  57.29  57.29    % Ideal Body Weight  --  111.36       Usual Body Weight (UBW)    Usual Body Weight  --  76.7 kg (169 lb)    % Usual Body Weight  --  83.23    % of Usual Body Weight Assessment  --  75-84% - moderate deficit    Weight Loss  --  unintentional    Weight Loss Time Frame  --  40# x 5-6 months, 23%       Body Mass Index (BMI)    BMI (kg/m2)  --  23.45    BMI Assessment  --  BMI 18.5-24.9: normal           Labs/Tests/Procedures/Meds     Row Name 07/19/19 1400          Labs/Procedures/Meds    Lab Results Reviewed  reviewed, pertinent        Diagnostic Tests/Procedures    Diagnostic Test/Procedure Reviewed  reviewed, " "pertinent        Medications    Pertinent Medications Reviewed  reviewed, pertinent     Pertinent Medications Comments  prevacid, insulin         Physical Findings     Row Name 07/19/19 1411          Physical Findings    Skin  -- intact, port         Estimated/Assessed Needs     Row Name 07/19/19 1412 07/19/19 1359       Calculation Measurements    Weight Used For Calculations  63.8 kg (140 lb 10.5 oz)  --    Height  165.1 cm (65\")  165.1 cm (65\")       Estimated/Assessed Needs    Additional Documentation  KCAL/KG (Group);Fluid Requirements (Group);Protein Requirements (Group)  --       KCAL/KG    KCAL/KG  30 Kcal/Kg (kcal)  --    30 Kcal/Kg (kcal)  1914  --       Protein Requirements    Est Protein Requirement Amount (gms/kg)  1.2 gm protein  --    Estimated Protein Requirements (gms/day)  76.56  --       Fluid Requirements    Estimated Fluid Requirements (mL/day)  1914  --    Estimated Fluid Requirement Method  RDA Method  --    RDA Method (mL)  1914  --        Nutrition Prescription Ordered     Row Name 07/19/19 1413          Nutrition Prescription PO    Current PO Diet  Regular     Fluid Consistency  Thin     Common Modifiers  Consistent Carbohydrate         Evaluation of Received Nutrient/Fluid Intake     Row Name 07/19/19 1413 07/19/19 1412          PO Evaluation    Number of Meals  2  --    % PO Intake  75%  --           Problem/Interventions:  Problem 1     Row Name 07/19/19 1414          Nutrition Diagnoses Problem 1    Problem 1  Underweight     Etiology (related to)  Medical Diagnosis;Factors Affecting Nutrition     Oncology  Cancer w/mets;Pancreatic cancer     Reported/Observed By  Patient     Appetite  Early Satiety;Fair     Signs/Symptoms (evidenced by)  Unintended Weight Change     Unintended Weight Change  Loss     Number of Pounds Lost  40#     Weight loss time period  5 months                 Intervention Goal     Row Name 07/19/19 1415          Intervention Goal    General  Maintain " nutrition;Disease management/therapy     PO  Tolerate PO;PO intake (%)     PO Intake %  75 %     Weight  --         Nutrition Intervention     Row Name 07/19/19 1415          Nutrition Intervention    RD/Tech Action  Follow Tx progress;Care plan reviewd;Supplement provided         Nutrition Prescription     Row Name 07/19/19 1415          Nutrition Prescription PO    PO Prescription  Begin/change supplement     Supplement  Boost Glucose Control     Supplement Frequency  3 times a day     Other Modifiers  Small feedings     New PO Prescription Ordered?  Yes         Education/Evaluation     Row Name 07/19/19 1416          Education    Education  Provided education regarding;Advised regarding habits/behavior;Education topics     Education Topics  Protein     Advised Regarding Habits/Behavior  Use supplement;Eating pattern        Monitor/Evaluation    Monitor  Per protocol;PO intake;Supplement intake;Symptoms           Electronically signed by:  Dayami Zaidi RD, LD  07/19/19 2:17 PM

## 2019-07-19 NOTE — DISCHARGE SUMMARY
PHYSICIAN DISCHARGE SUMMARY                                                                        [facility]    Patient Identification:  Name: Linette Ghosh  Age: 63 y.o.  Sex: female  :  1955  MRN: [id number]  Primary Care Physician: Raine Ghosh MD    Admit date: 2019  Discharge date and time: 2019     Discharged Condition: good    Discharge Diagnoses:   Pleural effusion on right - likely malignant. S/p thoracentesis - 1000 cc.     Malignant neoplasm of tail of pancreas     Asthma    Benign essential hypertension    Type 2 diabetes mellitus    Hyponatremia:   Stable        Hospital Course:  Pleasant 63-year-old female with widely metastatic pancreatic carcinoma transferred from her oncologist office with shortness of air and a large right pleural effusion.  Please H&P for full details.  Patient was admitted.  She underwent thoracentesis in radiology department and 1 L of fluid was removed.  No evidence of infection but this certainly appears to be malignant in nature.  She did do well post procedure with marked improvement of the shortness of breath anything much better.  Follow-up chest x-ray did not show any recurrence or pneumothorax.  She is feeling markedly improved with much improved air movement today also.  She will be discharged at this point for the remainder of her treatment follow-up as an outpatient.          Consults:     Consults     Date and Time Order Name Status Description    2019 2321 Inpatient Hematology & Oncology Consult Completed     2019 1908 [facility] (on-call MD unless specified) Details Completed     2019 190 Hematology and Oncology (on-call MD unless specified) Completed             Discharge Exam:  Physical Exam   Afebrile vital signs stable.  Well-developed well-nourished female no apparent distress.  Lungs with occasional rhonchi.  Slight dullness right base.  Heart  regular rate and rhythm.  Extremities with no clubbing cyanosis or edema.  Alert oriented conversant cooperative pleasant     Disposition:  Home    Patient Instructions:      Discharge Medications      Continue These Medications      Instructions Start Date   albuterol (2.5 MG/3ML) 0.083% nebulizer solution  Commonly known as:  PROVENTIL   2.5 mg, Nebulization, Every 6 Hours PRN      albuterol sulfate  (90 Base) MCG/ACT inhaler  Commonly known as:  PROVENTIL HFA;VENTOLIN HFA;PROAIR HFA   2 puffs, Inhalation, Every 4 Hours PRN      aspirin 325 MG tablet   325 mg, Oral, Daily      budesonide 180 MCG/ACT inhaler  Commonly known as:  PULMICORT FLEXHALER   2 puffs, Inhalation, 2 Times Daily - RT, Rinse mouth after using      CENTRUM SILVER tablet   1 tablet, Oral, Daily      desloratadine 5 MG tablet  Commonly known as:  CLARINEX   5 mg, Oral, Daily      fluticasone 50 MCG/ACT nasal spray  Commonly known as:  FLONASE   2 sprays, Nasal, Daily      GARLIC PO   2 capsules, Oral, Daily      HYDROcodone-acetaminophen 5-325 MG per tablet  Commonly known as:  NORCO   1 tablet, Oral, Every 6 Hours PRN      hydrOXYzine 25 MG tablet  Commonly known as:  ATARAX   25 mg, Oral, Every 8 Hours PRN      lisinopril-hydrochlorothiazide 20-12.5 MG per tablet  Commonly known as:  PRINZIDE,ZESTORETIC   2 tablets, Oral, Daily      metFORMIN 1000 MG tablet  Commonly known as:  GLUCOPHAGE   1,000 mg, Oral, 2 Times Daily With Meals      montelukast 10 MG tablet  Commonly known as:  SINGULAIR   10 mg, Oral, Nightly      Morphine 15 MG 12 hr tablet  Commonly known as:  MS CONTIN   15 mg, Oral, 2 Times Daily, 1 tablet      ondansetron 8 MG tablet  Commonly known as:  ZOFRAN   8 mg, Oral, 3 Times Daily PRN      oxyCODONE 10 MG tablet  Commonly known as:  ROXICODONE   10 mg, Oral, Every 4 Hours PRN      pantoprazole 40 MG EC tablet  Commonly known as:  PROTONIX   40 mg, Oral, Daily      simvastatin 20 MG tablet  Commonly known as:  ZOCOR   20  mg, Oral, Nightly      timolol 0.25 % ophthalmic solution  Commonly known as:  TIMOPTIC   1 drop, Both Eyes, Daily      vitamin E 100 UNIT capsule   100 Units, Oral, Daily           Diet Instructions     Diet: Regular      Discharge Diet:  Regular        Future Appointments   Date Time Provider Department Center   7/24/2019  8:30 AM INFU [facility] PORT CHAIR  [facility]   7/24/2019  9:00 AM Marifer Myers APRN [facility]  [facility]   7/24/2019  9:30 AM CHAIR 06 ARVIND Garcia MD  [facility]   7/29/2019 10:15 AM Raine Ghosh MD [facility] PC PAVIL None   8/7/2019  7:45 AM INFU [facility] PORT CHAIR  [facility]   8/7/2019  8:20 AM Aime Zhong MD [facility]  [facility]   8/7/2019  8:45 AM CHAIR 02 ARVIND Garcia LG  [facility]   10/13/2019  9:00 AM CH[facility] CT 3  [facility] CT [facility]     Additional Instructions for the Follow-ups that You Need to Schedule     Discharge Follow-up with PCP   As directed       Currently Documented PCP:    Raine Ghosh MD    PCP Phone Number:    [phone]     Follow Up Details:  1 week         Discharge Follow-up with Specialty: Oncology   As directed      Specialty:  Oncology    Follow Up Details:  As directed.           Follow-up Information     Raine Ghosh MD .    Specialty:  Internal Medicine  Why:  1 week  Contact information:  [address] [place] [address]  [phone]                 Discharge Order (From admission, onward)    Start     Ordered    07/19/19 1459  Discharge patient  Once     Expected Discharge Date:  07/19/19    Discharge Disposition:  Home or Self Care    Physician of Record for Attribution - Please select from Treatment Team:  BRYCE BOURGEOIS [[id number]]    Review needed by CMO to determine Physician of Record:  No       Question Answer Comment   Physician of Record for Attribution - Please select from Treatment Team BRYCE BOURGEOIS    Review needed by CMO to determine Physician of Record No        07/19/19 1500            Total time spent  discharging patient including evaluation,post hospitalization follow up,  medication and post hospitalization instructions and education total time exceeds 30 minutes.    Signed:  Maikel Turcios MD  7/19/2019  3:05 PM    EMR Dragon/Transcription disclaimer:   Much of this encounter note is an electronic transcription/translation of spoken language to printed text. The electronic translation of spoken language may permit erroneous, or at times, nonsensical words or phrases to be inadvertently transcribed; Although I have reviewed the note for such errors, some may still exist.

## 2019-07-20 NOTE — OUTREACH NOTE
Prep Survey      Responses   Facility patient discharged from?  [facility]   Is patient eligible?  Yes   Discharge diagnosis  Pleural effusion on right - likely malignant  Malignant neoplasm of tail of pancreas    Does the patient have one of the following disease processes/diagnoses(primary or secondary)?  Other   Does the patient have Home health ordered?  No   Is there a DME ordered?  No   Prep survey completed?  Yes          Angy Patel RN

## 2019-07-22 NOTE — OUTREACH NOTE
Medical Week 1 Survey      Responses   Facility patient discharged from?  [facility]   Does the patient have one of the following disease processes/diagnoses(primary or secondary)?  Other   Is there a successful TCM telephone encounter documented?  No   Week 1 attempt successful?  Yes   Call start time  1219   Call end time  1224   Discharge diagnosis  Pleural effusion on right - likely malignant  Malignant neoplasm of tail of pancreas    Meds reviewed with patient/caregiver?  Yes   Is the patient having any side effects they believe may be caused by any medication additions or changes?  No   Does the patient have all medications ordered at discharge?  N/A   Prescription comments  no changes to medications.   Is the patient taking all medications as directed (includes completed medication regime)?  N/A   Comments regarding appointments  Has appt with [facility] on July 24 2019   Does the patient have a primary care provider?   Yes   Does the patient have an appointment with their PCP within 7 days of discharge?  Yes   Comments regarding PCP  Dr Ghosh/ PCP encouraged patient to make appt   Has the patient kept scheduled appointments due by today?  N/A   Has home health visited the patient within 72 hours of discharge?  N/A   Psychosocial issues?  No   Comments  Patient reports still with decreased appetite. Alon aguilar, and has a followup on July 24 2019   Did the patient receive a copy of their discharge instructions?  Yes   Nursing interventions  Reviewed instructions with patient   What is the patient's perception of their health status since discharge?  Same   Is the patient/caregiver able to teach back signs and symptoms related to disease process for when to call PCP?  Yes   Is the patient/caregiver able to teach back signs and symptoms related to disease process for when to call 911?  Yes   Is the patient/caregiver able to teach back the hierarchy of who to call/visit for symptoms/problems? PCP, Specialist,  Home health nurse, Urgent Care, ED, 911  Yes   Week 1 call completed?  Yes          Gideon Dwyer RN

## 2019-07-22 NOTE — PAYOR COMM NOTE
"Ashley Yang (63 y.o. Female)     PLEASE SEE ATTACHED DC SUMMARY    REF#[id number]      THANK YOU    JOHNNY ALVES LPN [facility]    [phone]  OR  [phone]    Date of Birth Social Security Number Address Home Phone MRN    1955  [id number]  [address]  [place] [address] [phone] [id number]    Quaker Marital Status          [facility] Single       Admission Date Admission Type Admitting Provider Attending Provider Department, Room/Bed    7/17/19 Emergency Efraín Sanderson MD  [address], E467/1    Discharge Date Discharge Disposition Discharge Destination        7/19/2019 Home or Self Care              Attending Provider:  (none)   Allergies:  Penicillins    Isolation:  None   Infection:  None   Code Status:  Prior    Ht:  165.1 cm (65\")   Wt:  63.8 kg (140 lb 10.5 oz)    Admission Cmt:  None   Principal Problem:  Pleural effusion on right [J90]                 Active Insurance as of 7/17/2019     Primary Coverage     Payor Plan Insurance Group Employer/Plan Group    ANTHEM BLUE CROSS ANTHEM BLUE CROSS BLUE SHIELD PPO [id number]     Payor Plan Address Payor Plan Phone Number Payor Plan Fax Number Effective Dates    [address] [phone]  4/1/2013 - None Entered    [facility] [id number]       Subscriber Name Subscriber Birth Date Member ID       ASHLEY YANG 1955 [id number]           Secondary Coverage     Payor Plan Insurance Group Employer/Plan Group    C[employer] CIGNA [id number]     Payor Plan Address Payor Plan Phone Number Payor Plan Fax Number Effective Dates    [address] [phone]  1/1/2019 - None Entered    [facility] [id number]       Subscriber Name Subscriber Birth Date Member ID       ASHLEY YANG 1955 [id number]                 Emergency Contacts      (Rel.) Home Phone Work Phone Mobile Phone    Gayathri Shen (Sister) [phone] -- [phone]    Jerry Yang (Brother) [phone] -- --               Discharge " Summary      Maikel Turcios MD at 2019  3:05 PM                                                                             PHYSICIAN DISCHARGE SUMMARY                                                                        [facility]    Patient Identification:  Name: Linette Ghosh  Age: 63 y.o.  Sex: female  :  1955  MRN: [id number]  Primary Care Physician: Raine Ghosh MD    Admit date: 2019  Discharge date and time: 2019     Discharged Condition: good    Discharge Diagnoses:   Pleural effusion on right - likely malignant. S/p thoracentesis - 1000 cc.     Malignant neoplasm of tail of pancreas     Asthma    Benign essential hypertension    Type 2 diabetes mellitus    Hyponatremia:     Stable        Hospital Course:  Pleasant 63-year-old female with widely metastatic pancreatic carcinoma transferred from her oncologist office with shortness of air and a large right pleural effusion.  Please H&P for full details.  Patient was admitted.  She underwent thoracentesis in radiology department and 1 L of fluid was removed.  No evidence of infection but this certainly appears to be malignant in nature.  She did do well post procedure with marked improvement of the shortness of breath anything much better.  Follow-up chest x-ray did not show any recurrence or pneumothorax.  She is feeling markedly improved with much improved air movement today also.  She will be discharged at this point for the remainder of her treatment follow-up as an outpatient.          Consults:     Consults     Date and Time Order Name Status Description    2019 2321 Inpatient Hematology & Oncology Consult Completed     2019 1908 [facility] (on-call MD unless specified) Details Completed     2019 1905 Hematology and Oncology (on-call MD unless specified) Completed             Discharge Exam:  Physical Exam   Afebrile vital signs stable.  Well-developed well-nourished female no apparent  distress.  Lungs with occasional rhonchi.  Slight dullness right base.  Heart regular rate and rhythm.  Extremities with no clubbing cyanosis or edema.  Alert oriented conversant cooperative pleasant     Disposition:  Home    Patient Instructions:      Discharge Medications      Continue These Medications      Instructions Start Date   albuterol (2.5 MG/3ML) 0.083% nebulizer solution  Commonly known as:  PROVENTIL   2.5 mg, Nebulization, Every 6 Hours PRN      albuterol sulfate  (90 Base) MCG/ACT inhaler  Commonly known as:  PROVENTIL HFA;VENTOLIN HFA;PROAIR HFA   2 puffs, Inhalation, Every 4 Hours PRN      aspirin 325 MG tablet   325 mg, Oral, Daily      budesonide 180 MCG/ACT inhaler  Commonly known as:  PULMICORT FLEXHALER   2 puffs, Inhalation, 2 Times Daily - RT, Rinse mouth after using      CENTRUM SILVER tablet   1 tablet, Oral, Daily      desloratadine 5 MG tablet  Commonly known as:  CLARINEX   5 mg, Oral, Daily      fluticasone 50 MCG/ACT nasal spray  Commonly known as:  FLONASE   2 sprays, Nasal, Daily      GARLIC PO   2 capsules, Oral, Daily      HYDROcodone-acetaminophen 5-325 MG per tablet  Commonly known as:  NORCO   1 tablet, Oral, Every 6 Hours PRN      hydrOXYzine 25 MG tablet  Commonly known as:  ATARAX   25 mg, Oral, Every 8 Hours PRN      lisinopril-hydrochlorothiazide 20-12.5 MG per tablet  Commonly known as:  PRINZIDE,ZESTORETIC   2 tablets, Oral, Daily      metFORMIN 1000 MG tablet  Commonly known as:  GLUCOPHAGE   1,000 mg, Oral, 2 Times Daily With Meals      montelukast 10 MG tablet  Commonly known as:  SINGULAIR   10 mg, Oral, Nightly      Morphine 15 MG 12 hr tablet  Commonly known as:  MS CONTIN   15 mg, Oral, 2 Times Daily, 1 tablet      ondansetron 8 MG tablet  Commonly known as:  ZOFRAN   8 mg, Oral, 3 Times Daily PRN      oxyCODONE 10 MG tablet  Commonly known as:  ROXICODONE   10 mg, Oral, Every 4 Hours PRN      pantoprazole 40 MG EC tablet  Commonly known as:  PROTONIX   40  mg, Oral, Daily      simvastatin 20 MG tablet  Commonly known as:  ZOCOR   20 mg, Oral, Nightly      timolol 0.25 % ophthalmic solution  Commonly known as:  TIMOPTIC   1 drop, Both Eyes, Daily      vitamin E 100 UNIT capsule   100 Units, Oral, Daily           Diet Instructions     Diet: Regular      Discharge Diet:  Regular        Future Appointments   Date Time Provider Department Center   7/24/2019  8:30 AM INFU [facility] KRE PORT CHAIR  INFUS KRE LAG   7/24/2019  9:00 AM Marifer Myers APRN [facility] KRES  CBC [facility]   7/24/2019  9:30 AM CHAIR 06 ARVIND Garcia MD  INFUS KRE [facility]   7/29/2019 10:15 AM Raine Ghosh MD [facility] PC PAVIL None   8/7/2019  7:45 AM INFU CBC KRE PORT CHAIR  INFUS KRE [facility]   8/7/2019  8:20 AM Aime Zhong MD [facility] KRES  CBC [place]   8/7/2019  8:45 AM CHAIR 02 ARVIND Garcia LG  INFUS KRE [facility]   10/13/2019  9:00 AM CHANA CT 3  CHANA CT CHANA     Additional Instructions for the Follow-ups that You Need to Schedule     Discharge Follow-up with PCP   As directed       Currently Documented PCP:    Raine Ghosh MD    PCP Phone Number:    [phone]     Follow Up Details:  1 week         Discharge Follow-up with Specialty: Oncology   As directed      Specialty:  Oncology    Follow Up Details:  As directed.           Follow-up Information     Raine Ghosh MD .    Specialty:  Internal Medicine  Why:  1 week  Contact information:  [address] [place], [place] [address] [address]  [phone]                 Discharge Order (From admission, onward)    Start     Ordered    07/19/19 1459  Discharge patient  Once     Expected Discharge Date:  07/19/19    Discharge Disposition:  Home or Self Care    Physician of Record for Attribution - Please select from Treatment Team:  BRYCE BOURGEOIS [[id number]]    Review needed by CMO to determine Physician of Record:  No       Question Answer Comment   Physician of Record for Attribution - Please select from Treatment Team BRYCE BOURGEOIS    Review needed by CMO to  determine Physician of Record No        07/19/19 1502            Total time spent discharging patient including evaluation,post hospitalization follow up,  medication and post hospitalization instructions and education total time exceeds 30 minutes.    Signed:  Maikel Turcios MD  7/19/2019  3:05 PM    EMR Dragon/Transcription disclaimer:   Much of this encounter note is an electronic transcription/translation of spoken language to printed text. The electronic translation of spoken language may permit erroneous, or at times, nonsensical words or phrases to be inadvertently transcribed; Although I have reviewed the note for such errors, some may still exist.       Electronically signed by Maikel Turcios MD at 7/19/2019  3:08 PM

## 2019-07-22 NOTE — PROGRESS NOTES
Case Management Discharge Note    Final Note: Discharged home with no needs identified.  Transported home by family in private vehicle    Destination      No service has been selected for the patient.      Durable Medical Equipment      No service has been selected for the patient.      Dialysis/Infusion      No service has been selected for the patient.      Home Medical Care      No service has been selected for the patient.      Therapy      No service has been selected for the patient.      Community Resources      No service has been selected for the patient.        Transportation Services  Private: Car    Final Discharge Disposition Code: 01 - home or self-care

## 2019-07-24 NOTE — PROGRESS NOTES
Atropine held because pt c/o constipation with pain meds and needing stool softeners to have a BM.FRANCIS Caicedo made aware.

## 2019-07-24 NOTE — PROGRESS NOTES
[facility] OUTPATIENT FOLLOW UP CLINIC VISIT    REASON FOR FOLLOW-UP:    1.Metastatic adenocarcinoma of the pancreas  2.  Initiated FOLFIRINOX on 7/10/2019  3.  Hospitalized on 7/17/2019 for tachycardia and shortness of breath.  She was ultimately diagnosed with a right-sided pleural effusion.  She did undergo an ultrasound-guided thoracentesis removing approximately 1 L of fluid.  Her symptoms improved.      HISTORY OF PRESENT ILLNESS:  Linette Ghosh is a 63 y.o. female here today for scheduled cycle 2 FOLFIRINOX.  She reports tolerating her first treatment relatively well.  She was seen in the emergency department on 7/17/2019 for tachycardia and shortness of breath.  She was diagnosed with right sided pleural effusion and underwent thoracentesis.  1000 L of fluid was pulled off and her symptoms improved.    He reports feeling relatively well in the office today.   She reports mild nausea after her first cycle and fatigue.  She denies upper or chills.  She denies bleeding or bruising, she denies skin changes.  She does continue to utilize oxycodone 10 mg every 4 hours as needed for pain.  There was a prescription that was supposed to be sent for long-acting morphine but there was some confusion and it apparently did not end up at her pharmacy.  She is requesting this today.  She denies new pain.    Denies skin changes or neuropathy.  Her appetite is fair.  She does report continued constipation that is been unchanged since starting treatment.  She utilizes magnesium citrate which provides her relief.        ONCOLOGIC HISTORY:  She has had about a 40 pound weight loss in the past 6 months associated with worsening bilateral flank pain.  She does note that the pain is worse with eating.  She was taking hydrocodone a couple of times per day with some improvement.     Due to worsening pain, she presented to the emergency department on 6/1/2019.  A chest x-ray was unremarkable.  She had a CT angiogram of  the chest showing no evidence for pulmonary embolism.  There was some nodularity involving the major fissure on the right.  There was some pleural thickening and nodularity involving the minor fissure on the right.  New infiltrate involving the posterior sulcus on the right medial he was noted to have a small pleural effusion on the right was noted as well.  A small groundglass area is appreciated in the right upper lobe.  The left adrenal gland was prominent in the tail the pancreas is prominent measuring 2.9 cm.  Some liver cysts were noted.     She had a follow-up CT scan of the abdomen and pelvis with contrast on 6/9/2019 showing a large hypoenhancing mass involving the entire tail of the pancreas which encases the splenic artery and occludes and splenic vein.  This abuts and possibly infiltrates the left adrenal gland.  No evidence for metastatic disease otherwise.     She saw Dr. Varela with gastroenterology and had an endoscopic ultrasound performed on 6/17/2019 with pathology confirming adenocarcinoma.  The CA 19-9 is elevated at 655.        She was seen initially on 6/21/2019.    A PET scan was requested and performed on 6/25/2019.  A 4 cm intensely hypermetabolic pancreatic tail mass is noted.  Unfortunately, extensive metastatic disease is noted with carcinomatosis throughout the abdomen and extensive metastatic disease throughout the right pleura.  Metastatic disease extending into the posterior mediastinum was noted as well.    She did see Dr. Smiley with surgical oncology.  This referral was made for the PET scan results returned in hopes that she had localized disease that could be resected.  He will place a Mediport on 7/8/2019.    ALLERGIES:  Allergies   Allergen Reactions   • Penicillins Hives       MEDICATIONS:  The medication list has been reviewed with the patient by the medical assistant, and the list has been updated in the electronic medical record, which I reviewed.  Medication dosages  "and frequencies were confirmed to be accurate.    Review of Systems   Constitutional: Positive for appetite change. Negative for chills, fatigue, fever and unexpected weight change.   HENT:   Negative for mouth sores, nosebleeds, sore throat and trouble swallowing.    Respiratory: Positive for shortness of breath. Negative for cough.         See HPI   Cardiovascular: Negative for chest pain and leg swelling.        See HPI   Gastrointestinal: Positive for abdominal pain and constipation. Negative for diarrhea, nausea and vomiting.   Endocrine: Negative for hot flashes.   Genitourinary: Negative for difficulty urinating.    Musculoskeletal: Positive for flank pain. Negative for arthralgias and myalgias.   Skin: Negative for rash.   Neurological: Negative for dizziness and extremity weakness.   Hematological: Negative for adenopathy. Does not bruise/bleed easily.   Psychiatric/Behavioral: Negative for sleep disturbance.       Vitals:    07/24/19 0833   BP: 137/90   Pulse: 109   Resp: 14   Temp: 98 °F (36.7 °C)   SpO2: 94%   Weight: 63 kg (138 lb 14.4 oz)   Height: 168 cm (66.14\")       Physical Exam   Constitutional: She is oriented to person, place, and time. She appears well-developed and well-nourished. No distress.   HENT:   Head: Normocephalic and atraumatic.   Mouth/Throat: Oropharynx is clear and moist and mucous membranes are normal. No oropharyngeal exudate.   Eyes: Pupils are equal, round, and reactive to light.   Neck: Normal range of motion.   Cardiovascular: Normal rate.   No murmur heard.  Pulmonary/Chest: Effort normal. No respiratory distress. She has no rhonchi.   Benign left chest Mediport.   Abdominal: Soft. Normal appearance and bowel sounds are normal. She exhibits no distension. There is no hepatosplenomegaly.   Musculoskeletal: Normal range of motion. She exhibits no edema.   Neurological: She is alert and oriented to person, place, and time.   Skin: Skin is warm and dry. No rash noted. "   Psychiatric: She has a normal mood and affect.   Vitals reviewed.      DIAGNOSTIC DATA:  Results for orders placed or performed in visit on 07/24/19   Comprehensive Metabolic Panel   Result Value Ref Range    Glucose 248 (H) 74 - 124 mg/dL    BUN 10 6 - 20 mg/dL    Creatinine 0.70 0.60 - 1.10 mg/dL    Sodium 131 (L) 134 - 145 mmol/L    Potassium 4.0 3.5 - 4.7 mmol/L    Chloride 89 (L) 98 - 107 mmol/L    CO2 30.3 (H) 22.0 - 29.0 mmol/L    Calcium 9.6 8.5 - 10.2 mg/dL    Total Protein 7.3 6.3 - 8.0 g/dL    Albumin 3.60 3.50 - 5.20 g/dL    ALT (SGPT) 25 0 - 33 U/L    AST (SGOT) 21 0 - 32 U/L    Alkaline Phosphatase 133 (H) 38 - 116 U/L    Total Bilirubin 0.3 0.2 - 1.2 mg/dL    eGFR Non African Amer 85 >60 mL/min/1.73    Globulin 3.7 (H) 1.8 - 3.5 gm/dL    A/G Ratio 1.0 (L) 1.1 - 2.4 g/dL    BUN/Creatinine Ratio 14.3 7.3 - 30.0    Anion Gap 11.7 5.0 - 15.0 mmol/L   CBC Auto Differential   Result Value Ref Range    WBC 4.59 3.40 - 10.80 10*3/mm3    RBC 4.15 3.77 - 5.28 10*6/mm3    Hemoglobin 10.4 (L) 12.0 - 15.9 g/dL    Hematocrit 34.1 34.0 - 46.6 %    MCV 82.2 79.0 - 97.0 fL    MCH 25.1 (L) 26.6 - 33.0 pg    MCHC 30.5 (L) 31.5 - 35.7 g/dL    RDW 14.6 12.3 - 15.4 %    RDW-SD 42.9 37.0 - 54.0 fl    MPV 8.8 6.0 - 12.0 fL    Platelets 393 140 - 450 10*3/mm3    Neutrophil % 40.5 (L) 42.7 - 76.0 %    Lymphocyte % 32.2 19.6 - 45.3 %    Monocyte % 21.4 (H) 5.0 - 12.0 %    Eosinophil % 4.1 0.3 - 6.2 %    Basophil % 0.9 0.0 - 1.5 %    Immature Grans % 0.9 (H) 0.0 - 0.5 %    Neutrophils, Absolute 1.86 1.70 - 7.00 10*3/mm3    Lymphocytes, Absolute 1.48 0.70 - 3.10 10*3/mm3    Monocytes, Absolute 0.98 (H) 0.10 - 0.90 10*3/mm3    Eosinophils, Absolute 0.19 0.00 - 0.40 10*3/mm3    Basophils, Absolute 0.04 0.00 - 0.20 10*3/mm3    Immature Grans, Absolute 0.04 0.00 - 0.05 10*3/mm3    nRBC 0.7 (H) 0.0 - 0.2 /100 WBC       IMAGING: I personally reviewed PET scan images from 6/25/2019.  Large tail of pancreas mass with evidence for  carcinomatosis.    IMPRESSION:  1. There is a 4 cm intensely hypermetabolic pancreatic tail mass and  there is extensive metastatic disease. There is hypermetabolic  carcinomatosis throughout the abdomen and there is extensive metastatic  disease throughout the right pleura. There is also metastatic disease  extending into the posterior mediastinum. There has been increase in the  small right pleural effusion.  2. The reticular nodular opacities at the left lower lobe are  nonspecific and may represent bronchiolitis or infectious infiltrates,  but metastatic disease cannot be excluded at this point.      ASSESSMENT:  This is a 63 y.o. female with:  1.  Metastatic pancreas cancer originating in the tail of the pancreas: There is evidence for carcinomatosis and extensive metastatic disease throughout the right pleura with extension into the posterior mediastinum.  She is not a surgical candidate.  She did see Dr. Smiley with surgical oncology and Mediport is planned for Monday.  Dr. Zhong recommended palliative therapy with FOLFIRINOX.  Patient has had a formal education. Plans to treat her every 2 weeks.  Repeat CT imaging after 4 doses or 2 months.  We do need to check BRCA mutation status at some point to see if she would be a candidate for olaparib.    · Cycle 1 day 1 FOLFIRINOX 7/10/2019  · She is here today, July 24, 2019 for scheduled cycle 2.    2.  Cancer related pain:   · She is currently utilizing oxycodone 10 mg every 4 hours.  There was a supposed to be a prescription sent to her pharmacy for morphine sulfate long-acting 15 mg twice daily.  For some reason this was not received by the pharmacy.  3.  Pleural effusion requiring thoracentesis.  She was experience and tachycardia and shortness of breath but quickly resolved upon procedure on 7/17/2019.      PLAN:  1.  Proceed with cycle 2 FOLFIRINOX today.  2.  We will refill both pain medication prescriptions today.  I did review proper schedule and dose  with the patient and her family member today.  3.  Monitor her respiratory status and I have asked the patient to call the office if she should experience the tachycardia and shortness of breath she had experienced as result of her pleural effusion.  4.  She will return as already scheduled in 2 weeks on August 7, 2019 to see Dr. Zhong in anticipation of her next treatment.  5.  We discussed signs and symptoms for which the patient to notify our office, or for any other problems or concerns.

## 2019-07-26 PROBLEM — R00.0 TACHYCARDIA: Status: ACTIVE | Noted: 2019-01-01

## 2019-07-26 NOTE — PROGRESS NOTES
Patient here today fof unhook.   Patient c/o feeling weak and shaky. Denies any n/v/d.  States that she is eating and drinking good.   . Hands visibly shaking and SOA, O2 sat 94 %.  Reviewed with Abigail NP and orders received for EKG, CXR, CBC and CMP.   Patient added to Abigail's schedule.  Escorted patient to Exam Rm 12. Labs drawn and taken to lab.

## 2019-07-26 NOTE — PROGRESS NOTES
[facility] GROUP OUTPATIENT FOLLOW UP CLINIC VISIT    REASON FOR FOLLOW-UP:    1.Metastatic adenocarcinoma of the pancreas  2.  Initiated FOLFIRINOX on 7/10/2019  3.  Hospitalized on 7/17/2019 for tachycardia and shortness of breath.  She was ultimately diagnosed with a right-sided pleural effusion.  She did undergo an ultrasound-guided thoracentesis removing approximately 1 L of fluid.  Her symptoms improved.      HISTORY OF PRESENT ILLNESS:  Linette Gohsh is a 63 y.o. female with the above mentioned history here today for disconnect is complaining of some weakness.  The nurse further evaluated the patient was found to have a heart rate in the 140s.  The patient denies any increase in shortness of breath.  She denies any chest pain or palpitations.  She states that overall she just feels weak.  She has had some constipation and is planning on taking magnesium citrate when she gets home to help her bowels move.  She reports otherwise, her pain is fairly well controlled.  She is due for her pain medication and did not take it since she had a drive to our office.  She denies fevers or chills.  She states that she is not eating as well but she feels like she is adequately consuming fluids.    The patient has previously required a thoracentesis for right-sided pleural effusion.  She is very hesitant to have a repeat thoracentesis if it is not needed.      ONCOLOGIC HISTORY:  She has had about a 40 pound weight loss in the past 6 months associated with worsening bilateral flank pain.  She does note that the pain is worse with eating.  She was taking hydrocodone a couple of times per day with some improvement.     Due to worsening pain, she presented to the emergency department on 6/1/2019.  A chest x-ray was unremarkable.  She had a CT angiogram of the chest showing no evidence for pulmonary embolism.  There was some nodularity involving the major fissure on the right.  There was some pleural thickening and  nodularity involving the minor fissure on the right.  New infiltrate involving the posterior sulcus on the right medial he was noted to have a small pleural effusion on the right was noted as well.  A small groundglass area is appreciated in the right upper lobe.  The left adrenal gland was prominent in the tail the pancreas is prominent measuring 2.9 cm.  Some liver cysts were noted.     She had a follow-up CT scan of the abdomen and pelvis with contrast on 6/9/2019 showing a large hypoenhancing mass involving the entire tail of the pancreas which encases the splenic artery and occludes and splenic vein.  This abuts and possibly infiltrates the left adrenal gland.  No evidence for metastatic disease otherwise.     She saw Dr. Varela with gastroenterology and had an endoscopic ultrasound performed on 6/17/2019 with pathology confirming adenocarcinoma.  The CA 19-9 is elevated at 655.        She was seen initially on 6/21/2019.    A PET scan was requested and performed on 6/25/2019.  A 4 cm intensely hypermetabolic pancreatic tail mass is noted.  Unfortunately, extensive metastatic disease is noted with carcinomatosis throughout the abdomen and extensive metastatic disease throughout the right pleura.  Metastatic disease extending into the posterior mediastinum was noted as well.    She did see Dr. Smiley with surgical oncology.  This referral was made for the PET scan results returned in hopes that she had localized disease that could be resected.  He will place a Mediport on 7/8/2019.    ALLERGIES:  Allergies   Allergen Reactions   • Penicillins Hives       MEDICATIONS:  The medication list has been reviewed with the patient by the medical assistant, and the list has been updated in the electronic medical record, which I reviewed.  Medication dosages and frequencies were confirmed to be accurate.    Review of Systems   Constitutional: Positive for appetite change and fatigue. Negative for chills, fever and  unexpected weight change.   HENT:   Negative for mouth sores, nosebleeds, sore throat and trouble swallowing.    Respiratory: Positive for shortness of breath (unchanged). Negative for cough.    Cardiovascular: Negative for chest pain and leg swelling.   Gastrointestinal: Positive for abdominal pain and constipation. Negative for diarrhea, nausea and vomiting.   Endocrine: Negative for hot flashes.   Genitourinary: Negative for difficulty urinating.    Musculoskeletal: Negative for arthralgias and myalgias.   Skin: Negative for rash.   Neurological: Positive for extremity weakness. Negative for dizziness.   Hematological: Negative for adenopathy. Does not bruise/bleed easily.   Psychiatric/Behavioral: Negative for sleep disturbance.     Temp: 98.9  HR- 144  RR- 20  /81  O2 sat 94% on room air    An EKG was obtained in the office and reviewed by me which showed sinus tachycardia with a rate of 133    Physical Exam   Constitutional: She is oriented to person, place, and time. She appears well-developed and well-nourished. No distress.   HENT:   Head: Normocephalic and atraumatic.   Mouth/Throat: Oropharynx is clear and moist and mucous membranes are normal. No oropharyngeal exudate.   Eyes: Pupils are equal, round, and reactive to light.   Neck: Normal range of motion.   Cardiovascular: Normal rate, regular rhythm and normal heart sounds.   No murmur heard.  Pulmonary/Chest: Effort normal. No respiratory distress. She has decreased breath sounds in the right middle field and the right lower field. She has no wheezes. She has no rhonchi. She has no rales.   Abdominal: Soft. Normal appearance and bowel sounds are normal. She exhibits no distension. There is no hepatosplenomegaly.   Musculoskeletal: Normal range of motion. She exhibits no edema.   Neurological: She is alert and oriented to person, place, and time.   Skin: Skin is warm and dry. No rash noted.   Psychiatric: She has a normal mood and affect.   Vitals  reviewed.      DIAGNOSTIC DATA:  Results for orders placed or performed in visit on 07/26/19   Comprehensive Metabolic Panel   Result Value Ref Range    Glucose 311 (C) 74 - 124 mg/dL    BUN 12 6 - 20 mg/dL    Creatinine 0.66 0.60 - 1.10 mg/dL    Sodium 129 (C) 134 - 145 mmol/L    Potassium 4.2 3.5 - 4.7 mmol/L    Chloride 87 (L) 98 - 107 mmol/L    CO2 26.0 22.0 - 29.0 mmol/L    Calcium 9.5 8.5 - 10.2 mg/dL    Total Protein 7.3 6.3 - 8.0 g/dL    Albumin 3.40 (L) 3.50 - 5.20 g/dL    ALT (SGPT) 38 (H) 0 - 33 U/L    AST (SGOT) 44 (H) 0 - 32 U/L    Alkaline Phosphatase 140 (H) 38 - 116 U/L    Total Bilirubin 0.4 0.2 - 1.2 mg/dL    eGFR Non African Amer 90 >60 mL/min/1.73    Globulin 3.9 (H) 1.8 - 3.5 gm/dL    A/G Ratio 0.9 (L) 1.1 - 2.4 g/dL    BUN/Creatinine Ratio 18.2 7.3 - 30.0    Anion Gap 16.0 (H) 5.0 - 15.0 mmol/L   CBC Auto Differential   Result Value Ref Range    WBC 5.09 3.40 - 10.80 10*3/mm3    RBC 4.04 3.77 - 5.28 10*6/mm3    Hemoglobin 9.9 (L) 12.0 - 15.9 g/dL    Hematocrit 32.6 (L) 34.0 - 46.6 %    MCV 80.7 79.0 - 97.0 fL    MCH 24.5 (L) 26.6 - 33.0 pg    MCHC 30.4 (L) 31.5 - 35.7 g/dL    RDW 14.8 12.3 - 15.4 %    RDW-SD 43.5 37.0 - 54.0 fl    MPV 9.5 6.0 - 12.0 fL    Platelets 435 140 - 450 10*3/mm3    Neutrophil % 78.6 (H) 42.7 - 76.0 %    Lymphocyte % 14.9 (L) 19.6 - 45.3 %    Monocyte % 5.3 5.0 - 12.0 %    Eosinophil % 0.4 0.3 - 6.2 %    Basophil % 0.4 0.0 - 1.5 %    Immature Grans % 0.4 0.0 - 0.5 %    Neutrophils, Absolute 4.00 1.70 - 7.00 10*3/mm3    Lymphocytes, Absolute 0.76 0.70 - 3.10 10*3/mm3    Monocytes, Absolute 0.27 0.10 - 0.90 10*3/mm3    Eosinophils, Absolute 0.02 0.00 - 0.40 10*3/mm3    Basophils, Absolute 0.02 0.00 - 0.20 10*3/mm3    Immature Grans, Absolute 0.02 0.00 - 0.05 10*3/mm3    nRBC 0.0 0.0 - 0.2 /100 WBC       IMAGING: I personally reviewed PET scan images from 6/25/2019.  Large tail of pancreas mass with evidence for carcinomatosis.    IMPRESSION:  1. There is a 4 cm intensely  hypermetabolic pancreatic tail mass and  there is extensive metastatic disease. There is hypermetabolic  carcinomatosis throughout the abdomen and there is extensive metastatic  disease throughout the right pleura. There is also metastatic disease  extending into the posterior mediastinum. There has been increase in the  small right pleural effusion.  2. The reticular nodular opacities at the left lower lobe are  nonspecific and may represent bronchiolitis or infectious infiltrates,  but metastatic disease cannot be excluded at this point.      A chest x-ray was obtained in the office today which shows a persistent right pleural effusion  2-VIEW CHEST 7/26/2019   FINDINGS: There is a moderate-sized right pleural effusion with some  associated dense atelectasis at the right base and this is unchanged  from 7 days ago. There is further patchy density at the left base  worrisome for an area of developing pneumonia. The heart remains top  normal in size. A left-sided MediPort catheter ends in the SVC without  change.    ASSESSMENT:  This is a 63 y.o. female with:  1.  Metastatic pancreas cancer originating in the tail of the pancreas: There is evidence for carcinomatosis and extensive metastatic disease throughout the right pleura with extension into the posterior mediastinum.  She is not a surgical candidate.  She did see Dr. Smiley with surgical oncology and Mediport is planned for Monday.  Dr. Zhong recommended palliative therapy with FOLFIRINOX.  Patient has had a formal education. Plans to treat her every 2 weeks.  Repeat CT imaging after 4 doses or 2 months.  We do need to check BRCA mutation status at some point to see if she would be a candidate for olaparib.    · Cycle 1 day 1 FOLFIRINOX 7/10/2019  · July 24, 2019 cycle 2 FOLFIRINOX.  .    2.  Cancer related pain:   · She is currently utilizing oxycodone 10 mg every 4 hours.  Along with morphine sulfate long-acting 15 mg twice daily.    · Patient reports  adequate pain control today.    3.  Pleural effusion requiring thoracentesis.  She was experience and tachycardia and shortness of breath but quickly resolved upon procedure on 7/17/2019.  We obtained a chest x-ray in the office today.  The pleural effusion on the right side is unchanged from previous chest x-ray.  Patient denies any increase in shortness of breath.    4.  Sinus tachycardia: Patient had previous episode and was found to have a pleural effusion.  Her tachycardia improved after the thoracentesis.  She denies any increase in shortness of breath at this time.  She is asymptomatic from tachycardia.  After discussion with Dr. Zhong we will refer the patient to cardiology for further evaluation.      PLAN:  1.  Referred the patient to cardiology for further evaluation of her tachycardia.  2.  We discussed signs and symptoms for which the patient should call the office or go to the emergency room.  3.  Continue current pain medication regimen.  4.  Return for follow-up visit with Dr. Zhong on 8/7/2019 for reevaluation prior to her third cycle of FOLFIRINOX.

## 2019-07-29 PROBLEM — J90 PLEURAL EFFUSION: Status: ACTIVE | Noted: 2019-01-01

## 2019-07-29 NOTE — TELEPHONE ENCOUNTER
Admission to [facility] noted.    ----- Message from Jyotsna Rooney sent at 7/29/2019 12:43 PM EDT -----  mariola rodarte,[place] office called and she said she admitted pt to [facility]. Know call back needed.

## 2019-07-29 NOTE — OUTREACH NOTE
Medical Week 2 Survey      Responses   Facility patient discharged from?  [facility]   Does the patient have one of the following disease processes/diagnoses(primary or secondary)?  Other   Week 2 attempt successful?  No   Revoke  Readmitted          Cynthia Bee RN

## 2019-07-29 NOTE — PROGRESS NOTES
Chief Complaint   Patient presents with   • Hypertension   • Cancer   • Diabetes   • Constipation   tachycardia  Pleural effusion  Hyponatremia      History of Present Illness   Linette Ghosh is a 63 y.o. female presents for follow up evaluation. She has metastatic pancreatic cancer. She has pain, nausea, and constipation related to both the disease and chemotherapy. She is taking morphine for pain relief. This does well for about 6-8 hours and then she takes an oxycodone. Nausea is most pronounced day of chemo and 2 days after. Her appetite is low in general but she is tolerating ensure. She reports constipation. Takes sennokot 3 times daily. miralax w/ no benefit. She does have a bm after using mag citrate.   She is anemic, hyponatremic, and has very high glucose. She is not checking glucose regularly. She is taking metformin. Glucose was 311 on last testing.  Recently hospitalization for tachycardia. Found to have a pleaural effusion and this was drained. Some improvement after this.           The following portions of the patient's history were reviewed and updated as appropriate: allergies, current medications, past family history, past medical history, past social history, past surgical history and problem list.  No current facility-administered medications on file prior to visit.      Current Outpatient Medications on File Prior to Visit   Medication Sig Dispense Refill   • albuterol (PROVENTIL) (2.5 MG/3ML) 0.083% nebulizer solution Take 2.5 mg by nebulization Every 6 (Six) Hours As Needed for Wheezing.     • albuterol sulfate  (90 Base) MCG/ACT inhaler Inhale 2 puffs Every 4 (Four) Hours As Needed for Wheezing.     • aspirin 325 MG tablet Take 325 mg by mouth Daily.     • budesonide (PULMICORT FLEXHALER) 180 MCG/ACT inhaler Inhale 2 puffs 2 (Two) Times a Day. Rinse mouth after using 1 each 11   • desloratadine (CLARINEX) 5 MG tablet Take 1 tablet by mouth Daily. 90 tablet 1   • fluticasone (FLONASE)  50 MCG/ACT nasal spray 2 sprays into the nostril(s) as directed by provider Daily. 3 bottle 3   • HYDROcodone-acetaminophen (NORCO) 5-325 MG per tablet Take 1 tablet by mouth Every 6 (Six) Hours As Needed for Moderate Pain . 120 tablet 0   • hydrOXYzine (ATARAX) 25 MG tablet Take 1 tablet by mouth Every 8 (Eight) Hours As Needed for Itching. 90 tablet 1   • lisinopril-hydrochlorothiazide (PRINZIDE,ZESTORETIC) 20-12.5 MG per tablet Take 2 tablets by mouth Daily. 60 tablet 0   • metFORMIN (GLUCOPHAGE) 1000 MG tablet Take 1,000 mg by mouth 2 (Two) Times a Day With Meals.     • montelukast (SINGULAIR) 10 MG tablet Take 10 mg by mouth Every Night.     • Morphine (MS CONTIN) 15 MG 12 hr tablet Take 1 tablet by mouth 2 (Two) Times a Day for 30 days. 1 tablet 60 tablet 0   • Multiple Vitamins-Minerals (CENTRUM SILVER) tablet Take 1 tablet by mouth Daily.     • ondansetron (ZOFRAN) 8 MG tablet Take 1 tablet by mouth 3 (Three) Times a Day As Needed for Nausea or Vomiting. 30 tablet 5   • oxyCODONE (ROXICODONE) 10 MG tablet Take 1 tablet by mouth Every 4 (Four) Hours As Needed for Moderate Pain . 60 tablet 0   • pantoprazole (PROTONIX) 40 MG EC tablet Take 40 mg by mouth Daily.     • simvastatin (ZOCOR) 20 MG tablet Take 20 mg by mouth Every Night.     • timolol (TIMOPTIC) 0.25 % ophthalmic solution Administer 1 drop to both eyes Daily.     • vitamin E 100 UNIT capsule Take 100 Units by mouth Daily.     • [DISCONTINUED] GARLIC PO Take 2 capsules by mouth Daily.       Review of Systems   Constitutional: Positive for fatigue.   HENT: Negative.    Eyes: Negative.    Respiratory: Positive for shortness of breath.    Cardiovascular: Positive for palpitations.   Gastrointestinal: Positive for constipation.   Endocrine: Negative.    Genitourinary: Negative.    Musculoskeletal: Negative.    Skin: Negative.    Allergic/Immunologic: Negative.    Neurological: Negative.    Hematological: Negative.    Psychiatric/Behavioral: Negative.         Objective   Physical Exam   Constitutional: She is oriented to person, place, and time.   Alert and pleasant   Chronically ill   HENT:   Head: Normocephalic and atraumatic.   Right Ear: External ear normal.   Left Ear: External ear normal.   Mouth/Throat: Oropharynx is clear and moist.   Eyes: EOM are normal. Pupils are equal, round, and reactive to light.   Neck: Normal range of motion. Neck supple.   Cardiovascular:   Tachycardia     Pulmonary/Chest:   Diffuse wheeze     Abdominal: Soft. Bowel sounds are normal.   discomfort   Neurological: She is alert and oriented to person, place, and time.   Skin: Skin is warm and dry.   Psychiatric: She has a normal mood and affect. Her behavior is normal. Judgment and thought content normal.   Nursing note and vitals reviewed.     ekg for tachycardia. Sinus tach at  123 bpm.   /80   Pulse (!) 123   Wt 61.2 kg (135 lb)   SpO2 94%   BMI 21.70 kg/m²     Assessment/Plan   Diagnoses and all orders for this visit:    Hyponatremia  -     Basic Metabolic Panel  -     Magnesium  -     TSH  -     ECG 12 Lead  -     XR Chest PA & Lateral    Tachycardia  -     Basic Metabolic Panel  -     Magnesium  -     TSH  -     ECG 12 Lead  -     XR Chest PA & Lateral    Type 2 diabetes mellitus without complication, without long-term current use of insulin (CMS/HCC)  -     Basic Metabolic Panel  -     Hemoglobin A1c  -     ECG 12 Lead  -     XR Chest PA & Lateral    Pleural effusion on right    Malignant neoplasm of tail of pancreas (CMS/HCC)      Patient with multiple medical issues. She has tachycardia with a pleural effusion. She is hyperglycemic and hyponatremic. Dehydrated w/ nausea and constipation/ side effects of chemo. Will admit to determine if repeat thoracentesis is indicated. She will need to be started on an insulin regiment during steroid dosing w/ chemo. She will have electrolytes assessed and get gentle hydration. ? Of lower lobe pneumonia and this will also be  investigated. F/u w/ oncology routinely.

## 2019-08-01 NOTE — OUTREACH NOTE
Medical Week 1 Survey      Responses   Facility patient discharged from?  [facility]   Does the patient have one of the following disease processes/diagnoses(primary or secondary)?  Other   Is there a successful TCM telephone encounter documented?  No   Week 1 attempt successful?  Yes   Call start time  1330   Call end time  1335   Discharge diagnosis  Malignant neoplasm of tail of pancreas    Meds reviewed with patient/caregiver?  Yes   Is the patient having any side effects they believe may be caused by any medication additions or changes?  No   Does the patient have all medications ordered at discharge?  Yes   Is the patient taking all medications as directed (includes completed medication regime)?  Yes   Does the patient have a primary care provider?   Yes   Does the patient have an appointment with their PCP within 7 days of discharge?  Yes   Has the patient kept scheduled appointments due by today?  N/A   Has home health visited the patient within 72 hours of discharge?  N/A   Psychosocial issues?  No   Did the patient receive a copy of their discharge instructions?  Yes   Nursing interventions  Reviewed instructions with patient   What is the patient's perception of their health status since discharge?  Improving   Is the patient/caregiver able to teach back signs and symptoms related to disease process for when to call PCP?  Yes   Is the patient/caregiver able to teach back signs and symptoms related to disease process for when to call 911?  Yes   Is the patient/caregiver able to teach back the hierarchy of who to call/visit for symptoms/problems? PCP, Specialist, Home health nurse, Urgent Care, ED, 911  Yes   Additional teach back comments  Pt says her sugars are running good, no questions or concerns at this time.   Week 1 call completed?  Yes          Sophy [place] RN

## 2019-08-05 PROBLEM — I26.99 PULMONARY EMBOLISM, BILATERAL (HCC): Status: ACTIVE | Noted: 2019-01-01

## 2019-08-05 PROBLEM — R06.03 ACUTE RESPIRATORY DISTRESS: Status: ACTIVE | Noted: 2019-01-01

## 2019-08-05 NOTE — PROGRESS NOTES
PATIENTINFORMATION    Date of Office Visit: 2019  Encounter Provider: Yaya Barron MD  Place of Service: [facility] CARDIOLOGY  Patient Name: Linette Ghosh  : 1955    Subjective:     Encounter Date:2019      Patient ID: Linette Ghosh is a 64 y.o. female.    Chief Complaint   Patient presents with   • Rapid Heart Rate   • Shortness of Breath   • Palpitations   • Numbness     HPI  Ms. Ghosh is a 64 years old female patient with past medical history of metastatic pancreatic cancer on chemotherapy, hypertension, type 2 diabetes mellitus, hyperlipidemia referred to cardiology clinic for evaluation of tachycardia.  Patient was diagnosed with pancreatic cancer back in 2019 and started receiving chemotherapy.  She already received 2 cycles of chemotherapy with third 1 pending this Wednesday.  Patient has been experiencing progressively worsening shortness of breath and noted to have large right sided pleural effusion for which she was hospitalized on 2019 and had ultrasound-guided thoracentesis of 1 L of fluid.  Her symptoms temporarily improved but started getting worse over the past 1 week.  She could barely exert because of severe shortness of breath and palpitations upon minimal exertion.  She has history of asthma/COPD but currently she denied increased cough or wheezing.  She denied any past or current chest discomfort, orthopnea, PND or extremity swelling.  She does not have any past known history of heart disease.  Her heart rate was noted to be in the 120s during her previous admission.  Today in clinic her heart rate was also running in the 120's.   Otherwise she denied any fever.        ROS   All systems reviewed.  She has significant weight loss, fatigue.  Otherwise negative except as noted in HPI.    Past Medical History:   Diagnosis Date   • Asthma    • Carotid artery stenosis    • Chronic kidney disease, stage III (moderate) (CMS/HCC)    •  "Diabetes mellitus (CMS/HCC)     Type 2   • GERD (gastroesophageal reflux disease)    • Glaucoma    • Hydronephrosis    • Hyperlipidemia    • Hypertension    • Intracranial hemorrhage (CMS/HCC)    • Iron deficiency anemia    • Pancreatic cancer (CMS/HCC) 06/2019   • Pleural effusion on right    • Pneumonia    • Polyp of sigmoid colon    • Shortness of breath    • Tachycardia    • Vitamin D deficiency        Past Surgical History:   Procedure Laterality Date   • CHOLECYSTECTOMY     • GALLBLADDER SURGERY     • HERNIA REPAIR      x3   • HYSTERECTOMY         Social History     Socioeconomic History   • Marital status: Single     Spouse name: Not on file   • Number of children: 0   • Years of education: College   • Highest education level: Not on file   Occupational History   • Occupation: Customer Service     Employer: [employer]   Tobacco Use   • Smoking status: Current Every Day Smoker     Packs/day: 1.00     Years: 30.00     Pack years: 30.00     Types: Cigarettes   • Smokeless tobacco: Never Used   Substance and Sexual Activity   • Alcohol use: No   • Drug use: No   • Sexual activity: Defer       Family History   Problem Relation Age of Onset   • Hypertension Mother    • Diabetes Mother    • Arthritis Mother    • Hypertension Sister    • Diabetes Sister    • Thyroid disease Sister    • Arthritis Sister    • Hypertension Brother    • Diabetes Brother    • Lung cancer Maternal Uncle    • Stroke Maternal Grandfather    • Hypertension Brother    • Hypertension Sister    • Hypertension Sister                 Objective:     /86 (BP Location: Right arm)   Ht 170.2 cm (67\")   Wt 62.1 kg (137 lb)   BMI 21.46 kg/m²  Body mass index is 21.46 kg/m².     Physical Exam   Constitutional: She is oriented to person, place, and time. She appears well-developed and well-nourished. She appears distressed.   HENT:   Head: Normocephalic and atraumatic.   Eyes: EOM are normal. Pupils are equal, round, and reactive to " light.   Neck: Normal range of motion. Neck supple. No thyromegaly present.   Cardiovascular: Regular rhythm. Tachycardia present. Exam reveals no gallop and no friction rub.   No murmur heard.  Pulmonary/Chest: Accessory muscle usage present. She is in respiratory distress. She has decreased breath sounds in the right middle field and the right lower field. She has no wheezes. She has rhonchi in the right upper field, the right middle field, the right lower field, the left upper field, the left middle field and the left lower field. She has no rales. She exhibits no tenderness.   Abdominal: Soft. Bowel sounds are normal. She exhibits no distension. There is no guarding.   Musculoskeletal: Normal range of motion. She exhibits no edema or deformity.   Neurological: She is alert and oriented to person, place, and time. She has normal reflexes. No cranial nerve deficit.   Skin: Skin is warm and dry. No rash noted. She is not diaphoretic.   Psychiatric: She has a normal mood and affect. Judgment normal.       Review Of Data:   I have personally reviewed prior medical records, EKG done in the hospital, echocardiogram done in the office and labs      Assessment/Plan:       Ms. Ghosh is a 64 years old female patient with metastatic pancreatic cancer on FOLFIRINOX started on 7/10/2019 came to clinic for evaluation of tachycardia and shortness of breath.      1. Tachycardia  -Sinus tachycardia probably related to physiologic response to underlying hypoxemia and respiratory distress.  -Patient had echocardiogram done in the office emergently for evaluation of EF and pericardial effusion.  Patient has normal ejection fraction and no evidence for any significant pericardial effusion.  -Treat underlying cause-drain pleural effusion, supplemental oxygen, Check CBC, CMP     2. Pleural effusion on right-large on physical exam  -Malignant.  Send patient to ED for possible drainage.  Patient needs Pleurx for recurrent pleural  effusion.    3. Acute respiratory distress and hypoxemia-sats in the office running in the 80s  -Start supplemental oxygen, chest x-ray, send patient to the ED.     4. Benign essential hypertension-controlled    5. Mixed hyperlipidemia  -On a statin      Diagnosis and plan of care discussed with patient and verbalized understanding.           Yaya Barron MD  08/05/19  3:18 PM

## 2019-08-05 NOTE — PROGRESS NOTES
PATIENTINFORMATION    Date of Office Visit: 2019  Encounter Provider: Yaya Barron MD  Place of Service: [facility] CARDIOLOGY  Patient Name: Linette Ghosh  : 1955    Subjective:     Encounter Date:2019      Patient ID: Linette Ghosh is a 64 y.o. female.    No chief complaint on file.    HPI    ROS    Past Medical History:   Diagnosis Date   • Asthma    • Carotid artery stenosis    • Chronic kidney disease, stage III (moderate) (CMS/HCC)    • Diabetes mellitus (CMS/HCC)     Type 2   • GERD (gastroesophageal reflux disease)    • Glaucoma    • Hydronephrosis    • Hyperlipidemia    • Hypertension    • Intracranial hemorrhage (CMS/HCC)    • Iron deficiency anemia    • Pancreatic cancer (CMS/HCC) 2019   • Pleural effusion on right    • Pneumonia    • Polyp of sigmoid colon    • Shortness of breath    • Tachycardia    • Vitamin D deficiency        Past Surgical History:   Procedure Laterality Date   • CHOLECYSTECTOMY     • GALLBLADDER SURGERY     • HERNIA REPAIR      x3   • HYSTERECTOMY         Social History     Socioeconomic History   • Marital status: Single     Spouse name: Not on file   • Number of children: 0   • Years of education: College   • Highest education level: Not on file   Occupational History   • Occupation: Customer Service     Employer: [employer] [place]   Tobacco Use   • Smoking status: Current Every Day Smoker     Packs/day: 1.00     Years: 30.00     Pack years: 30.00     Types: Cigarettes   • Smokeless tobacco: Never Used   Substance and Sexual Activity   • Alcohol use: No   • Drug use: No   • Sexual activity: Defer       Family History   Problem Relation Age of Onset   • Hypertension Mother    • Diabetes Mother    • Arthritis Mother    • Hypertension Sister    • Diabetes Sister    • Thyroid disease Sister    • Arthritis Sister    • Hypertension Brother    • Diabetes Brother    • Lung cancer Maternal Uncle    • Stroke Maternal  Grandfather    • Hypertension Brother    • Hypertension Sister    • Hypertension Sister          Procedures       Objective:     There were no vitals taken for this visit. There is no height or weight on file to calculate BMI.     Physical Exam    Review Of Data:   I have personally reviewed patient's prior medical records, EKG done today and prior labs.      Assessment/Plan:         No diagnosis found.    Diagnosis and plan of care discussed with patient and verbalized understanding.           Yaya Barron MD  08/05/19  1:12 PM

## 2019-08-06 PROBLEM — T45.1X5A ANEMIA ASSOCIATED WITH CHEMOTHERAPY: Status: ACTIVE | Noted: 2019-01-01

## 2019-08-06 PROBLEM — D64.81 ANEMIA ASSOCIATED WITH CHEMOTHERAPY: Status: ACTIVE | Noted: 2019-01-01

## 2019-08-06 PROBLEM — T45.1X5A CHEMOTHERAPY-INDUCED NEUTROPENIA (HCC): Status: ACTIVE | Noted: 2019-01-01

## 2019-08-06 PROBLEM — D70.1 CHEMOTHERAPY-INDUCED NEUTROPENIA (HCC): Status: ACTIVE | Noted: 2019-01-01

## 2019-08-06 NOTE — OUTREACH NOTE
Medical Week 2 Survey      Responses   Facility patient discharged from?  [facility]   Does the patient have one of the following disease processes/diagnoses(primary or secondary)?  Other   Week 2 attempt successful?  No   Revoke  Readmitted          Rajni Bass RN

## 2019-08-12 PROBLEM — K92.0 HEMATEMESIS WITH NAUSEA: Status: ACTIVE | Noted: 2019-01-01

## 2019-08-13 NOTE — ANESTHESIA POSTPROCEDURE EVALUATION
Patient: Linette Ghosh    Procedure Summary     Date:  08/13/19 Room / Location:  [facility] ENDOSCOPY 10 /  [facility]    Anesthesia Start:  0908 Anesthesia Stop:  0956    Procedure:  ESOPHAGOGASTRODUODENOSCOPY WITH CAUTERY (N/A Esophagus) Diagnosis:       Hematemesis with nausea      (Hematemesis with nausea [K92.0])    Surgeon:  Yessenia Hurtado MD Provider:  Vernon Johnson MD    Anesthesia Type:  MAC ASA Status:  3          Anesthesia Type: MAC  Last vitals  BP   125/91 (08/13/19 0837)   Temp   36.7 °C (98 °F) (08/13/19 0837)   Pulse   102 (08/13/19 0837)   Resp   16 (08/13/19 0728)     SpO2   94 % (08/13/19 0837)     Post Anesthesia Care and Evaluation    Patient location during evaluation: PHASE II  Patient participation: complete - patient participated  Level of consciousness: awake and alert  Pain management: adequate  Airway patency: patent  Anesthetic complications: No anesthetic complications  PONV Status: none  Cardiovascular status: acceptable, tachycardic and hemodynamically stable  Respiratory status: acceptable  Hydration status: acceptable

## 2019-08-13 NOTE — ANESTHESIA PREPROCEDURE EVALUATION
Anesthesia Evaluation     Patient summary reviewed and Nursing notes reviewed                Airway   Mallampati: II  TM distance: >3 FB  Neck ROM: full  No difficulty expected  Dental - normal exam     Pulmonary    (+) pleural effusion, pulmonary embolism, a smoker Current Abstained day of surgery, asthma, shortness of breath, decreased breath sounds,     ROS comment: Malignant right pleural effusion  PE comment: On right  Cardiovascular     Rhythm: regular  Rate: abnormal    (+) hypertension, dysrhythmias Tachycardia, PVD, hyperlipidemia,  carotid artery disease    PE comment: ST/rate 106    Neuro/Psych    ROS Comment: Hx of intracranial hemorrhage  GI/Hepatic/Renal/Endo    (+)  GERD, GI bleeding, renal disease CRI, diabetes mellitus type 2,     Musculoskeletal     Abdominal  - normal exam   Substance History      OB/GYN          Other   (+) blood dyscrasia   history of cancer      Other Comment: Pancreatic CA/chemotherapy-induced neutropenia/anemia                Anesthesia Plan    ASA 3     MAC     intravenous induction   Anesthetic plan, all risks, benefits, and alternatives have been provided, discussed and informed consent has been obtained with: patient.

## 2019-08-15 NOTE — PROGRESS NOTES
Pt to infusion room,   Pt d/c'd from hospital yesterday   Labs noted  Lab Results   Component Value Date    WBC 18.36 (H) 08/15/2019    HGB 10.0 (L) 08/15/2019    HCT 31.7 (L) 08/15/2019    MCV 85.0 08/15/2019     08/15/2019     Notes per Dr Caballero from  yesterday noted.   Discussed with Dr Zhong today .   Per Dr Zhong patient to have neupogen 300 today and tomorrow.  Pt to start Eliquis today 2.5 mg every 12 hours  Pt has RX  For eliquis ready to  - RX is for 5mg every 12 hours.  Per Dr Zhong patient to start with 2.5mg every 12 hours  Written and verbal instructions given to patient on eliquis dosing. - 2.5 mg every 12 hours.   Pt V/U   Pt instructed to take 1/2 of 5mg tablet. Pt V/U  Pt to return tomorrow for neupogen .

## 2019-08-15 NOTE — OUTREACH NOTE
Prep Survey      Responses   Facility patient discharged from?  [facility]   Is patient eligible?  Yes   Discharge diagnosis  **Pulmonary embolism, bilateral    Does the patient have one of the following disease processes/diagnoses(primary or secondary)?  Other   Does the patient have Home health ordered?  No   Medication alerts for this patient  Eliquis    Prep survey completed?  Yes          Sun Jennings RN

## 2019-08-16 NOTE — OUTREACH NOTE
Medical Week 1 Survey      Responses   Facility patient discharged from?  [facility]   Does the patient have one of the following disease processes/diagnoses(primary or secondary)?  Other   Is there a successful TCM telephone encounter documented?  No   Week 1 attempt successful?  Yes   Call start time  1657   Call end time  1703   Discharge diagnosis  **Pulmonary embolism, bilateral    Medication alerts for this patient  Eliquis    Meds reviewed with patient/caregiver?  Yes   Is the patient having any side effects they believe may be caused by any medication additions or changes?  No   Does the patient have all medications ordered at discharge?  Yes   Is the patient taking all medications as directed (includes completed medication regime)?  Yes   Does the patient have a primary care provider?   Yes   Does the patient have an appointment with their PCP within 7 days of discharge?  Yes   Has the patient kept scheduled appointments due by today?  N/A   Has home health visited the patient within 72 hours of discharge?  N/A   Psychosocial issues?  No   Did the patient receive a copy of their discharge instructions?  Yes   Nursing interventions  Reviewed instructions with patient   What is the patient's perception of their health status since discharge?  Improving   Is the patient/caregiver able to teach back signs and symptoms related to disease process for when to call PCP?  Yes   Is the patient/caregiver able to teach back signs and symptoms related to disease process for when to call 911?  Yes   Is the patient/caregiver able to teach back the hierarchy of who to call/visit for symptoms/problems? PCP, Specialist, Home health nurse, Urgent Care, ED, 911  Yes   Additional teach back comments  She is feeling better, needing a pill splitter.  She didn't check her BS this AM, encouraged to do daily.   Week 1 call completed?  Yes          Mily Valle RN

## 2019-08-19 NOTE — PROGRESS NOTES
Patient is here for lab and RN review following recent hospitalization for newly diagnosed bilateral pulmonary emboli.  Overall, she is recovering and has had significant reduction in her shortness of breath.  She does continue to struggle with cancer related pain.  She has long-acting morphine, though is requesting a refill for her oxycodone, 10 mg every 4 hours for the breakthrough pain.  I sent a message to Dr. Zhong regarding this.    Additionally, she does complain of some new right posterior knee tenderness.  She says that it feels like a charley horse that began just this morning.  She has not taken anything for relief.  He denies any trauma or musculoskeletal issues to the area.  I have encouraged her to take Tylenol for relief and to call us should this pain persist or progress over the next few days.    She is on Eliquis, 5 mg twice daily and is tolerating this well without any excess bleeding or bruising.    Her labs are all stable and improved today.  Her hemoglobin is up to 11 from 10 4 days ago.    She is scheduled to return in 1 week for formal hospital return visit with Dr. Zhong and consideration of her next cycle of FOLFIRINOX.     I have asked her to call with any issues prior to this time.  She has verbalized understanding.  I will refill her oxycodone once I have received consent from Dr. Zhong.    Lab Results   Component Value Date    WBC 8.74 08/19/2019    HGB 11.0 (L) 08/19/2019    HCT 35.6 08/19/2019    MCV 85.6 08/19/2019     08/19/2019     THADDEUS Ortiz

## 2019-08-23 NOTE — OUTREACH NOTE
Medical Week 2 Survey      Responses   Facility patient discharged from?  [facility]   Does the patient have one of the following disease processes/diagnoses(primary or secondary)?  Other   Week 2 attempt successful?  Yes   Call start time  1526   Call end time  1528   Meds reviewed with patient/caregiver?  Yes   Is the patient taking all medications as directed (includes completed medication regime)?  Yes   Has the patient kept scheduled appointments due by today?  Yes   What is the patient's perception of their health status since discharge?  Improving   Week 2 Call Completed?  Yes          Judith Cedillo RN

## 2019-08-26 NOTE — PROGRESS NOTES
1532 Pt completed transfusion of irinotecan and lecovourin. Attempted to verify Chemo push and ball with a second nurse and the pt was unable to state her name and date of birth clearly stating that her tongue felt thick and heavy and that she had some numbness in bi lat hands. FRANCIS Hayes at bedside, 20 mg of Pepcid ordered and given at 1535. Pt moved to another area due to current room being cold. Pt given gloves, warm blankets and something warm to drink. /87     1549 FRANCIS Hayes wants pt to wait for 20 min and then recheck before continuing with any further treatment     1609 Pt states all symptoms are gone. Discussed with FRANCIS hayes. OK to continue with Chemo 5FU push and ball. Pt is to call with any further symptoms or issues. Pt stated understanding.

## 2019-08-26 NOTE — PROGRESS NOTES
"[facility] CBC GROUP OUTPATIENT FOLLOW UP CLINIC VISIT    REASON FOR FOLLOW-UP:    1.  Metastatic adenocarcinoma of the pancreas  2.  Palliative chemotherapy with FOLFIRINOX initiated on 7/10/2019.  3.  Neutropenia related to chemotherapy requiring Neulasta  4.  Bilateral pulmonary emboli and bilateral calf vein deep vein thrombosis diagnosed on 8/5/2019.  She also had gastrointestinal bleeding with a single angiectasia discovered in the duodenum which was injected and treated with APC.  Anticoagulation was briefly held but she was discharged with plans for Eliquis 2.5 mg twice daily which she did start.  She was neutropenic from chemotherapy and required Neupogen.      HISTORY OF PRESENT ILLNESS:  Linette Ghosh is a 64 y.o. female who returns today for follow up of the above issue.      Earlier in August she was hospitalized at [facility] with bilateral DVT and PE.    She has been on Eliquis 2.5 mg twice daily which she tolerates well with no evidence for bleeding.  She complains of global weakness.  She fell last week after twisting her left ankle.  She states that her left foot is \"numb\" following this.  She has some numbness in her right great toe but no numbness in her hands at this point.  Her pain is adequately controlled.  She complains of decreased appetite.  No nausea vomiting or diarrhea at this point.  No fevers or chills.    ONCOLOGIC HISTORY:  She has had about a 40 pound weight loss in the past 6 months associated with worsening bilateral flank pain.  She does note that the pain is worse with eating.  She was taking hydrocodone a couple of times per day with some improvement.     Due to worsening pain, she presented to the emergency department on 6/1/2019.  A chest x-ray was unremarkable.  She had a CT angiogram of the chest showing no evidence for pulmonary embolism.  There was some nodularity involving the major fissure on the right.  There was some pleural thickening and " nodularity involving the minor fissure on the right.  New infiltrate involving the posterior sulcus on the right medial he was noted to have a small pleural effusion on the right was noted as well.  A small groundglass area is appreciated in the right upper lobe.  The left adrenal gland was prominent in the tail the pancreas is prominent measuring 2.9 cm.  Some liver cysts were noted.     She had a follow-up CT scan of the abdomen and pelvis with contrast on 6/9/2019 showing a large hypoenhancing mass involving the entire tail of the pancreas which encases the splenic artery and occludes and splenic vein.  This abuts and possibly infiltrates the left adrenal gland.  No evidence for metastatic disease otherwise.     She saw Dr. Varela with gastroenterology and had an endoscopic ultrasound performed on 6/17/2019 with pathology confirming adenocarcinoma.  The CA 19-9 is elevated at 655.        She was seen initially on 6/21/2019.    A PET scan was requested and performed on 6/25/2019.  A 4 cm intensely hypermetabolic pancreatic tail mass is noted.  Unfortunately, extensive metastatic disease is noted with carcinomatosis throughout the abdomen and extensive metastatic disease throughout the right pleura.  Metastatic disease extending into the posterior mediastinum was noted as well.    She did see Dr. Smiley with surgical oncology.  This referral was made for the PET scan results returned in hopes that she had localized disease that could be resected.  He will place a Mediport on 7/8/2019.    ALLERGIES:  Allergies   Allergen Reactions   • Penicillins Hives       MEDICATIONS:  The medication list has been reviewed with the patient by the medical assistant, and the list has been updated in the electronic medical record, which I reviewed.  Medication dosages and frequencies were confirmed to be accurate.    REVIEW OF SYSTEMS:  PAIN:  See Vital Signs below.  GENERAL: Decreased appetite.  Weight loss.  SKIN:  No rashes  "or non-healing lesions.  HEME/LYMPH:  No abnormal bleeding.  No palpable lymphadenopathy.  EYES:  No vision changes or diplopia.  ENT:  No sore throat or difficulty swallowing.  RESPIRATORY: Some shortness of breath and she requires nebulizers  CARDIOVASCULAR:  No chest pain, palpitations, orthopnea, or dyspnea on exertion.  GASTROINTESTINAL: Flank and abdominal pain.  Adequately controlled with medication.  GENITOURINARY:  No dysuria or hematuria.  MUSCULOSKELETAL:  No joint pain, swelling, or erythema.  Left ankle pain  NEUROLOGIC:  No dizziness, loss of consciousness, or seizures.  Left foot numbness  PSYCHIATRIC: Anxiety.    Vitals:    08/26/19 0757   BP: 142/70   Pulse: 118  Comment: chemo effect causing high heart rate   Resp: 16   Temp: 98.6 °F (37 °C)   TempSrc: Oral   SpO2: 95%   Weight: 60 kg (132 lb 4.8 oz)   Height: 167.6 cm (65.98\")   PainSc: 0-No pain  Comment: pancratic cancer       PHYSICAL EXAMINATION:  GENERAL:  Well-developed well-nourished female; awake, alert and oriented, in no acute distress.  SKIN:  Warm and dry, without rashes, purpura, or petechiae.  HEAD:  Normocephalic, atraumatic.  EARS:  Hearing intact.  NOSE:  Septum midline.  No excoriations or nasal discharge.  MOUTH:  No stomatitis or ulcers.  Lips are normal.  THROAT:  Oropharynx without lesions or exudates.  NECK:  Supple with good range of motion; no thyromegaly or masses; no JVD or bruits.  LYMPHATICS:  No cervical, supraclavicular, axillary, lymphadenopathy.  CHEST:  Lungs are clear to auscultation bilaterally.  No wheezes, rales, or rhonchi.  HEART: Tachycardic and regular without murmurs gallops or rubs  ABDOMEN:  Soft, minimal tenderness to palpation without rebound or guarding, non-distended.  Normal active bowel sounds.  No organomegaly.  EXTREMITIES:  No clubbing, cyanosis, or edema.  NEUROLOGICAL:  No focal neurologic deficits.  She has normal sensation in the left foot.  Normal proprioception.  She ambulates with a mild " ashiap.    DIAGNOSTIC DATA:  Results for orders placed or performed in visit on 08/26/19   CBC Auto Differential   Result Value Ref Range    WBC 11.60 (H) 3.40 - 10.80 10*3/mm3    RBC 4.21 3.77 - 5.28 10*6/mm3    Hemoglobin 11.2 (L) 12.0 - 15.9 g/dL    Hematocrit 36.0 34.0 - 46.6 %    MCV 85.5 79.0 - 97.0 fL    MCH 26.6 26.6 - 33.0 pg    MCHC 31.1 (L) 31.5 - 35.7 g/dL    RDW 21.2 (H) 12.3 - 15.4 %    RDW-SD 63.4 (H) 37.0 - 54.0 fl    MPV 9.7 6.0 - 12.0 fL    Platelets 464 (H) 140 - 450 10*3/mm3    Neutrophil % 56.9 42.7 - 76.0 %    Lymphocyte % 24.0 19.6 - 45.3 %    Monocyte % 15.1 (H) 5.0 - 12.0 %    Eosinophil % 0.9 0.3 - 6.2 %    Basophil % 0.4 0.0 - 1.5 %    Immature Grans % 2.7 (H) 0.0 - 0.5 %    Neutrophils, Absolute 6.61 1.70 - 7.00 10*3/mm3    Lymphocytes, Absolute 2.78 0.70 - 3.10 10*3/mm3    Monocytes, Absolute 1.75 (H) 0.10 - 0.90 10*3/mm3    Eosinophils, Absolute 0.10 0.00 - 0.40 10*3/mm3    Basophils, Absolute 0.05 0.00 - 0.20 10*3/mm3    Immature Grans, Absolute 0.31 (H) 0.00 - 0.05 10*3/mm3    nRBC 2.1 (H) 0.0 - 0.2 /100 WBC       IMAGING: I personally reviewed PET scan images from 6/25/2019.  Large tail of pancreas mass with evidence for carcinomatosis.    IMPRESSION:  1. There is a 4 cm intensely hypermetabolic pancreatic tail mass and  there is extensive metastatic disease. There is hypermetabolic  carcinomatosis throughout the abdomen and there is extensive metastatic  disease throughout the right pleura. There is also metastatic disease  extending into the posterior mediastinum. There has been increase in the  small right pleural effusion.  2. The reticular nodular opacities at the left lower lobe are  nonspecific and may represent bronchiolitis or infectious infiltrates,  but metastatic disease cannot be excluded at this point.      ASSESSMENT:  This is a 64 y.o. female with:  1.  Metastatic pancreas cancer originating in the tail of the pancreas: There is evidence for carcinomatosis and extensive  metastatic disease throughout the right pleura with extension into the posterior mediastinum.  She is not a surgical candidate.  She did see Dr. Smiley with surgical oncology nonetheless and he placed a Mediport.  We initiated therapy with palliative FOLFIRINOX on 7/10/2019.    Proceed with cycle #4 today.    2.  Bilateral lower extremity DVT and pulmonary embolism: She is on Eliquis 2.5 mg twice daily due to recent GI bleeding.  Increase to 5 mg twice daily at this point.    3.  Decreased appetite and weight loss: Start Remeron 15 mg nightly.  Consider oncology nutrition referral.    4.  Cancer related pain: She continues pain medication with good control of her pain.  No refills necessary today.    5.  We do need to check BRCA mutation status at some point to see if she would be a candidate for olaparib.      6.  Left ankle injury with left foot numbness: She has normal sensation and normal proprioception on physical examination today.  I do not think she needs an x-ray as she is ambulatory without too much difficulty.  I do not think her numbness is related to chemotherapy.  Hopefully will improve over time.    7.  Neutropenia related to therapy: Add Neulasta to her treatment plan to receive on Wednesday.    PLAN:  1.  Proceed with cycle #4 of FOLFIRINOX today.  Decreased doses secondary to weight loss  2.  Neulasta on Wednesday  3.  Start Remeron 15 mg nightly  4.  Continue pain medication  5.  Increase Eliquis to 5 mg twice daily and watch closely for bleeding  6.  Return in 2 weeks with a nurse practitioner for cycle #5 of therapy.  PET scan in 3 weeks and I will see her back in 4 weeks for follow-up.  7.  We will check a CA 19-9 today

## 2019-08-26 NOTE — PROGRESS NOTES
Pt is unable to get a ride here on Wednesday for her chemo ball unhook. Called and left a message with Anamaria Fuentes in regards to contacting the pt to set up a rid with [employer].

## 2019-08-27 NOTE — PROGRESS NOTES
I was notified by Silvia MARCELINO RN in Infusion that pt will need a Lyft ride for her appt at 1 tomorrow. I have scheduled Lyyue to pick pt up at 12:30 pm and bring her to [facility].

## 2019-08-28 NOTE — TELEPHONE ENCOUNTER
Call rec from pt-she is asking for a refill of her nausea medication as well as Morphine.    I do not show Morphine in her medication list. She states it was filled by Dr Zhong. She states she is no longer taking the Norco. I have discontinued this in her medication list.  I have refilled the Ondansetron for pt.

## 2019-08-28 NOTE — PROGRESS NOTES
Pt called to cancel the Lyft ride scheduled for today. She states she has someone that will be bringing her. I have canceled the ride.

## 2019-08-30 NOTE — TELEPHONE ENCOUNTER
----- Message from Landy Gloria sent at 8/30/2019 12:33 PM EDT -----  Contact: [phone]  Pt calling about questions about chemo?      Pt called to ask when she was supposed to take off the Neulasta on-body injector. Advised pt that since it was put on 8/28, it was due to come off 8/29. She v/u. Pt also states that she has a scratchy throat. Advised her to gargle with salt water and she can take OTC Zyrtec or Claritin. She v/u.

## 2019-08-30 NOTE — OUTREACH NOTE
Medical Week 3 Survey      Responses   Facility patient discharged from?  [facility]   Does the patient have one of the following disease processes/diagnoses(primary or secondary)?  Other   Week 3 attempt successful?  Yes   Call start time  0943   Call end time  0950   Meds reviewed with patient/caregiver?  Yes   Is the patient having any side effects they believe may be caused by any medication additions or changes?  No   Does the patient have all medications ordered at discharge?  Yes   Is the patient taking all medications as directed (includes completed medication regime)?  Yes   Does the patient have a primary care provider?   Yes   Does the patient have an appointment with their PCP within 7 days of discharge?  Yes   Has the patient kept scheduled appointments due by today?  Yes   Has home health visited the patient within 72 hours of discharge?  N/A   Psychosocial issues?  No   Did the patient receive a copy of their discharge instructions?  Yes   Nursing interventions  Reviewed instructions with patient, Educated on MyChart   What is the patient's perception of their health status since discharge?  Improving   Is the patient/caregiver able to teach back signs and symptoms related to disease process for when to call PCP?  Yes   Is the patient/caregiver able to teach back signs and symptoms related to disease process for when to call 911?  Yes   Is the patient/caregiver able to teach back the hierarchy of who to call/visit for symptoms/problems? PCP, Specialist, Home health nurse, Urgent Care, ED, 911  Yes   If the patient is a current smoker, are they able to teach back resources for cessation?  Smoking cessation medications, Smoking cessation classes, Smoking cessation support groups, [phone]   Week 3 Call Completed?  Yes   Graduated  Yes   Graduated/Revoked comments  States is improving-states will call if any questions/concerns. Goals met.   Wrap up additional comments  States BS was 160 this  "morning. States using glucerna for supplement until appetite increases. States has \"cold\" with nasal congestion, slight cough-states will contact PCP office for evaluation.          Frances Clay RN  "

## 2019-09-02 NOTE — ED PROVIDER NOTES
EMERGENCY DEPARTMENT ENCOUNTER    CHIEF COMPLAINT  Chief Complaint: SOA  History given by: Pt  History limited by: none  Room Number: 36/36  PMD: Raine Ghosh MD      HPI:  Pt is a 64 y.o. female, Hx of Asthma, who presents complaining of SOA that began yesterday, worsening today. She states she took two breathing treatments at home and states her SOA is close to her baseline. The pt also reports productive cough with green sputum since yesterday but denies nausea, vomiting, fever, or CP. She states she had a blood clot in her lung last month and is currently on Eliquis. She states she has been compliant with her medications. She states she has a Hx of metastatic pancreatic cancer and is currently undergoing chemo. Her last chemo was one week ago. She smokes 1 PPD.    Duration: one day  Onset: gradual  Timing: constant  Location: respiratory  Radiation: none  Quality: SOA  Intensity/Severity: moderate  Progression: improved  Associated Symptoms: productive cough with green sputum  Aggravating Factors: n/a  Alleviating Factors: n/a  Previous Episodes: n/a  Treatment before arrival: The pt states she had two breathing Tx at home and states her SOA has resolved.     PAST MEDICAL HISTORY  Active Ambulatory Problems     Diagnosis Date Noted   • Moderate persistent asthma without complication 03/09/2016   • Asthma 03/09/2016   • Hyperlipidemia 03/09/2016   • Arthralgia of hip 03/09/2016   • Pain of thigh 03/09/2016   • Microscopic hematuria 03/09/2016   • Type 2 diabetes mellitus (CMS/HCC) 03/09/2016   • Tobacco abuse 09/13/2016   • Chronic kidney disease, stage III (moderate) (CMS/HCC) 03/14/2017   • Iron deficiency anemia 03/14/2017   • Pancreatic mass 06/11/2019   • Malignant neoplasm of tail of pancreas (CMS/HCC) 06/21/2019   • Encounter for fitting and adjustment of vascular catheter 07/08/2019   • Pleural effusion on right (malignant) 07/17/2019   • Hyponatremia 07/18/2019   • Tachycardia 07/26/2019   • Pleural  effusion 07/29/2019   • Acute respiratory distress 08/05/2019   • Pulmonary embolism, bilateral (CMS/HCC) 08/05/2019   • Chemotherapy induced neutropenia (CMS/HCC) 08/06/2019   • Anemia associated with chemotherapy 08/06/2019   • Hematemesis with nausea 08/12/2019     Resolved Ambulatory Problems     Diagnosis Date Noted   • Abnormal kidney function study 03/09/2016     Past Medical History:   Diagnosis Date   • Asthma    • Carotid artery stenosis    • Chronic kidney disease, stage III (moderate) (CMS/[facility])    • Diabetes mellitus (CMS/HCC)    • GERD (gastroesophageal reflux disease)    • Glaucoma    • Hydronephrosis    • Hyperlipidemia    • Hypertension    • Intracranial hemorrhage (CMS/HCC)    • Iron deficiency anemia    • Pancreatic cancer (CMS/HCC) 06/2019   • Pleural effusion on right    • Pneumonia    • Polyp of sigmoid colon    • Shortness of breath    • Tachycardia    • Tachycardia    • Vitamin D deficiency        PAST SURGICAL HISTORY  Past Surgical History:   Procedure Laterality Date   • CHOLECYSTECTOMY     • ENDOSCOPY N/A 8/13/2019    Procedure: ESOPHAGOGASTRODUODENOSCOPY WITH CAUTERY;  Surgeon: Yessenia Hurtado MD;  Location: [facility] ENDOSCOPY;  Service: Gastroenterology   • GALLBLADDER SURGERY     • HERNIA REPAIR      x3   • HYSTERECTOMY     • STOMACH SURGERY         FAMILY HISTORY  Family History   Problem Relation Age of Onset   • Hypertension Mother    • Diabetes Mother    • Arthritis Mother    • Hypertension Sister    • Diabetes Sister    • Thyroid disease Sister    • Arthritis Sister    • Hypertension Brother    • Diabetes Brother    • Lung cancer Maternal Uncle    • Stroke Maternal Grandfather    • Hypertension Brother    • Hypertension Sister    • Hypertension Sister        SOCIAL HISTORY  Social History     Socioeconomic History   • Marital status: Single     Spouse name: Not on file   • Number of children: 0   • Years of education: College   • Highest education level: Not on file   Occupational  History   • Occupation: Customer Service     Employer: [employer]   Tobacco Use   • Smoking status: Current Every Day Smoker     Packs/day: 1.00     Years: 30.00     Pack years: 30.00     Types: Cigarettes   • Smokeless tobacco: Never Used   Substance and Sexual Activity   • Alcohol use: No   • Drug use: No   • Sexual activity: Defer       ALLERGIES  Penicillins    REVIEW OF SYSTEMS  Review of Systems   Constitutional: Negative for fever.   HENT: Negative for sore throat.    Eyes: Negative.    Respiratory: Positive for cough and shortness of breath.    Cardiovascular: Negative for chest pain.   Gastrointestinal: Negative for abdominal pain, diarrhea and vomiting.   Genitourinary: Negative for dysuria.   Musculoskeletal: Negative for neck pain.   Skin: Negative for rash.   Allergic/Immunologic: Negative.    Neurological: Negative for weakness, numbness and headaches.   Hematological: Negative.    Psychiatric/Behavioral: Negative.    All other systems reviewed and are negative.      PHYSICAL EXAM  ED Triage Vitals [09/02/19 1638]   Temp Heart Rate Resp BP SpO2   98.9 °F (37.2 °C) 112 20 140/74 98 %      Temp src Heart Rate Source Patient Position BP Location FiO2 (%)   Tympanic -- -- -- --       Physical Exam   Constitutional: She is oriented to person, place, and time. She appears distressed (mildly).   HENT:   Head: Normocephalic and atraumatic.   Eyes: EOM are normal. Pupils are equal, round, and reactive to light.   Neck: Normal range of motion. Neck supple.   Cardiovascular: Normal rate, regular rhythm and normal heart sounds.   No murmur heard.  Pulmonary/Chest: She is in respiratory distress (mild). She has wheezes (diffuse). She has rhonchi (diffuse).   Cough on exam   Abdominal: Soft. There is no tenderness. There is no rebound and no guarding.   Musculoskeletal: Normal range of motion. She exhibits no edema.   Neurological: She is alert and oriented to person, place, and time. She has normal  sensation and normal strength.   Skin: Skin is warm and dry. No rash noted.   Psychiatric: Mood and affect normal.   Nursing note and vitals reviewed.      LAB RESULTS  Lab Results (last 24 hours)     Procedure Component Value Units Date/Time    CBC & Differential [[id number]] Collected:  09/02/19 1719    Specimen:  Blood Updated:  09/02/19 1731    Narrative:       The following orders were created for panel order CBC & Differential.  Procedure                               Abnormality         Status                     ---------                               -----------         ------                     CBC Auto Differential[[id number]]        Abnormal            Final result                 Please view results for these tests on the individual orders.    Comprehensive Metabolic Panel [[id number]]  (Abnormal) Collected:  09/02/19 1719    Specimen:  Blood Updated:  09/02/19 1759     Glucose 126 mg/dL      BUN 9 mg/dL      Creatinine 0.74 mg/dL      Sodium 126 mmol/L      Potassium 3.7 mmol/L      Chloride 81 mmol/L      CO2 32.7 mmol/L      Calcium 10.0 mg/dL      Total Protein 7.7 g/dL      Albumin 4.40 g/dL      ALT (SGPT) 12 U/L      AST (SGOT) 21 U/L      Alkaline Phosphatase 174 U/L      Total Bilirubin 0.3 mg/dL      eGFR Non African Amer 79 mL/min/1.73      Globulin 3.3 gm/dL      A/G Ratio 1.3 g/dL      BUN/Creatinine Ratio 12.2     Anion Gap 12.3 mmol/L     Narrative:       GFR Normal >60  Chronic Kidney Disease <60  Kidney Failure <15    Troponin [[id number]]  (Normal) Collected:  09/02/19 1719    Specimen:  Blood Updated:  09/02/19 1751     Troponin T <0.010 ng/mL     Narrative:       Troponin T Reference Range:  <= 0.03 ng/mL-   Negative for AMI  >0.03 ng/mL-     Abnormal for myocardial necrosis.  Clinicians would have to utilize clinical acumen, EKG, Troponin and serial changes to determine if it is an Acute Myocardial Infarction or myocardial injury due to an underlying chronic condition.     CBC Auto  Differential [[id number]]  (Abnormal) Collected:  09/02/19 1719    Specimen:  Blood Updated:  09/02/19 1731     WBC 25.36 10*3/mm3      RBC 4.22 10*6/mm3      Hemoglobin 11.1 g/dL      Hematocrit 36.4 %      MCV 86.3 fL      MCH 26.3 pg      MCHC 30.5 g/dL      RDW 21.2 %      RDW-SD 63.4 fl      MPV 10.1 fL      Platelets 402 10*3/mm3      Neutrophil % 81.4 %      Lymphocyte % 8.2 %      Monocyte % 7.2 %      Eosinophil % 0.7 %      Basophil % 0.7 %      Immature Grans % 1.8 %      Neutrophils, Absolute 20.66 10*3/mm3      Lymphocytes, Absolute 2.07 10*3/mm3      Monocytes, Absolute 1.82 10*3/mm3      Eosinophils, Absolute 0.17 10*3/mm3      Basophils, Absolute 0.19 10*3/mm3      Immature Grans, Absolute 0.45 10*3/mm3      nRBC 1.0 /100 WBC     Blood Culture - Blood, Arm, Left [[id number]] Collected:  09/02/19 1853    Specimen:  Blood from Arm, Left Updated:  09/02/19 1905    Lactic Acid, Plasma [[id number]]  (Normal) Collected:  09/02/19 1853    Specimen:  Blood Updated:  09/02/19 1939     Lactate 1.6 mmol/L     Blood Culture - Blood, Arm, Left [[id number]] Collected:  09/02/19 1923    Specimen:  Blood from Arm, Left Updated:  09/02/19 1932          I ordered the above labs and reviewed the results    RADIOLOGY  XR Chest 2 View   Final Result         STAT PORTABLE RADIOGRAPHIC VIEW OF THE CHEST     CLINICAL HISTORY: Shortness of air. Blood clotting in lungs.     FINDINGS:    Stat portable radiographic view of the chest demonstrates a left  subclavian approach Mediport catheter which has its distal tip at the  level of the confluence of the right innominate vein in the SVC. The  distal tip is turned cephalad and this is new when compared to the prior  chest x-ray dated 07/29/2019.     There is a loculated pleural effusion within the right base and there  are areas of consolidation within the right lung base that have a very  similar appearance when compared to the prior chest x-ray of 07/29/2019  although there does  appear to be a greater degree of loculated pleural  fluid within the right base and there is a greater degree of parenchymal  opacification compatible with atelectasis or infiltrate when compared to  the prior study. The left lung is clear. The cardiomediastinal  silhouette is within normal limits. Incidental note is made of surgical  clips within the right upper quadrant from a prior cholecystectomy.     These findings were discussed with Dr. Lamb on 09/02/2019 at  approximately 6:25 PM.    I ordered the above noted radiological studies. Interpreted by radiologist. Discussed with radiologist (). Reviewed by me in PACS.       PROCEDURES  Procedures    EKG          EKG time: 1707  Rhythm/Rate: sinus tach, 115   P waves and IL: occasional PAC  QRS, axis: nml  ST and T waves: nml     Interpreted Contemporaneously by me, independently viewed  Improved compared to prior 8/5/2019      PROGRESS AND CONSULTS       1820  Per Dr. Evans, CXR shows right sided loculated pleural effusion that has increased in size. The tip of metaport catheter is now pointed cranially in the superior vena cava. There are areas of consolidation within the right lung base similar appearance when compared to the prior chest x-ray of 07/29/2019    1839  Rechecked pt. Pt is resting comfortably. She states she is breathing back to normal. On re-exam, the pt has diffuse rhonchi. There is occasional wheezing. Notified pt of lab work results, including CBC that shows an elevated WBC; and CXR that shows an increased R pleural effusion as well as possible pneumonia in the right lung base. She states she is on Neulasta. Discussed the plan to consult Oncology. Pt understands and agrees with the plan, all questions answered.    1942  Discussed with Dr. Truong (Oncology) who states the pt is safe to be discharged with Rx for Doxycycline.     1959  Rechecked pt. Pt is resting comfortably. Notified pt of discussion with Dr. Zhong. Discussed the plan to discharge  the pt home with prescriptions for Doxycycline. I instructed the pt to f/u with her PCP and Oncology. She requests that I write a Rx for albuterol neb, which I will. Pt understands and agrees with the plan, all questions answered.        MEDICAL DECISION MAKING  Results were reviewed/discussed with the patient and they were also made aware of online access. Pt also made aware that some labs, such as cultures, will not be resulted during ER visit and follow up with PMD is necessary.     MDM  Number of Diagnoses or Management Options  Mild intermittent asthma with acute exacerbation in adult:   Pneumonia of right lower lobe due to infectious organism (CMS/HCC):      Amount and/or Complexity of Data Reviewed  Tests in the radiology section of CPT®: ordered and reviewed (CXR shows right sided loculated pleural effusion that has increased in size. The tip of metaport catheter is now pointed cranially in the superior vena cava. There are areas of consolidation within the right lung base   )  Decide to obtain previous medical records or to obtain history from someone other than the patient: yes           DIAGNOSIS  Final diagnoses:   Mild intermittent asthma with acute exacerbation in adult   Pneumonia of right lower lobe due to infectious organism (CMS/HCC)       DISPOSITION  DISCHARGE    Patient discharged in stable condition.    Reviewed implications of results, diagnosis, meds, responsibility to follow up, warning signs and symptoms of possible worsening, potential complications and reasons to return to ER.    Patient/Family voiced understanding of above instructions.    Discussed plan for discharge, as there is no emergent indication for admission. Patient referred to primary care provider for BP management due to today's BP. Pt/family is agreeable and understands need for follow up and repeat testing.  Pt is aware that discharge does not mean that nothing is wrong but it indicates no emergency is present that  requires admission and they must continue care with follow-up as given below or physician of their choice.     FOLLOW-UP  Raine Ghosh MD  [address]  [place] [address]  [phone]    Schedule an appointment as soon as possible for a visit       Aime Zhong MD  [address]  [place] [address]  [phone]    Schedule an appointment as soon as possible for a visit            Medication List      New Prescriptions    doxycycline 100 MG capsule  Commonly known as:  MONODOX  Take 1 capsule by mouth 2 (Two) Times a Day.     predniSONE 20 MG tablet  Commonly known as:  DELTASONE  Take 1 tablet by mouth 2 (Two) Times a Day.              Latest Documented Vital Signs:  As of 8:06 PM  BP- 112/77 HR- 110 Temp- 98.9 °F (37.2 °C) (Tympanic) O2 sat- 98%    --  Documentation assistance provided by autumn Cuba for Dr. Lamb.  Information recorded by the scribe was done at my direction and has been verified and validated by me.                Thom Cuba  09/02/19 2008       Daniel Lamb MD  09/02/19 2041

## 2019-09-02 NOTE — ED NOTES
Patient iv was blown when nurse tried to flush the line, another line was started. PAtient was also stuck for blood cultures and patient is a very hard stick, was only able to get one blue top on the first set of cultures.      Mayra Clifford, CHOCO  09/02/19 3540

## 2019-09-02 NOTE — DISCHARGE INSTRUCTIONS
Take medications as directed and to follow-up with your oncologist.  Please return to the emergency department symptoms worsen with increasing shortness of breath or high fever.

## 2019-09-06 NOTE — PROGRESS NOTES
[facility] OUT[facility]IENT FOLLOW UP [facility]INIC VISIT    REASON FOR FOLLOW-UP:    1.  Metastatic adenocarcinoma of the pancreas  2.  Palliative chemotherapy with FOLFIRINOX initiated on 7/10/2019.  3.  Neutropenia related to chemotherapy requiring Neulasta  4.  Bilateral pulmonary emboli and bilateral calf vein deep vein thrombosis diagnosed on 8/5/2019.  She also had gastrointestinal bleeding with a single angiectasia discovered in the duodenum which was injected and treated with APC.  Anticoagulation was briefly held but she was discharged with plans for Eliquis 2.5 mg twice daily which she did start.  She was neutropenic from chemotherapy and required Neupogen.      HISTORY OF PRESENT ILLNESS:  Linette Ghosh is a 64 y.o. female who returns today for follow up of the above issue.  She returns today for consideration of cycle #5 of FOLFIRINOX. She was recently started on Remeron, 15 mg daily in hopes this would enhance her appetite. Today, she does report that her appetite is increased though she is continuing to struggle with difficulty swallowing secondary to xerostomia.  She has Biotene at home but has not used it yet.  Additionally, she does have taste alteration which is contributing to her difficulty eating.  Her weight is reflective and this with an additional 10 pound weight loss in the last week.  She does continue on Eliquis, 5 mg twice daily for a newly diagnosed bilateral DVT and PE in early August.  Her dose was increased at her last office visit 2 weeks ago.  She is tolerating this well without any evidence of acute bleeding issues.    Of note, patient was seen in the emergency room September 2, 2018 with evidence of further loculation of the pleural effusion within the right base as well as an area of parenchymal opacification which was thought to be a pneumonia.  This is reviewed with Dr. Truong, who was on-call and patient was discharged home with a course of doxycycline and an albuterol  inhaler.    She returns today, she has noted significant reduction in shortness of breath and cough.  She is afebrile.  She remains weak and her total white blood cell count, though improved; still persists with leukocytosis at 18.31.  She denies any issues with excess bleeding or bruising.  She did have some left lower extremity numbness following an injury 2 weeks ago.  The patient was able to ambulate without much difficulty and therefore an x-ray was not performed.  Was also not felt to be treatment related in nature.  Thankfully, numbness has significantly reduced.       She has no other concerns.    ONCOLOGIC HISTORY:  She has had about a 40 pound weight loss in the past 6 months associated with worsening bilateral flank pain.  She does note that the pain is worse with eating.  She was taking hydrocodone a couple of times per day with some in her appetite and this is reflected.     Due to worsening pain, she presented to the emergency department on 6/1/2019.  A chest x-ray was unremarkable.  She had a CT angiogram of the chest showing no evidence for pulmonary embolism.  There was some nodularity involving the major fissure on the right.  There was some pleural thickening and nodularity involving the minor fissure on the right.  New infiltrate involving the posterior sulcus on the right medial he was noted to have a small pleural effusion on the right was noted as well.  A small groundglass area is appreciated in the right upper lobe.  The left adrenal gland was prominent in the tail the pancreas is prominent measuring 2.9 cm.  Some liver cysts were noted.     She had a follow-up CT scan of the abdomen and pelvis with contrast on 6/9/2019 showing a large hypoenhancing mass involving the entire tail of the pancreas which encases the splenic artery and occludes and splenic vein.  This abuts and possibly infiltrates the left adrenal gland.  No evidence for metastatic disease otherwise.     She saw Dr. Varela  with gastroenterology and had an endoscopic ultrasound performed on 6/17/2019 with pathology confirming adenocarcinoma.  The CA 19-9 is elevated at 655.        She was seen initially on 6/21/2019.    A PET scan was requested and performed on 6/25/2019.  A 4 cm intensely hypermetabolic pancreatic tail mass is noted.  Unfortunately, extensive metastatic disease is noted with carcinomatosis throughout the abdomen and extensive metastatic disease throughout the right pleura.  Metastatic disease extending into the posterior mediastinum was noted as well.    She did see Dr. Smiley with surgical oncology.  This referral was made for the PET scan results returned in hopes that she had localized disease that could be resected.  He will place a Mediport on 7/8/2019.    ALLERGIES:  Allergies   Allergen Reactions   • Penicillins Hives       MEDICATIONS:  The medication list has been reviewed with the patient by the medical assistant, and the list has been updated in the electronic medical record, which I reviewed.  Medication dosages and frequencies were confirmed to be accurate.    REVIEW OF SYSTEMS:  PAIN:  See Vital Signs below.  GENERAL: Decreased appetite.  Weight loss.  See HPI  SKIN:  No rashes or non-healing lesions.  HEME/LYMPH:  No abnormal bleeding.  No palpable lymphadenopathy.  EYES:  No vision changes or diplopia.  ENT:  No sore throat or difficulty swallowing.  RESPIRATORY: Some shortness of breath and she requires nebulizers, improved    CARDIOVASCULAR:  No chest pain, palpitations, orthopnea, or dyspnea on exertion.  GASTROINTESTINAL: Flank and abdominal pain.  Adequately controlled with medication.  GENITOURINARY:  No dysuria or hematuria.  MUSCULOSKELETAL:  No joint pain, swelling, or erythema.  Left ankle pain  NEUROLOGIC:  No dizziness, loss of consciousness, or seizures.  Left foot numbness, this is better  PSYCHIATRIC: Anxiety.    There were no vitals filed for this visit.    PHYSICAL  EXAMINATION:  GENERAL:  Well-developed well-nourished female; awake, alert and oriented, in no acute distress.  SKIN:  Warm and dry, without rashes, purpura, or petechiae.  HEAD:  Normocephalic, atraumatic.  EARS:  Hearing intact.  NOSE:  Septum midline.  No excoriations or nasal discharge.  MOUTH:  No stomatitis or ulcers.  Lips are normal. Oral candidiasis noted   THROAT:  Oropharynx without lesions or exudates.  NECK:  Supple with good range of motion; no thyromegaly or masses; no JVD or bruits.  LYMPHATICS:  No cervical, supraclavicular, axillary, lymphadenopathy.  CHEST:  Mild, diffuse inspiratory and exp rales   HEART: Tachycardic and regular without murmurs gallops or rubs, this is unchanged today   ABDOMEN:  Soft, minimal tenderness to palpation without rebound or guarding, non-distended.  Normal active bowel sounds.  No organomegaly.  EXTREMITIES:  No clubbing, cyanosis, or edema.  NEUROLOGICAL:  No focal neurologic deficits.  She has normal sensation in the left foot.  Normal proprioception.  She ambulates with a mild limp. This has improved today, less numbness noted     DIAGNOSTIC DATA:  Results for orders placed or performed during the hospital encounter of 09/02/19   Blood Culture - Blood, Arm, Left   Result Value Ref Range    Blood Culture No growth at 3 days    Blood Culture - Blood, Arm, Left   Result Value Ref Range    Blood Culture No growth at 3 days    Comprehensive Metabolic Panel   Result Value Ref Range    Glucose 126 (H) 65 - 99 mg/dL    BUN 9 8 - 23 mg/dL    Creatinine 0.74 0.57 - 1.00 mg/dL    Sodium 126 (L) 136 - 145 mmol/L    Potassium 3.7 3.5 - 5.2 mmol/L    Chloride 81 (L) 98 - 107 mmol/L    CO2 32.7 (H) 22.0 - 29.0 mmol/L    Calcium 10.0 8.6 - 10.5 mg/dL    Total Protein 7.7 6.0 - 8.5 g/dL    Albumin 4.40 3.50 - 5.20 g/dL    ALT (SGPT) 12 1 - 33 U/L    AST (SGOT) 21 1 - 32 U/L    Alkaline Phosphatase 174 (H) 39 - 117 U/L    Total Bilirubin 0.3 0.2 - 1.2 mg/dL    eGFR Non African Amer  79 >60 mL/min/1.73    Globulin 3.3 gm/dL    A/G Ratio 1.3 g/dL    BUN/Creatinine Ratio 12.2 7.0 - 25.0    Anion Gap 12.3 5.0 - 15.0 mmol/L   Troponin   Result Value Ref Range    Troponin T <0.010 0.000 - 0.030 ng/mL   CBC Auto Differential   Result Value Ref Range    WBC 25.36 (H) 3.40 - 10.80 10*3/mm3    RBC 4.22 3.77 - 5.28 10*6/mm3    Hemoglobin 11.1 (L) 12.0 - 15.9 g/dL    Hematocrit 36.4 34.0 - 46.6 %    MCV 86.3 79.0 - 97.0 fL    MCH 26.3 (L) 26.6 - 33.0 pg    MCHC 30.5 (L) 31.5 - 35.7 g/dL    RDW 21.2 (H) 12.3 - 15.4 %    RDW-SD 63.4 (H) 37.0 - 54.0 fl    MPV 10.1 6.0 - 12.0 fL    Platelets 402 140 - 450 10*3/mm3    Neutrophil % 81.4 (H) 42.7 - 76.0 %    Lymphocyte % 8.2 (L) 19.6 - 45.3 %    Monocyte % 7.2 5.0 - 12.0 %    Eosinophil % 0.7 0.3 - 6.2 %    Basophil % 0.7 0.0 - 1.5 %    Immature Grans % 1.8 (H) 0.0 - 0.5 %    Neutrophils, Absolute 20.66 (H) 1.70 - 7.00 10*3/mm3    Lymphocytes, Absolute 2.07 0.70 - 3.10 10*3/mm3    Monocytes, Absolute 1.82 (H) 0.10 - 0.90 10*3/mm3    Eosinophils, Absolute 0.17 0.00 - 0.40 10*3/mm3    Basophils, Absolute 0.19 0.00 - 0.20 10*3/mm3    Immature Grans, Absolute 0.45 (H) 0.00 - 0.05 10*3/mm3    nRBC 1.0 (H) 0.0 - 0.2 /100 WBC   Lactic Acid, Plasma   Result Value Ref Range    Lactate 1.6 0.5 - 2.0 mmol/L       IMAGING: I personally reviewed PET scan images from 6/25/2019.  Large tail of pancreas mass with evidence for carcinomatosis.    IMPRESSION:  1. There is a 4 cm intensely hypermetabolic pancreatic tail mass and  there is extensive metastatic disease. There is hypermetabolic  carcinomatosis throughout the abdomen and there is extensive metastatic  disease throughout the right pleura. There is also metastatic disease  extending into the posterior mediastinum. There has been increase in the  small right pleural effusion.  2. The reticular nodular opacities at the left lower lobe are  nonspecific and may represent bronchiolitis or infectious infiltrates,  but  metastatic disease cannot be excluded at this point.      ASSESSMENT:  This is a 64 y.o. female with:  1.  Metastatic pancreas cancer originating in the tail of the pancreas: There is evidence for carcinomatosis and extensive metastatic disease throughout the right pleura with extension into the posterior mediastinum.  She is not a surgical candidate.  She did see Dr. Smiley with surgical oncology nonetheless and he placed a Mediport.  We initiated therapy with palliative FOLFIRINOX on 7/10/2019.    Patient was recently seen in the emergency department as noted above with diagnosis of right lower lobe pneumonia.  She is completed a course of doxycycline and has noted decreased shortness of breath and cough.  She remains generally weak with persistent leukocytosis.  After review with Dr. Zhong, we will delay treatment 1 week in order to give her more time to recover.    She return in 1 week for consideration of cycle #5 of therapy and will have a PET scan a few days after this.  She was seen Dr. Zhong for review in 2 weeks with chemotherapy planned in 3 weeks pending CT scan results.    2.  Bilateral lower extremity DVT and pulmonary embolism: Patient will continue on Eliquis, 5 mg twice daily.    3.  Decreased appetite and weight loss: Continue Remeron, 15 mg daily.  In addition, I have encouraged the patient to try Biotene.  I have also sent a prescription for nystatin for oral candidiasis.  An urgent referral to our oncology dietitian has been sent given her progressive weight loss.    4.  Cancer related pain: Current pain regimen is controlling her pain reasonably well    5.  We do need to check BRCA mutation status at some point to see if she would be a candidate for olaparib.      6.  Left ankle injury with left foot numbness: Noted improvement.  We will continue to monitor this.    7.  Neutropenia related to therapy: Neulasta was added with cycle #4 of therapy.  She does have leukocytosis today likely residual  from recent pneumonia.  She is afebrile.  We will continue to monitor.    8.  Recent right lower lobe pneumonia.  Symptoms have nearly resolved.  She did have an area of loculated pleural effusion within the right base on CT imaging which we will continue to monitor.    9.  Hypokalemia.  Likely result of 1 day of significant diarrhea.  Diarrhea has now resolved.  We will proceed with 40 mEq of IV potassium today.  Additionally, a prescription for 40 mEq oral x 3 days and then 20 mEq daily thereafter was sent to her pharmacy.    PLAN:  1.  Delay cycle #5 FOLFIRINOX with Neulasta by 1 week  2.  IV and oral potassium as noted above  3.  Continue Remeron 15 mg nightly  4.  Urgent referral to oncology nutrition today  5.  Continue pain medication  6.  Encouraged the use of Biotene and prescription sent for nystatin  7.  Continue Eliquis to 5 mg twice daily and watch closely for bleeding  8.  PET scan scheduled for next Wednesday  9.  MD visit in 2 weeks for scan review  10.The patient is to call with any issues or concerns prior to her next office visit.  She has verbalized understanding.    All the above reviewed with Dr. Zhong and he is in agreement

## 2019-09-11 NOTE — PROGRESS NOTES
Oncology Nutrition Screening    Patient Name:  Linette Ghosh  YOB: 1955  MRN: [id number]  Date:  09/11/19  Physician:  Trevin, referred by Landy TRAVIS    Type of Cancer Treatment:   Chemotherapy:  FOLFIRINOX    Patient Active Problem List   Diagnosis   • Moderate persistent asthma without complication   • Asthma   • Hyperlipidemia   • Arthralgia of hip   • Pain of thigh   • Microscopic hematuria   • Type 2 diabetes mellitus (CMS/HCC)   • Tobacco abuse   • Chronic kidney disease, stage III (moderate) (CMS/HCC)   • Iron deficiency anemia   • Pancreatic mass   • Malignant neoplasm of tail of pancreas (CMS/HCC)   • Encounter for fitting and adjustment of vascular catheter   • Pleural effusion on right (malignant)   • Hyponatremia   • Tachycardia   • Pleural effusion   • Acute respiratory distress   • Pulmonary embolism, bilateral (CMS/HCC)   • Chemotherapy induced neutropenia (CMS/HCC)   • Anemia associated with chemotherapy   • Hematemesis with nausea       Current Outpatient Medications   Medication Sig Dispense Refill   • albuterol (PROVENTIL) (2.5 MG/3ML) 0.083% nebulizer solution Take 2.5 mg by nebulization Every 6 (Six) Hours As Needed for Wheezing. 30 vial 0   • albuterol sulfate  (90 Base) MCG/ACT inhaler Inhale 2 puffs Every 4 (Four) Hours As Needed for Wheezing. 1 inhaler 0   • apixaban (ELIQUIS) 5 MG tablet tablet Take 1 tablet by mouth Every 12 (Twelve) Hours. 60 tablet 11   • budesonide (PULMICORT FLEXHALER) 180 MCG/ACT inhaler Inhale 2 puffs 2 (Two) Times a Day. Rinse mouth after using 1 each 11   • desloratadine (CLARINEX) 5 MG tablet Take 1 tablet by mouth Daily. 90 tablet 1   • doxycycline (MONODOX) 100 MG capsule Take 1 capsule by mouth 2 (Two) Times a Day. 14 capsule 0   • fluticasone (FLONASE) 50 MCG/ACT nasal spray 2 sprays into the nostril(s) as directed by provider Daily. 3 bottle 3   • Garlic 100 MG tablet Take 1 tablet by mouth 2 (Two) Times a Day.     • hydrOXYzine  "(ATARAX) 25 MG tablet Take 1 tablet by mouth Every 8 (Eight) Hours As Needed for Itching. 90 tablet 1   • insulin detemir (LEVEMIR) 100 UNIT/ML injection Inject 12 Units under the skin into the appropriate area as directed Every Night. 6 mL 0   • Insulin Pen Needle (PEN NEEDLES) 32G X 4 MM misc USE NIGHTLY WITH INSULIN 30 each 0   • metFORMIN (GLUCOPHAGE) 1000 MG tablet Take 1 tablet by mouth 2 (Two) Times a Day With Meals. 120 tablet 1   • mirtazapine (REMERON) 15 MG tablet Take 1 tablet by mouth Every Night. 30 tablet 5   • montelukast (SINGULAIR) 10 MG tablet Take 1 tablet by mouth Every Night. 90 tablet 0   • Morphine (MS CONTIN) 15 MG 12 hr tablet Take 1 tablet by mouth 2 (Two) Times a Day. As needed for pain. 60 tablet 0   • Multiple Vitamins-Minerals (CENTRUM SILVER) tablet Take 1 tablet by mouth Daily.     • nystatin (MYCOSTATIN) 529899 UNIT/ML suspension Swish and swallow 5 mL 4 (Four) Times a Day. 120 mL 0   • ondansetron (ZOFRAN) 8 MG tablet Take 1 tablet by mouth 3 (Three) Times a Day As Needed for Nausea or Vomiting. 30 tablet 5   • oxyCODONE (ROXICODONE) 10 MG tablet Take 1 tablet by mouth Every 4 (Four) Hours As Needed for Moderate Pain . 60 tablet 0   • pantoprazole (PROTONIX) 40 MG EC tablet Take 1 tablet by mouth 2 (Two) Times a Day Before Meals. 60 tablet 0   • potassium chloride (K-DUR,KLOR-CON) 20 MEQ CR tablet Take 2 tablets daily x 3 days, then one tablet daily thereafter 33 tablet 1   • predniSONE (DELTASONE) 20 MG tablet Take 1 tablet by mouth 2 (Two) Times a Day. 10 tablet 0   • simvastatin (ZOCOR) 20 MG tablet Take 1 tablet by mouth Every Night. 90 tablet 0   • timolol (TIMOPTIC) 0.25 % ophthalmic solution Administer 1 drop to both eyes Daily.     • vitamin E 100 UNIT capsule Take 100 Units by mouth Daily.       No current facility-administered medications for this visit.        Glycemic Risk:   IDDM, Steriods    Weight:   Height: 65\"  Weight: 120 lbs.  Usual Body Weight: 135 lbs.   BMI: " "19.5  Weight has decreased 15 pounds over last 1 month   11% weight loss in 3 weeks    Oral Food Intake:  Regular Diet - No Restrictions  Compared to normal intake, current food intake is less than normal    Hydration Status:   How many 8 ounce glass of water of fluid do you drink per day?  unsure    Enteral Feeding:   n/a    Nutrition Symptoms:   Altered Apetite  Altered Taste Perception  Fatigue    Activity:   Not my normal self, but able to be up and about with fairly normal activities     reports that she has been smoking cigarettes.  She has a 30.00 pack-year smoking history. She has never used smokeless tobacco. She reports that she does not drink alcohol or use drugs.    Evaluation of Nutritional Risk:   Patient identified to be at nutritional risk and/or for nutritional consultation; will follow patient through course of treatment.   Diagnosis  Glycemic Risk  Weight Change  Nutrition Impact Symptoms  Patient Education     Spoke to patient on the phone today. She complains of early satiety, poor appetite, weight loss.  I instructed her to eat small frequent meals, eating every 2 hours even if she is not hungry and gave her examples of foods to eat. She is drinking Ensure and tolerates this.  She says the mouth pain is resolving and she is using the nystatin and biotene.  She says that she is used to eating \"homestyle\" foods like biscuits and jennings, gravy etc and she cannot tolerate these any longer. Sending her a list of high calorie high protein foods in the mail along with a sample menu for small frequent meals.   Will follow up when she is here next.         Electronically signed by:  Dayami Zaidi RD, LD  10:42 AM  "

## 2019-09-11 NOTE — TELEPHONE ENCOUNTER
----- Message from Dimas Patterson sent at 9/11/2019  2:41 PM EDT -----  Contact: [phone]  Needs refill oxycodone      Called and informed pt that I would send oxycodone to Dr. Zhong for signature and then it would get routed to her pharmacy. She v/u.

## 2019-09-21 PROBLEM — D63.1 ANEMIA DUE TO CHRONIC KIDNEY DISEASE: Status: ACTIVE | Noted: 2019-01-01

## 2019-09-21 PROBLEM — N18.9 ANEMIA DUE TO CHRONIC KIDNEY DISEASE: Status: ACTIVE | Noted: 2019-01-01

## 2019-09-21 NOTE — ED PROVIDER NOTES
" EMERGENCY DEPARTMENT ENCOUNTER    CHIEF COMPLAINT  Chief Complaint: SOA   History given by: patient   History limited by: nothing  Room Number: E461/1  PMD: Raine Ghosh MD       HPI:  Pt is a 64 y.o. female who presents complaining of increasing SOA that began this morning. Pt also c/o productive cough w/ green sputum (x1 month), vomiting (x1). Pt denies fever, chills, CP, abd pain, change in appetite, urinary sx or black/bloody stool. Pt states she fell about 3 weeks ago. Pt is receiving chemotherapy and reports her last treatment was about two weeks ago. Pt is on Eliquis and had her dose increased about 2-3 weeks ago. Pt's last dose of Eliquis was at 0630 this morning. Pt is a smoker. Hx of DM, GERD, HTN, hyperlipidemia, pneumonia, pancreatic CA and CKD. There are no other complaints at this time.     Duration:  Hours   Onset: gradual   Timing: constant   Location: upper respiratory   Radiation: none   Quality: \"SOA\"   Intensity/Severity: moderate   Progression: unchanged   Associated Symptoms: pt also c/o productive cough w/ green sputum (x1 month), vomiting (x1)  Aggravating Factors: none mentioned   Alleviating Factors: none mentioned   Previous Episodes: hx of DM, GERD, HTN, hyperlipidemia, pneumonia, pancreatic CA and CKD  Treatment before arrival: none    PAST MEDICAL HISTORY  Active Ambulatory Problems     Diagnosis Date Noted   • Moderate persistent asthma without complication 03/09/2016   • Asthma 03/09/2016   • Hyperlipidemia 03/09/2016   • Arthralgia of hip 03/09/2016   • Pain of thigh 03/09/2016   • Microscopic hematuria 03/09/2016   • Type 2 diabetes mellitus (CMS/HCC) 03/09/2016   • Tobacco abuse 09/13/2016   • Chronic kidney disease, stage III (moderate) (CMS/HCC) 03/14/2017   • Iron deficiency anemia 03/14/2017   • Pancreatic mass 06/11/2019   • Malignant neoplasm of tail of pancreas (CMS/HCC) 06/21/2019   • Encounter for fitting and adjustment of vascular catheter 07/08/2019   • Pleural " effusion on right (malignant) 07/17/2019   • Hyponatremia 07/18/2019   • Tachycardia 07/26/2019   • Pleural effusion 07/29/2019   • Acute respiratory distress 08/05/2019   • Pulmonary embolism, bilateral (CMS/HCC) 08/05/2019   • Chemotherapy induced neutropenia (CMS/HCC) 08/06/2019   • Anemia associated with chemotherapy 08/06/2019   • Hematemesis with nausea 08/12/2019     Resolved Ambulatory Problems     Diagnosis Date Noted   • Abnormal kidney function study 03/09/2016     Past Medical History:   Diagnosis Date   • Asthma    • Carotid artery stenosis    • Chronic kidney disease, stage III (moderate) (CMS/HCC)    • Diabetes mellitus (CMS/HCC)    • GERD (gastroesophageal reflux disease)    • Glaucoma    • Hydronephrosis    • Hyperlipidemia    • Hypertension    • Intracranial hemorrhage (CMS/HCC)    • Iron deficiency anemia    • Pancreatic cancer (CMS/HCC) 06/2019   • Pleural effusion on right    • Pneumonia    • Polyp of sigmoid colon    • Shortness of breath    • Tachycardia    • Tachycardia    • Vitamin D deficiency        PAST SURGICAL HISTORY  Past Surgical History:   Procedure Laterality Date   • CHOLECYSTECTOMY     • ENDOSCOPY N/A 8/13/2019    Procedure: ESOPHAGOGASTRODUODENOSCOPY WITH CAUTERY;  Surgeon: Yessenia Hurtado MD;  Location: [facility] ENDOSCOPY;  Service: Gastroenterology   • GALLBLADDER SURGERY     • HERNIA REPAIR      x3   • HYSTERECTOMY     • STOMACH SURGERY         FAMILY HISTORY  Family History   Problem Relation Age of Onset   • Hypertension Mother    • Diabetes Mother    • Arthritis Mother    • Hypertension Sister    • Diabetes Sister    • Thyroid disease Sister    • Arthritis Sister    • Hypertension Brother    • Diabetes Brother    • Lung cancer Maternal Uncle    • Stroke Maternal Grandfather    • Hypertension Brother    • Hypertension Sister    • Hypertension Sister        SOCIAL HISTORY  Social History     Socioeconomic History   • Marital status: Single     Spouse name: Not on file   •  Number of children: 0   • Years of education: College   • Highest education level: Not on file   Occupational History   • Occupation: Customer Service     Employer: [employer]   Tobacco Use   • Smoking status: Current Every Day Smoker     Packs/day: 1.00     Years: 30.00     Pack years: 30.00     Types: Cigarettes   • Smokeless tobacco: Never Used   Substance and Sexual Activity   • Alcohol use: No   • Drug use: No   • Sexual activity: Defer       ALLERGIES  Penicillins    REVIEW OF SYSTEMS  Review of Systems   Constitutional: Negative for appetite change, chills and fever.   HENT: Negative for rhinorrhea and sore throat.    Eyes: Negative for visual disturbance.   Respiratory: Positive for cough (productive w/ green sputum x1 month) and shortness of breath (increasing).    Cardiovascular: Negative for chest pain, palpitations and leg swelling.   Gastrointestinal: Positive for vomiting (x1). Negative for abdominal pain, blood in stool and diarrhea.   Endocrine: Negative.    Genitourinary: Negative for decreased urine volume, difficulty urinating, dysuria, frequency, hematuria and urgency.   Musculoskeletal: Negative for neck pain.   Skin: Negative for rash.   Neurological: Positive for numbness (in L toes). Negative for syncope and headaches.   Psychiatric/Behavioral: Negative.    All other systems reviewed and are negative.      PHYSICAL EXAM  ED Triage Vitals   Temp Heart Rate Resp BP SpO2   09/21/19 1616 09/21/19 1616 09/21/19 1616 09/21/19 1646 09/21/19 1616   98.3 °F (36.8 °C) (!) 124 20 118/64 95 %      Temp src Heart Rate Source Patient Position BP Location FiO2 (%)   09/21/19 1616 09/21/19 1616 -- -- --   Tympanic Monitor          Physical Exam   Constitutional: She is oriented to person, place, and time. She appears healthy.  Non-toxic appearance.   HENT:   Head: Normocephalic and atraumatic.   Oropharynx is within normal limits.   Eyes: EOM are normal.   Neck: Normal range of motion. Neck  supple.   Cardiovascular: Regular rhythm, normal heart sounds and intact distal pulses. Tachycardia present.   No murmur heard.  Pulses:       Posterior tibial pulses are 2+ on the right side, and 2+ on the left side.   HR in 120's.   Pulmonary/Chest: Effort normal. No respiratory distress. She has decreased breath sounds (mild, bilaterally). She has rhonchi (L base). She has rales (L base).   There is a port in L upper chest that is clean, dry, and intact.   Abdominal: Soft. Bowel sounds are normal. There is no tenderness. There is no rebound and no guarding.   Musculoskeletal: Normal range of motion. She exhibits no edema.   Able to move all extremities.   Neurological: She is alert and oriented to person, place, and time.   Skin: Skin is warm and dry.   Good capillary refill.   Psychiatric: Affect normal.   Nursing note and vitals reviewed.      LAB RESULTS  Lab Results (last 24 hours)     Procedure Component Value Units Date/Time    CBC & Differential [[id number]] Collected:  09/21/19 1704    Specimen:  Blood Updated:  09/21/19 1714    Narrative:       The following orders were created for panel order CBC & Differential.  Procedure                               Abnormality         Status                     ---------                               -----------         ------                     CBC Auto Differential[[id number]]        Abnormal            Final result                 Please view results for these tests on the individual orders.    Comprehensive Metabolic Panel [[id number]]  (Abnormal) Collected:  09/21/19 1704    Specimen:  Blood Updated:  09/21/19 1747     Glucose 151 mg/dL      BUN 6 mg/dL      Creatinine 0.56 mg/dL      Sodium 135 mmol/L      Potassium 2.5 mmol/L      Chloride 89 mmol/L      CO2 32.7 mmol/L      Calcium 8.9 mg/dL      Total Protein 7.0 g/dL      Albumin 4.00 g/dL      ALT (SGPT) 9 U/L      AST (SGOT) 20 U/L      Alkaline Phosphatase 73 U/L      Total Bilirubin 0.3 mg/dL      eGFR  Non  Amer 109 mL/min/1.73      Globulin 3.0 gm/dL      A/G Ratio 1.3 g/dL      BUN/Creatinine Ratio 10.7     Anion Gap 13.3 mmol/L     Narrative:       GFR Normal >60  Chronic Kidney Disease <60  Kidney Failure <15    BNP [[id number]]  (Normal) Collected:  09/21/19 1704    Specimen:  Blood Updated:  09/21/19 1737     proBNP 161.5 pg/mL     Narrative:       Among patients with dyspnea, NT-proBNP is highly sensitive for the detection of acute congestive heart failure. In addition NT-proBNP of <300 pg/ml effectively rules out acute congestive heart failure with 99% negative predictive value.    Troponin [[id number]]  (Normal) Collected:  09/21/19 1704    Specimen:  Blood Updated:  09/21/19 1738     Troponin T <0.010 ng/mL     Narrative:       Troponin T Reference Range:  <= 0.03 ng/mL-   Negative for AMI  >0.03 ng/mL-     Abnormal for myocardial necrosis.  Clinicians would have to utilize clinical acumen, EKG, Troponin and serial changes to determine if it is an Acute Myocardial Infarction or myocardial injury due to an underlying chronic condition.     CBC Auto Differential [[id number]]  (Abnormal) Collected:  09/21/19 1704    Specimen:  Blood Updated:  09/21/19 1714     WBC 8.12 10*3/mm3      RBC 3.79 10*6/mm3      Hemoglobin 9.8 g/dL      Hematocrit 32.2 %      MCV 85.0 fL      MCH 25.9 pg      MCHC 30.4 g/dL      RDW 19.7 %      RDW-SD 60.0 fl      MPV 9.6 fL      Platelets 223 10*3/mm3      Neutrophil % 72.3 %      Lymphocyte % 13.5 %      Monocyte % 12.7 %      Eosinophil % 0.5 %      Basophil % 0.6 %      Immature Grans % 0.4 %      Neutrophils, Absolute 5.87 10*3/mm3      Lymphocytes, Absolute 1.10 10*3/mm3      Monocytes, Absolute 1.03 10*3/mm3      Eosinophils, Absolute 0.04 10*3/mm3      Basophils, Absolute 0.05 10*3/mm3      Immature Grans, Absolute 0.03 10*3/mm3      nRBC 0.2 /100 WBC     Protime-INR [[id number]]  (Normal) Collected:  09/21/19 1704    Specimen:  Blood Updated:  09/21/19 1825     Protime  13.8 Seconds      INR 1.09    aPTT [[id number]]  (Normal) Collected:  09/21/19 1704    Specimen:  Blood Updated:  09/21/19 1825     PTT 27.8 seconds     POC Occult Blood Stool [[id number]]  (Normal) Collected:  09/21/19 2040    Specimen:  Stool from Per Rectum Updated:  09/21/19 2041     Fecal Occult Blood Negative     Lot Number [id number]     Expiration Date 5/31/2020     Positive Control Positive     Negative Control Negative          I ordered the above labs and reviewed the results.    RADIOLOGY  CT Angiogram Chest With Contrast   Final Result   1. There has been interval resolution of all pulmonary emboli since the   prior examination.   2. There is a stable large partially loculated right pleural effusion   seen in conjunction with stable atelectasis and volume loss in the right   lung.   3. There is a new enhancing pleural-based nodule at the base of the   right hemithorax that measures on the order of 1 cm. This is suspicious   for a pleural-based metastasis.       Radiation dose reduction techniques were utilized, including automated   exposure control and exposure modulation based on body size.       This report was finalized on 9/21/2019 8:14 PM by Dr. Shane Roger M.D.          XR Chest 2 View   Final Result   Stable loculated right pleural effusion with volume loss and   atelectasis at the base of the right lung. This is a stable chest   radiograph when compared to the prior examination of 09/02/2019.       This report was finalized on 9/21/2019 8:04 PM by Dr. Shane Roger M.D.               I ordered the above noted radiological studies. Interpreted by radiologist. Discussed with radiologist (Dr. Roger). Reviewed by me in PACS.       PROCEDURES  Procedures    EKG          EKG time: 1753  Rhythm/Rate: Sinus tach, 110's   P waves and WA: L atrial enlargement, normal  QRS, axis: PRWP   ST and T waves: unremarkable ST and T findings      Interpreted Contemporaneously by me, independently  viewed  unchanged compared to prior 9/2/2019.      PROGRESS AND CONSULTS       1641  Ordered CBC, CMP, troponin, BNP, and CXR for further evaluation.     1803  Ordered CTA of chest for further evaluation and Mirco-K for low potassium levels.    1925  Spoke with Dr. Roger (Radiologist) regarding CTA of chest who reports pulmonary emboli has resolved, stable R pleural effusion that has not changed, atelectasis at base of R lower lobe, and acute 1cm lesiion at R hemithorax.    2028  Placed a call to hematology/oncology and A.    2100  Discussed pt case with Dr. Mccormack (oncologist).     2115  Discussed pt case with Dr. Dominguez ([facility]) who agrees to admit pt.     2120  Rechecked pt. Pt is resting comfortably. Notified pt of lab results and imaging. Discussed the plan to admit for further treatment. Pt understands and agrees with the plan, all questions answered.      MEDICAL DECISION MAKING  Results were reviewed/discussed with the patient and they were also made aware of online access. Pt also made aware that some labs, such as cultures, will not be resulted during ER visit and follow up with PMD is necessary.     MDM  Number of Diagnoses or Management Options  Abnormal CT of the chest:   Anemia due to chronic kidney disease, unspecified CKD stage:   Dyspnea, unspecified type:   Hypokalemia:      Amount and/or Complexity of Data Reviewed  Clinical lab tests: ordered and reviewed (Glucose= 151  BUN= 6  Creatinine= 0.56  Sodium= 135  Potassium= 2.5  Troponin= <0.010  HGB= 9.8)  Tests in the radiology section of CPT®: ordered and reviewed (See radiology section.)  Tests in the medicine section of CPT®: ordered and reviewed (See EKG procedure note.)  Discussion of test results with the performing providers: yes (Dr. Roger)  Decide to obtain previous medical records or to obtain history from someone other than the patient: yes  Review and summarize past medical records: yes (Reviewed and summarized pt's previous medical  records and pt was admitted in august 2019 and had bilateral DVT w/ PE's and GI bleed. Pt had endoscopy done w/ cauterization and was off eliquis for 7 days.)  Discuss the patient with other providers: yes (Dr. Mccormack (oncologist)  Dr. Dominguez ([facility]))  Independent visualization of images, tracings, or specimens: yes           DIAGNOSIS  Final diagnoses:   Anemia due to chronic kidney disease, unspecified CKD stage   Hypokalemia   Dyspnea, unspecified type   Abnormal CT of the chest       DISPOSITION  ADMISSION    Discussed treatment plan and reason for admission with pt/family and admitting physician.  Pt/family voiced understanding of the plan for admission for further testing/treatment as needed.       Latest Documented Vital Signs:  As of 10:47 PM  BP- 98/64 HR- 114 Temp- 98.3 °F (36.8 °C) (Tympanic) O2 sat- 100%    --  Documentation assistance provided by autunm Corey for Dr. Ardon. Information recorded by the scribe was done at my direction and has been verified and validated by me.          Valerie Corey  09/21/19 [id number]       Ml Ardon MD  09/29/19 2021

## 2019-09-21 NOTE — ED TRIAGE NOTES
Pt reports shortness of breath and productive cough with green colored sputum starting this morning. Pt also reports left foot pain s/p fall x3 weeks ago.

## 2019-09-22 PROBLEM — E83.42 HYPOMAGNESEMIA: Status: ACTIVE | Noted: 2019-01-01

## 2019-09-22 PROBLEM — R06.00 DYSPNEA: Status: ACTIVE | Noted: 2019-01-01

## 2019-09-22 PROBLEM — E87.6 HYPOKALEMIA: Status: ACTIVE | Noted: 2019-01-01

## 2019-09-22 PROBLEM — R22.2 PLEURAL NODULE: Status: ACTIVE | Noted: 2019-01-01

## 2019-09-22 PROBLEM — K59.03 DRUG-INDUCED CONSTIPATION: Status: ACTIVE | Noted: 2019-01-01

## 2019-09-22 NOTE — CONSULTS
Subjective     REASON FOR CONSULTATION:    1.  Metastatic adenocarcinoma of the pancreas  2.  Palliative chemotherapy with FOLFIRINOX initiated on 7/10/2019.  3.  Neutropenia related to chemotherapy requiring Neulasta  4.  Bilateral pulmonary emboli and bilateral calf vein deep vein thrombosis diagnosed on 8/5/2019.  She also had gastrointestinal bleeding with a single angiectasia discovered in the duodenum which was injected and treated with APC.  Anticoagulation was briefly held but she was discharged with plans for Eliquis 2.5 mg twice daily which she did start.  She was neutropenic from chemotherapy and required Neupogen.  Provide an opinion on any further workup or treatment                             REQUESTING PHYSICIAN: Dr. Reynoso    RECORDS OBTAINED:  Records of the patients history including those obtained from the referring provider were reviewed and summarized in detail.    HISTORY OF PRESENT ILLNESS:  The patient is a 64 y.o. year old female who is here for an opinion about the above issue.    History of Present Illness patient is a 64-year-old female with history of metastatic pancreatic cancer with metastatic disease throughout the right pleura and extension into the posterior mediastinum.  She was seen by Dr. Zhong from our group.  She was treated with FOLFIRINOX as of July 10, 2019.  She also has history of DVT and pulmonary embolism for which she has been on Eliquis 5 mg twice daily.  Recently she saw our nurse practitioner Landy and received cycle 5 of FOLFIRINOX.  This was September 9, 2019.    She now got admitted with further shortness of breath which was worse in the last 2 days.  She also had some vomiting.  Her hemoglobin is 9.4 stable though it has dropped from 2 weeks ago likely secondary to chemotherapy.  Her white count is 6.34 and a platelet count of 220.  She had hypokalemia with a potassium of 2.5 and today is improved to 3.3.  Her oxygen saturation is 98%.  And she is  afebrile.    She had a CT angiogram of the chest yesterday which shows there has been interval resolution of the filling defects within the bilateral lower lobes.  There has been resolution of the filling defects within the right upper lobe pulmonary artery branches.  There is a stable large right pleural effusion with considerable consolidation and volume loss within the right middle and right lower lobe.  There is some pleural thickening involving the posterior aspect of the right pleura.  Persistent stable right hilar adenopathy is noted.  At the base of the right pleural space is a 1 cm pleural-based enhancing nodule.    Potassium is improved to 3.3.  Discussed with Dr. Zhong her primary oncologist and given that she is not hypoxic and she is scheduled for chemotherapy tomorrow.  He plans to continue the chemotherapy.  Her recent PET scan had shown significantly increase in the size of the large loculated pleural effusion.  I discussed with patient about thoracocentesis or Pleurx catheter placement but she has not required too many thoracocentesis.  Patient refuses to undergo that today and it is reasonable to get it done as outpatient.    Patient complains of left ankle pain.  She had a fall 3 weeks ago.  No leg swelling    Patient states her shortness of breath has improved now      Past Medical History:   Diagnosis Date   • Asthma    • Carotid artery stenosis    • Chronic kidney disease, stage III (moderate) (CMS/HCC)    • Diabetes mellitus (CMS/HCC)     Type 2   • GERD (gastroesophageal reflux disease)    • Glaucoma    • Hydronephrosis    • Hyperlipidemia    • Hypertension    • Intracranial hemorrhage (CMS/HCC)    • Iron deficiency anemia    • Pancreatic cancer (CMS/HCC) 06/2019   • Pleural effusion on right    • Pneumonia    • Polyp of sigmoid colon    • Shortness of breath    • Tachycardia    • Tachycardia    • Vitamin D deficiency         Past Surgical History:   Procedure Laterality Date   •  CHOLECYSTECTOMY     • ENDOSCOPY N/A 8/13/2019    Procedure: ESOPHAGOGASTRODUODENOSCOPY WITH CAUTERY;  Surgeon: Yessenia Hurtado MD;  Location: [facility];  Service: Gastroenterology   • GALLBLADDER SURGERY     • HERNIA REPAIR      x3   • HYSTERECTOMY     • STOMACH SURGERY          No current facility-administered medications on file prior to encounter.      Current Outpatient Medications on File Prior to Encounter   Medication Sig Dispense Refill   • albuterol (PROVENTIL) (2.5 MG/3ML) 0.083% nebulizer solution Take 2.5 mg by nebulization Every 6 (Six) Hours As Needed for Wheezing. 30 vial 3   • albuterol sulfate  (90 Base) MCG/ACT inhaler Inhale 2 puffs Every 4 (Four) Hours As Needed for Wheezing. 1 inhaler 0   • apixaban (ELIQUIS) 5 MG tablet tablet Take 1 tablet by mouth Every 12 (Twelve) Hours. 60 tablet 11   • desloratadine (CLARINEX) 5 MG tablet Take 1 tablet by mouth Daily. 90 tablet 1   • fluticasone (FLONASE) 50 MCG/ACT nasal spray 2 sprays into the nostril(s) as directed by provider Daily. 3 bottle 3   • insulin detemir (LEVEMIR) 100 UNIT/ML injection Inject 12 Units under the skin into the appropriate area as directed Every Night. 6 mL 0   • Insulin Pen Needle (PEN NEEDLES) 32G X 4 MM misc USE NIGHTLY WITH INSULIN 30 each 0   • metFORMIN (GLUCOPHAGE) 1000 MG tablet Take 1 tablet by mouth 2 (Two) Times a Day With Meals. 120 tablet 1   • montelukast (SINGULAIR) 10 MG tablet Take 1 tablet by mouth Every Night. 90 tablet 0   • Morphine (MS CONTIN) 15 MG 12 hr tablet Take 1 tablet by mouth 2 (Two) Times a Day. As needed for pain. 60 tablet 0   • Multiple Vitamins-Minerals (CENTRUM SILVER) tablet Take 1 tablet by mouth Daily.     • nystatin (MYCOSTATIN) 339036 UNIT/ML suspension Swish and swallow 5 mL 4 (Four) Times a Day. 120 mL 0   • ondansetron (ZOFRAN) 8 MG tablet Take 1 tablet by mouth 3 (Three) Times a Day As Needed for Nausea or Vomiting. 30 tablet 5   • oxyCODONE (ROXICODONE) 10 MG tablet  Take 1 tablet by mouth Every 4 (Four) Hours As Needed for Moderate Pain . 60 tablet 0   • pantoprazole (PROTONIX) 40 MG EC tablet Take 1 tablet by mouth 2 (Two) Times a Day Before Meals. 60 tablet 0   • potassium chloride (K-DUR,KLOR-CON) 20 MEQ CR tablet Take 2 tablets daily x 3 days, then one tablet daily thereafter 33 tablet 1   • simvastatin (ZOCOR) 20 MG tablet Take 1 tablet by mouth Every Night. 90 tablet 0   • timolol (TIMOPTIC) 0.25 % ophthalmic solution Administer 1 drop to both eyes Daily.     • budesonide (PULMICORT FLEXHALER) 180 MCG/ACT inhaler Inhale 2 puffs 2 (Two) Times a Day. Rinse mouth after using 1 each 11        ALLERGIES:    Allergies   Allergen Reactions   • Penicillins Hives        Social History     Socioeconomic History   • Marital status: Single     Spouse name: Not on file   • Number of children: 0   • Years of education: College   • Highest education level: Not on file   Occupational History   • Occupation: Customer Service     Employer: [employer]   Tobacco Use   • Smoking status: Current Every Day Smoker     Packs/day: 1.00     Years: 30.00     Pack years: 30.00     Types: Cigarettes   • Smokeless tobacco: Never Used   Substance and Sexual Activity   • Alcohol use: No   • Drug use: No   • Sexual activity: Defer        Family History   Problem Relation Age of Onset   • Hypertension Mother    • Diabetes Mother    • Arthritis Mother    • Hypertension Sister    • Diabetes Sister    • Thyroid disease Sister    • Arthritis Sister    • Hypertension Brother    • Diabetes Brother    • Lung cancer Maternal Uncle    • Stroke Maternal Grandfather    • Hypertension Brother    • Hypertension Sister    • Hypertension Sister         Review of Systems   Constitutional: Positive for fatigue. Negative for appetite change, chills, diaphoresis, fever and unexpected weight change.   HENT: Negative for hearing loss, sore throat and trouble swallowing.    Respiratory: Positive for shortness of  breath. Negative for cough, chest tightness and wheezing.    Cardiovascular: Negative for chest pain, palpitations and leg swelling.   Gastrointestinal: Negative for abdominal distention, abdominal pain, constipation, diarrhea, nausea and vomiting.   Genitourinary: Negative for dysuria, frequency, hematuria and urgency.   Musculoskeletal: Negative for joint swelling.        No muscle weakness.   Skin: Negative for rash and wound.   Neurological: Negative for seizures, syncope, speech difficulty, weakness, numbness and headaches.   Hematological: Negative for adenopathy. Does not bruise/bleed easily.   Psychiatric/Behavioral: Negative for behavioral problems, confusion and suicidal ideas.   Patient complains of ankle pain    Objective     Vitals:    09/22/19 0500 09/22/19 0700 09/22/19 0856 09/22/19 0905   BP:  110/74     BP Location:  Right arm     Patient Position:  Sitting     Pulse:   96 87   Resp:  18 18    Temp:  97.4 °F (36.3 °C)     TempSrc:  Oral     SpO2:  100% 98%    Weight: 59.9 kg (132 lb 0 oz)      Height:         Current Status 9/9/2019   ECOG score 0       Physical Exam      GENERAL:  Well-developed, well-nourished in no acute distress.   NECK:  Supple with good range of motion; no thyromegaly or masses, no JVD.  LYMPHATICS:  No cervical, supraclavicular, axillary or inguinal adenopathy.  CHEST:  Lungs clear to auscultation. Good airflow.  Decreased breath sounds in the right lung base  CARDIAC:  Regular rate and rhythm without murmurs, rubs or gallops. Normal S1,S2.  ABDOMEN:  Soft, nontender with no hepatosplenomegaly or masses.  Distended  EXTREMITIES:  No clubbing, cyanosis or edema.  NEUROLOGICAL:  Cranial Nerves II-XII grossly intact.  No focal neurological deficits.  PSYCHIATRIC:  Normal affect and mood.        RECENT LABS:  Hematology WBC   Date Value Ref Range Status   09/22/2019 6.34 3.40 - 10.80 10*3/mm3 Final     RBC   Date Value Ref Range Status   09/22/2019 3.65 (L) 3.77 - 5.28 10*6/mm3  Final     Hemoglobin   Date Value Ref Range Status   09/22/2019 9.4 (L) 12.0 - 15.9 g/dL Final     Hematocrit   Date Value Ref Range Status   09/22/2019 30.8 (L) 34.0 - 46.6 % Final     Platelets   Date Value Ref Range Status   09/22/2019 220 140 - 450 10*3/mm3 Final          Assessment/Plan   ASSESSMENT:  This is a 64 y.o. female with:  1.  Metastatic pancreas cancer originating in the tail of the pancreas: There is evidence for carcinomatosis and extensive metastatic disease throughout the right pleura with extension into the posterior mediastinum.  She is not a surgical candidate.  She did see Dr. Smiley with surgical oncology nonetheless and he placed a Mediport.  We initiated therapy with palliative FOLFIRINOX on 7/10/2019.  Last cycle of FOLFIRINOX September 9, 2019.  She has cycle 6 tomorrow.  She was admitted with shortness of breath but CT angiogram actually shows improvement in the pulmonary embolism.  · Recent PET scan done after cycle 5 FOLFIRINOX has shown improvement in the pancreatic tail mass.  But there is worsening of the large right pleural effusion.  · Patient refuses thoracocentesis at present.  · She is scheduled for chemotherapy tomorrow and to see Dr. Zhong tomorrow.  · Given that she is not hypoxic now and her shortness of breath is just intermittent she refuses to undergo thoracocentesis.     2.  Bilateral lower extremity DVT and pulmonary embolism: Patient will continue on Eliquis, 5 mg twice daily.  · CT angiogram of the chest yesterday shows significant improvement in the pulmonary embolism     3.  Decreased appetite and weight loss: Continue Remeron, 15 mg daily.  In addition, I have encouraged the patient to try Biotene.  I have also sent a prescription for nystatin for oral candidiasis.  An urgent referral to our oncology dietitian has been sent given her progressive weight loss.     4.  Cancer related pain: Current pain regimen is controlling her pain reasonably well     5.  We do  need to check BRCA mutation status at some point to see if she would be a candidate for olaparib.       6.  Left ankle injury with left foot numbness: Noted improvement.  We will continue to monitor this.  · Will obtain left ankle x-rays today prior to discharge     7.  Neutropenia related to therapy: Neulasta was added with cycle #4 of therapy.  She does have leukocytosis today likely residual from recent pneumonia.  She is afebrile.  We will continue to monitor.     8.  Recent right lower lobe pneumonia.  Symptoms have nearly resolved.  She did have an area of loculated pleural effusion within the right base on CT imaging which we will continue to monitor.     9.  Hypokalemia.  Likely result of 1 day of significant diarrhea.  Diarrhea has now resolved.  We will proceed with 40 mEq of IV potassium today.  Additionally, a prescription for 40 mEq oral x 3 days and then 20 mEq daily thereafter was sent to her pharmacy.  · Patient received potassium supplementation    Plan   1.   Okay to discharge from our point  2. Discussion done with Dr. Zhong and she has appointment tomorrow in the office for cycle 6 of FOLFIRINOX  3. Obtain CA 19-9 today  4. Obtain x-ray of the left ankle prior to discharge  5. In future shortness of breath gets worse, patient could undergo thoracocentesis as there is worsening of her pleural effusion.  6. Potassium supplementation prior to discharge    We will sign off.  Patient to follow-up with Dr. Zhong September 23.    Rafaela Mccormack MD

## 2019-09-22 NOTE — DISCHARGE SUMMARY
Date of Admission: 9/21/2019  Date of Discharge:  9/22/2019  Primary Care Physician: Raine Ghosh MD     Discharge Diagnosis:  Active Hospital Problems    Diagnosis  POA   • **Hypokalemia [E87.6]  Yes   • Pleural nodule [R22.2]  Yes   • Hypomagnesemia [E83.42]  Yes   • Dyspnea [R06.00]  Unknown   • Drug-induced constipation [K59.03]  Unknown   • Anemia due to chronic kidney disease [N18.9, D63.1]  Yes   • Pleural effusion on right (malignant) [J90]  Yes   • Malignant neoplasm of tail of pancreas (CMS/HCC) [C25.2]  Yes   • Tobacco abuse [Z72.0]  Yes   • Type 2 diabetes mellitus (CMS/HCC) [E11.9]  Yes      Resolved Hospital Problems   No resolved problems to display.       Presenting Problem/History of Present Illness:  Hypokalemia [E87.6]  Abnormal CT of the chest [R93.89]  Dyspnea, unspecified type [R06.00]  Anemia due to chronic kidney disease, unspecified CKD stage [N18.9, D63.1]  Hypokalemia [E87.6]     Hospital Course:  The patient is a 64 y.o. woman with metastatic pancreatic cancer who was admitted to our service yesterday for hypokalemia.  She came to the emergency room complaining of left foot pain and shortness of breath.  Potassium level was corrected.  She was seen by her oncologist today and okayed for discharge.  She has an appointment tomorrow in the office.   Patient has a malignant pleural effusion which was unchanged by CT.  Shortness of breath had improved and she was at baseline.   She has an Ace wrap for the left ankle.  She sprained it several days ago.  She has been able to walk on it.   Patient discharged as requested.    Stable condition; poor prognosis    Exam Today: Feels okay.  Asking to go home.  Vital signs noted.  No distress.  Thin.  Looks older than age.  Heart regular.  Lungs with decreased breath sounds on the right.  Few crackles on the left.  Abdomen distended but soft.  Extremities without edema    Procedures Performed:  Xr Chest 2 View    Result Date: 9/21/2019  Stable  loculated right pleural effusion with volume loss and atelectasis at the base of the right lung. This is a stable chest radiograph when compared to the prior examination of 09/02/2019.  This report was finalized on 9/21/2019 8:04 PM by Dr. Shane Roger M.D.      Xr Ankle 3+ View Left    Result Date: 9/22/2019  There are findings suggestive of a defect in the medial aspect of the talus. However, the defect appears corticated there is no overlying soft tissue swelling. No evidence for an acute fracture or abnormality of the left ankle is appreciated. However, it may be prudent to consider a CT of the left ankle to ensure there is no subtle acute bony abnormality in the region that has a deformity.  This report was finalized on 9/22/2019 5:10 PM by Dr. Shane Roger M.D.      Ct Angiogram Chest With Contrast    Result Date: 9/21/2019  1. There has been interval resolution of all pulmonary emboli since the prior examination. 2. There is a stable large partially loculated right pleural effusion seen in conjunction with stable atelectasis and volume loss in the right lung. 3. There is a new enhancing pleural-based nodule at the base of the right hemithorax that measures on the order of 1 cm. This is suspicious for a pleural-based metastasis.  Radiation dose reduction techniques were utilized, including automated exposure control and exposure modulation based on body size.  This report was finalized on 9/21/2019 8:14 PM by Dr. Shane Roger M.D.           Labs:  Lab Results   Component Value Date    WBC 6.34 09/22/2019    HGB 9.4 (L) 09/22/2019    HCT 30.8 (L) 09/22/2019     09/22/2019     Lab Results   Component Value Date     (L) 09/22/2019    K 4.6 09/22/2019    CL 92 (L) 09/22/2019    CO2 29.5 (H) 09/22/2019    BUN 6 (L) 09/22/2019    CREATININE 0.68 09/22/2019    GLUCOSE 124 (H) 09/22/2019     Lab Results   Component Value Date    AST 20 09/21/2019    ALT 9 09/21/2019    ALKPHOS 73 09/21/2019          Consults:   Dr. Rafaela Mccormack    Discharge Disposition:  Home or Self Care    Discharge Medications:     Discharge Medications      New Medications      Instructions Start Date   bisacodyl 10 MG suppository  Commonly known as:  DULCOLAX   10 mg, Rectal, Daily PRN      polyethylene glycol pack packet  Commonly known as:  MIRALAX   17 g, Oral, Daily   Start Date:  9/23/2019        Changes to Medications      Instructions Start Date   potassium chloride 20 MEQ CR tablet  Commonly known as:  K-DURKLOR-CON  What changed:  additional instructions   one tablet daily thereafter         Continue These Medications      Instructions Start Date   albuterol sulfate  (90 Base) MCG/ACT inhaler  Commonly known as:  PROVENTIL HFA;VENTOLIN HFA;PROAIR HFA   2 puffs, Inhalation, Every 4 Hours PRN      albuterol (2.5 MG/3ML) 0.083% nebulizer solution  Commonly known as:  PROVENTIL   2.5 mg, Nebulization, Every 6 Hours PRN      apixaban 5 MG tablet tablet  Commonly known as:  ELIQUIS   5 mg, Oral, Every 12 Hours Scheduled      budesonide 180 MCG/ACT inhaler  Commonly known as:  PULMICORT FLEXHALER   2 puffs, Inhalation, 2 Times Daily - RT, Rinse mouth after using      desloratadine 5 MG tablet  Commonly known as:  CLARINEX   5 mg, Oral, Daily      fluticasone 50 MCG/ACT nasal spray  Commonly known as:  FLONASE   2 sprays, Nasal, Daily      LEVEMIR FLEXTOUCH 100 UNIT/ML injection  Generic drug:  insulin detemir   12 Units, Subcutaneous, Nightly      montelukast 10 MG tablet  Commonly known as:  SINGULAIR   10 mg, Oral, Nightly      Morphine 15 MG 12 hr tablet  Commonly known as:  MS CONTIN   15 mg, Oral, 2 Times Daily, As needed for pain.      nystatin 482671 UNIT/ML suspension  Commonly known as:  MYCOSTATIN   500,000 Units, Swish & Swallow, 4 Times Daily      ondansetron 8 MG tablet  Commonly known as:  ZOFRAN   8 mg, Oral, 3 Times Daily PRN      oxyCODONE 10 MG tablet  Commonly known as:  ROXICODONE   10 mg, Oral, Every 4  Hours PRN      pantoprazole 40 MG EC tablet  Commonly known as:  PROTONIX   40 mg, Oral, 2 Times Daily Before Meals      Pen Needles 32G X 4 MM misc   USE NIGHTLY WITH INSULIN      simvastatin 20 MG tablet  Commonly known as:  ZOCOR   20 mg, Oral, Nightly      timolol 0.25 % ophthalmic solution  Commonly known as:  TIMOPTIC   1 drop, Both Eyes, Daily         Stop These Medications    CENTRUM SILVER tablet     metFORMIN 1000 MG tablet  Commonly known as:  GLUCOPHAGE            Discharge Diet:     Activity at Discharge:     Follow-up Appointments:  Future Appointments   Date Time Provider Department Center   9/23/2019  7:30 AM [facility]  [facility]   9/23/2019  8:00 AM Aime Zhong MD [facility]  [facility]   10/13/2019  9:00 AM [facility] CT 3  [facility]     Follow-up Information     Raine Ghosh MD Follow up.    Specialty:  Internal Medicine  Contact information:  [address]  [place] [address]  [phone]                     Test Results Pending at Discharge: None       Aleena Reynoso MD  09/22/19  7:44 PM    Time Spent on Discharge Activities: 30 minutes

## 2019-09-22 NOTE — H&P
Patient Name:  Linette Ghosh  YOB: 1955  MRN:  [id number]  Admit Date:  9/21/2019  Patient Care Team:  Raine Ghosh MD as PCP - General (Internal Medicine)  Hal Miles MD as Consulting Physician (Ophthalmology)  Berhane Rodriguez MD as Consulting Physician (Pulmonary Disease)  Raine Ghosh MD as Referring Physician (Internal Medicine)  Aime Zhong MD as Consulting Physician (Hematology and Oncology)  Red Caballero MD as Consulting Physician (Urology)  Ayan Varela MD as Consulting Physician (Gastroenterology)  Constantin Smiley MD as Consulting Physician (Surgical Oncology)      Subjective   History Present Illness     Chief Complaint   Patient presents with   • Shortness of Breath   • Cough   • Foot Pain       Ms. Ghosh is a 64 y.o. smoker with a history of pancreatic cancer followed by the [facility] group that presents to [facility] complaining of shortness of breath.  She initially told me she is short of breath all the time but then says that this occurred over the last day or 2.     Shortness of Breath   This is a new problem. The current episode started yesterday. The problem occurs intermittently. The problem has been unchanged. Associated symptoms include vomiting (once few days ago). Pertinent negatives include no fever. Nothing aggravates the symptoms. She has tried nothing for the symptoms.   Cough   Associated symptoms include shortness of breath. Pertinent negatives include no fever.   Foot Pain   This is a new problem. The current episode started 1 to 4 weeks ago. The problem occurs constantly. The problem has been unchanged. Associated symptoms include coughing and vomiting (once few days ago). Pertinent negatives include no fever.     Review of Systems   Constitutional: Positive for appetite change and unexpected weight change. Negative for fever.   HENT: Negative.    Respiratory: Positive for cough and shortness of  breath.    Cardiovascular: Negative.    Gastrointestinal: Positive for vomiting (once few days ago). Negative for blood in stool and diarrhea.   Endocrine: Negative.    Genitourinary: Negative.    Musculoskeletal: Negative.    Skin: Negative.    Hematological: Negative.    Psychiatric/Behavioral: Negative.         Personal History     Past Medical History:   Diagnosis Date   • Asthma    • Carotid artery stenosis    • Chronic kidney disease, stage III (moderate) (CMS/HCC)    • Diabetes mellitus (CMS/HCC)     Type 2   • GERD (gastroesophageal reflux disease)    • Glaucoma    • Hydronephrosis    • Hyperlipidemia    • Hypertension    • Intracranial hemorrhage (CMS/HCC)    • Iron deficiency anemia    • Pancreatic cancer (CMS/HCC) 06/2019   • Pleural effusion on right    • Pneumonia    • Polyp of sigmoid colon    • Shortness of breath    • Tachycardia    • Tachycardia    • Vitamin D deficiency      Past Surgical History:   Procedure Laterality Date   • CHOLECYSTECTOMY     • ENDOSCOPY N/A 8/13/2019    Procedure: ESOPHAGOGASTRODUODENOSCOPY WITH CAUTERY;  Surgeon: Yessenia Hurtado MD;  Location: [facility] ENDOSCOPY;  Service: Gastroenterology   • GALLBLADDER SURGERY     • HERNIA REPAIR      x3   • HYSTERECTOMY     • STOMACH SURGERY       Family History   Problem Relation Age of Onset   • Hypertension Mother    • Diabetes Mother    • Arthritis Mother    • Hypertension Sister    • Diabetes Sister    • Thyroid disease Sister    • Arthritis Sister    • Hypertension Brother    • Diabetes Brother    • Lung cancer Maternal Uncle    • Stroke Maternal Grandfather    • Hypertension Brother    • Hypertension Sister    • Hypertension Sister      Social History     Tobacco Use   • Smoking status: Current Every Day Smoker     Packs/day: 1.00     Years: 30.00     Pack years: 30.00     Types: Cigarettes   • Smokeless tobacco: Never Used   Substance Use Topics   • Alcohol use: No   • Drug use: No     No current facility-administered medications  on file prior to encounter.      Current Outpatient Medications on File Prior to Encounter   Medication Sig Dispense Refill   • albuterol (PROVENTIL) (2.5 MG/3ML) 0.083% nebulizer solution Take 2.5 mg by nebulization Every 6 (Six) Hours As Needed for Wheezing. 30 vial 3   • albuterol sulfate  (90 Base) MCG/ACT inhaler Inhale 2 puffs Every 4 (Four) Hours As Needed for Wheezing. 1 inhaler 0   • apixaban (ELIQUIS) 5 MG tablet tablet Take 1 tablet by mouth Every 12 (Twelve) Hours. 60 tablet 11   • desloratadine (CLARINEX) 5 MG tablet Take 1 tablet by mouth Daily. 90 tablet 1   • fluticasone (FLONASE) 50 MCG/ACT nasal spray 2 sprays into the nostril(s) as directed by provider Daily. 3 bottle 3   • insulin detemir (LEVEMIR) 100 UNIT/ML injection Inject 12 Units under the skin into the appropriate area as directed Every Night. 6 mL 0   • Insulin Pen Needle (PEN NEEDLES) 32G X 4 MM misc USE NIGHTLY WITH INSULIN 30 each 0   • metFORMIN (GLUCOPHAGE) 1000 MG tablet Take 1 tablet by mouth 2 (Two) Times a Day With Meals. 120 tablet 1   • montelukast (SINGULAIR) 10 MG tablet Take 1 tablet by mouth Every Night. 90 tablet 0   • Morphine (MS CONTIN) 15 MG 12 hr tablet Take 1 tablet by mouth 2 (Two) Times a Day. As needed for pain. 60 tablet 0   • Multiple Vitamins-Minerals (CENTRUM SILVER) tablet Take 1 tablet by mouth Daily.     • nystatin (MYCOSTATIN) 600343 UNIT/ML suspension Swish and swallow 5 mL 4 (Four) Times a Day. 120 mL 0   • ondansetron (ZOFRAN) 8 MG tablet Take 1 tablet by mouth 3 (Three) Times a Day As Needed for Nausea or Vomiting. 30 tablet 5   • oxyCODONE (ROXICODONE) 10 MG tablet Take 1 tablet by mouth Every 4 (Four) Hours As Needed for Moderate Pain . 60 tablet 0   • pantoprazole (PROTONIX) 40 MG EC tablet Take 1 tablet by mouth 2 (Two) Times a Day Before Meals. 60 tablet 0   • potassium chloride (K-DUR,KLOR-CON) 20 MEQ CR tablet Take 2 tablets daily x 3 days, then one tablet daily thereafter 33 tablet 1   •  simvastatin (ZOCOR) 20 MG tablet Take 1 tablet by mouth Every Night. 90 tablet 0   • timolol (TIMOPTIC) 0.25 % ophthalmic solution Administer 1 drop to both eyes Daily.     • budesonide (PULMICORT FLEXHALER) 180 MCG/ACT inhaler Inhale 2 puffs 2 (Two) Times a Day. Rinse mouth after using 1 each 11     Allergies   Allergen Reactions   • Penicillins Hives       Objective    Objective     Vital Signs  Temp:  [98 °F (36.7 °C)-98.3 °F (36.8 °C)] 98 °F (36.7 °C)  Heart Rate:  [] 103  Resp:  [16-20] 16  BP: ()/(64-85) 127/85  SpO2:  [95 %-100 %] 100 %  on   ;   Device (Oxygen Therapy): room air  Body mass index is 21.31 kg/m².    Physical Exam   Constitutional: She is oriented to person, place, and time. No distress.   Somewhat frail   HENT:   Head: Normocephalic and atraumatic.   Eyes: Conjunctivae and EOM are normal. No scleral icterus.   Neck: Normal range of motion. No JVD present.   Cardiovascular: Normal rate and regular rhythm.   Pulmonary/Chest: Effort normal. She has decreased breath sounds.   Abdominal: Soft. Bowel sounds are normal. She exhibits no distension. There is no tenderness.   Musculoskeletal: Normal range of motion. She exhibits no edema.   Neurological: She is alert and oriented to person, place, and time.   Skin: Skin is warm and dry.   Psychiatric: She has a normal mood and affect. Her behavior is normal.   Nursing note and vitals reviewed.      Results Review:  I reviewed the patient's new clinical results.  I reviewed the patient's new imaging results and agree with the interpretation.  I reviewed the patient's other test results and agree with the interpretation  I personally viewed and interpreted the patient's EKG/Telemetry data  Discussed with ED provider.    Lab Results (last 24 hours)     Procedure Component Value Units Date/Time    CBC & Differential [[id number]] Collected:  09/21/19 1704    Specimen:  Blood Updated:  09/21/19 1714    Narrative:       The following orders were  created for panel order CBC & Differential.  Procedure                               Abnormality         Status                     ---------                               -----------         ------                     CBC Auto Differential[[id number]]        Abnormal            Final result                 Please view results for these tests on the individual orders.    Comprehensive Metabolic Panel [[id number]]  (Abnormal) Collected:  09/21/19 1704    Specimen:  Blood Updated:  09/21/19 1747     Glucose 151 mg/dL      BUN 6 mg/dL      Creatinine 0.56 mg/dL      Sodium 135 mmol/L      Potassium 2.5 mmol/L      Chloride 89 mmol/L      CO2 32.7 mmol/L      Calcium 8.9 mg/dL      Total Protein 7.0 g/dL      Albumin 4.00 g/dL      ALT (SGPT) 9 U/L      AST (SGOT) 20 U/L      Alkaline Phosphatase 73 U/L      Total Bilirubin 0.3 mg/dL      eGFR Non African Amer 109 mL/min/1.73      Globulin 3.0 gm/dL      A/G Ratio 1.3 g/dL      BUN/Creatinine Ratio 10.7     Anion Gap 13.3 mmol/L     Narrative:       GFR Normal >60  Chronic Kidney Disease <60  Kidney Failure <15    BNP [[id number]]  (Normal) Collected:  09/21/19 1704    Specimen:  Blood Updated:  09/21/19 1737     proBNP 161.5 pg/mL     Narrative:       Among patients with dyspnea, NT-proBNP is highly sensitive for the detection of acute congestive heart failure. In addition NT-proBNP of <300 pg/ml effectively rules out acute congestive heart failure with 99% negative predictive value.    Troponin [[id number]]  (Normal) Collected:  09/21/19 1704    Specimen:  Blood Updated:  09/21/19 1738     Troponin T <0.010 ng/mL     Narrative:       Troponin T Reference Range:  <= 0.03 ng/mL-   Negative for AMI  >0.03 ng/mL-     Abnormal for myocardial necrosis.  Clinicians would have to utilize clinical acumen, EKG, Troponin and serial changes to determine if it is an Acute Myocardial Infarction or myocardial injury due to an underlying chronic condition.     CBC Auto Differential  [[id number]]  (Abnormal) Collected:  09/21/19 1704    Specimen:  Blood Updated:  09/21/19 1714     WBC 8.12 10*3/mm3      RBC 3.79 10*6/mm3      Hemoglobin 9.8 g/dL      Hematocrit 32.2 %      MCV 85.0 fL      MCH 25.9 pg      MCHC 30.4 g/dL      RDW 19.7 %      RDW-SD 60.0 fl      MPV 9.6 fL      Platelets 223 10*3/mm3      Neutrophil % 72.3 %      Lymphocyte % 13.5 %      Monocyte % 12.7 %      Eosinophil % 0.5 %      Basophil % 0.6 %      Immature Grans % 0.4 %      Neutrophils, Absolute 5.87 10*3/mm3      Lymphocytes, Absolute 1.10 10*3/mm3      Monocytes, Absolute 1.03 10*3/mm3      Eosinophils, Absolute 0.04 10*3/mm3      Basophils, Absolute 0.05 10*3/mm3      Immature Grans, Absolute 0.03 10*3/mm3      nRBC 0.2 /100 WBC     Protime-INR [[id number]]  (Normal) Collected:  09/21/19 1704    Specimen:  Blood Updated:  09/21/19 1825     Protime 13.8 Seconds      INR 1.09    aPTT [[id number]]  (Normal) Collected:  09/21/19 1704    Specimen:  Blood Updated:  09/21/19 1825     PTT 27.8 seconds     Magnesium [[id number]]  (Abnormal) Collected:  09/21/19 1704    Specimen:  Blood Updated:  09/22/19 0008     Magnesium 1.1 mg/dL     POC Occult Blood Stool [[id number]]  (Normal) Collected:  09/21/19 2040    Specimen:  Stool from Per Rectum Updated:  09/21/19 2041     Fecal Occult Blood Negative     Lot Number [id number]     Expiration Date 5/31/2020     Positive Control Positive     Negative Control Negative    POC Glucose Once [[id number]]  (Abnormal) Collected:  09/22/19 0005    Specimen:  Blood Updated:  09/22/19 0006     Glucose 166 mg/dL           Imaging Results (last 24 hours)     Procedure Component Value Units Date/Time    CT Angiogram Chest With Contrast [[id number]] Collected:  09/21/19 2011     Updated:  09/21/19 2017    Narrative:       CT ANGIOGRAM OF THE CHEST WITH CONTRAST     HISTORY: 64-year-old female with a history of cough and shortness of air  with metastatic pancreatic cancer, also with a diagnosis of  bilateral  pulmonary emboli presenting for follow-up evaluation.     TECHNIQUE: Contiguous axial images were obtained through the chest  following bolus intravenous administration of nonionic contrast. 3D  reformatted images were produced and presented for interpretation.     COMPARISON: CT angiogram of the chest with contrast, 08/05/2019.     FINDINGS: Since the prior examination there has been interval resolution  of the filling defects within the bilateral lower lobes. There has also  been resolution of filling defects within the right upper lobe pulmonary  arterial branches. There remains a stable large right pleural effusion  with considerable consolidation and volume loss within the right middle  lobe and right lower lobe. There is some pleural thickening involving  the posterior aspect of the right pleura. Persistent stable right hilar  adenopathy is noted. There is no evidence for right heart strain.  Minimal atheromatous calcification within the thoracic aorta is noted.  At the base of the right pleural space there is a 1 cm pleural-based  enhancing nodule.     There are multiple hepatic cysts that appear stable. Diffuse bilateral  adrenal thickening is noted. No suspicious osseous lesions are  appreciated.       Impression:       1. There has been interval resolution of all pulmonary emboli since the  prior examination.  2. There is a stable large partially loculated right pleural effusion  seen in conjunction with stable atelectasis and volume loss in the right  lung.  3. There is a new enhancing pleural-based nodule at the base of the  right hemithorax that measures on the order of 1 cm. This is suspicious  for a pleural-based metastasis.     Radiation dose reduction techniques were utilized, including automated  exposure control and exposure modulation based on body size.     This report was finalized on 9/21/2019 8:14 PM by Dr. Shane Roger M.D.       XR Chest 2 View [[id number]] Collected:   09/21/19 1831     Updated:  09/21/19 2007    Narrative:       TWO VIEWS OF THE CHEST     HISTORY: 64-year-old female with a history of CHF and COPD with  pancreatic cancer.     FINDINGS: PA and lateral chest radiographs were obtained. Comparison is  made to a prior chest radiograph dated 09/02/2019. There is stable  elevation of the right hemidiaphragm with blunting of the right  costophrenic angle with associated volume loss at the base of the right  lung. The left lung is clear. There is a left-sided subclavian Mediport  catheter with the tip at the level of the confluence of the right  innominate vein and SVC.       Impression:       Stable loculated right pleural effusion with volume loss and  atelectasis at the base of the right lung. This is a stable chest  radiograph when compared to the prior examination of 09/02/2019.     This report was finalized on 9/21/2019 8:04 PM by Dr. Shane Roger M.D.             Results for orders placed in visit on 08/05/19   Adult Transthoracic Echo Complete W/ Cont if Necessary Per Protocol    Narrative · Left ventricular systolic function is normal.  · Estimated EF appears to be in the range of 51 - 55%  · Normal right ventricular cavity size, wall thickness, systolic function  · Estimated right ventricular systolic pressure from tricuspid   regurgitation is moderately elevated (45-55 mmHg).  · There is a trivial pericardial effusion        ECG 12 Lead   Final Result   HEART RATE= 115  bpm   RR Interval= 524  ms   DE Interval= 161  ms   P Horizontal Axis= 13  deg   P Front Axis= 54  deg   QRSD Interval= 71  ms   QT Interval= 311  ms   QRS Axis= -7  deg   T Wave Axis= 59  deg   - BORDERLINE ECG -   Sinus tachycardia   Probable left atrial enlargement   Consider anterior infarct   Electronically Signed By: Sam Justice ([facility]) 21-Sep-2019 20:17:20   Date and Time of Study: 2019-09-21 17:30:45           Assessment/Plan     Active Hospital Problems    Diagnosis POA   •  **Hypokalemia [E87.6] Yes   • Pleural nodule [R22.2] Yes   • Anemia due to chronic kidney disease [N18.9, D63.1] Yes   • Pleural effusion on right (malignant) [J90] Yes   • Malignant neoplasm of tail of pancreas (CMS/HCC) [C25.2] Yes   • Tobacco abuse [Z72.0] Yes   • Type 2 diabetes mellitus (CMS/HCC) [E11.9] Yes   Hypomagnesemia      64 y.o. female admitted with Hypokalemia.    · She initially presented for shortness of breath.  Her CT angiogram shows resolution of pulmonary emboli and no new pulmonary process.  She still has malignant pleural effusion but this is unchanged.  She does have an incidental finding of a pleural-based nodule that could represent metastatic disease.  Will have her oncologist evaluate her.  Her breathing is improved at the time of my examination.  Do not plan to work-up further at this time.  · Potassium protocol initiated.  I added magnesium to blood drawn in ER and in the low.  Start magnesium protocol as well.  · Repeat labs in a.m.  Monitor hemoglobin.  · Eliquis for DVT prophylaxis.  · Full code.  · Discussed with patient and ED provider.      Caleb Dominguez MD  [facility]  09/22/19  12:35 AM    Patient was seen prior to midnight despite note being completed later due to patient care related issues.

## 2019-09-22 NOTE — PLAN OF CARE
Problem: Patient Care Overview  Goal: Plan of Care Review  Outcome: Ongoing (interventions implemented as appropriate)   09/22/19 0431   Coping/Psychosocial   Plan of Care Reviewed With patient   Plan of Care Review   Progress no change   OTHER   Outcome Summary Pt admitted to [facility] around 2230 for hypokalemia, anemia and CKD, K+ and Mag being replaced, ambulated in room, will continue to monitor     Goal: Individualization and Mutuality  Outcome: Ongoing (interventions implemented as appropriate)    Goal: Discharge Needs Assessment  Outcome: Ongoing (interventions implemented as appropriate)    Goal: Interprofessional Rounds/Family Conf  Outcome: Ongoing (interventions implemented as appropriate)      Problem: Pain, Chronic (Adult)  Goal: Identify Related Risk Factors and Signs and Symptoms  Outcome: Ongoing (interventions implemented as appropriate)    Goal: Acceptable Pain/Comfort Level and Functional Ability  Outcome: Ongoing (interventions implemented as appropriate)

## 2019-09-23 NOTE — PROGRESS NOTES
[facility] GROUP OUTPATIENT FOLLOW UP CLINIC VISIT    REASON FOR FOLLOW-UP:    1.  Metastatic adenocarcinoma of the pancreas  2.  Palliative chemotherapy with FOLFIRINOX initiated on 7/10/2019.  3.  Neutropenia related to chemotherapy requiring Neulasta  4.  Bilateral pulmonary emboli and bilateral calf vein deep vein thrombosis diagnosed on 8/5/2019.  She also had gastrointestinal bleeding with a single angiectasia discovered in the duodenum which was injected and treated with APC.  Anticoagulation was briefly held but she was discharged with plans for Eliquis 2.5 mg twice daily which she did start.  She was neutropenic from chemotherapy and required Neupogen.      HISTORY OF PRESENT ILLNESS:  Linette Ghosh is a 64 y.o. female who returns today for follow up of the above issue.  She returns today for consideration of cycle #5 of FOLFIRINOX. She was recently started on Remeron, 15 mg daily in hopes this would enhance her appetite.     Her appetite has improved.  Pain is generally well controlled.  She did present to the hospital over the weekend with worsening shortness of breath.  She declined thoracentesis.  Shortness of breath did improve some.  She complained of some left ankle pain with a history of an old injury.  X-rays showed no definite fracture with follow-up CT imaging suggested by radiology.    ONCOLOGIC HISTORY:  She has had about a 40 pound weight loss in the past 6 months associated with worsening bilateral flank pain.  She does note that the pain is worse with eating.  She was taking hydrocodone a couple of times per day with some in her appetite and this is reflected.     Due to worsening pain, she presented to the emergency department on 6/1/2019.  A chest x-ray was unremarkable.  She had a CT angiogram of the chest showing no evidence for pulmonary embolism.  There was some nodularity involving the major fissure on the right.  There was some pleural thickening and nodularity involving the  minor fissure on the right.  New infiltrate involving the posterior sulcus on the right medial he was noted to have a small pleural effusion on the right was noted as well.  A small groundglass area is appreciated in the right upper lobe.  The left adrenal gland was prominent in the tail the pancreas is prominent measuring 2.9 cm.  Some liver cysts were noted.     She had a follow-up CT scan of the abdomen and pelvis with contrast on 6/9/2019 showing a large hypoenhancing mass involving the entire tail of the pancreas which encases the splenic artery and occludes and splenic vein.  This abuts and possibly infiltrates the left adrenal gland.  No evidence for metastatic disease otherwise.     She saw Dr. Varela with gastroenterology and had an endoscopic ultrasound performed on 6/17/2019 with pathology confirming adenocarcinoma.  The CA 19-9 is elevated at 655.        She was seen initially on 6/21/2019.    A PET scan was requested and performed on 6/25/2019.  A 4 cm intensely hypermetabolic pancreatic tail mass is noted.  Unfortunately, extensive metastatic disease is noted with carcinomatosis throughout the abdomen and extensive metastatic disease throughout the right pleura.  Metastatic disease extending into the posterior mediastinum was noted as well.    She did see Dr. Smiley with surgical oncology.  This referral was made for the PET scan results returned in hopes that she had localized disease that could be resected.  He placed a Mediport on 7/8/2019.    FOLFIRINOX initiated 7/10/2019.    A PET scan on 9/18/2020 4:19 cycles of therapy shows a decrease in the size and FDG avidity of the pancreas and liver metastases.  There is an enlarging right pleural effusion.  There is significant bowel activity of undetermined significance.    ALLERGIES:  Allergies   Allergen Reactions   • Penicillins Hives       MEDICATIONS:  The medication list has been reviewed with the patient by the medical assistant, and the list  "has been updated in the electronic medical record, which I reviewed.  Medication dosages and frequencies were confirmed to be accurate.    REVIEW OF SYSTEMS:  PAIN:  See Vital Signs below.  GENERAL: Decreased appetite.  Weight loss.   SKIN:  No rashes or non-healing lesions.  HEME/LYMPH:  No abnormal bleeding.  No palpable lymphadenopathy.  EYES:  No vision changes or diplopia.  ENT:  No sore throat or difficulty swallowing.  RESPIRATORY: Shortness of breath  CARDIOVASCULAR:  No chest pain, palpitations, orthopnea, or dyspnea on exertion.  GASTROINTESTINAL: Flank and abdominal pain.  Adequately controlled with medication.  GENITOURINARY:  No dysuria or hematuria.  MUSCULOSKELETAL:  No joint pain, swelling, or erythema.  Left ankle pain  NEUROLOGIC:  No dizziness, loss of consciousness, or seizures.  Left foot numbness, this is better  PSYCHIATRIC: Anxiety.    Vitals:    09/23/19 0814   BP: 149/92   Pulse: 115   Resp: 16   Temp: 97.6 °F (36.4 °C)   SpO2: 92%   Weight: 60.2 kg (132 lb 12.8 oz)   Height: 168 cm (66.14\")   PainSc: 0-No pain       PHYSICAL EXAMINATION:  GENERAL:  Well-developed well-nourished female; awake, alert and oriented, in no acute distress.  SKIN:  Warm and dry, without rashes, purpura, or petechiae.  HEAD:  Normocephalic, atraumatic.  EARS:  Hearing intact.  NOSE:  Septum midline.  No excoriations or nasal discharge.  MOUTH:  No stomatitis or ulcers.  Lips are normal. Oral candidiasis noted   THROAT:  Oropharynx without lesions or exudates.  LYMPHATICS:  No cervical, supraclavicular, axillary, lymphadenopathy.  CHEST: Decreased breath sounds at the right base to mid chest  HEART: Tachycardic and regular without murmurs gallops or rubs, this is unchanged today   ABDOMEN:  Soft, minimal tenderness to palpation without rebound or guarding, non-distended.  Normal active bowel sounds.  No organomegaly.  EXTREMITIES:  No clubbing, cyanosis, or edema.  NEUROLOGICAL:  No focal neurologic deficits.  "     DIAGNOSTIC DATA:  Results for orders placed or performed in visit on 09/23/19   CBC Auto Differential   Result Value Ref Range    WBC 7.79 3.40 - 10.80 10*3/mm3    RBC 3.75 (L) 3.77 - 5.28 10*6/mm3    Hemoglobin 9.8 (L) 12.0 - 15.9 g/dL    Hematocrit 33.6 (L) 34.0 - 46.6 %    MCV 89.6 79.0 - 97.0 fL    MCH 26.1 (L) 26.6 - 33.0 pg    MCHC 29.2 (L) 31.5 - 35.7 g/dL    RDW 20.5 (H) 12.3 - 15.4 %    RDW-SD 67.0 (H) 37.0 - 54.0 fl    MPV 9.9 6.0 - 12.0 fL    Platelets 253 140 - 450 10*3/mm3    Neutrophil % 74.0 42.7 - 76.0 %    Lymphocyte % 11.4 (L) 19.6 - 45.3 %    Monocyte % 11.0 5.0 - 12.0 %    Eosinophil % 2.7 0.3 - 6.2 %    Basophil % 0.5 0.0 - 1.5 %    Immature Grans % 0.4 0.0 - 0.5 %    Neutrophils, Absolute 5.76 1.70 - 7.00 10*3/mm3    Lymphocytes, Absolute 0.89 0.70 - 3.10 10*3/mm3    Monocytes, Absolute 0.86 0.10 - 0.90 10*3/mm3    Eosinophils, Absolute 0.21 0.00 - 0.40 10*3/mm3    Basophils, Absolute 0.04 0.00 - 0.20 10*3/mm3    Immature Grans, Absolute 0.03 0.00 - 0.05 10*3/mm3    nRBC 0.3 (H) 0.0 - 0.2 /100 WBC       IMAGING: PET scan images from 6/25/2019.     IMPRESSION:  1. There is a 4 cm intensely hypermetabolic pancreatic tail mass and  there is extensive metastatic disease. There is hypermetabolic  carcinomatosis throughout the abdomen and there is extensive metastatic  disease throughout the right pleura. There is also metastatic disease  extending into the posterior mediastinum. There has been increase in the  small right pleural effusion.  2. The reticular nodular opacities at the left lower lobe are  nonspecific and may represent bronchiolitis or infectious infiltrates,  but metastatic disease cannot be excluded at this point.    I personally reviewed PET scan images from 9/18/2019.  There has been improvement.  Bowel activity of uncertain significance.  Right pleural effusion.    IMPRESSION:  1. There has been significant decrease in intensity of metabolic  activity at the pancreatic tail  mass and decrease in the intensity of  metabolic activity of the hepatic capsular implants. Some of the right  pleural metastatic disease is slightly less intensely hypermetabolic,  but is otherwise unchanged. There has been significant increase in size  of the moderately large loculated right pleural effusion and a small  increase since the recent chest CTA.  2. There is more than typical diffuse bowel activity, but there is no  definitive bowel thickening to suggest significant colitis or enteritis.  The bowel metabolic activity may be chemotherapy related.    ASSESSMENT:  This is a 64 y.o. female with:  1.  Metastatic pancreas cancer originating in the tail of the pancreas: There is evidence for carcinomatosis and extensive metastatic disease throughout the right pleura with extension into the posterior mediastinum.  She is not a surgical candidate.  She did see Dr. Smiley with surgical oncology nonetheless and he placed a Mediport.  We initiated therapy with palliative FOLFIRINOX on 7/10/2019.    She has required a delay in therapy.    A PET scan on 9/18/2019 overall shows improvement with decreasing liver metastases and improvement in the pancreas mass.    As of 9/23/2019 proceed with cycle #5 FOLFIRINOX    2.  Bilateral lower extremity DVT and pulmonary embolism: Patient will continue on Eliquis, 5 mg twice daily.    3.  Decreased appetite and weight loss: Continue Remeron, 15 mg daily.  Her weight has improved today.  Its unclear if her weights are accurate.    4.  Cancer related pain: Current pain regimen is controlling her pain reasonably well    5.  We do need to check BRCA mutation status at some point to see if she would be a candidate for olaparib.      6.  Left ankle injury with left foot numbness: Noted improvement.  We will continue to monitor this.  This is an old injury with no acute fracture on plain film imaging.  She declines any further imaging at this point.    7.  Neutropenia related to  therapy: Neulasta was added with cycle #4 of therapy.     8.  Recent right lower lobe pneumonia.  Symptoms have nearly resolved.  She did have an area of loculated pleural effusion within the right base on CT imaging which we will continue to monitor.    9.  Right pleural effusion: We will schedule a thoracentesis.  I do not think she requires a Pleurx catheter at this point.  She did have pain with the first thoracentesis and is hopeful that she does not have as much pain with the upcoming one.    PLAN:  1.  Proceed with cycle #5 FOLFIRINOX today  2.  Continue pain medication  3.  Continue conservative care for the left ankle  4.  We will schedule right thoracentesis and send the fluid for cytology, cell count and differential.  5.  FOLFIRINOX every 2 weeks with Neulasta support.  I will see her back in 8 weeks for follow-up.  CT imaging after that.

## 2019-09-23 NOTE — PROGRESS NOTES
pts last chemo infusion, she had a reaction at end of chemo treatment  Discussed with Dr Zhong , will give additional pepcid 20 mg IV today with treatment  Pt c/o bowels not moving frequently and having some issues with constipation  Discussed with Dr Zhong . Will hold atropine with todays treatment

## 2019-09-23 NOTE — PROGRESS NOTES
Outpatient Oncology Nutrition Follow Up:     Weight 132#.   Patient seen today during infusion. Receiving FOLFIRINOX. She reports that her intake is improved. She is making an effort to eat frequent meals throughout the day. She is drinking Ensure.   I encouraged her to eat high calorie high protein foods when she eats. She is drinking skim milk however and I suggested that she go back to whole to optimize calories. She does not think she can tolerate the thickness but I gave her other examples of ways to add calories and protein. Suggested she mix Ensure with ice cream as a shake for example.   She is having some loose stools today after having been constipated. She is to begin using a stool softener at home. Explained how constipation can also effect appetite.   Will continue to follow and encourage.

## 2019-09-23 NOTE — OUTREACH NOTE
Prep Survey      Responses   Facility patient discharged from?  [facility]   Is patient eligible?  Yes   Discharge diagnosis  **Hypokalemia    Does the patient have one of the following disease processes/diagnoses(primary or secondary)?  Other   Does the patient have Home health ordered?  No   Is there a DME ordered?  No   Prep survey completed?  Yes          Sun Jennings RN

## 2019-09-26 NOTE — OUTREACH NOTE
Medical Week 1 Survey      Responses   Facility patient discharged from?  [facility]   Does the patient have one of the following disease processes/diagnoses(primary or secondary)?  Other   Is there a successful TCM telephone encounter documented?  No   Week 1 attempt successful?  No   Revoke  Decline to participate [Pt ended phone call after introduction.]          Sophy Chinchilla RN

## 2019-10-02 NOTE — DISCHARGE INSTRUCTIONS
EDUCATION /DISCHARGE INSTRUCTIONS Thoracentesis:  A thoracentesis is a procedure to remove fluid or air from the space between the lung and it's lining.  It is done to relieve lung compression and respiratory distress.  A sample can also be obtained to aid diagnosis.   Medications can be administered into the space.      During the procedure:  You will be seated comfortable leaning forward onto a pillow supported by a table.  The area will be cleaned with antiseptic soap and draped with a sterile towel.  The physician will administer a local antiseptic to numb the area.  Next, the physician will insert a needle with tubing attached into the space between the lung and lining.  Fluid is removed and a sample sent to the laboratory.   When completed a pressure dressing is applied to the site.    Risks of the procedure include but are not limited to:   *  Bleeding    *  Low blood pressure *  Infection     *  Lung collapse possibly requiring insertion of a tube to re-inflate the lung.    Benefits of the procedure:  Relief of respiratory distress and facilitation of a diagnosis.  Alternatives to the procedure:  Drug therapy with diuretics to remove fluid.  Risks of diuretic drug therapy include possible dehydration and renal failure.  The benefit of drug therapy is that it can be done at home under physician supervision.  THIS EDUCATION INFORMATION WAS REVIEWED PRIOR TO THE PROCEDURE AND CONSENT. Patient initials___________________Time__________________    Post Procedure:    *  Rest today (no pushing pulling, straining or heavy lifting).   *  Slowly increase activity tomorow.    *  If you received sedation do not drive for 24 hours.   *  Keep dressing clean and dry.   *  Leave dressing on puncture site for 24 hours.    *  You may shower when dressing removed.   *  Expect some coughing as the lung re-expands.    Call your doctor if experiencing:   *  If experiencing sudden / severe shortness of breath, chest pain or if  coughing       up blood go to the nearest emergency room.   *  Signs of infection such as redness, swelling, excessive pain and / or foul       smelling drainage from the puncture site.   *  Chills or fever over 101 degrees (by mouth).   *  Change in sputum color (yellow, green, red).   *  Unrelieved pain.   *  Any new or severe symptoms.  Following the procedure:     Follow-up with the ordering physician as directed.    Continue to take other medications as directed by your physician unless    otherwise instructed.   If applicable, resume taking your blood thinners or Aspirin on ___10/3/2019 @12noon (eliquis)________.    If you have any concerns please call the Radiology Nurses Desk at [phone].  You are the most important factor in your recovery.  Follow the above instructions carefully.

## 2019-10-07 NOTE — PROGRESS NOTES
"Pharmacist Yumiko asking about the need for Emend with this treatment plan.  In speaking with the pt she states she has nausea \"but nothing crazy\".  Called to have Felicita RN to start process to get Emend approved for future use if needed.  Wendy BLANCO aware, but no orders to administer today.   "

## 2019-10-07 NOTE — PROGRESS NOTES
Outpatient Oncology Nutrition Follow Up:     Weight 122#, decreased 10# since last seen. Complains of no appetite. Reviewed need for intake of small meals throughout the day. Gave patient a list of high calorie high protein foods. Encouraged her to set an alarm or timer to remind her to eat by the clock. Patient states she sleeps a lot during the day. Suggested that she keep snacks at the bedside, sip on Ensure or Boost, etc. Will continue to follow and encourage.

## 2019-10-07 NOTE — PROGRESS NOTES
[facility] OUTPATIENT FOLLOW UP CLINIC VISIT    REASON FOR FOLLOW-UP:    1.  Metastatic adenocarcinoma of the pancreas  2.  Palliative chemotherapy with FOLFIRINOX initiated on 7/10/2019.  3.  Neutropenia related to chemotherapy requiring Neulasta  4.  Bilateral pulmonary emboli and bilateral calf vein deep vein thrombosis diagnosed on 8/5/2019.  She also had gastrointestinal bleeding with a single angiectasia discovered in the duodenum which was injected and treated with APC.  Anticoagulation with Eliquis      HISTORY OF PRESENT ILLNESS:  Linette Ghosh is a 64 y.o. female with the above mentioned history who returns to the office today in anticipation of cycle #6 FOLFIRINOX. She continues to tolerate chemo with some manageable side effects. She does have intermittent nausea, taking zofran as needed. She reports this controls her symptoms. She denies diarrhea following chemo. She denies abdominal pain or bloating.    She is noted to have lost weight today. She reports her appetite is poor. She was previous prescribed Remeron, though states today she is not taking this. She is taking an occasional Ensure, though does not do this consistently. She states she doesn't feel hungry. She states her weight loss is not related to nausea. She denies any new pain. She denies fevers or chills, signs or symptoms of infection.   She denies worsening cough or chest pain. She denies worsening shortness of breath. She reports today she wishes to forego any further thoracentsis due to the pain it causes. She notes not change in her symptoms when she does undergo thoracentesis.    The patient is seen with a family member today who was not present at MD visit two weeks ago. She is requesting review of  as well as recent PET scan.     ONCOLOGIC HISTORY:  She has had about a 40 pound weight loss in the past 6 months associated with worsening bilateral flank pain.  She does note that the pain is worse with eating.   She was taking hydrocodone a couple of times per day with some in her appetite and this is reflected.     Due to worsening pain, she presented to the emergency department on 6/1/2019.  A chest x-ray was unremarkable.  She had a CT angiogram of the chest showing no evidence for pulmonary embolism.  There was some nodularity involving the major fissure on the right.  There was some pleural thickening and nodularity involving the minor fissure on the right.  New infiltrate involving the posterior sulcus on the right medial he was noted to have a small pleural effusion on the right was noted as well.  A small groundglass area is appreciated in the right upper lobe.  The left adrenal gland was prominent in the tail the pancreas is prominent measuring 2.9 cm.  Some liver cysts were noted.     She had a follow-up CT scan of the abdomen and pelvis with contrast on 6/9/2019 showing a large hypoenhancing mass involving the entire tail of the pancreas which encases the splenic artery and occludes and splenic vein.  This abuts and possibly infiltrates the left adrenal gland.  No evidence for metastatic disease otherwise.     She saw Dr. Varela with gastroenterology and had an endoscopic ultrasound performed on 6/17/2019 with pathology confirming adenocarcinoma.  The CA 19-9 is elevated at 655.        She was seen initially on 6/21/2019.    A PET scan was requested and performed on 6/25/2019.  A 4 cm intensely hypermetabolic pancreatic tail mass is noted.  Unfortunately, extensive metastatic disease is noted with carcinomatosis throughout the abdomen and extensive metastatic disease throughout the right pleura.  Metastatic disease extending into the posterior mediastinum was noted as well.    She did see Dr. Smiley with surgical oncology.  This referral was made for the PET scan results returned in hopes that she had localized disease that could be resected.  He placed a Mediport on 7/8/2019.    FOLFIRINOX initiated  "7/10/2019.    A PET scan on 9/18/2020 4:19 cycles of therapy shows a decrease in the size and FDG avidity of the pancreas and liver metastases.  There is an enlarging right pleural effusion.  There is significant bowel activity of undetermined significance.    ALLERGIES:  Allergies   Allergen Reactions   • Penicillins Hives       MEDICATIONS:  The medication list has been reviewed with the patient by the medical assistant, and the list has been updated in the electronic medical record, which I reviewed.  Medication dosages and frequencies were confirmed to be accurate.    I have reviewed the patient's medical history in detail and updated the computerized patient record.    Review of Systems   Constitutional: Positive for appetite change, fatigue and unexpected weight change. Negative for activity change, chills and fever.   HENT: Negative for mouth sores and sore throat.    Eyes: Negative for visual disturbance.   Respiratory: Negative for cough and shortness of breath.    Cardiovascular: Negative for chest pain and leg swelling.   Gastrointestinal: Positive for nausea (intermittent.). Negative for abdominal pain, diarrhea and vomiting.   Genitourinary: Negative for dysuria and frequency.   Neurological: Negative for dizziness and weakness.   Hematological: Does not bruise/bleed easily.   Psychiatric/Behavioral: The patient is not nervous/anxious.    All other systems reviewed and are negative.    Review of systems 10/07/2019  unchanged from previous office visit except as updated.       Vitals:    10/07/19 0933   BP: 108/73   Pulse: 97   Resp: 16   Temp: 97 °F (36.1 °C)   TempSrc: Oral   SpO2: 94%   Weight: 55.4 kg (122 lb 1.6 oz)   Height: 168 cm (66.14\")   PainSc: 0-No pain       PHYSICAL EXAMINATION:  GENERAL:  Well-developed well-nourished female; awake, alert and oriented, in no acute distress.  SKIN:  Warm and dry, without rashes, purpura, or petechiae.  HEAD:  Normocephalic, atraumatic.  EARS:  Hearing " intact.  NOSE:  Septum midline.  No excoriations or nasal discharge.  MOUTH:  No stomatitis or ulcers.  Lips are normal.  THROAT:  Oropharynx without lesions or exudates.  LYMPHATICS:  No cervical, supraclavicular, axillary, lymphadenopathy.  CHEST: Decreased breath sounds at the right base. No adventitious breath sounds in the left lung.   HEART: Tachycardic and regular without murmurs gallops or rubs, this is unchanged today   ABDOMEN:  Soft, minimal tenderness to palpation without rebound or guarding, non-distended.  Normal active bowel sounds.  No organomegaly. No ascites.   EXTREMITIES:  No clubbing, cyanosis, or edema.  NEUROLOGICAL:  No focal neurologic deficits.    Physical exam 10/07/2019  unchanged from previous office visit except as updated.      DIAGNOSTIC DATA:  Results for orders placed or performed in visit on 10/07/19   Comprehensive metabolic panel   Result Value Ref Range    Glucose 159 (H) 74 - 124 mg/dL    BUN 9 6 - 20 mg/dL    Creatinine 0.58 (L) 0.60 - 1.10 mg/dL    Sodium 130 (L) 134 - 145 mmol/L    Potassium 4.1 3.5 - 4.7 mmol/L    Chloride 87 (L) 98 - 107 mmol/L    CO2 27.7 22.0 - 29.0 mmol/L    Calcium 8.9 8.5 - 10.2 mg/dL    Total Protein 7.4 6.3 - 8.0 g/dL    Albumin 3.70 3.50 - 5.20 g/dL    ALT (SGPT) 14 0 - 33 U/L    AST (SGOT) 21 0 - 32 U/L    Alkaline Phosphatase 129 (H) 38 - 116 U/L    Total Bilirubin 0.5 0.2 - 1.2 mg/dL    eGFR Non African Amer 105 >60 mL/min/1.73    Globulin 3.7 (H) 1.8 - 3.5 gm/dL    A/G Ratio 1.0 (L) 1.1 - 2.4 g/dL    BUN/Creatinine Ratio 15.5 7.3 - 30.0    Anion Gap 15.3 (H) 5.0 - 15.0 mmol/L   CBC Auto Differential   Result Value Ref Range    WBC 20.92 (H) 3.40 - 10.80 10*3/mm3    RBC 4.08 3.77 - 5.28 10*6/mm3    Hemoglobin 10.7 (L) 12.0 - 15.9 g/dL    Hematocrit 35.4 34.0 - 46.6 %    MCV 86.8 79.0 - 97.0 fL    MCH 26.2 (L) 26.6 - 33.0 pg    MCHC 30.2 (L) 31.5 - 35.7 g/dL    RDW 19.9 (H) 12.3 - 15.4 %    RDW-SD 61.5 (H) 37.0 - 54.0 fl    MPV 9.6 6.0 - 12.0 fL     Platelets 315 140 - 450 10*3/mm3    Neutrophil % 85.9 (H) 42.7 - 76.0 %    Lymphocyte % 6.0 (L) 19.6 - 45.3 %    Monocyte % 6.0 5.0 - 12.0 %    Eosinophil % 0.0 (L) 0.3 - 6.2 %    Basophil % 0.2 0.0 - 1.5 %    Immature Grans % 1.9 (H) 0.0 - 0.5 %    Neutrophils, Absolute 17.95 (H) 1.70 - 7.00 10*3/mm3    Lymphocytes, Absolute 1.26 0.70 - 3.10 10*3/mm3    Monocytes, Absolute 1.26 (H) 0.10 - 0.90 10*3/mm3    Eosinophils, Absolute 0.01 0.00 - 0.40 10*3/mm3    Basophils, Absolute 0.04 0.00 - 0.20 10*3/mm3    Immature Grans, Absolute 0.40 (H) 0.00 - 0.05 10*3/mm3    nRBC 0.6 (H) 0.0 - 0.2 /100 WBC       IMAGING: PET scan images from 6/25/2019.     IMPRESSION:  1. There is a 4 cm intensely hypermetabolic pancreatic tail mass and  there is extensive metastatic disease. There is hypermetabolic  carcinomatosis throughout the abdomen and there is extensive metastatic  disease throughout the right pleura. There is also metastatic disease  extending into the posterior mediastinum. There has been increase in the  small right pleural effusion.  2. The reticular nodular opacities at the left lower lobe are  nonspecific and may represent bronchiolitis or infectious infiltrates,  but metastatic disease cannot be excluded at this point.    IMPRESSION:  1. There has been significant decrease in intensity of metabolic  activity at the pancreatic tail mass and decrease in the intensity of  metabolic activity of the hepatic capsular implants. Some of the right  pleural metastatic disease is slightly less intensely hypermetabolic,  but is otherwise unchanged. There has been significant increase in size  of the moderately large loculated right pleural effusion and a small  increase since the recent chest CTA.  2. There is more than typical diffuse bowel activity, but there is no  definitive bowel thickening to suggest significant colitis or enteritis.  The bowel metabolic activity may be chemotherapy related.    The patients family  member requested review of PET scan today.     ASSESSMENT:  This is a 64 y.o. female with:  1.  Metastatic pancreas cancer originating in the tail of the pancreas: There is evidence for carcinomatosis and extensive metastatic disease throughout the right pleura with extension into the posterior mediastinum.  She is not a surgical candidate.  She did see Dr. Smiley with surgical oncology nonetheless and he placed a Mediport.  We initiated therapy with palliative FOLFIRINOX on 7/10/2019.    She has required a delay in therapy.    A PET scan on 9/18/2019 overall shows improvement with decreasing liver metastases and improvement in the pancreas mass.    We will proceed with cycle #6 FOLFIRINOX today. Dose adjusted to accommodate weight loss.     2.  Bilateral lower extremity DVT and pulmonary embolism: Patient will continue on Eliquis, 5 mg twice daily. She is without signs or symptoms of bleeding.     3.  Decreased appetite and weight loss: Prescribed Remeron, 15 mg daily, though she states today she is not taking this.  Her weights seem to be inaccurate and erratic. Resume Remeron and refer to oncology dietician.     4.  Cancer related pain: Current pain regimen is controlling her pain reasonably well    5.  We do need to check BRCA mutation status at some point to see if she would be a candidate for olaparib.      6.  Left ankle injury with left foot numbness: Noted improvement.  We will continue to monitor this.  This is an old injury with no acute fracture on plain film imaging.  She declines any further imaging at this point.    7.  Neutropenia related to therapy: Neulasta was added with cycle #4 of therapy.     8.  Recent right lower lobe pneumonia.  Symptoms have nearly resolved.  She did have an area of loculated pleural effusion within the right base on CT imaging which we will continue to monitor.    9.  Right pleural effusion: Intermittent thoracentesis. Last 10/2/2019 with 500ml removed. The patient did  not complete the procedure due to pain. She is reports today her symptoms do not improve with thoracentesis and she does not wish to proceed further.       PLAN:  1. Proceed today with cycle #6 FOLFIRINOX  2. Dosing slightly adjusted to account for weight loss.  3. Neulasta on body placed on the day of 5-FU unhook  4. Resume Remeron  5. Refer to oncology dietician. We did discuss today increasing Ensure.  6. Continue pain regimen.    7. Continue zofran as needed.  8. Return in 2 weeks and 4 weeks for CBC, CMP NP follow up and continued FOLFIRINOX with Neulasta.  9. MD follow up in 6 weeks for FOLFIRINOX, planning CT imaging following MD follow up    Today's plan was discussed and reviewed with Dr. Zhong who is in agreement to proceed with chemotherapy. At the families request, have have reviewed ca19-9 and PET scans. PET scan with disease response in the pancreas and liver, though bowel activity is of uncertain origin.     Wendy Daly, APRN  10/07/2019

## 2019-10-13 PROBLEM — J18.9 PNEUMONIA OF LEFT UPPER LOBE DUE TO INFECTIOUS ORGANISM: Status: ACTIVE | Noted: 2019-01-01

## 2019-10-13 NOTE — ED PROVIDER NOTES
"EMERGENCY DEPARTMENT ENCOUNTER    Room Number:  S617/1  Date seen:  10/13/2019  Time seen: 11:18 AM  PCP: Raine Ghosh MD    HPI:  Chief complaint:SOA  Context:Linette Ghosh is a 64 y.o. female who presents via EMS with hx of pancreatitic CA currently on chemotherapy followed by  (oncology) to the ED with c/o SOA that began this AM. Pt also reports a productive cough with green sputum and fever (tmax-100.8) for \"a couple of day.\" . EMS performed duo-neb/solu-medrol en route and states pt became tachycardic with HR-160s afterward but was asymptomatic. Pt was 88% on RA and is currently on 3L with O2 sat-100% Pt has hx of PNA and states that this feels similar. Pt denies abd pain, N/V, and cp at this time. Last chemotherapy was 4 days ago. Pt is on Eliquis.        Onset: gradual  Location:chest  Duration: this AM  Timing: constant  Aggravating Factors: none  Alleviating Factors: none  Severity: severe      MEDICAL RECORD REVIEW   Pt was admitted 9/21-9/22 for dyspnea and hypokalemia. Pt has pancreatic CA with malignant pleural effusion.     ALLERGIES  Penicillins    PAST MEDICAL HISTORY  Active Ambulatory Problems     Diagnosis Date Noted   • Moderate persistent asthma without complication 03/09/2016   • Asthma 03/09/2016   • Hyperlipidemia 03/09/2016   • Arthralgia of hip 03/09/2016   • Pain of thigh 03/09/2016   • Microscopic hematuria 03/09/2016   • Type 2 diabetes mellitus (CMS/HCC) 03/09/2016   • Tobacco abuse 09/13/2016   • Chronic kidney disease, stage III (moderate) (CMS/HCC) 03/14/2017   • Iron deficiency anemia 03/14/2017   • Pancreatic mass 06/11/2019   • Malignant neoplasm of tail of pancreas (CMS/HCC) 06/21/2019   • Encounter for fitting and adjustment of vascular catheter 07/08/2019   • Pleural effusion on right (malignant) 07/17/2019   • Hyponatremia 07/18/2019   • Tachycardia 07/26/2019   • Pleural effusion 07/29/2019   • Acute respiratory distress 08/05/2019   • Pulmonary embolism, bilateral " (CMS/HCC) 08/05/2019   • Chemotherapy induced neutropenia (CMS/HCC) 08/06/2019   • Anemia associated with chemotherapy 08/06/2019   • Hematemesis with nausea 08/12/2019   • Anemia due to chronic kidney disease 09/21/2019   • Hypokalemia 09/22/2019   • Pleural nodule 09/22/2019   • Hypomagnesemia 09/22/2019   • Dyspnea 09/22/2019   • Drug-induced constipation 09/22/2019     Resolved Ambulatory Problems     Diagnosis Date Noted   • Abnormal kidney function study 03/09/2016     Past Medical History:   Diagnosis Date   • Asthma    • Carotid artery stenosis    • Chronic kidney disease, stage III (moderate) (CMS/[facility])    • Diabetes mellitus (CMS/[facility])    • GERD (gastroesophageal reflux disease)    • Glaucoma    • Hydronephrosis    • Hyperlipidemia    • Hypertension    • Intracranial hemorrhage (CMS/[facility])    • Iron deficiency anemia    • Pancreatic cancer (CMS/HCC) 06/2019   • Pleural effusion on right    • Pneumonia    • Polyp of sigmoid colon    • Shortness of breath    • Tachycardia    • Tachycardia    • Vitamin D deficiency        PAST SURGICAL HISTORY  Past Surgical History:   Procedure Laterality Date   • CHOLECYSTECTOMY     • ENDOSCOPY N/A 8/13/2019    Procedure: ESOPHAGOGASTRODUODENOSCOPY WITH CAUTERY;  Surgeon: Yessenia Hurtado MD;  Location: [facility] ENDOSCOPY;  Service: Gastroenterology   • GALLBLADDER SURGERY     • HERNIA REPAIR      x3   • HYSTERECTOMY     • STOMACH SURGERY         FAMILY HISTORY  Family History   Problem Relation Age of Onset   • Hypertension Mother    • Diabetes Mother    • Arthritis Mother    • Hypertension Sister    • Diabetes Sister    • Thyroid disease Sister    • Arthritis Sister    • Hypertension Brother    • Diabetes Brother    • Lung cancer Maternal Uncle    • Stroke Maternal Grandfather    • Hypertension Brother    • Hypertension Sister    • Hypertension Sister        SOCIAL HISTORY  Social History     Socioeconomic History   • Marital status: Single     Spouse name: Not on file   •  Number of children: 0   • Years of education: College   • Highest education level: Not on file   Occupational History   • Occupation: Customer Service     Employer: [employer]   Tobacco Use   • Smoking status: Current Every Day Smoker     Packs/day: 1.00     Years: 30.00     Pack years: 30.00     Types: Cigarettes   • Smokeless tobacco: Never Used   Substance and Sexual Activity   • Alcohol use: No   • Drug use: No   • Sexual activity: Defer       REVIEW OF SYSTEMS  Review of Systems   Constitutional: Positive for fever (tmax-100.8). Negative for chills.   HENT: Negative.    Eyes: Negative.    Respiratory: Positive for cough (productive cough with green sputum) and shortness of breath.    Cardiovascular: Negative for chest pain.   Gastrointestinal: Negative for abdominal pain and vomiting.   Genitourinary: Negative.  Negative for dysuria and hematuria.   Musculoskeletal: Negative.    Skin: Negative.  Negative for rash.   Neurological: Negative.  Negative for syncope and headaches.   Psychiatric/Behavioral: Negative.  Negative for confusion.       PHYSICAL EXAM  ED Triage Vitals   Temp Pulse Resp BP SpO2   10/13/19 1107 -- 10/13/19 1109 10/13/19 1109 --   (!) 100.8 °F (38.2 °C)  28 147/88       Temp src Heart Rate Source Patient Position BP Location FiO2 (%)   10/13/19 1107 -- -- -- --   Tympanic         Physical Exam   Constitutional: She is oriented to person, place, and time and well-developed, well-nourished, and in no distress. No distress.   Ill appearing   HENT:   Head: Normocephalic and atraumatic.   Mouth/Throat: Mucous membranes are normal.   Dry mucous membranes   Eyes: Pupils are equal, round, and reactive to light.   Neck: Normal range of motion. Neck supple.   Cardiovascular: Regular rhythm and normal heart sounds. Tachycardia present.   Pulmonary/Chest: Tachypnea noted. She is in respiratory distress (mild). She has decreased breath sounds (on right). She has rhonchi (diffuse to left).    Abdominal: Soft. There is no tenderness. There is no rebound and no guarding.   Neurological: She is alert and oriented to person, place, and time. She has normal strength.   Skin: Skin is warm, dry and intact.   Psychiatric: Mood, affect and judgment normal.   Nursing note and vitals reviewed.      LAB RESULTS  Recent Results (from the past 24 hour(s))   Comprehensive Metabolic Panel    Collection Time: 10/13/19 11:59 AM   Result Value Ref Range    Glucose 190 (H) 65 - 99 mg/dL    BUN 7 (L) 8 - 23 mg/dL    Creatinine 0.59 0.57 - 1.00 mg/dL    Sodium 133 (L) 136 - 145 mmol/L    Potassium 4.0 3.5 - 5.2 mmol/L    Chloride 90 (L) 98 - 107 mmol/L    CO2 28.2 22.0 - 29.0 mmol/L    Calcium 8.5 (L) 8.6 - 10.5 mg/dL    Total Protein 7.2 6.0 - 8.5 g/dL    Albumin 3.80 3.50 - 5.20 g/dL    ALT (SGPT) 13 1 - 33 U/L    AST (SGOT) 25 1 - 32 U/L    Alkaline Phosphatase 162 (H) 39 - 117 U/L    Total Bilirubin 0.8 0.2 - 1.2 mg/dL    eGFR Non African Amer 103 >60 mL/min/1.73    Globulin 3.4 gm/dL    A/G Ratio 1.1 g/dL    BUN/Creatinine Ratio 11.9 7.0 - 25.0    Anion Gap 14.8 5.0 - 15.0 mmol/L   Procalcitonin    Collection Time: 10/13/19 11:59 AM   Result Value Ref Range    Procalcitonin 0.20 0.10 - 0.25 ng/mL   Green Top (Gel)    Collection Time: 10/13/19 11:59 AM   Result Value Ref Range    Extra Tube Hold for add-ons.    Lavender Top    Collection Time: 10/13/19 11:59 AM   Result Value Ref Range    Extra Tube hold for add-on    CBC Auto Differential    Collection Time: 10/13/19 11:59 AM   Result Value Ref Range    WBC 29.76 (H) 3.40 - 10.80 10*3/mm3    RBC 3.73 (L) 3.77 - 5.28 10*6/mm3    Hemoglobin 10.0 (L) 12.0 - 15.9 g/dL    Hematocrit 31.9 (L) 34.0 - 46.6 %    MCV 85.5 79.0 - 97.0 fL    MCH 26.8 26.6 - 33.0 pg    MCHC 31.3 (L) 31.5 - 35.7 g/dL    RDW 18.6 (H) 12.3 - 15.4 %    RDW-SD 57.1 (H) 37.0 - 54.0 fl    MPV 10.6 6.0 - 12.0 fL    Platelets 152 140 - 450 10*3/mm3   Manual Differential    Collection Time: 10/13/19 11:59 AM    Result Value Ref Range    Neutrophil % 97.0 (H) 42.7 - 76.0 %    Lymphocyte % 2.0 (L) 19.6 - 45.3 %    Monocyte % 1.0 (L) 5.0 - 12.0 %    Neutrophils Absolute 28.87 (H) 1.70 - 7.00 10*3/mm3    Lymphocytes Absolute 0.60 (L) 0.70 - 3.10 10*3/mm3    Monocytes Absolute 0.30 0.10 - 0.90 10*3/mm3    Anisocytosis Slight/1+ None Seen    Hypochromia Slight/1+ None Seen    Poikilocytes Slight/1+ None Seen    Polychromasia Slight/1+ None Seen    WBC Morphology Normal Normal    Platelet Morphology Normal Normal   Light Blue Top    Collection Time: 10/13/19 12:00 PM   Result Value Ref Range    Extra Tube hold for add-on    Gold Top - SST    Collection Time: 10/13/19 12:00 PM   Result Value Ref Range    Extra Tube Hold for add-ons.    Respiratory Panel, PCR - Swab, Nasopharynx    Collection Time: 10/13/19 12:03 PM   Result Value Ref Range    ADENOVIRUS, PCR Not Detected Not Detected    Coronavirus 229E Not Detected Not Detected    Coronavirus HKU1 Not Detected Not Detected    Coronavirus NL63 Not Detected Not Detected    Coronavirus OC43 Not Detected Not Detected    Human Metapneumovirus Not Detected Not Detected    Human Rhinovirus/Enterovirus Not Detected Not Detected    Influenza B PCR Not Detected Not Detected    Parainfluenza Virus 1 Not Detected Not Detected    Parainfluenza Virus 2 Not Detected Not Detected    Parainfluenza Virus 3 Not Detected Not Detected    Parainfluenza Virus 4 Not Detected Not Detected    Bordetella pertussis pcr Not Detected Not Detected    Influenza A H1 2009 PCR Not Detected Not Detected    Chlamydophila pneumoniae PCR Not Detected Not Detected    Mycoplasma pneumo by PCR Not Detected Not Detected    Influenza A PCR Not Detected Not Detected    Influenza A H3 Not Detected Not Detected    Influenza A H1 Not Detected Not Detected    RSV, PCR Not Detected Not Detected    Bordetella parapertussis PCR Not Detected Not Detected   Lactic Acid, Plasma    Collection Time: 10/13/19 12:15 PM   Result Value  Ref Range    Lactate 1.5 0.5 - 2.0 mmol/L       I ordered the above labs and reviewed the results    RADIOLOGY  CT Angiogram Chest With Contrast   Final Result   There is no CT evidence of pulmonary embolism. There is   extensive airspace infiltrate in the left lung consistent with pneumonia   that is new since the preceding CTA of the chest dated 09/21/2017.   Chronic dense consolidation and volume loss at the right lung base is   unchanged. A moderate-sized right pleural effusion is also unchanged.   Small capsular metastatic lesions within the upper abdomen adjacent to   the right lobe of the liver are unchanged, as well.       These findings were discussed with Dr. Monge in the emergency room on   10/13/1950 1:00 PM       This report was finalized on 10/13/2019 2:53 PM by Dr. Jhon Arroyo M.D.          XR Chest 2 View   Final Result       New opacity has developed in the left upper lung, suggesting infiltrate   in the left upper lobe. Loculated right pleural effusion appears similar   to prior exam with right basilar atelectasis or infiltrate.       This report was finalized on 10/13/2019 12:11 PM by Dr. Natanael Whelan M.D.              I ordered the above noted radiological studies and reviewed the images on the PACS system.      MEDICATIONS GIVEN IN ER  Medications   sodium chloride 0.9 % flush 10 mL (not administered)   sodium chloride 0.9 % flush 10 mL (not administered)   sodium chloride 0.9 % bolus 1,100 mL (0 mL Intravenous Stopped 10/13/19 1345)   acetaminophen (TYLENOL) 160 MG/5ML solution 650 mg (650 mg Oral Given 10/13/19 1203)   sodium chloride 0.9 % bolus 700 mL (700 mL Intravenous New Bag 10/13/19 1347)   cefepime (MAXIPIME) 2 g/100 mL 0.9% NS (mbp) (0 g Intravenous Stopped 10/13/19 1411)   vancomycin 1250 mg/250 mL 0.9% NS IVPB ([facility]) (1,250 mg Intravenous New Bag 10/13/19 1412)   iopamidol (ISOVUE-370) 76 % injection 100 mL (100 mL Intravenous Given by Other 10/13/19 1427)  "      EKG  Interpreted by ED Physician   PROCEDURES  Procedures      PROGRESS AND CONSULTS    Progress Notes:       1204-: Discussed the patient and plan of care with Dr. Monge. After a bedside evaluation; they agree with the plan of care.    1453- spoke with radiology and CTA chest shows extensive left upper lobe PNA but is negative for PE.     1455: Patient rechecked. Patient updated on ER findings, concerns, and need for admission.They are agreeable to admission and patient's questions answered. The doctor and I have reviewed and discussed the patient, we agree the patient should be admitted for further management, evaluation, and treatment.      CONSULTS  1515: Phone call with Dr Lisa. Discussed the patient, relevant history, exam, diagnostics, ED findings/progress, and concerns.  He agrees to admit the patient to telemetry.      Disposition vitals:  /72 (BP Location: Right arm, Patient Position: Lying)   Pulse (!) 135   Temp 97.5 °F (36.4 °C) (Tympanic)   Resp 20   Ht 167.6 cm (66\")   Wt 57.4 kg (126 lb 8.7 oz)   SpO2 97%   BMI 20.42 kg/m²       DIAGNOSIS  Final diagnoses:   Pneumonia of left upper lobe due to infectious organism (CMS/HCC)   Sepsis, due to unspecified organism, unspecified whether acute organ dysfunction present (CMS/HCC)   Malignant neoplasm of pancreas, unspecified location of malignancy (CMS/HCC)           Documentation assistance provided by autumn Edwards for Ml TRAVIS.  Information recorded by the scribe was done at my direction and has been verified and validated by me.             Kesha Edwards  10/13/19 1204       Kesha Edwards  10/13/19 1501       Ml Laguerre APRN  10/13/19 1722    "

## 2019-10-13 NOTE — ED PROVIDER NOTES
Pt presents to ED c/o SOA that began this AM. She also c/o productive cough and CP that is exacerbated with deep breathing.     Upon exam, pt is tachycardic with regular rhythm. Lungs have decreased breath sounds in R bases with diffuse rhonchi. She is in mild respiratory distress. Abdominal region benign with no calf tenderness.  XR chest shows left upper lobe pneumonia.    EKG          EKG time: 1124  Rhythm/Rate:   P waves and AZ: Nml   QRS, axis: Nml QRS, LAD   ST and T waves: Nml    Interpreted Contemporaneously by me, independently viewed  changed compared to prior 9/21/2019  Rate was 115 at that time but no new ischemic changes.       Discussed with pt plan to review labs for further evalution. Pt understands and agrees with the plan, all questions answered.    MD ATTESTATION NOTE    The JOSÉ and I have discussed this patient's history, physical exam, and treatment plan.  I have reviewed the documentation and personally had a face to face interaction with the patient. I affirm the documentation and agree with the treatment and plan.  The attached note describes my personal findings.    Documentation assistance provided by autumn Nathan for Dr. Monge.  Information recorded by the autumn was done at my direction and has been verified and validated by me.     Marissa Nathan  10/13/19 1224       Richard Monge MD  10/13/19 [id number]

## 2019-10-13 NOTE — ED NOTES
Patient given cup of ice chips per verbal order from Ml Laguerre NP.  Patient tolerated well.      Federico Mercado RN  10/13/19 [id number]

## 2019-10-13 NOTE — ED TRIAGE NOTES
Fever started yesterday with productive cough with yellow sputum. Pt reports generalized weakness and body aches.

## 2019-10-13 NOTE — PROGRESS NOTES
"Pharmacokinetic Consult - Vancomycin Dosing (Initial Note)    Linette Ghosh has been consulted for pharmacy to dose vancomycin for PNA.  Pharmacy dosing vancomycin per Dr Lisa's request.   Goal trough: 15-20 mg/L   Other antimicrobials: none    Relevant clinical data and objective history reviewed:  64 y.o. female 167.6 cm (66\") 57.4 kg (126 lb 8.7 oz)    Past Medical History:   Diagnosis Date   • Asthma    • Carotid artery stenosis    • Chronic kidney disease, stage III (moderate) (CMS/[facility])    • Diabetes mellitus (CMS/[facility])     Type 2   • GERD (gastroesophageal reflux disease)    • Glaucoma    • Hydronephrosis    • Hyperlipidemia    • Hypertension    • Intracranial hemorrhage (CMS/[facility])    • Iron deficiency anemia    • Pancreatic cancer (CMS/[facility]) 06/2019   • Pleural effusion on right    • Pneumonia    • Polyp of sigmoid colon    • Shortness of breath    • Tachycardia    • Tachycardia    • Vitamin D deficiency      Creatinine   Date Value Ref Range Status   10/13/2019 0.59 0.57 - 1.00 mg/dL Final   10/07/2019 0.58 (L) 0.60 - 1.10 mg/dL Final   09/23/2019 0.53 (L) 0.60 - 1.10 mg/dL Final   06/09/2019 1.00 0.60 - 1.30 mg/dL Final     Comment:     Serial Number: [id number]:  [id number]   03/31/2019 1.00 0.60 - 1.30 mg/dL Final     Comment:     Serial Number: [id number]:  [id number]     BUN   Date Value Ref Range Status   10/13/2019 7 (L) 8 - 23 mg/dL Final     Estimated Creatinine Clearance: 87.3 mL/min (by C-G formula based on SCr of 0.59 mg/dL).    Lab Results   Component Value Date    WBC 29.76 (H) 10/13/2019     Temp Readings from Last 3 Encounters:   10/13/19 97.5 °F (36.4 °C) (Tympanic)   10/07/19 97 °F (36.1 °C) (Oral)   10/02/19 97.3 °F (36.3 °C) (Oral)           Assessment/Plan  Will start vancomycin 1,250 mg IV Q12h. Will monitor serum creatinine every 24 hours for the first 3 days then at least every 48 hours per dosing recommendations. Vancomycin level prior to 5th dose. Pharmacy will continue to follow " daily while on vancomycin and adjust as needed.     Blake Moscoso, RPH

## 2019-10-13 NOTE — H&P
Internal medicine history and physical  INTERNAL MEDICINE   [facility]       Patient Identification:  Name: Linette Ghosh  Age: 64 y.o.  Sex: female  :  1955  MRN: [id number]                   Primary Care Physician: Raine Ghosh MD                                   Chief Complaint: Progressive shortness of breath since this morning.    History of Present Illness:   Patient is a 64-year-old female with history of pancreatic cancer for which she is on ongoing chemotherapy under the direction of Dr. Zhong.  Her last chemotherapy was 4 days ago.  According to patient she has done well since her chemotherapy until couple days ago when she started noticing increasing cough and yellowish sputum production and low-grade temperature and when she woke up this morning she was having significant shortness of breath which was new and was having discomfort in the left side of the chest when she coughs and takes deep breath.  This got her concerned resulting in her coming to the emergency room via EMS.  She was noted to have heart rate of 160 bpm and oxygen saturation of 88% which improved with oxygen supplementation.  Work-up in the emergency room revealed left-sided lung infiltrate consistent with pneumonia as she had a CT scan performed to rule out acute pulmonary embolism which was not seen.  Patient is being admitted for further care.      Past Medical History:  Past Medical History:   Diagnosis Date   • Asthma    • Carotid artery stenosis    • Chronic kidney disease, stage III (moderate) (CMS/HCC)    • Diabetes mellitus (CMS/HCC)     Type 2   • GERD (gastroesophageal reflux disease)    • Glaucoma    • Hydronephrosis    • Hyperlipidemia    • Hypertension    • Intracranial hemorrhage (CMS/HCC)    • Iron deficiency anemia    • Pancreatic cancer (CMS/HCC) 2019   • Pleural effusion on right    • Pneumonia    • Polyp of sigmoid colon    • Shortness of breath    • Tachycardia    • Tachycardia    •  Vitamin D deficiency      Past Surgical History:  Past Surgical History:   Procedure Laterality Date   • CHOLECYSTECTOMY     • ENDOSCOPY N/A 8/13/2019    Procedure: ESOPHAGOGASTRODUODENOSCOPY WITH CAUTERY;  Surgeon: Yessenia Hurtado MD;  Location: [facility] ENDOSCOPY;  Service: Gastroenterology   • GALLBLADDER SURGERY     • HERNIA REPAIR      x3   • HYSTERECTOMY     • STOMACH SURGERY        Home Meds:  Medications Prior to Admission   Medication Sig Dispense Refill Last Dose   • albuterol (PROVENTIL) (2.5 MG/3ML) 0.083% nebulizer solution Take 2.5 mg by nebulization Every 6 (Six) Hours As Needed for Wheezing. 30 vial 3 10/13/2019 at 0630   • albuterol sulfate  (90 Base) MCG/ACT inhaler Inhale 2 puffs Every 4 (Four) Hours As Needed for Wheezing. 1 inhaler 0 10/13/2019 at 0630   • apixaban (ELIQUIS) 5 MG tablet tablet Take 1 tablet by mouth Every 12 (Twelve) Hours. 60 tablet 11 10/13/2019 at 0630   • budesonide (PULMICORT FLEXHALER) 180 MCG/ACT inhaler Inhale 2 puffs 2 (Two) Times a Day. Rinse mouth after using 1 each 11 10/13/2019 at 0630   • desloratadine (CLARINEX) 5 MG tablet Take 1 tablet by mouth Daily. 90 tablet 1 10/13/2019 at 0630   • fluticasone (FLONASE) 50 MCG/ACT nasal spray 2 sprays into the nostril(s) as directed by provider Daily. 3 bottle 3 10/13/2019 at 0630   • lisinopril (PRINIVIL,ZESTRIL) 20 MG tablet Take 1 tablet by mouth Daily. 90 tablet 3 10/13/2019 at 0630time   • montelukast (SINGULAIR) 10 MG tablet Take 1 tablet by mouth Every Night. 90 tablet 0 10/12/2019 at 2100   • Morphine (MS CONTIN) 30 MG 12 hr tablet Take 1 tablet by mouth Every 12 (Twelve) Hours. As needed for pain. 60 tablet 0 10/13/2019 at 0630   • ondansetron (ZOFRAN) 8 MG tablet Take 1 tablet by mouth 3 (Three) Times a Day As Needed for Nausea or Vomiting. 30 tablet 5 Past Week at Unknown time   • oxyCODONE (ROXICODONE) 10 MG tablet Take 1 tablet by mouth Every 4 (Four) Hours As Needed for Moderate Pain . 120 tablet 0  10/13/2019 at 0630   • polyethylene glycol (MIRALAX) pack packet Take 17 g by mouth Daily. (Patient taking differently: Take 17 g by mouth As Needed.)   2100 at Unknown time   • simvastatin (ZOCOR) 20 MG tablet Take 1 tablet by mouth Every Night. 90 tablet 0 10/12/2019 at 2100   • timolol (TIMOPTIC) 0.25 % ophthalmic solution Administer 1 drop to both eyes Daily.   10/13/2019 at 0630   • insulin detemir (LEVEMIR) 100 UNIT/ML injection Inject 12 Units under the skin into the appropriate area as directed Every Night. 9 mL 1 Unknown at Unknown time   • pantoprazole (PROTONIX) 40 MG EC tablet Take 1 tablet by mouth 2 (Two) Times a Day Before Meals. 180 tablet 1 Unknown at Unknown time     Current Meds:     Current Facility-Administered Medications:   •  dextrose (D50W) 25 g/ 50mL Intravenous Solution 25 g, 25 g, Intravenous, Q15 Min PRN, Sony Lisa MD  •  dextrose (GLUTOSE) oral gel 15 g, 15 g, Oral, Q15 Min PRN, Sony Lisa MD  •  glucagon (human recombinant) (GLUCAGEN DIAGNOSTIC) injection 1 mg, 1 mg, Subcutaneous, Q15 Min PRN, Sony Lisa MD  •  insulin lispro (humaLOG) injection 0-9 Units, 0-9 Units, Subcutaneous, 4x Daily With Meals & Nightly, Sony Lisa MD  •  Pharmacy to dose vancomycin, , Does not apply, Daily, Sony Lisa MD  •  sodium chloride 0.9 % flush 10 mL, 10 mL, Intravenous, PRN, Ml Laguerre APRN  •  Access VAD, , , Once **AND** sodium chloride 0.9 % flush 10 mL, 10 mL, Intravenous, PRN, Richard Monge MD  •  [START ON 10/14/2019] vancomycin 1250 mg/250 mL 0.9% NS IVPB ([facility]), 20 mg/kg, Intravenous, Q12H, Sony Lisa MD  Allergies:  Allergies   Allergen Reactions   • Penicillins Hives     Social History:   Social History     Tobacco Use   • Smoking status: Current Every Day Smoker     Packs/day: 1.00     Years: 30.00     Pack years: 30.00     Types: Cigarettes   • Smokeless tobacco: Never Used   Substance Use Topics   • Alcohol use: No      Family History:  Family History  "  Problem Relation Age of Onset   • Hypertension Mother    • Diabetes Mother    • Arthritis Mother    • Hypertension Sister    • Diabetes Sister    • Thyroid disease Sister    • Arthritis Sister    • Hypertension Brother    • Diabetes Brother    • Lung cancer Maternal Uncle    • Stroke Maternal Grandfather    • Hypertension Brother    • Hypertension Sister    • Hypertension Sister           Review of Systems  See history of present illness and past medical history.    Constitutional: Positive for fever (tmax-100.8). Negative for chills.   HENT: Negative.    Eyes: Negative.    Respiratory: Positive for cough (productive cough with green sputum) and shortness of breath.    Cardiovascular: Negative for chest pain.   Gastrointestinal: Negative for abdominal pain and vomiting.   Genitourinary: Negative.  Negative for dysuria and hematuria.   Musculoskeletal: Negative.    Skin: Negative.  Negative for rash.   Neurological: Negative.  Negative for syncope and headaches.   Psychiatric/Behavioral: Negative.  Negative for confusion.   Remainder of ROS is negative.      Vitals:   /72 (BP Location: Right arm, Patient Position: Lying)   Pulse (!) 135   Temp 97.5 °F (36.4 °C) (Tympanic)   Resp 20   Ht 167.6 cm (66\")   Wt 57.4 kg (126 lb 8.7 oz)   SpO2 97%   BMI 20.42 kg/m²   I/O:     Intake/Output Summary (Last 24 hours) at 10/13/2019 1921  Last data filed at 10/13/2019 1108  Gross per 24 hour   Intake 300 ml   Output --   Net 300 ml     Exam:  General Appearance:    Alert, cooperative, no distress, appears stated age   Head:    Normocephalic, without obvious abnormality, atraumatic   Eyes:    PERRL, conjunctiva/corneas clear, EOM's intact, both eyes   Ears:    Normal external ear canals, both ears   Nose:   Nares normal, septum midline, mucosa normal, no drainage    or sinus tenderness   Throat:   Lips, tongue, gums normal; oral mucosa pink and moist   Neck:   Supple, symmetrical, trachea midline, no adenopathy;     " thyroid:  no enlargement/tenderness/nodules; no carotid    bruit or JVD   Back:     Symmetric, no curvature, ROM normal, no CVA tenderness   Lungs:    Crackles at the left lung base, scattered rhonchi bilaterally   Chest Wall:   Mediport in place    Heart:   Tachycardia 2/6 systolic murmur noted   Abdomen:     Soft, non-tender, bowel sounds active all four quadrants,     no masses, no hepatomegaly, no splenomegaly   Extremities:   Extremities normal, atraumatic, no cyanosis or edema   Pulses:   Pulses palpable in all extremities; symmetric all extremities   Skin:   Skin color normal, Skin is warm and dry,  no rashes or palpable lesions   Neurologic:  Grossly nonfocal       Data Review:      I reviewed the patient's new clinical results.  Results from last 7 days   Lab Units 10/13/19  1159 10/07/19  0917   WBC 10*3/mm3 29.76* 20.92*   HEMOGLOBIN g/dL 10.0* 10.7*   PLATELETS 10*3/mm3 152 315     Results from last 7 days   Lab Units 10/13/19  1159 10/07/19  0916   SODIUM mmol/L 133* 130*   POTASSIUM mmol/L 4.0 4.1   CHLORIDE mmol/L 90* 87*   CO2 mmol/L 28.2 27.7   BUN mg/dL 7* 9   CREATININE mg/dL 0.59 0.58*   CALCIUM mg/dL 8.5* 8.9   GLUCOSE mg/dL 190* 159*     Xr Chest 2 View    Result Date: 10/13/2019   New opacity has developed in the left upper lung, suggesting infiltrate in the left upper lobe. Loculated right pleural effusion appears similar to prior exam with right basilar atelectasis or infiltrate.  This report was finalized on 10/13/2019 12:11 PM by Dr. Natanael Whelan M.D.      Xr Chest 2 View    Result Date: 9/21/2019  Stable loculated right pleural effusion with volume loss and atelectasis at the base of the right lung. This is a stable chest radiograph when compared to the prior examination of 09/02/2019.  This report was finalized on 9/21/2019 8:04 PM by Dr. Shane Roger M.D.      Xr Ankle 3+ View Left    Result Date: 9/22/2019  There are findings suggestive of a defect in the medial aspect of the  talus. However, the defect appears corticated there is no overlying soft tissue swelling. No evidence for an acute fracture or abnormality of the left ankle is appreciated. However, it may be prudent to consider a CT of the left ankle to ensure there is no subtle acute bony abnormality in the region that has a deformity.  This report was finalized on 9/22/2019 5:10 PM by Dr. Shane Roger M.D.      Us Thoracentesis    Result Date: 10/2/2019  Successful ultrasound-guided thoracentesis of the right pleural space.  500 mL of bloody fluid obtained.     This report was finalized on 10/2/2019 12:58 PM by Dr. Edson Olvera MD.      Ct Angiogram Chest With Contrast    Result Date: 10/13/2019  There is no CT evidence of pulmonary embolism. There is extensive airspace infiltrate in the left lung consistent with pneumonia that is new since the preceding CTA of the chest dated 09/21/2017. Chronic dense consolidation and volume loss at the right lung base is unchanged. A moderate-sized right pleural effusion is also unchanged. Small capsular metastatic lesions within the upper abdomen adjacent to the right lobe of the liver are unchanged, as well.  These findings were discussed with Dr. Monge in the emergency room on 10/13/1950 1:00 PM  This report was finalized on 10/13/2019 2:53 PM by Dr. Jhon Arroyo M.D.      Ct Angiogram Chest With Contrast    Result Date: 9/21/2019  1. There has been interval resolution of all pulmonary emboli since the prior examination. 2. There is a stable large partially loculated right pleural effusion seen in conjunction with stable atelectasis and volume loss in the right lung. 3. There is a new enhancing pleural-based nodule at the base of the right hemithorax that measures on the order of 1 cm. This is suspicious for a pleural-based metastasis.  Radiation dose reduction techniques were utilized, including automated exposure control and exposure modulation based on body size.  This report  was finalized on 9/21/2019 8:14 PM by Dr. Shane Roger M.D.      Nm Pet Skull Base To Mid Thigh    Result Date: 9/19/2019  1. There has been significant decrease in intensity of metabolic activity at the pancreatic tail mass and decrease in the intensity of metabolic activity of the hepatic capsular implants. Some of the right pleural metastatic disease is slightly less intensely hypermetabolic, but is otherwise unchanged. There has been significant increase in size of the moderately large loculated right pleural effusion and a small increase since the recent chest CTA. 2. There is more than typical diffuse bowel activity, but there is no definitive bowel thickening to suggest significant colitis or enteritis. The bowel metabolic activity may be chemotherapy related.  This report was finalized on 9/19/2019 4:22 PM by Dr. Isidra Farias M.D.      ECG 12 Lead   Final Result   HEART RATE= 156  bpm   RR Interval= 384  ms   LA Interval= 133  ms   P Horizontal Axis= 20  deg   P Front Axis= 74  deg   QRSD Interval= 59  ms   QT Interval= 249  ms   QRS Axis= -39  deg   T Wave Axis= 50  deg   - BORDERLINE ECG -   Sinus tachycardia - rate has increased   Left axis deviation   Electronically Signed By: Mio Urbina ([facility]) 13-Oct-2019 21:55:16   Date and Time of Study: 2019-10-13 11:24:34          Assessment:  Active Hospital Problems    Diagnosis POA   • **Pneumonia of left upper lobe due to infectious organism (CMS/HCC) [J18.1] Yes   • Anemia due to chronic kidney disease [N18.9, D63.1] Yes   • Pulmonary embolism, bilateral (CMS/HCC) [I26.99] Yes   • Malignant neoplasm of tail of pancreas (CMS/HCC) [C25.2] Yes   • Chronic kidney disease, stage III (moderate) (CMS/HCC) [N18.3] Yes   • Tobacco abuse [Z72.0] Yes   • Type 2 diabetes mellitus (CMS/HCC) [E11.9] Yes     Impression: 08/24/2015 - Her A1c is very well controlled.  Again I have encouraged her to stop smoking.  Check Urine for microalbumin today.   Cont current meds.   Impression: 02/25/2015 - Very well controlled.  Cont current meds.  I would really like to see her exercise.  Impression: 08/12/2014 - well controlled.  Cont current meds.  Last Eye exam in April.  Need to request eye exam.  Impression: 02/12/2014 - Well controlled.  She is up to date on eye exams.  She has had pneumovax.  She really needs to quit smoking.  I tell her this on every visit.  She is on an ASA and STATIN and ACE.; Description: Monofil exam 2/2015, declines flu shots     • Moderate persistent asthma without complication [J45.40] Yes     Impression: 10/05/2015 - I am treating her with a zpack. She is wheezing a lot.  Cont current inhalers.  I do not think she needs steroids.  She is not soa.  She is moving air well.  She really must quit smoking.;   Impression: 08/24/2015 - Cont all meds as prescribed.  Her smoking is certainly an issue with her exacerbations.  She is using her meds as she should.  I have asked her to add mucinex to help thin out her sputum.  She really must stop smoking.  She declines flu shot.  She says she gets sick every time she takes one.;          Medical decision making:  Healthcare associated pneumonia-plan is to provide her with vancomycin and cefepime provided with IV fluid bolus as per sepsis protocol and follow-up on blood cultures and lactic acid levels.  Continue with oxygen supplementation and adjust treatment according to her clinical course.  History of COPD/asthma-provided with oxygen supplementation and nebulizer treatment.    Pancreatic cancer on chemotherapy-consult hematology oncology service.  Chronic kidney disease-monitor renal function avoid nephrotoxic agents and hypotensive episodes.  Diabetes mellitus-monitor her blood sugar pattern provided with Accu-Cheks and sliding scale coverage and avoid hypoglycemic episodes.  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  Hypertension-continue antihypertensive regimen  monitor her renal function and avoid hypotensive episodes.    Sony Lisa MD   10/13/2019  7:21 PM  Much of this encounter note is an electronic transcription/translation of spoken language to printed text. The electronic translation of spoken language may permit erroneous, or at times, nonsensical words or phrases to be inadvertently transcribed; Although I have reviewed the note for such errors, some may still exist

## 2019-10-13 NOTE — PROGRESS NOTES
Clinical Pharmacy Services: Medication History    Linette Ghosh is a 64 y.o. female presenting to [facility] for   Chief Complaint   Patient presents with   • Cough       She  has a past medical history of Asthma, Carotid artery stenosis, Chronic kidney disease, stage III (moderate) (CMS/[facility]), Diabetes mellitus (CMS/[facility]), GERD (gastroesophageal reflux disease), Glaucoma, Hydronephrosis, Hyperlipidemia, Hypertension, Intracranial hemorrhage (CMS/[facility]), Iron deficiency anemia, Pancreatic cancer (CMS/[facility]) (06/2019), Pleural effusion on right, Pneumonia, Polyp of sigmoid colon, Shortness of breath, Tachycardia, Tachycardia, and Vitamin D deficiency.    Allergies as of 10/13/2019 - Reviewed 10/13/2019   Allergen Reaction Noted   • Penicillins Hives 03/09/2016       Medication information was obtained from: Patient  Pharmacy and Phone Number: [facility] [phone]    Prior to Admission Medications     Prescriptions Last Dose Informant Patient Reported? Taking?    albuterol (PROVENTIL) (2.5 MG/3ML) 0.083% nebulizer solution  Self No Yes    Take 2.5 mg by nebulization Every 6 (Six) Hours As Needed for Wheezing.    albuterol sulfate  (90 Base) MCG/ACT inhaler  Self No Yes    Inhale 2 puffs Every 4 (Four) Hours As Needed for Wheezing.    apixaban (ELIQUIS) 5 MG tablet tablet 10/13/2019 Self No Yes    Take 1 tablet by mouth Every 12 (Twelve) Hours.    budesonide (PULMICORT FLEXHALER) 180 MCG/ACT inhaler  Self No Yes    Inhale 2 puffs 2 (Two) Times a Day. Rinse mouth after using    desloratadine (CLARINEX) 5 MG tablet  Self No Yes    Take 1 tablet by mouth Daily.    fluticasone (FLONASE) 50 MCG/ACT nasal spray  Self No Yes    2 sprays into the nostril(s) as directed by provider Daily.    insulin detemir (LEVEMIR) 100 UNIT/ML injection 10/12/2019 Self No Yes    Inject 12 Units under the skin into the appropriate area as directed Every Night.    lisinopril (PRINIVIL,ZESTRIL) 20 MG tablet 10/13/2019 Self No Yes     Take 1 tablet by mouth Daily.    montelukast (SINGULAIR) 10 MG tablet  Self No Yes    Take 1 tablet by mouth Every Night.    Morphine (MS CONTIN) 30 MG 12 hr tablet 10/13/2019 Self No Yes    Take 1 tablet by mouth Every 12 (Twelve) Hours. As needed for pain.    ondansetron (ZOFRAN) 8 MG tablet  Self No Yes    Take 1 tablet by mouth 3 (Three) Times a Day As Needed for Nausea or Vomiting.    oxyCODONE (ROXICODONE) 10 MG tablet 10/13/2019 Self No Yes    Take 1 tablet by mouth Every 4 (Four) Hours As Needed for Moderate Pain .    pantoprazole (PROTONIX) 40 MG EC tablet Past Week Self No Yes    Take 1 tablet by mouth 2 (Two) Times a Day Before Meals.    polyethylene glycol (MIRALAX) pack packet  Self No Yes    Take 17 g by mouth Daily.    simvastatin (ZOCOR) 20 MG tablet  Self No Yes    Take 1 tablet by mouth Every Night.    timolol (TIMOPTIC) 0.25 % ophthalmic solution  Self Yes Yes    Administer 1 drop to both eyes Daily.            Medication notes: Removed: alprazolam (never picked up), azithromycin, bisacodyl, nystatin (therapy completed).     This medication list is complete to the best of my knowledge as of 10/13/2019    Please call if questions.    STACEY Broderick  10/13/2019 3:50 PM

## 2019-10-13 NOTE — PLAN OF CARE
Problem: Patient Care Overview  Goal: Plan of Care Review  Outcome: Ongoing (interventions implemented as appropriate)   10/13/19 2652   Coping/Psychosocial   Plan of Care Reviewed With patient   Plan of Care Review   Progress no change   OTHER   Outcome Summary Pt new admit from ER. Arrived to unit on O2. Panncreatic cancer being treated with chemo. Vanc per pharmacy dosing, first dose was given in ER. VSS. Will continue to monitor       Problem: Fall Risk (Adult)  Goal: Identify Related Risk Factors and Signs and Symptoms  Outcome: Ongoing (interventions implemented as appropriate)    Goal: Absence of Fall  Outcome: Ongoing (interventions implemented as appropriate)      Problem: Pain, Chronic (Adult)  Goal: Identify Related Risk Factors and Signs and Symptoms  Outcome: Ongoing (interventions implemented as appropriate)    Goal: Acceptable Pain/Comfort Level and Functional Ability  Outcome: Ongoing (interventions implemented as appropriate)

## 2019-10-14 PROBLEM — Z79.4 TYPE 2 DIABETES MELLITUS WITH HYPERGLYCEMIA, WITH LONG-TERM CURRENT USE OF INSULIN (HCC): Status: ACTIVE | Noted: 2019-01-01

## 2019-10-14 PROBLEM — A41.9 SEPSIS (HCC): Status: ACTIVE | Noted: 2019-01-01

## 2019-10-14 PROBLEM — E11.65 TYPE 2 DIABETES MELLITUS WITH HYPERGLYCEMIA, WITH LONG-TERM CURRENT USE OF INSULIN (HCC): Status: ACTIVE | Noted: 2019-01-01

## 2019-10-14 PROBLEM — F11.20 OPIOID DEPENDENCE (HCC): Status: ACTIVE | Noted: 2019-01-01

## 2019-10-14 PROBLEM — D69.6 THROMBOCYTOPENIA (HCC): Status: ACTIVE | Noted: 2019-01-01

## 2019-10-14 NOTE — PLAN OF CARE
Problem: Patient Care Overview  Goal: Plan of Care Review  Outcome: Ongoing (interventions implemented as appropriate)   10/14/19 0541 10/14/19 1002 10/14/19 1649   Coping/Psychosocial   Plan of Care Reviewed With --  patient --    Plan of Care Review   Progress improving --  --    OTHER   Outcome Summary --  --  PT on RA, IVF ctd, Cefepime started, VSS, will ct to monitor.        Problem: Fall Risk (Adult)  Goal: Identify Related Risk Factors and Signs and Symptoms  Outcome: Outcome(s) achieved Date Met: 10/14/19    Goal: Absence of Fall  Outcome: Ongoing (interventions implemented as appropriate)      Problem: Pain, Chronic (Adult)  Goal: Identify Related Risk Factors and Signs and Symptoms  Outcome: Outcome(s) achieved Date Met: 10/14/19    Goal: Acceptable Pain/Comfort Level and Functional Ability  Outcome: Ongoing (interventions implemented as appropriate)

## 2019-10-14 NOTE — CONSULTS
Group: [facility]         CONSULT NOTE    Patient Identification:  Linette Ghosh  64 y.o.  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  She presented to the emergency room yesterday with cough congestion yellowish sputum production low-grade fever.  No aspiration as such was reported.  She was noted to have oxygen saturation 88% which improved with O2 supplementation.  She has also underlying history of asthma for which she follows my partner Dr. Berhane Leach.  She was noted to have healthcare associated pneumonia and currently started on broad-spectrum antibiotics.  She actually feels better since antibiotics have been started.      Review of Systems  Constitutional: Positive for fever (tmax-100.8). Negative for chills.   HENT: Negative.    Eyes: Negative.    Respiratory: Positive for cough (productive cough with green sputum) and shortness of breath.    Cardiovascular: Negative for chest pain.   Gastrointestinal: Negative for abdominal pain and vomiting.   Genitourinary: Negative.  Negative for dysuria and hematuria.   Musculoskeletal: Negative.    Skin: Negative.  Negative for rash.   Neurological: Negative.  Negative for syncope and headaches.   Psychiatric/Behavioral: Negative.  Negative for confusion.   Past Medical History:  Past Medical History:   Diagnosis Date   • Asthma    • Carotid artery stenosis    • Chronic kidney disease, stage III (moderate) (CMS/HCC)    • Diabetes mellitus (CMS/HCC)     Type 2   • GERD (gastroesophageal reflux disease)    • Glaucoma    • Hydronephrosis    • Hyperlipidemia    • Hypertension    • Intracranial hemorrhage (CMS/HCC)    • Iron deficiency anemia    • Pancreatic  cancer (CMS/HCC) 06/2019   • Pleural effusion on right    • Pneumonia    • Polyp of sigmoid colon    • Shortness of breath    • Tachycardia    • Tachycardia    • Vitamin D deficiency        Past Surgical History:  Past Surgical History:   Procedure Laterality Date   • CHOLECYSTECTOMY     • ENDOSCOPY N/A 8/13/2019    Procedure: ESOPHAGOGASTRODUODENOSCOPY WITH CAUTERY;  Surgeon: Yessenia Hurtado MD;  Location: [facility] ENDOSCOPY;  Service: Gastroenterology   • GALLBLADDER SURGERY     • HERNIA REPAIR      x3   • HYSTERECTOMY     • STOMACH SURGERY          Home Meds:  Medications Prior to Admission   Medication Sig Dispense Refill Last Dose   • albuterol (PROVENTIL) (2.5 MG/3ML) 0.083% nebulizer solution Take 2.5 mg by nebulization Every 6 (Six) Hours As Needed for Wheezing. 30 vial 3 10/13/2019 at 0630   • albuterol sulfate  (90 Base) MCG/ACT inhaler Inhale 2 puffs Every 4 (Four) Hours As Needed for Wheezing. 1 inhaler 0 10/13/2019 at 0630   • apixaban (ELIQUIS) 5 MG tablet tablet Take 1 tablet by mouth Every 12 (Twelve) Hours. 60 tablet 11 10/13/2019 at 0630   • budesonide (PULMICORT FLEXHALER) 180 MCG/ACT inhaler Inhale 2 puffs 2 (Two) Times a Day. Rinse mouth after using 1 each 11 10/13/2019 at 0630   • desloratadine (CLARINEX) 5 MG tablet Take 1 tablet by mouth Daily. 90 tablet 1 10/13/2019 at 0630   • fluticasone (FLONASE) 50 MCG/ACT nasal spray 2 sprays into the nostril(s) as directed by provider Daily. 3 bottle 3 10/13/2019 at 0630   • lisinopril (PRINIVIL,ZESTRIL) 20 MG tablet Take 1 tablet by mouth Daily. 90 tablet 3 10/13/2019 at 0630time   • montelukast (SINGULAIR) 10 MG tablet Take 1 tablet by mouth Every Night. 90 tablet 0 10/12/2019 at 2100   • Morphine (MS CONTIN) 30 MG 12 hr tablet Take 1 tablet by mouth Every 12 (Twelve) Hours. As needed for pain. 60 tablet 0 10/13/2019 at 0630   • ondansetron (ZOFRAN) 8 MG tablet Take 1 tablet by mouth 3 (Three) Times a Day As Needed for Nausea or Vomiting. 30  tablet 5 Past Week at Unknown time   • oxyCODONE (ROXICODONE) 10 MG tablet Take 1 tablet by mouth Every 4 (Four) Hours As Needed for Moderate Pain . 120 tablet 0 10/13/2019 at 0630   • polyethylene glycol (MIRALAX) pack packet Take 17 g by mouth Daily. (Patient taking differently: Take 17 g by mouth As Needed.)   2100 at Unknown time   • simvastatin (ZOCOR) 20 MG tablet Take 1 tablet by mouth Every Night. 90 tablet 0 10/12/2019 at 2100   • timolol (TIMOPTIC) 0.25 % ophthalmic solution Administer 1 drop to both eyes Daily.   10/13/2019 at 0630   • insulin detemir (LEVEMIR) 100 UNIT/ML injection Inject 12 Units under the skin into the appropriate area as directed Every Night. 9 mL 1 Unknown at Unknown time   • pantoprazole (PROTONIX) 40 MG EC tablet Take 1 tablet by mouth 2 (Two) Times a Day Before Meals. 180 tablet 1 Unknown at Unknown time       Allergies:  Allergies   Allergen Reactions   • Penicillins Hives       Social History:   Social History     Socioeconomic History   • Marital status: Single     Spouse name: Not on file   • Number of children: 0   • Years of education: College   • Highest education level: Not on file   Occupational History   • Occupation: Customer Service     Employer: [employer]   Tobacco Use   • Smoking status: Current Every Day Smoker     Packs/day: 1.00     Years: 30.00     Pack years: 30.00     Types: Cigarettes   • Smokeless tobacco: Never Used   Substance and Sexual Activity   • Alcohol use: No   • Drug use: No   • Sexual activity: Defer       Family History:  Family History   Problem Relation Age of Onset   • Hypertension Mother    • Diabetes Mother    • Arthritis Mother    • Hypertension Sister    • Diabetes Sister    • Thyroid disease Sister    • Arthritis Sister    • Hypertension Brother    • Diabetes Brother    • Lung cancer Maternal Uncle    • Stroke Maternal Grandfather    • Hypertension Brother    • Hypertension Sister    • Hypertension Sister        Physical  "Exam:  /96 (BP Location: Left arm, Patient Position: Sitting)   Pulse (!) 124   Temp 98.3 °F (36.8 °C) (Oral)   Resp 22   Ht 167.6 cm (66\")   Wt 57.4 kg (126 lb 8.7 oz)   SpO2 97%   BMI 20.42 kg/m²  Body mass index is 20.42 kg/m². 97% 57.4 kg (126 lb 8.7 oz)  Physical Exam  Middle-aged female resting comfortably currently in no acute distress or discomfort  Well-developed normal body habitus  Eyes normal conjunctive a pupils reactive to light  ENT Mallampati between 3 and 4 normal nasal exam  Neck midline trachea no thyromegaly  Chest diminished breath sounds with crackles noted on the left side no labored breathing  CVs regular rate and rhythm no lower extremity edema  Abdomen soft nontender no hepatospleno megaly  Skin no rashes no nodules  Psych oriented to time place and person normal memory  Muscular skeletal no cyanosis no clubbing normal range of motion    LABS:  Lab Results   Component Value Date    CALCIUM 7.9 (L) 10/14/2019    PHOS 2.0 (L) 07/30/2019     Results from last 7 days   Lab Units 10/14/19  0556 10/13/19  1159   SODIUM mmol/L 139 133*   POTASSIUM mmol/L 3.7 4.0   CHLORIDE mmol/L 100 90*   CO2 mmol/L 28.2 28.2   BUN mg/dL 10 7*   CREATININE mg/dL 0.47* 0.59   GLUCOSE mg/dL 112* 190*   CALCIUM mg/dL 7.9* 8.5*   WBC 10*3/mm3 14.66* 29.76*   HEMOGLOBIN g/dL 8.1* 10.0*   PLATELETS 10*3/mm3 103* 152   ALT (SGPT) U/L 13 13   AST (SGOT) U/L 18 25   PROCALCITONIN ng/mL  --  0.20     Lab Results   Component Value Date    TROPONINT <0.010 09/21/2019         Results from last 7 days   Lab Units 10/13/19  1312 10/13/19  1216   BLOODCX  No growth at 24 hours No growth at 24 hours     Results from last 7 days   Lab Units 10/14/19  0556 10/13/19  1215 10/13/19  1159   PROCALCITONIN ng/mL  --   --  0.20   LACTATE mmol/L 0.9 1.5  --          Results from last 7 days   Lab Units 10/13/19  1203   ADENOVIRUS DETECTION BY PCR  Not Detected   CORONAVIRUS 229E  Not Detected   CORONAVIRUS HKU1  Not Detected "   CORONAVIRUS NL63  Not Detected   CORONAVIRUS OC43  Not Detected   HUMAN METAPNEUMOVIRUS  Not Detected   HUMAN RHINOVIRUS/ENTEROVIRUS  Not Detected   INFLUENZA B PCR  Not Detected   PARAINFLUENZA 1  Not Detected   PARAINFLUENZA VIRUS 2  Not Detected   PARAINFLUENZA VIRUS 3  Not Detected   PARAINFLUENZA VIRUS 4  Not Detected   BORDETELLA PERTUSSIS PCR  Not Detected   CHLAMYDOPHILA PNEUMONIAE PCR  Not Detected   MYCOPLAMA PNEUMO PCR  Not Detected   INFLUENZA A PCR  Not Detected   INFLUENZA A H3  Not Detected   INFLUENZA A H1  Not Detected   RSV, PCR  Not Detected         Results from last 7 days   Lab Units 10/13/19  1312 10/13/19  1216   BLOODCX  No growth at 24 hours No growth at 24 hours     Lab Results   Component Value Date    TSH 3.970 07/18/2019     Estimated Creatinine Clearance: 109.6 mL/min (A) (by C-G formula based on SCr of 0.47 mg/dL (L)).         Imaging: I personally visualized the images of scans/x-rays performed within last 3 days.      Assessment:  Pneumonia of left upper lobe due to infectious organism (CMS/HCC)  Healthcare associated pneumonia  Metastatic pancreatic cancer with extensive mets including the right pleura extension into the posterior mediastinum  Bilateral lower extremity DVT and PE on Eliquis  Malignant right pleural effusion status post thoracentesis in the past      Recommendations:  At this point we have a female with unfortunate situation metastatic pancreatic cancer with extensive mets including malignant pleural effusion.  Clinically and radiologically she does have pneumonia healthcare associated and currently on appropriate antibiotics.  Clinically and leukocytosis has improved significantly with treatment  Pleural effusion on the right appears to be loculated but apparently in the past she has not had any improvement with draining fluid and she is reluctant to have the procedure done due to pain etc. if drainage is necessary in future may consider a Pleurx catheter  Wean  down oxygen as tolerated  We will follow along closely              Nik Ward MD  10/14/2019  2:15 PM      Much of this encounter note is an electronic transcription/translation of spoken language to printed text using Dragon Software.

## 2019-10-14 NOTE — PROGRESS NOTES
Clinical Pharmacy Services: Medication History    Linette Ghosh is a 64 y.o. female presenting to [facility] for Pneumonia of left upper lobe due to infectious organism (CMS/[facility]) [J18.1]  Malignant neoplasm of pancreas, unspecified location of malignancy (CMS/[facility]) [C25.9]  Sepsis, due to unspecified organism, unspecified whether acute organ dysfunction present (CMS/[facility]) [A41.9]    She  has a past medical history of Asthma, Carotid artery stenosis, Chronic kidney disease, stage III (moderate) (CMS/[facility]), Diabetes mellitus (CMS/[facility]), GERD (gastroesophageal reflux disease), Glaucoma, Hydronephrosis, Hyperlipidemia, Hypertension, Intracranial hemorrhage (CMS/[facility]), Iron deficiency anemia, Pancreatic cancer (CMS/[facility]) (06/2019), Pleural effusion on right, Pneumonia, Polyp of sigmoid colon, Shortness of breath, Tachycardia, Tachycardia, and Vitamin D deficiency.    Allergies as of 10/13/2019 - Reviewed 10/13/2019   Allergen Reaction Noted   • Penicillins Hives 03/09/2016       Medication information was obtained from: Patient     Prior to Admission Medications       Prescriptions Last Dose Informant Patient Reported? Taking?    albuterol (PROVENTIL) (2.5 MG/3ML) 0.083% nebulizer solution Patient Taking Differently Self No Yes    Take 2.5 mg by nebulization Every 6 (Six) Hours As Needed for Wheezing.    Patient taking differently:  Take 2.5 mg by nebulization Every 4 (Four) Hours As Needed for Wheezing.    albuterol sulfate  (90 Base) MCG/ACT inhaler 10/13/2019 Self No Yes    Inhale 2 puffs Every 4 (Four) Hours As Needed for Wheezing.    apixaban (ELIQUIS) 5 MG tablet tablet 10/13/2019 Self No Yes    Take 1 tablet by mouth Every 12 (Twelve) Hours.    budesonide (PULMICORT FLEXHALER) 180 MCG/ACT inhaler 10/13/2019 Self No Yes    Inhale 2 puffs 2 (Two) Times a Day. Rinse mouth after using    desloratadine (CLARINEX) 5 MG tablet 10/13/2019 Self No Yes    Take 1 tablet by mouth Daily.    fluticasone  (FLONASE) 50 MCG/ACT nasal spray 10/13/2019 Self No Yes    2 sprays into the nostril(s) as directed by provider Daily.    insulin detemir (LEVEMIR) 100 UNIT/ML injection  Self No Yes    Inject 12 Units under the skin into the appropriate area as directed Every Night.    lisinopril (PRINIVIL,ZESTRIL) 20 MG tablet 10/13/2019 Self No Yes    Take 1 tablet by mouth Daily.    montelukast (SINGULAIR) 10 MG tablet 10/12/2019 Self No Yes    Take 1 tablet by mouth Every Night.    Morphine (MS CONTIN) 30 MG 12 hr tablet 10/13/2019 Self No Yes    Take 1 tablet by mouth Every 12 (Twelve) Hours. As needed for pain.    ondansetron (ZOFRAN) 8 MG tablet Past Week Self No Yes    Take 1 tablet by mouth 3 (Three) Times a Day As Needed for Nausea or Vomiting.    oxyCODONE (ROXICODONE) 10 MG tablet 10/13/2019 Self No Yes    Take 1 tablet by mouth Every 4 (Four) Hours As Needed for Moderate Pain .    pantoprazole (PROTONIX) 40 MG EC tablet  Self No Yes    Take 1 tablet by mouth 2 (Two) Times a Day Before Meals.    polyethylene glycol (MIRALAX) pack packet 2100 Self No Yes    Take 17 g by mouth Daily.    Patient taking differently:  Take 17 g by mouth As Needed.    simvastatin (ZOCOR) 20 MG tablet 10/12/2019 Self No Yes    Take 1 tablet by mouth Every Night.    timolol (TIMOPTIC) 0.25 % ophthalmic solution 10/13/2019 Self Yes Yes    Administer 1 drop to both eyes Daily.            Medication notes: Patient is still taking pantoprazole and levemir. Patient uses albuterol 0.083% nebulizer solution every 4 hours as needed for wheezing.     This medication list is complete to the best of my knowledge as of 10/14/2019    Please call if questions.    Camilla Kilgore, Pharmacy Intern  10/14/2019 3:48 PM       I have reviewed and agree with the pharmacy student's medication history.  Everton Estevez, BriandaD

## 2019-10-14 NOTE — PROGRESS NOTES
Name: Linette Ghosh ADMIT: 10/13/2019   : 1955  PCP: Raine Ghosh MD    MRN: [id number] LOS: 1 days   AGE/SEX: 64 y.o. female  ROOM[facility]     Subjective   Subjective   Reports feeling better today.  No new complaints.     Objective   Objective   Temp:  [97.5 °F (36.4 °C)-100.8 °F (38.2 °C)] 97.9 °F (36.6 °C)  Heart Rate:  [111-160] 117  Resp:  [18-28] 20  BP: (103-147)/(72-97) 106/74  SpO2:  [92 %-100 %] 97 %  on  Flow (L/min):  [2-4] 2;   Device (Oxygen Therapy): nasal cannula  Body mass index is 20.42 kg/m².    Physical Exam   Constitutional: She is oriented to person, place, and time. She appears cachectic. She is cooperative. She appears ill (Chronically). No distress.   Neck: No JVD present. No tracheal deviation present.   Cardiovascular: Regular rhythm. Tachycardia present.   No murmur heard.  Pulmonary/Chest: Effort normal. No respiratory distress. She has decreased breath sounds. She has rhonchi.   Abdominal: Soft. Normal appearance and bowel sounds are normal. She exhibits no distension. There is no tenderness.   Musculoskeletal: She exhibits no edema.   Neurological: She is alert and oriented to person, place, and time.   Skin: Skin is warm and dry.   Psychiatric: She has a normal mood and affect. Her behavior is normal.   Nursing note and vitals reviewed.      Results Review:       I reviewed the patient's new clinical results.  Results from last 7 days   Lab Units 10/14/19  0556 10/13/19  1159   WBC 10*3/mm3 14.66* 29.76*   HEMOGLOBIN g/dL 8.1* 10.0*   PLATELETS 10*3/mm3 103* 152     Results from last 7 days   Lab Units 10/14/19  0556 10/13/19  1159   SODIUM mmol/L 139 133*   POTASSIUM mmol/L 3.7 4.0   CHLORIDE mmol/L 100 90*   CO2 mmol/L 28.2 28.2   BUN mg/dL 10 7*   CREATININE mg/dL 0.47* 0.59   GLUCOSE mg/dL 112* 190*   Estimated Creatinine Clearance: 109.6 mL/min (A) (by C-G formula based on SCr of 0.47 mg/dL (L)).  Results from last 7 days   Lab Units 10/14/19  0556 10/13/19  5773    CALCIUM mg/dL 7.9* 8.5*   ALBUMIN g/dL 3.00* 3.80     Results from last 7 days   Lab Units 10/14/19  0556 10/13/19  1215 10/13/19  1159   PROCALCITONIN ng/mL  --   --  0.20   LACTATE mmol/L 0.9 1.5  --    No results found for: STREPPNEUAG, LEGANTIGENUR      Glucose   Date/Time Value Ref Range Status   10/14/2019 0758 107 70 - 130 mg/dL Final   10/13/2019 2128 241 (H) 70 - 130 mg/dL Final   10/13/2019 1810 246 (H) 70 - 130 mg/dL Final           apixaban 5 mg Oral Q12H   atorvastatin 10 mg Oral Daily   budesonide 0.5 mg Nebulization BID - RT   cetirizine 10 mg Oral Daily   fluticasone 2 spray Nasal Daily   insulin glargine 10 Units Subcutaneous Nightly   insulin lispro 0-9 Units Subcutaneous 4x Daily With Meals & Nightly   lisinopril 20 mg Oral Daily   montelukast 10 mg Oral Nightly   Morphine 30 mg Oral Q12H   pantoprazole 40 mg Oral BID    Pharmacy to dose vancomycin  Does not apply Daily   sodium chloride 10 mL Intravenous Q12H   timolol 1 drop Both Eyes Daily   vancomycin 20 mg/kg Intravenous Q12H       sodium chloride 100 mL/hr Last Rate: 100 mL/hr (10/13/19 2241)   Diet Regular; Cardiac, Consistent Carbohydrate         Assessment/Plan     Active Hospital Problems    Diagnosis  POA   • **Pneumonia of left upper lobe due to infectious organism (CMS/[facility]) [J18.1]  Yes   • Opioid dependence (CMS/[facility]) [F11.20]  Yes   • Type 2 diabetes mellitus with hyperglycemia, with long-term current use of insulin (CMS/[facility]) [E11.65, Z79.4]  Not Applicable   • Sepsis present on admission [A41.9]  Yes   • Thrombocytopenia (CMS/[facility]) [D69.6]  No   • Anemia due to chronic kidney disease [N18.9, D63.1]  Yes   • Pulmonary embolism, bilateral (CMS/[facility]) [I26.99]  Yes   • Malignant neoplasm of tail of pancreas (CMS/[facility]) [C25.2]  Yes   • Chronic kidney disease, stage III (moderate) (CMS/[facility]) [N18.3]  Yes   • Tobacco abuse [Z72.0]  Yes   • Moderate persistent asthma without complication [J45.40]  Yes      Resolved Hospital Problems   No  resolved problems to display.       · Admitted to monitored unit.  · Continue CEFEPIME + VANCOMYCIN day 2 of 7 for treatment of possible gram negative or MRSA pneumonia given nosocomial exposures.  Will de-escalate based on cultures.  · Will stop IV fluids.  · Blood cultures x2 done in ED negative thus far.    · Will order sputum culture, urinary antigens and MRSA screen nares.  · VRP negative.  · Supplemental oxygen to keep SaO2 > 92%  · Pulmonary and hematology consulted  · Worsening anemia today.  · Physical therapy consult    VTE Prophylaxis - Eliquis (home med)  Code Status - Full code  Disposition - Anticipate discharge home in 1-2 days.      Caleb Dominguez MD  [facility]  10/14/19  10:06 AM

## 2019-10-14 NOTE — PLAN OF CARE
Problem: Patient Care Overview  Goal: Plan of Care Review  Outcome: Ongoing (interventions implemented as appropriate)   10/14/19 0541   Coping/Psychosocial   Plan of Care Reviewed With patient   Plan of Care Review   Progress improving   OTHER   Outcome Summary Pt on RA, VSS, IVF and IV abx continues. Pt c/o pain once, treated with PRN pain med. Didn't sleep much this shift. VSS. Will continue to monitor.     Goal: Discharge Needs Assessment  Outcome: Ongoing (interventions implemented as appropriate)    Goal: Interprofessional Rounds/Family Conf  Outcome: Ongoing (interventions implemented as appropriate)      Problem: Fall Risk (Adult)  Goal: Identify Related Risk Factors and Signs and Symptoms  Outcome: Ongoing (interventions implemented as appropriate)    Goal: Absence of Fall  Outcome: Ongoing (interventions implemented as appropriate)      Problem: Pain, Chronic (Adult)  Goal: Identify Related Risk Factors and Signs and Symptoms  Outcome: Ongoing (interventions implemented as appropriate)    Goal: Acceptable Pain/Comfort Level and Functional Ability  Outcome: Ongoing (interventions implemented as appropriate)

## 2019-10-14 NOTE — CONSULTS
.     REASON FOR CONSULTATION:   Anemia, pancreatic cancer  Provide an opinion on any further workup or treatment                             REQUESTING PHYSICIAN: Sony Lisa MD  RECORDS OBTAINED:  Records of the patients history including those from the electronic medical record were reviewed and summarized in detail.    HISTORY OF PRESENT ILLNESS:  The patient is a 64 y.o. year old female  who is here for follow-up with the above-mentioned history.    Progressive shortness of breath prompted a visit to the ER the same day, 10/13/2019.    She reported cough with yellow sputum and low-grade fever.    Past Medical History:   Diagnosis Date   • Asthma    • Carotid artery stenosis    • Chronic kidney disease, stage III (moderate) (CMS/HCC)    • Diabetes mellitus (CMS/HCC)     Type 2   • GERD (gastroesophageal reflux disease)    • Glaucoma    • Hydronephrosis    • Hyperlipidemia    • Hypertension    • Intracranial hemorrhage (CMS/HCC)    • Iron deficiency anemia    • Pancreatic cancer (CMS/HCC) 06/2019   • Pleural effusion on right    • Pneumonia    • Polyp of sigmoid colon    • Shortness of breath    • Tachycardia    • Tachycardia    • Vitamin D deficiency      Past Surgical History:   Procedure Laterality Date   • CHOLECYSTECTOMY     • ENDOSCOPY N/A 8/13/2019    Procedure: ESOPHAGOGASTRODUODENOSCOPY WITH CAUTERY;  Surgeon: Yessenia Hurtado MD;  Location: [facility] ENDOSCOPY;  Service: Gastroenterology   • GALLBLADDER SURGERY     • HERNIA REPAIR      x3   • HYSTERECTOMY     • STOMACH SURGERY         MEDICATIONS    Current Facility-Administered Medications:   •  acetaminophen (TYLENOL) tablet 650 mg, 650 mg, Oral, Q4H PRN **OR** acetaminophen (TYLENOL) 160 MG/5ML solution 650 mg, 650 mg, Oral, Q4H PRN **OR** acetaminophen (TYLENOL) suppository 650 mg, 650 mg, Rectal, Q4H PRN, Sony Lisa MD  •  albuterol (PROVENTIL) nebulizer solution 0.083% 2.5 mg/3mL, 2.5 mg, Nebulization, Q6H PRN, Sony Lisa MD  •  apixaban  (ELIQUIS) tablet 5 mg, 5 mg, Oral, Q12H, Sony Lisa MD, 5 mg at 10/14/19 1002  •  atorvastatin (LIPITOR) tablet 10 mg, 10 mg, Oral, Daily, Sony Lisa MD, 10 mg at 10/14/19 1002  •  budesonide (PULMICORT) nebulizer solution 0.5 mg, 0.5 mg, Nebulization, BID - RT, Sony Lisa MD, 0.5 mg at 10/14/19 0912  •  cefepime (MAXIPIME) 2 g/100 mL 0.9% NS (mbp), 2 g, Intravenous, Q8H, Caleb Dominguez MD  •  cetirizine (zyrTEC) tablet 10 mg, 10 mg, Oral, Daily, Sony Lisa MD, 10 mg at 10/14/19 1002  •  dextrose (D50W) 25 g/ 50mL Intravenous Solution 25 g, 25 g, Intravenous, Q15 Min PRN, Sony Lisa MD  •  dextrose (GLUTOSE) oral gel 15 g, 15 g, Oral, Q15 Min PRN, Sony Lisa MD  •  fluticasone (FLONASE) 50 MCG/ACT nasal spray 2 spray, 2 spray, Nasal, Daily, Sony Lisa MD, 2 spray at 10/14/19 1003  •  glucagon (human recombinant) (GLUCAGEN DIAGNOSTIC) injection 1 mg, 1 mg, Subcutaneous, Q15 Min PRN, Sony Lisa MD  •  insulin glargine (LANTUS) injection 10 Units, 10 Units, Subcutaneous, Nightly, Sony Lisa MD, 10 Units at 10/13/19 2224  •  insulin lispro (humaLOG) injection 0-9 Units, 0-9 Units, Subcutaneous, 4x Daily With Meals & Nightly, Sony Lisa MD, 4 Units at 10/13/19 2224  •  lisinopril (PRINIVIL,ZESTRIL) tablet 20 mg, 20 mg, Oral, Daily, Sony Lisa MD, 20 mg at 10/14/19 1002  •  montelukast (SINGULAIR) tablet 10 mg, 10 mg, Oral, Nightly, Sony Lisa MD, 10 mg at 10/13/19 2223  •  Morphine (MS CONTIN) 12 hr tablet 30 mg, 30 mg, Oral, Q12H, Sony Lisa MD, 30 mg at 10/14/19 1002  •  nitroglycerin (NITROSTAT) SL tablet 0.4 mg, 0.4 mg, Sublingual, Q5 Min PRN, Sony Lisa MD  •  ondansetron (ZOFRAN) injection 4 mg, 4 mg, Intravenous, Q6H PRN, Sony Lisa MD  •  ondansetron (ZOFRAN) tablet 8 mg, 8 mg, Oral, TID PRN, Sony Lisa MD  •  oxyCODONE (ROXICODONE) immediate release tablet 10 mg, 10 mg, Oral, Q4H PRN, Sony Lisa MD, 10 mg at 10/14/19 9381  •  pantoprazole (PROTONIX) EC tablet 40  mg, 40 mg, Oral, BID AC, Sony Lisa MD, 40 mg at 10/14/19 0650  •  Pharmacy to dose vancomycin, , Does not apply, Daily, Sony Lisa MD  •  polyethylene glycol 3350 powder (packet), 17 g, Oral, Daily PRN, Sony Lisa MD  •  sodium chloride 0.9 % flush 10 mL, 10 mL, Intravenous, PRN, WyandotMl Sosa APRN  •  Access VAD, , , Once **AND** sodium chloride 0.9 % flush 10 mL, 10 mL, Intravenous, PRN, Richard Monge MD  •  sodium chloride 0.9 % flush 10 mL, 10 mL, Intravenous, Q12H, Sony Lisa MD, 10 mL at 10/14/19 1003  •  sodium chloride 0.9 % flush 10 mL, 10 mL, Intravenous, PRN, Sony Lisa MD  •  sodium chloride 0.9 % infusion, 100 mL/hr, Intravenous, Continuous, Sony Lisa MD, Last Rate: 100 mL/hr at 10/14/19 1008, 100 mL/hr at 10/14/19 1008  •  timolol (TIMOPTIC) 0.25 % ophthalmic solution 1 drop, 1 drop, Both Eyes, Daily, Sony Lisa MD, 1 drop at 10/14/19 1003  •  vancomycin 1250 mg/250 mL 0.9% NS IVPB ([facility]), 20 mg/kg, Intravenous, Q12H, Sony Lisa MD, 1,250 mg at 10/14/19 0207    ALLERGIES:     Allergies   Allergen Reactions   • Penicillins Hives       SOCIAL HISTORY:       Social History     Socioeconomic History   • Marital status: Single     Spouse name: Not on file   • Number of children: 0   • Years of education: College   • Highest education level: Not on file   Occupational History   • Occupation: Customer Service     Employer: [employer]   Tobacco Use   • Smoking status: Current Every Day Smoker     Packs/day: 1.00     Years: 30.00     Pack years: 30.00     Types: Cigarettes   • Smokeless tobacco: Never Used   Substance and Sexual Activity   • Alcohol use: No   • Drug use: No   • Sexual activity: Defer         FAMILY HISTORY:  Family History   Problem Relation Age of Onset   • Hypertension Mother    • Diabetes Mother    • Arthritis Mother    • Hypertension Sister    • Diabetes Sister    • Thyroid disease Sister    • Arthritis Sister    • Hypertension Brother    •  Diabetes Brother    • Lung cancer Maternal Uncle    • Stroke Maternal Grandfather    • Hypertension Brother    • Hypertension Sister    • Hypertension Sister        REVIEW OF SYSTEMS:  Review of Systems   Constitutional: Negative for activity change.   HENT: Negative for nosebleeds and trouble swallowing.    Respiratory: Negative for shortness of breath and wheezing.    Cardiovascular: Negative for chest pain and palpitations.   Gastrointestinal: Negative for constipation, diarrhea and nausea.   Genitourinary: Negative for dysuria and hematuria.   Musculoskeletal: Negative for arthralgias and myalgias.   Skin: Negative for rash and wound.   Neurological: Negative for seizures and syncope.   Hematological: Negative for adenopathy. Does not bruise/bleed easily.   Psychiatric/Behavioral: Negative for confusion.              Vitals:    10/14/19 0051 10/14/19 0700 10/14/19 0912 10/14/19 0919   BP: 115/85 106/74     BP Location: Right arm Left arm     Patient Position: Sitting Sitting     Pulse: 111 (!) 121 117    Resp: 24 18 20 20   Temp: 97.7 °F (36.5 °C) 97.9 °F (36.6 °C)     TempSrc: Oral Oral     SpO2: 94% 92% 97%    Weight:       Height:         Current Status 10/7/2019   ECOG score 1      PHYSICAL EXAM:    CONSTITUTIONAL:  Vital signs reviewed.  No distress, looks comfortable.  EYES:  Conjunctiva and lids unremarkable.  PERRLA  EARS,NOSE,MOUTH,THROAT:  Ears and nose appear unremarkable.  Lips, teeth, gums appear unremarkable.  RESPIRATORY:  Normal respiratory effort.  Lungs clear to auscultation bilaterally.  CARDIOVASCULAR:  Normal S1, S2.  No murmurs rubs or gallops.  No significant lower extremity edema.  GASTROINTESTINAL: Abdomen appears unremarkable.  Nontender.  No hepatomegaly.  No splenomegaly.  LYMPHATIC:  No cervical, supraclavicular, axillary lymphadenopathy.  NEURO: cranial nerves 2-12 grossly intact.  No focal deficits.  Appears to have equal strength all 4 extremities.  MUSCULOSKELETAL:  Unremarkable  digits/nails.  No cyanosis or clubbing.  No apparent joint deformities.  SKIN:  Warm.  No rashes.  PSYCHIATRIC:  Normal judgment and insight.  Normal mood and affect.     RECENT LABS:        WBC   Date Value Ref Range Status   10/14/2019 14.66 (H) 3.40 - 10.80 10*3/mm3 Final   10/13/2019 29.76 (H) 3.40 - 10.80 10*3/mm3 Final     Hemoglobin   Date Value Ref Range Status   10/14/2019 8.1 (L) 12.0 - 15.9 g/dL Final   10/13/2019 10.0 (L) 12.0 - 15.9 g/dL Final     Platelets   Date Value Ref Range Status   10/14/2019 103 (L) 140 - 450 10*3/mm3 Final   10/13/2019 152 140 - 450 10*3/mm3 Final       Assessment/Plan   · *Metastatic pancreatic cancer.  Carcinomatosis and extensive metastasis throughout right pleura with extension into posterior mediastinum.    · Palliative FOLFIRINOX started 7/10/2019.  · PET 9/18/2019: Improvement.  · Last treatment was cycle #6 on 10/7/2019.  Cycles every 2 weeks, next due on 10/21/2019.    *Bilateral lower extremity DVT and pulmonary embolism.  · Eliquis.    *Malignant right pleural effusion.  Intermittent thoracentesis.  Last was 10/2/2019, 500 mL removed.  Patient did not complete procedure due to pain.  Patient declined any further thoracentesis.  She did not feel her symptoms improved after procedures.    *Pneumonia.  Treatment as per hospitalist service.    *Leukocytosis.  Predominance of mature segmented neutrophils.  Therefore, appears reactive.  No signs of an underlying hematologic disorder.    *Anemia due to chemotherapy.    *Thrombocytopenia due to chemotherapy.    Plan  · Await improvement of pneumonia.  · Monitor labs.  · I have sent a message to our office to delay her upcoming chemo by 1 week.  I have also sent a message to her usual outpatient oncologist, Dr. Zhong regarding this treatment delay and also regarding the patient's request to decrease the intensity of her therapy.  She states this is at least her third admission since being on this chemo regimen.

## 2019-10-14 NOTE — PROGRESS NOTES
Discharge Planning Assessment  [facility]     Patient Name: Linette Ghosh  MRN: [id number]  Today's Date: 10/14/2019    Admit Date: 10/13/2019    Discharge Needs Assessment     Row Name 10/14/19 0953       Living Environment    Lives With  sibling(s)    Name(s) of Who Lives With Patient  Jerry    Current Living Arrangements  home/apartment/condo    Primary Care Provided by  self    Provides Primary Care For  no one, unable/limited ability to care for self    Family Caregiver if Needed  sibling(s)    Family Caregiver Names  Brother Jerry and Sister Gayathri    Quality of Family Relationships  supportive;involved;helpful       Resource/Environmental Concerns    Resource/Environmental Concerns  none    Transportation Concerns  car, none       Transition Planning    Patient/Family Anticipates Transition to  home with family    Patient/Family Anticipated Services at Transition  none    Transportation Anticipated  family or friend will provide       Discharge Needs Assessment    Readmission Within the Last 30 Days  no previous admission in last 30 days    Concerns to be Addressed  denies needs/concerns at this time    Equipment Currently Used at Home  nebulizer    Anticipated Changes Related to Illness  none    Equipment Needed After Discharge  none    Offered/Gave Vendor List  no Patient denies having any needs        Discharge Plan     Row Name 10/14/19 0955       Plan    Plan  Home with brother    Patient/Family in Agreement with Plan  yes    Plan Comments  Facesheet verified. Patient lives with brother Jerry in an apt with stairs and a handrail. They are planning to find an apt without stairs soon. Currently has a nebulizer at home that she gets from [facility]. Patient stated her brother or sister can pick her up to take her home.  Patient denies having any DC needs at this time. Kelly Pressley RN        Destination      No service coordination in this encounter.      Durable Medical Equipment      No  service coordination in this encounter.      Dialysis/Infusion      No service coordination in this encounter.      Home Medical Care      No service coordination in this encounter.      Therapy      No service coordination in this encounter.      Community Resources      No service coordination in this encounter.          Demographic Summary     Row Name 10/14/19 0945       General Information    Admission Type  inpatient    Arrived From  emergency department    Referral Source  admission list    Reason for Consult  discharge planning    Preferred Language  English       Contact Information    Permission Granted to Share Info With  family/designee        Functional Status     Row Name 10/14/19 0952       Functional Status    Usual Activity Tolerance  moderate    Current Activity Tolerance  fair       Functional Status, IADL    Medications  independent    Meal Preparation  independent    Housekeeping  independent    Laundry  independent    Shopping  independent       Mental Status    General Appearance WDL  WDL       Mental Status Summary    Recent Changes in Mental Status/Cognitive Functioning  no changes        Psychosocial     Row Name 10/14/19 0952       Behavior WDL    Behavior WDL  WDL       Emotion Mood WDL    Emotion/Mood/Affect WDL  WDL       Speech WDL    Speech WDL  WDL       Perceptual State WDL    Perceptual State WDL  WDL       Thought Process WDL    Thought Process WDL  WDL       Intellectual Performance WDL    Intellectual Performance WDL  WDL       Coping/Stress    Major Change/Loss/Stressor  none    Patient Personal Strengths  able to adapt    Sources of Support  sibling(s)    Reaction to Health Status  accepting    Understanding of Condition and Treatment  adequate understanding of medical condition       Developmental Stage (Eriksson's)    Developmental Stage  Stage 7 (35-65 years/Middle Adulthood) Generativity vs. Stagnation        Abuse/Neglect     Row Name 10/14/19 0953       Personal Safety     Feels Unsafe at Home or Work/School  no    Feels Threatened by Someone  no    Does Anyone Try to Keep You From Having Contact with Others or Doing Things Outside Your Home?  no    Physical Signs of Abuse Present  no        Legal    No documentation.       Substance Abuse    No documentation.       Patient Forms    No documentation.           Garima Ward RN

## 2019-10-15 PROBLEM — E87.6 HYPOKALEMIA: Status: ACTIVE | Noted: 2019-01-01

## 2019-10-15 PROBLEM — I82.4Z1 ACUTE DEEP VEIN THROMBOSIS (DVT) OF DISTAL END OF RIGHT LOWER EXTREMITY (HCC): Status: ACTIVE | Noted: 2019-01-01

## 2019-10-15 NOTE — PLAN OF CARE
Problem: Patient Care Overview  Goal: Plan of Care Review  Outcome: Ongoing (interventions implemented as appropriate)   10/15/19 6082   Coping/Psychosocial   Plan of Care Reviewed With patient   Plan of Care Review   Progress improving   OTHER   Outcome Summary Pt has had K+ x 2 today and needs 40 at 2030 this shift oncoming and lab placed in 4 hours pt also low on mag and has had 4 grams and is needing one more bag hung on 7 to 7 pt may discharge in the am pt alert x 4 but can have some bouts of confusion will cont to monitor,       Problem: Fall Risk (Adult)  Goal: Absence of Fall  Outcome: Ongoing (interventions implemented as appropriate)      Problem: Pain, Chronic (Adult)  Goal: Acceptable Pain/Comfort Level and Functional Ability  Outcome: Ongoing (interventions implemented as appropriate)

## 2019-10-15 NOTE — PROGRESS NOTES
REASON FOR FOLLOWUP/CHIEF COMPLAINT:  Metastatic pancreatic cancer.  DVT.  Leukocytosis.    HISTORY OF PRESENT ILLNESS:   No new events overnight.  Feels like her breathing is back to baseline.  However, complains of right leg swelling which she states is new for her.  She has a history of bilateral leg DVT.    Past Medical History, Past Surgical History, Social History, Family History have been reviewed and are without significant changes except as mentioned.    Review of Systems   Review of Systems   Constitutional: Negative for activity change.   HENT: Negative for nosebleeds and trouble swallowing.    Respiratory: Negative for shortness of breath and wheezing.    Cardiovascular: Positive for leg swelling. Negative for chest pain and palpitations.   Gastrointestinal: Negative for constipation, diarrhea and nausea.   Genitourinary: Negative for dysuria and hematuria.   Musculoskeletal: Negative for arthralgias and myalgias.   Skin: Negative for rash and wound.   Neurological: Negative for seizures and syncope.   Hematological: Negative for adenopathy. Does not bruise/bleed easily.   Psychiatric/Behavioral: Negative for confusion.       Medications:  The current medication list was reviewed in the EMR    ALLERGIES:    Allergies   Allergen Reactions   • Penicillins Hives              Vitals:    10/14/19 1236 10/14/19 1928 10/14/19 2051 10/14/19 2325   BP: 111/96 118/78  118/78   BP Location: Left arm Left arm  Left arm   Patient Position: Sitting Sitting  Sitting   Pulse: (!) 124 (!) 132 (!) 132 (!) 125   Resp: 22 18 20 18   Temp: 98.3 °F (36.8 °C) 98 °F (36.7 °C)  98.2 °F (36.8 °C)   TempSrc: Oral Oral  Oral   SpO2:  93% 95% 91%   Weight:       Height:         Physical Exam    CONSTITUTIONAL:  Vital signs reviewed.  No distress, looks comfortable.  EYES:  Conjunctiva and lids unremarkable.  PERRLA  EARS,NOSE,MOUTH,THROAT:  Ears and nose appear unremarkable.  Lips, teeth, gums appear  unremarkable.  RESPIRATORY:  Normal respiratory effort.  Lungs clear to auscultation bilaterally.  CARDIOVASCULAR:  Normal S1, S2.  No murmurs rubs or gallops.  Right leg slightly larger than left.  GASTROINTESTINAL: Abdomen appears unremarkable.  Nontender.  No hepatomegaly.  No splenomegaly.  NEURO: cranial nerves 2-12 grossly intact.  No focal deficits.  Appears to have equal strength all 4 extremities.  MUSCULOSKELETAL:  Unremarkable digits/nails.  No cyanosis or clubbing.  SKIN:  Warm.  No rashes.  PSYCHIATRIC:  Normal judgment and insight.  Normal mood and affect.       RECENT LABS:  WBC   Date Value Ref Range Status   10/14/2019 14.66 (H) 3.40 - 10.80 10*3/mm3 Final   10/13/2019 29.76 (H) 3.40 - 10.80 10*3/mm3 Final     Hemoglobin   Date Value Ref Range Status   10/14/2019 8.1 (L) 12.0 - 15.9 g/dL Final   10/13/2019 10.0 (L) 12.0 - 15.9 g/dL Final     Platelets   Date Value Ref Range Status   10/14/2019 103 (L) 140 - 450 10*3/mm3 Final   10/13/2019 152 140 - 450 10*3/mm3 Final       ASSESSMENT/PLAN:  · *Metastatic pancreatic cancer.  Carcinomatosis and extensive metastasis throughout right pleura with extension into posterior mediastinum.    · Palliative FOLFIRINOX started 7/10/2019.  · PET 9/18/2019: Improvement.  · Last treatment was cycle #6 on 10/7/2019.  Cycles every 2 weeks, next due on 10/21/2019.  · She requested a 1 week dose delay.  I have asked the office to delay all of her upcoming appointments by 1 week.  · She also requested a decrease in the intensity of her regimen.  I sent a message to her usual outpatient oncologist, Dr. Zhong, regarding this.     *Bilateral lower extremity DVT and pulmonary embolism.  · Eliquis.    *Right leg swelling.  · Because she states this is new for her, right leg Doppler ordered today.  She states she missed no doses of Eliquis at home.  Upon review of her MAR, she has missed no doses of Eliquis here.     *Malignant right pleural effusion.  Intermittent  thoracentesis.  Last was 10/2/2019, 500 mL removed.  Patient did not complete procedure due to pain.  Patient declined any further thoracentesis.  She did not feel her symptoms improved after procedures.     *Pneumonia.  Treatment as per hospitalist service.  Patient states her breathing is back to baseline.     *Leukocytosis.  Predominance of mature segmented neutrophils.  Therefore, appears reactive.  No signs of an underlying hematologic disorder.     *Anemia due to chemotherapy.     *Thrombocytopenia due to chemotherapy.     Plan  · Right leg Doppler today.  If an acute DVT is found, ideally she would change to Lovenox 1 mg/kg twice per day if she is willing to give injections and if this is affordable.  If not, she should change to Pradaxa.    Chart reviewed and summarized Including pulmonary consult from 10/14/2019, stating healthcare associated pneumonia.  He stated she might need to consider a Pleurx catheter if drainage is necessary.    Addendum  *Acute right soleal DVT.  Patient declines Lovenox.  Therefore, change Eliquis to Pradaxa 150 mg twice per day.  Case discussed with Dr. Dominguez.  We both agree with the plan.

## 2019-10-15 NOTE — PROGRESS NOTES
Rt lower extremity venous doppler completed, Prelim positive for acute calf DVT given to CHOCO Cardenas.

## 2019-10-15 NOTE — PROGRESS NOTES
"      [facility]         Dr Zaragoza Sayied   LOS: 2 days   Patient Care Team:  Raine Ghosh MD as PCP - General (Internal Medicine)  Hal Miles MD as Consulting Physician (Ophthalmology)  Berhane Rodriguez MD as Consulting Physician (Pulmonary Disease)  Raine Ghosh MD as Referring Physician (Internal Medicine)  Aime Zhong MD as Consulting Physician (Hematology and Oncology)  Red Caballero MD as Consulting Physician (Urology)  Ayan Varela MD as Consulting Physician (Gastroenterology)  Constantin Smiley MD as Consulting Physician (Surgical Oncology)    Chief Complaint: History of metastatic pancreatic cancer with malignant right pleural effusion mets to the right pleura and posterior mediastinum.  Admitted with healthcare associated pneumonia    Interval History: Feels better.  Shortness of breath mostly with exertion and minimal cough reported    REVIEW OF SYSTEMS:   CARDIOVASCULAR: No chest pain, chest pressure or chest discomfort. No palpitations or edema.   RESPIRATORY: Shortness of breath with cough  GASTROINTESTINAL: No anorexia, nausea, vomiting or diarrhea. No abdominal pain or blood.   HEMATOLOGIC: No bleeding or bruising.     Ventilator/Non-Invasive Ventilation Settings (From admission, onward)    None            Vital Signs  Temp:  [97.8 °F (36.6 °C)-98.3 °F (36.8 °C)] 98.3 °F (36.8 °C)  Heart Rate:  [115-132] 115  Resp:  [16-20] 18  BP: (112-121)/(70-83) 112/83    Intake/Output Summary (Last 24 hours) at 10/15/2019 1615  Last data filed at 10/15/2019 1300  Gross per 24 hour   Intake 600 ml   Output --   Net 600 ml     Flowsheet Rows      First Filed Value   Admission Height  167.6 cm (66\") Documented at 10/13/2019 1128   Admission Weight  57.4 kg (126 lb 8.7 oz) Documented at 10/13/2019 1128          Physical Exam:   General Appearance:    Alert, cooperative, in no acute distress   Lungs:    Diminished breath sounds bilaterally with " occasional crackles on the left base    Heart:    Regular rhythm and normal rate, normal S1 and S2, no            murmur, no gallop, no rub, no click   Chest Wall:    No abnormalities observed   Abdomen:     Normal bowel sounds, no masses, no organomegaly, soft        non-tender, non-distended, no guarding, no rebound                tenderness   Extremities:   Moves all extremities well, no edema, no cyanosis, no             redness     Results Review:        Results from last 7 days   Lab Units 10/15/19  0549 10/14/19  0556 10/13/19  1159   SODIUM mmol/L 131* 139 133*   POTASSIUM mmol/L 2.8* 3.7 4.0   CHLORIDE mmol/L 94* 100 90*   CO2 mmol/L 29.4* 28.2 28.2   BUN mg/dL 7* 10 7*   CREATININE mg/dL 0.39* 0.47* 0.59   GLUCOSE mg/dL 126* 112* 190*   CALCIUM mg/dL 8.0* 7.9* 8.5*         Results from last 7 days   Lab Units 10/15/19  0549 10/14/19  0556 10/13/19  1159   WBC 10*3/mm3 5.84 14.66* 29.76*   HEMOGLOBIN g/dL 7.9* 8.1* 10.0*   HEMATOCRIT % 25.5* 26.7* 31.9*   PLATELETS 10*3/mm3 85* 103* 152             Results from last 7 days   Lab Units 10/15/19  1321   MAGNESIUM mg/dL 1.1*               I reviewed the patient's new clinical results.  I personally viewed and interpreted the patient's CXR        Medication Review:     atorvastatin 10 mg Oral Daily   budesonide 0.5 mg Nebulization BID - RT   cefepime 2 g Intravenous Q8H   cetirizine 10 mg Oral Daily   dabigatran etexilate 150 mg Oral Q12H   fluticasone 2 spray Nasal Daily   insulin glargine 10 Units Subcutaneous Nightly   insulin lispro 0-9 Units Subcutaneous 4x Daily With Meals & Nightly   lisinopril 20 mg Oral Daily   montelukast 10 mg Oral Nightly   Morphine 30 mg Oral Q12H   pantoprazole 40 mg Oral BID AC   sodium chloride 10 mL Intravenous Q12H   timolol 1 drop Both Eyes Daily            ASSESSMENT:   Pneumonia of left upper lobe due to infectious organism (CMS/HCC)  Healthcare associated pneumonia  Metastatic pancreatic cancer with extensive mets  including the right pleura extension into the posterior mediastinum  Bilateral lower extremity DVT and PE on Eliquis  Malignant right pleural effusion status post thoracentesis in the past    PLAN:  Clinically improving.  Continue antibiotics de-escalate based on cultures  Right pleural effusion status post thoracentesis in the past.  Suspected to be malignant.  Discussed with patient due to reaccumulation if she wishes we can ask and see if Pleurx catheter could be placed.  She wants to think about it  Currently on Eliquis for DVT/PE  Wean down oxygen as tolerated  Monitor progress closely    Nik Ward MD  10/15/19  4:15 PM

## 2019-10-15 NOTE — PROGRESS NOTES
Name: Linette Ghosh ADMIT: 10/13/2019   : 1955  PCP: Raine Ghosh MD    MRN: [id number] LOS: 2 days   AGE/SEX: 64 y.o. female  ROOM: [facility]     Subjective   Subjective   Reports feeling better today.  No new complaints.     Objective   Objective   Temp:  [97.8 °F (36.6 °C)-98.3 °F (36.8 °C)] 97.8 °F (36.6 °C)  Heart Rate:  [116-132] 116  Resp:  [16-22] 16  BP: (111-121)/(70-96) 121/70  SpO2:  [91 %-95 %] 95 %  on   ;   Device (Oxygen Therapy): room air  Body mass index is 20.42 kg/m².    Physical Exam   Constitutional: She is oriented to person, place, and time. She appears cachectic. She is cooperative. She appears ill (Chronically). No distress.   Neck: No JVD present. No tracheal deviation present.   Cardiovascular: Regular rhythm. Tachycardia present.   No murmur heard.  Pulmonary/Chest: Effort normal. No respiratory distress. She has decreased breath sounds. She has rhonchi.   Abdominal: Soft. Normal appearance and bowel sounds are normal. She exhibits no distension. There is no tenderness.   Musculoskeletal: She exhibits no edema.   Neurological: She is alert and oriented to person, place, and time.   Skin: Skin is warm and dry.   Psychiatric: She has a normal mood and affect. Her behavior is normal.   Nursing note and vitals reviewed.      Results Review:       I reviewed the patient's new clinical results.  Results from last 7 days   Lab Units 10/15/19  0549 10/14/19  0556 10/13/19  1159   WBC 10*3/mm3 5.84 14.66* 29.76*   HEMOGLOBIN g/dL 7.9* 8.1* 10.0*   PLATELETS 10*3/mm3 85* 103* 152     Results from last 7 days   Lab Units 10/15/19  0549 10/14/19  0556 10/13/19  1159   SODIUM mmol/L 131* 139 133*   POTASSIUM mmol/L 2.8* 3.7 4.0   CHLORIDE mmol/L 94* 100 90*   CO2 mmol/L 29.4* 28.2 28.2   BUN mg/dL 7* 10 7*   CREATININE mg/dL 0.39* 0.47* 0.59   GLUCOSE mg/dL 126* 112* 190*   Estimated Creatinine Clearance: 132.1 mL/min (A) (by C-G formula based on SCr of 0.39 mg/dL (L)).  Results from last 7  days   Lab Units 10/15/19  0549 10/14/19  0556 10/13/19  1159   CALCIUM mg/dL 8.0* 7.9* 8.5*   ALBUMIN g/dL  --  3.00* 3.80     Results from last 7 days   Lab Units 10/14/19  0556 10/13/19  1215 10/13/19  1159   PROCALCITONIN ng/mL  --   --  0.20   LACTATE mmol/L 0.9 1.5  --      Lab Results   Component Value Date    STREPPNEUAG Negative 10/14/2019    LEGANTIGENUR Negative 10/14/2019     Results from last 7 days   Lab Units 10/14/19  1341 10/13/19  1312   BLOODCX   --  No growth at 24 hours   RESPCX  Scant growth (1+) Normal Respiratory Lauren  --        Glucose   Date/Time Value Ref Range Status   10/15/2019 1132 125 70 - 130 mg/dL Final   10/15/2019 0753 122 70 - 130 mg/dL Final   10/14/2019 2056 166 (H) 70 - 130 mg/dL Final   10/14/2019 1650 142 (H) 70 - 130 mg/dL Final   10/14/2019 1136 137 (H) 70 - 130 mg/dL Final   10/14/2019 0758 107 70 - 130 mg/dL Final   10/13/2019 2128 241 (H) 70 - 130 mg/dL Final           atorvastatin 10 mg Oral Daily   budesonide 0.5 mg Nebulization BID - RT   cefepime 2 g Intravenous Q8H   cetirizine 10 mg Oral Daily   dabigatran etexilate 150 mg Oral Q12H   fluticasone 2 spray Nasal Daily   insulin glargine 10 Units Subcutaneous Nightly   insulin lispro 0-9 Units Subcutaneous 4x Daily With Meals & Nightly   lisinopril 20 mg Oral Daily   montelukast 10 mg Oral Nightly   Morphine 30 mg Oral Q12H   pantoprazole 40 mg Oral BID AC   sodium chloride 10 mL Intravenous Q12H   timolol 1 drop Both Eyes Daily      Diet Regular; Consistent Carbohydrate         Assessment/Plan     Active Hospital Problems    Diagnosis  POA   • **Pneumonia of left upper lobe due to infectious organism (CMS/HCC) [J18.1]  Yes   • Hypokalemia [E87.6]  No   • Acute deep vein thrombosis (DVT) of distal end of right lower extremity (CMS/HCC) [I82.4Z1]  Yes   • Opioid dependence (CMS/[facility]) [F11.20]  Yes   • Type 2 diabetes mellitus with hyperglycemia, with long-term current use of insulin (CMS/[facility]) [E11.65, Z79.4]  Not  Applicable   • Sepsis present on admission [A41.9]  Yes   • Thrombocytopenia (CMS/HCC) [D69.6]  No   • Anemia due to chronic kidney disease [N18.9, D63.1]  Yes   • Pulmonary embolism, bilateral (CMS/HCC) [I26.99]  Yes   • Malignant neoplasm of tail of pancreas (CMS/HCC) [C25.2]  Yes   • Chronic kidney disease, stage III (moderate) (CMS/HCC) [N18.3]  Yes   • Tobacco abuse [Z72.0]  Yes   • Moderate persistent asthma without complication [J45.40]  Yes      Resolved Hospital Problems   No resolved problems to display.       · Continue CEFEPIME day 3 of 7 for treatment of possible gram negative pneumonia given nosocomial exposures.    · Pulmonary following.   · All cultures negative thus far including MRSA screen.  Vancomycin discontinued.  · Supplemental oxygen to keep SaO2 > 92%  · Worsening anemia again today.  Oncology following.    · Discussed with Dr. Walker.  Doppler study shows acute CVT.  Long discussion with patient regarding Lovenox versus Pradaxa.  She prefers oral anticoagulation at this point to subcu.  Will likely discharge home on Pradaxa.  · Physical therapy consulted  · K+ low, will start protocol replacement, check Mg++.    VTE Prophylaxis - Eliquis (home med) now being changed to Pradaxa.  Code Status - Full code  Disposition - Anticipate discharge home in 1-2 days.      Caleb Dominguez MD  [facility]  10/15/19  12:07 PM

## 2019-10-15 NOTE — PROGRESS NOTES
Staff message rec from Dr Walker about pt needing Pradaxa. See below.    Denise, Thom WILKERSON II, MD sent to [facility].     She is currently in the hospital.  She will likely go home tomorrow.   She failed Eliquis as she has a new acute clot.   She needs to change to Pradaxa 150 mg twice per day.   Please make sure this is affordable.  If not, she will need samples from you.  Thanks as always      Pt has commercial insurance and can use the Pradaxa copay card making her copay $0.    I replied to Dr Walker and notified him of the above. I have escribed the rx to [facility]

## 2019-10-16 NOTE — DISCHARGE SUMMARY
Patient Name: Linette Ghosh  : 1955  MRN: [id number]    Date of Admission: 10/13/2019  Date of Discharge:  10/16/2019  Primary Care Physician: Raine Ghosh MD      Chief Complaint:   Cough      Discharge Diagnoses     Active Hospital Problems    Diagnosis  POA   • **Pneumonia of left upper lobe due to infectious organism (CMS/HCC) [J18.1]  Yes   • Hypokalemia [E87.6]  No   • Acute deep vein thrombosis (DVT) of distal end of right lower extremity (CMS/HCC) [I82.4Z1]  Yes   • Opioid dependence (CMS/HCC) [F11.20]  Yes   • Type 2 diabetes mellitus with hyperglycemia, with long-term current use of insulin (CMS/HCC) [E11.65, Z79.4]  Not Applicable   • Sepsis present on admission [A41.9]  Yes   • Thrombocytopenia (CMS/HCC) [D69.6]  No   • Anemia due to chronic kidney disease [N18.9, D63.1]  Yes   • Pulmonary embolism, bilateral (CMS/HCC) [I26.99]  Yes   • Malignant neoplasm of tail of pancreas (CMS/HCC) [C25.2]  Yes   • Chronic kidney disease, stage III (moderate) (CMS/HCC) [N18.3]  Yes   • Tobacco abuse [Z72.0]  Yes   • Moderate persistent asthma without complication [J45.40]  Yes      Resolved Hospital Problems   No resolved problems to display.        Hospital Course     Ms. Ghosh is a 64 y.o. female smoker with a history of metastatic pancreatic cancer who presented to [facility] initially complaining of shortness of breath.  Please see the admitting history and physical for further details.  She was found to have pneumonia on CT and was admitted to the hospital for further evaluation and treatment.  She was started on cefepime and vancomycin due to nosocomial exposures.  She was seen by oncology and pulmonology.  She has a known malignant pleural effusion that she does not want any further intervention on.  Previous thoracentesis did not relieve symptoms.  Her cultures were negative here and vancomycin was discontinued.  She was cleared for discharge today by pulmonology on Omnicef to  "complete a 7-day course of treatment.    She was seen by hematology.  She developed swelling in her right leg for which a Doppler study was performed revealing distal DVT.  This occurred while on Eliquis.  It was discussed with her that she should ideally be on Lovenox but she did not want to perform injections on herself every day and instead chose Pradaxa.  She will follow-up with [facility] as outpatient regarding this.  If any propagation of clot would need to transition to Lovenox at that time.    Due to her malignant pleural effusion was offered to have Pleurx catheter placed.  She is not interested at this time but will continue to think about it and discuss with her oncologist.    Her potassium is low and has been replaced here.  While not on her med rec she states she takes potassium at home and was instructed to resume taking this.  Magnesium this morning is normal.       Day of Discharge     Complains of some heartburn requesting \"Rolaids.\"  Otherwise feels fine and is very eager to go home.    Physical Exam:  Temp:  [97.6 °F (36.4 °C)-98.5 °F (36.9 °C)] 98.2 °F (36.8 °C)  Heart Rate:  [110-125] 110  Resp:  [16-18] 18  BP: (110-113)/(73-96) 110/85  Body mass index is 20.42 kg/m².  Physical Exam   Constitutional: She is oriented to person, place, and time. She appears cachectic. She is cooperative. She appears ill (Chronically). No distress.   Neck: No JVD present. No tracheal deviation present.   Cardiovascular: Regular rhythm. Tachycardia present.   No murmur heard.  Pulmonary/Chest: Effort normal. No respiratory distress. She has decreased breath sounds. She has rhonchi.   Abdominal: Soft. Normal appearance and bowel sounds are normal. She exhibits no distension. There is no tenderness.   Musculoskeletal: She exhibits no edema.   Neurological: She is alert and oriented to person, place, and time.   Skin: Skin is warm and dry.   Psychiatric: She has a normal mood and affect. Her behavior is normal. "   Nursing note and vitals reviewed.      Consultants     Consult Orders (all) (From admission, onward)    Start     Ordered    10/14/19 1014  Inpatient Pulmonology Consult  Once     Specialty:  Pulmonary Disease  Provider:  Dennis Navarro MD    10/14/19 1014    10/14/19 1013  Hematology & Oncology Inpatient Consult  Once     Specialty:  Hematology and Oncology  Provider:  Rafaela Mccormack MD    10/14/19 1013          Procedures     Imaging Results (all)     Procedure Component Value Units Date/Time    CT Angiogram Chest With Contrast [[id number]] Collected:  10/13/19 1439     Updated:  10/13/19 1456    Narrative:       CT ANGIOGRAM CHEST W CONTRAST-     CLINICAL HISTORY: Chest pain. Dyspnea. Fever. History of pulmonary  embolism and pneumonia. History of metastatic pancreatic carcinoma.     TECHNIQUE: Spiral CT images were obtained through the chest during rapid  IV injection of contrast and were reconstructed in 2 mm thick axial  slices. Multiple coronal and sagittal and 3-D reconstructions were  obtained.     Radiation dose reduction techniques were utilized, including automated  exposure control and exposure modulation based on body size.     COMPARISON: CTA of the chest dated 09/21/2019.     FINDINGS: The main pulmonary arteries and their lobar and segmental  branches are well opacified, and demonstrate no filling defects. There  is no CT evidence of pulmonary embolism. The thoracic aorta is  unremarkable. There is extensive airspace consolidation within the left  upper lobe consistent with pneumonia that is new since the preceding  chest CT. A focal area of consolidation in the left lower lobe is also  new. Dense consolidation within the inferior aspect of the right lower  lobe is unchanged, and is likely primarily atelectasis. There is a  moderate-sized right pleural effusion that appears partially loculated  laterally. The effusion is unchanged. There is mild slightly lobulated  pleural thickening  throughout the right hemithorax. Mild right hilar  lymphadenopathy is unchanged. Mild subcarinal lymphadenopathy is also  stable. The heart is normal in size. Demonstrate numerous simple hepatic  cysts. There are several small capsular metastatic implants along the  right lobe of the liver that are unchanged..       Impression:       There is no CT evidence of pulmonary embolism. There is  extensive airspace infiltrate in the left lung consistent with pneumonia  that is new since the preceding CTA of the chest dated 09/21/2017.  Chronic dense consolidation and volume loss at the right lung base is  unchanged. A moderate-sized right pleural effusion is also unchanged.  Small capsular metastatic lesions within the upper abdomen adjacent to  the right lobe of the liver are unchanged, as well.     These findings were discussed with Dr. Monge in the emergency room on  10/13/1950 1:00 PM     This report was finalized on 10/13/2019 2:53 PM by Dr. Jhon Arroyo M.D.       XR Chest 2 View [[id number]] Collected:  10/13/19 1208     Updated:  10/13/19 1214    Narrative:       XR CHEST 2 VW-     HISTORY: Female who is 64 years-old, short of breath     TECHNIQUE: Frontal and lateral views of the chest     COMPARISON: 09/21/2019     FINDINGS: Stable appearing left chest port. Heart size is borderline.  Aorta appears tortuous. Mild prominence of vascular and interstitial  markings. New opacity has developed in the left upper lung suggesting  infiltrate in the left upper lobe, follow-up recommended.  Loculated  right pleural effusion appears similar to the prior exam, with right  basilar atelectasis or infiltrate. No pneumothorax. No acute osseous  process.       Impression:          New opacity has developed in the left upper lung, suggesting infiltrate  in the left upper lobe. Loculated right pleural effusion appears similar  to prior exam with right basilar atelectasis or infiltrate.     This report was finalized on  10/13/2019 12:11 PM by Dr. Natanael Whelan M.D.           Results for orders placed during the hospital encounter of 10/13/19   Duplex Venous Lower Extremity - Right CAR    Narrative · Acute DVT in right soleus veins.  · Chronic DVT in right gastrocnemius veins.  · All other right sided veins appeared normal.  · Enlarged right inguinal lymph node.        Pertinent Labs     Results from last 7 days   Lab Units 10/16/19  0627 10/15/19  0549 10/14/19  0556 10/13/19  1159   WBC 10*3/mm3 5.09 5.84 14.66* 29.76*   HEMOGLOBIN g/dL 8.1* 7.9* 8.1* 10.0*   PLATELETS 10*3/mm3 110* 85* 103* 152     Results from last 7 days   Lab Units 10/16/19  0627 10/15/19  0549 10/14/19  0556 10/13/19  1159   SODIUM mmol/L 133* 131* 139 133*   POTASSIUM mmol/L 3.1* 2.8* 3.7 4.0   CHLORIDE mmol/L 94* 94* 100 90*   CO2 mmol/L 27.4 29.4* 28.2 28.2   BUN mg/dL 4* 7* 10 7*   CREATININE mg/dL 0.42* 0.39* 0.47* 0.59   GLUCOSE mg/dL 85 126* 112* 190*   Estimated Creatinine Clearance: 122.6 mL/min (A) (by C-G formula based on SCr of 0.42 mg/dL (L)).  Results from last 7 days   Lab Units 10/14/19  0556 10/13/19  1159   ALBUMIN g/dL 3.00* 3.80   BILIRUBIN mg/dL 0.4 0.8   ALK PHOS U/L 115 162*   AST (SGOT) U/L 18 25   ALT (SGPT) U/L 13 13     Results from last 7 days   Lab Units 10/16/19  0627 10/15/19  1321 10/15/19  0549 10/14/19  0556 10/13/19  1159   CALCIUM mg/dL 8.3*  --  8.0* 7.9* 8.5*   ALBUMIN g/dL  --   --   --  3.00* 3.80   MAGNESIUM mg/dL 2.2 1.1* 1.2*  --   --                Invalid input(s): LDLCALC  Results from last 7 days   Lab Units 10/14/19  1341 10/13/19  1312 10/13/19  1216   BLOODCX   --  No growth at 3 days No growth at 3 days   RESPCX  Light growth (2+) Normal Respiratory Lauren  --   --        Test Results Pending at Discharge      Order Current Status    Blood Culture - Blood, Blood, Central Line Preliminary result    Blood Culture - Blood, Blood, Central Line Preliminary result        Discharge Details        Discharge  Medications      New Medications      Instructions Start Date   cefdinir 300 MG capsule  Commonly known as:  OMNICEF  Notes to patient:  10/16/19 PM   300 mg, Oral, 2 Times Daily      dabigatran etexilate 150 MG capsu  Commonly known as:  PRADAXA  Notes to patient:  10/16/19 PM   150 mg, Oral, 2 Times Daily         Changes to Medications      Instructions Start Date   albuterol sulfate  (90 Base) MCG/ACT inhaler  Commonly known as:  PROVENTIL HFA;VENTOLIN HFA;PROAIR HFA  What changed:  Another medication with the same name was changed. Make sure you understand how and when to take each.   2 puffs, Inhalation, Every 4 Hours PRN      albuterol (2.5 MG/3ML) 0.083% nebulizer solution  Commonly known as:  PROVENTIL  What changed:  when to take this   2.5 mg, Nebulization, Every 6 Hours PRN      polyethylene glycol pack packet  Commonly known as:  MIRALAX  What changed:    · when to take this  · reasons to take this  Notes to patient:  10/17/19 AM   17 g, Oral, Daily         Continue These Medications      Instructions Start Date   budesonide 180 MCG/ACT inhaler  Commonly known as:  PULMICORT FLEXHALER  Notes to patient:  10/16/19 PM   2 puffs, Inhalation, 2 Times Daily - RT, Rinse mouth after using      desloratadine 5 MG tablet  Commonly known as:  CLARINEX  Notes to patient:  10/17/19 AM   5 mg, Oral, Daily      fluticasone 50 MCG/ACT nasal spray  Commonly known as:  FLONASE  Notes to patient:  10/17/19 AM   2 sprays, Nasal, Daily      insulin detemir 100 UNIT/ML injection  Commonly known as:  LEVEMIR  Notes to patient:  10/16/19 PM   12 Units, Subcutaneous, Nightly      lisinopril 20 MG tablet  Commonly known as:  PRINIVIL,ZESTRIL  Notes to patient:  10/17/19 AM   20 mg, Oral, Daily      montelukast 10 MG tablet  Commonly known as:  SINGULAIR  Notes to patient:  10/16/19 PM   10 mg, Oral, Nightly      Morphine 30 MG 12 hr tablet  Commonly known as:  MS CONTIN   30 mg, Oral, Every 12 Hours, As needed for pain.       ondansetron 8 MG tablet  Commonly known as:  ZOFRAN   8 mg, Oral, 3 Times Daily PRN      oxyCODONE 10 MG tablet  Commonly known as:  ROXICODONE   10 mg, Oral, Every 4 Hours PRN      pantoprazole 40 MG EC tablet  Commonly known as:  PROTONIX  Notes to patient:  10/17/19 PM   40 mg, Oral, 2 Times Daily Before Meals      simvastatin 20 MG tablet  Commonly known as:  ZOCOR  Notes to patient:  10/16/19 PM   20 mg, Oral, Nightly      timolol 0.25 % ophthalmic solution  Commonly known as:  TIMOPTIC  Notes to patient:  10/17/19 AM   1 drop, Both Eyes, Daily             Allergies   Allergen Reactions   • Penicillins Hives         Discharge Disposition:  Home or Self Care    Discharge Diet:  Diet Order   Procedures   • Diet Regular; Consistent Carbohydrate       Discharge Activity:   Activity Instructions     Activity as Tolerated            CODE STATUS:    Code Status and Medical Interventions:   Ordered at: 10/13/19 1925     Code Status:    CPR     Medical Interventions (Level of Support Prior to Arrest):    Full       Future Appointments   Date Time Provider Department Center   10/21/2019  3:00 PM INFU [facility] CHAIR  INFUS [facility]   10/21/2019  3:30 PM RN REV 1 [facility] INFUS [facility]   10/28/2019  8:15 AM INFU [facility] CHAIR  INFUS [facility]   10/28/2019  8:40 AM [facility]   10/28/2019  9:00 AM CHEMO INF 1 [facility] INFUS [facility]RE [facility]   10/29/2019  2:30 PM Raine Ghosh MD [facility] PAVIL None   11/11/2019  7:45 AM INFU [facility] CHAIR  INFUS [facility]   11/11/2019  8:20 AM Landy Fu APRN [facility] KRES [facility]   11/11/2019  8:45 AM CHEMO INF 2 [facility] KRE  INFUS KRE [facility]   11/25/2019  7:30 AM INFU [facility] CHAIR  INFUS [facility]RE [facility]   11/25/2019  8:00 AM Aime Zhong MD [facility] KRES [facility]   11/25/2019  8:30 AM CHEMO INF 1 [facility] INFUS KRE [facility]     Follow-up Information     Raine Ghosh MD Follow up on 10/29/2019.    Specialty:  Internal  Medicine  Why:  appointment time: 2:30 pm.   Contact information:  [address]  [place] [id number]  [phone]                   Time Spent on Discharge:  Greater than 30 minutes      Caleb Dominguez MD  [facility]  10/16/19  11:10 AM

## 2019-10-16 NOTE — PROGRESS NOTES
"      [facility]         Dr Zaragoza Sayied   LOS: 3 days   Patient Care Team:  Raine Ghosh MD as PCP - General (Internal Medicine)  Hal Miles MD as Consulting Physician (Ophthalmology)  Berhane Rodriguez MD as Consulting Physician (Pulmonary Disease)  Raine Ghosh MD as Referring Physician (Internal Medicine)  Aime Zhong MD as Consulting Physician (Hematology and Oncology)  Red Caballero MD as Consulting Physician (Urology)  Ayan Varela MD as Consulting Physician (Gastroenterology)  Constantin Smiley MD as Consulting Physician (Surgical Oncology)    Chief Complaint: History of metastatic pancreatic cancer with malignant right pleural effusion mets to the right pleura and posterior mediastinum.  Admitted with healthcare associated pneumonia    Interval History: Continues to feel better.  Currently off oxygen and tolerating well.  Refuses Pleurx catheter and thoracentesis both    REVIEW OF SYSTEMS:   CARDIOVASCULAR: No chest pain, chest pressure or chest discomfort. No palpitations or edema.   RESPIRATORY: Shortness of breath with cough  GASTROINTESTINAL: No anorexia, nausea, vomiting or diarrhea. No abdominal pain or blood.   HEMATOLOGIC: No bleeding or bruising.     Ventilator/Non-Invasive Ventilation Settings (From admission, onward)    None            Vital Signs  Temp:  [97.6 °F (36.4 °C)-98.5 °F (36.9 °C)] 98.2 °F (36.8 °C)  Heart Rate:  [110-125] 110  Resp:  [16-18] 18  BP: (110-113)/(73-96) 110/85    Intake/Output Summary (Last 24 hours) at 10/16/2019 1435  Last data filed at 10/16/2019 0900  Gross per 24 hour   Intake 287.08 ml   Output --   Net 287.08 ml     Flowsheet Rows      First Filed Value   Admission Height  167.6 cm (66\") Documented at 10/13/2019 1128   Admission Weight  57.4 kg (126 lb 8.7 oz) Documented at 10/13/2019 1128          Physical Exam:   General Appearance:    Alert, cooperative, in no acute distress   Lungs:    " Diminished breath sounds bilaterally with occasional crackles on the left base    Heart:    Regular rhythm and normal rate, normal S1 and S2, no            murmur, no gallop, no rub, no click   Chest Wall:    No abnormalities observed   Abdomen:     Normal bowel sounds, no masses, no organomegaly, soft        non-tender, non-distended, no guarding, no rebound                tenderness   Extremities:   Moves all extremities well, no edema, no cyanosis, no             redness     Results Review:        Results from last 7 days   Lab Units 10/16/19  0627 10/15/19  0549 10/14/19  0556   SODIUM mmol/L 133* 131* 139   POTASSIUM mmol/L 3.1* 2.8* 3.7   CHLORIDE mmol/L 94* 94* 100   CO2 mmol/L 27.4 29.4* 28.2   BUN mg/dL 4* 7* 10   CREATININE mg/dL 0.42* 0.39* 0.47*   GLUCOSE mg/dL 85 126* 112*   CALCIUM mg/dL 8.3* 8.0* 7.9*         Results from last 7 days   Lab Units 10/16/19  0627 10/15/19  0549 10/14/19  0556   WBC 10*3/mm3 5.09 5.84 14.66*   HEMOGLOBIN g/dL 8.1* 7.9* 8.1*   HEMATOCRIT % 26.3* 25.5* 26.7*   PLATELETS 10*3/mm3 110* 85* 103*             Results from last 7 days   Lab Units 10/16/19  0627   MAGNESIUM mg/dL 2.2               I reviewed the patient's new clinical results.  I personally viewed and interpreted the patient's CXR        Medication Review:     atorvastatin 10 mg Oral Daily   budesonide 0.5 mg Nebulization BID - RT   cefepime 2 g Intravenous Q8H   cetirizine 10 mg Oral Daily   dabigatran etexilate 150 mg Oral Q12H   fluticasone 2 spray Nasal Daily   insulin glargine 10 Units Subcutaneous Nightly   insulin lispro 0-9 Units Subcutaneous 4x Daily With Meals & Nightly   lisinopril 20 mg Oral Daily   montelukast 10 mg Oral Nightly   Morphine 30 mg Oral Q12H   pantoprazole 40 mg Oral BID AC   sodium chloride 10 mL Intravenous Q12H   timolol 1 drop Both Eyes Daily            ASSESSMENT:   Pneumonia of left upper lobe due to infectious organism (CMS/HCC)  Healthcare associated pneumonia  Metastatic  pancreatic cancer with extensive mets including the right pleura extension into the posterior mediastinum  Bilateral lower extremity DVT and PE on Eliquis  Malignant right pleural effusion status post thoracentesis in the past    PLAN:  Clinically improving.  Cultures negative so far.  Okay to de-escalate antibiotics to oral Omnicef.  Total 7 days of antibiotic treatment.  Discussed with Dr. Dominguez.  Right pleural effusion status post thoracentesis in the past.  Suspected to be malignant.  Discussed with patient due to reaccumulation if she wishes we can ask and see if Pleurx catheter could be placed.  Patient does not want thoracentesis of Pleurx catheter.  Currently on Eliquis for DVT/PE  Wean down oxygen as tolerated  No objection to discharge home and follow-up.    Nik Ward MD  10/16/19  2:35 PM

## 2019-10-16 NOTE — PROGRESS NOTES
REASON FOR FOLLOWUP/CHIEF COMPLAINT:  Metastatic pancreatic cancer.  DVT.  Leukocytosis.    HISTORY OF PRESENT ILLNESS:   Nothing new overnight.  Wants to go home today.  Denies bleeding.  She does not want Lovenox injections.    Past Medical History, Past Surgical History, Social History, Family History have been reviewed and are without significant changes except as mentioned.    Review of Systems   Review of Systems   Constitutional: Negative for activity change.   HENT: Negative for nosebleeds and trouble swallowing.    Respiratory: Negative for shortness of breath and wheezing.    Cardiovascular: Positive for leg swelling. Negative for chest pain and palpitations.   Gastrointestinal: Negative for constipation, diarrhea and nausea.   Genitourinary: Negative for dysuria and hematuria.   Musculoskeletal: Negative for arthralgias and myalgias.   Skin: Negative for rash and wound.   Neurological: Negative for seizures and syncope.   Hematological: Negative for adenopathy. Does not bruise/bleed easily.   Psychiatric/Behavioral: Negative for confusion.       Medications:  The current medication list was reviewed in the EMR    ALLERGIES:    Allergies   Allergen Reactions   • Penicillins Hives              Vitals:    10/15/19 2317 10/16/19 0700 10/16/19 0758 10/16/19 0803   BP: 111/73 110/85     BP Location: Left arm Left arm     Patient Position: Sitting Sitting     Pulse: 120  116 110   Resp: 16 18 18 18   Temp: 98.5 °F (36.9 °C) 98.2 °F (36.8 °C)     TempSrc: Oral Oral     SpO2: 97%   99%   Weight:       Height:         Physical Exam    CONSTITUTIONAL:  Vital signs reviewed.  No distress, looks comfortable.  Pleasant.  Appropriately verbal.  EYES:  Conjunctiva and lids unremarkable.  PERRLA  EARS,NOSE,MOUTH,THROAT:  Ears and nose appear unremarkable.  Lips, teeth, gums appear unremarkable.  RESPIRATORY:  Normal respiratory effort.  Lungs clear to auscultation bilaterally.  CARDIOVASCULAR:  Normal S1, S2.  No  murmurs rubs or gallops.  Right leg slightly larger than left.  GASTROINTESTINAL: Abdomen appears unremarkable.  Nontender.  No hepatomegaly.  No splenomegaly.  NEURO: cranial nerves 2-12 grossly intact.  No focal deficits.  Appears to have equal strength all 4 extremities.  MUSCULOSKELETAL:  Unremarkable digits/nails.  No cyanosis or clubbing.  SKIN:  Warm.  No rashes.  PSYCHIATRIC:  Normal judgment and insight.  Normal mood and affect.       RECENT LABS:  WBC   Date Value Ref Range Status   10/16/2019 5.09 3.40 - 10.80 10*3/mm3 Final   10/15/2019 5.84 3.40 - 10.80 10*3/mm3 Final   10/14/2019 14.66 (H) 3.40 - 10.80 10*3/mm3 Final   10/13/2019 29.76 (H) 3.40 - 10.80 10*3/mm3 Final     Hemoglobin   Date Value Ref Range Status   10/16/2019 8.1 (L) 12.0 - 15.9 g/dL Final   10/15/2019 7.9 (L) 12.0 - 15.9 g/dL Final   10/14/2019 8.1 (L) 12.0 - 15.9 g/dL Final   10/13/2019 10.0 (L) 12.0 - 15.9 g/dL Final     Platelets   Date Value Ref Range Status   10/16/2019 110 (L) 140 - 450 10*3/mm3 Final   10/15/2019 85 (L) 140 - 450 10*3/mm3 Final   10/14/2019 103 (L) 140 - 450 10*3/mm3 Final   10/13/2019 152 140 - 450 10*3/mm3 Final       ASSESSMENT/PLAN:  · *Metastatic pancreatic cancer.  Carcinomatosis and extensive metastasis throughout right pleura with extension into posterior mediastinum.    · Palliative FOLFIRINOX started 7/10/2019.  · PET 9/18/2019: Improvement.  · Last treatment was cycle #6 on 10/7/2019.  Cycles every 2 weeks, next due on 10/21/2019.  · She requested a 1 week dose delay.  I have asked the office to delay all of her upcoming appointments by 1 week.  · She also requested a decrease in the intensity of her regimen.  I sent a message to her usual outpatient oncologist, Dr. Zhong, regarding this.     *Bilateral lower extremity DVT and pulmonary embolism.  · Eliquis.    *Acute right soleal DVT, on 10/15/2019.  · Patient insists compliance with Eliquis at home.  Eliquis was given during the hospital  stay.  · Patient declines Lovenox.  · Therefore, changed Eliquis to Pradaxa 150 mg twice per day, on 10/15/2019.     *Malignant right pleural effusion.  Intermittent thoracentesis.  Last was 10/2/2019, 500 mL removed.  Patient did not complete procedure due to pain.  Patient declined any further thoracentesis.  She did not feel her symptoms improved after procedures.     *Pneumonia.  Treatment as per hospitalist service.  Patient states her breathing is back to baseline.     *Leukocytosis.  Predominance of mature segmented neutrophils.  Therefore, appears reactive.  No signs of an underlying hematologic disorder.  WBC down to 5.1, normal.     *Anemia due to chemotherapy.  Hb 8.1     *Thrombocytopenia due to chemotherapy.  , improving.    *Hypokalemia.  Defer to hospitalist service.     Plan  · Noted plans for likely discharge today.  No problems from a hematology oncology standpoint.  · Because Hb is borderline regarding transfusion threshold today, I have asked our office to arrange a CBC on Monday, 10/21/2019 with nurse review to see if she needs a transfusion.  · Keep appointment with THADDEUS Souza, in our office on 10/28/2019 with tentative plans for chemotherapy that day.  Again, I sent a message to her usual outpatient oncologist, Dr. Zhong regarding the patient's request to decrease the intensity of the regimen which could involve a dose reduction or perhaps dropping Irinotecan.  Defer to Dr. Zhong if he feels this is appropriate.     Reviewed and summarized chart including note from pulmonary: Due to pleural fluid reaccumulation, I offered referral for consideration of Pleurx catheter.  Patient reported she wanted to think about it.  The new acute DVT was discussed with Dr. Dominguez.  We both agree with Pradaxa since patient declines Lovenox.  I did make the patient aware we suspect the Lovenox is more effective for anticoagulation in patients with cancer.  However, for convenience/quality of life  reasons she declines Lovenox and request Pradaxa.  I told her if she develops a new clot on Pradaxa she would have even more reason to switch to Lovenox.

## 2019-10-17 NOTE — PAYOR COMM NOTE
"Ashley Yang (64 y.o. Female)     PLEASE SEE ATTACHED DC SUMMARY    REF#[id number]    THANK YOU    JOHNNY ALVES LPN CCP    Date of Birth Social Security Number Address Home Phone MRN    1955  [id number]  [place] [address] [phone] [id number]    Catholic Marital Status          [facility] Single       Admission Date Admission Type Admitting Provider Attending Provider Department, Room/Bed    10/13/19 Emergency Sony Lisa MD  [address], S617/1    Discharge Date Discharge Disposition Discharge Destination        10/16/2019 Home or Self Care              Attending Provider:  (none)   Allergies:  Penicillins    Isolation:  None   Infection:  None   Code Status:  Prior    Ht:  167.6 cm (66\")   Wt:  57.4 kg (126 lb 8.7 oz)    Admission Cmt:  None   Principal Problem:  Pneumonia of left upper lobe due to infectious organism (CMS/[facility]) [J18.1]                 Active Insurance as of 10/13/2019     Primary Coverage     Payor Plan Insurance Group Employer/Plan Group    [employer]HE[employer] BLUE CROSS ANTHEM BLUE CROSS BLUE SHIELD PPO [id number]     Payor Plan Address Payor Plan Phone Number Payor Plan Fax Number Effective Dates    [address] [phone]  4/1/2013 - None Entered    [facility] [id number]       Subscriber Name Subscriber Birth Date Member ID       ASHLEY YANG 1955 [id number]           Secondary Coverage     Payor Plan Insurance Group Employer/Plan Group    [employer] CIGNA [id number]     Payor Plan Address Payor Plan Phone Number Payor Plan Fax Number Effective Dates    [address] [phone]  1/1/2002 - None Entered    [facility] [id number]       Subscriber Name Subscriber Birth Date Member ID       ASHLEY YANG 1955 [id number]                 Emergency Contacts      (Rel.) Home Phone Work Phone Mobile Phone    Gayathri Shen (Sister) [phone] -- [phone]    Jerry Yang (Brother) [phone] -- --               Discharge Summary "      Caleb Dominguez MD at 10/16/19 1110              Patient Name: Linette Ghosh  : 1955  MRN: [id number]    Date of Admission: 10/13/2019  Date of Discharge:  10/16/2019  Primary Care Physician: Raine Ghosh MD      Chief Complaint:   Cough      Discharge Diagnoses     Active Hospital Problems    Diagnosis  POA   • **Pneumonia of left upper lobe due to infectious organism (CMS/HCC) [J18.1]  Yes   • Hypokalemia [E87.6]  No   • Acute deep vein thrombosis (DVT) of distal end of right lower extremity (CMS/HCC) [I82.4Z1]  Yes   • Opioid dependence (CMS/HCC) [F11.20]  Yes   • Type 2 diabetes mellitus with hyperglycemia, with long-term current use of insulin (CMS/HCC) [E11.65, Z79.4]  Not Applicable   • Sepsis present on admission [A41.9]  Yes   • Thrombocytopenia (CMS/HCC) [D69.6]  No   • Anemia due to chronic kidney disease [N18.9, D63.1]  Yes   • Pulmonary embolism, bilateral (CMS/HCC) [I26.99]  Yes   • Malignant neoplasm of tail of pancreas (CMS/HCC) [C25.2]  Yes   • Chronic kidney disease, stage III (moderate) (CMS/HCC) [N18.3]  Yes   • Tobacco abuse [Z72.0]  Yes   • Moderate persistent asthma without complication [J45.40]  Yes      Resolved Hospital Problems   No resolved problems to display.        Hospital Course     Ms. Ghosh is a 64 y.o. female smoker with a history of metastatic pancreatic cancer who presented to [facility] initially complaining of shortness of breath.  Please see the admitting history and physical for further details.  She was found to have pneumonia on CT and was admitted to the hospital for further evaluation and treatment.  She was started on cefepime and vancomycin due to nosocomial exposures.  She was seen by oncology and pulmonology.  She has a known malignant pleural effusion that she does not want any further intervention on.  Previous thoracentesis did not relieve symptoms.  Her cultures were negative here and vancomycin was discontinued.  She was cleared  "for discharge today by pulmonology on Omnicef to complete a 7-day course of treatment.    She was seen by hematology.  She developed swelling in her right leg for which a Doppler study was performed revealing distal DVT.  This occurred while on Eliquis.  It was discussed with her that she should ideally be on Lovenox but she did not want to perform injections on herself every day and instead chose Pradaxa.  She will follow-up with [facility] group as outpatient regarding this.  If any propagation of clot would need to transition to Lovenox at that time.    Due to her malignant pleural effusion was offered to have Pleurx catheter placed.  She is not interested at this time but will continue to think about it and discuss with her oncologist.    Her potassium is low and has been replaced here.  While not on her med rec she states she takes potassium at home and was instructed to resume taking this.  Magnesium this morning is normal.       Day of Discharge     Complains of some heartburn requesting \"Rolaids.\"  Otherwise feels fine and is very eager to go home.    Physical Exam:  Temp:  [97.6 °F (36.4 °C)-98.5 °F (36.9 °C)] 98.2 °F (36.8 °C)  Heart Rate:  [110-125] 110  Resp:  [16-18] 18  BP: (110-113)/(73-96) 110/85  Body mass index is 20.42 kg/m².  Physical Exam   Constitutional: She is oriented to person, place, and time. She appears cachectic. She is cooperative. She appears ill (Chronically). No distress.   Neck: No JVD present. No tracheal deviation present.   Cardiovascular: Regular rhythm. Tachycardia present.   No murmur heard.  Pulmonary/Chest: Effort normal. No respiratory distress. She has decreased breath sounds. She has rhonchi.   Abdominal: Soft. Normal appearance and bowel sounds are normal. She exhibits no distension. There is no tenderness.   Musculoskeletal: She exhibits no edema.   Neurological: She is alert and oriented to person, place, and time.   Skin: Skin is warm and dry.   Psychiatric: She has a " normal mood and affect. Her behavior is normal.   Nursing note and vitals reviewed.      Consultants     Consult Orders (all) (From admission, onward)    Start     Ordered    10/14/19 1014  Inpatient Pulmonology Consult  Once     Specialty:  Pulmonary Disease  Provider:  Dennis Navarro MD    10/14/19 1014    10/14/19 1013  Hematology & Oncology Inpatient Consult  Once     Specialty:  Hematology and Oncology  Provider:  Rafaela Mccormack MD    10/14/19 1013          Procedures     Imaging Results (all)     Procedure Component Value Units Date/Time    CT Angiogram Chest With Contrast [[id number]] Collected:  10/13/19 1439     Updated:  10/13/19 1456    Narrative:       CT ANGIOGRAM CHEST W CONTRAST-     CLINICAL HISTORY: Chest pain. Dyspnea. Fever. History of pulmonary  embolism and pneumonia. History of metastatic pancreatic carcinoma.     TECHNIQUE: Spiral CT images were obtained through the chest during rapid  IV injection of contrast and were reconstructed in 2 mm thick axial  slices. Multiple coronal and sagittal and 3-D reconstructions were  obtained.     Radiation dose reduction techniques were utilized, including automated  exposure control and exposure modulation based on body size.     COMPARISON: CTA of the chest dated 09/21/2019.     FINDINGS: The main pulmonary arteries and their lobar and segmental  branches are well opacified, and demonstrate no filling defects. There  is no CT evidence of pulmonary embolism. The thoracic aorta is  unremarkable. There is extensive airspace consolidation within the left  upper lobe consistent with pneumonia that is new since the preceding  chest CT. A focal area of consolidation in the left lower lobe is also  new. Dense consolidation within the inferior aspect of the right lower  lobe is unchanged, and is likely primarily atelectasis. There is a  moderate-sized right pleural effusion that appears partially loculated  laterally. The effusion is unchanged. There is  mild slightly lobulated  pleural thickening throughout the right hemithorax. Mild right hilar  lymphadenopathy is unchanged. Mild subcarinal lymphadenopathy is also  stable. The heart is normal in size. Demonstrate numerous simple hepatic  cysts. There are several small capsular metastatic implants along the  right lobe of the liver that are unchanged..       Impression:       There is no CT evidence of pulmonary embolism. There is  extensive airspace infiltrate in the left lung consistent with pneumonia  that is new since the preceding CTA of the chest dated 09/21/2017.  Chronic dense consolidation and volume loss at the right lung base is  unchanged. A moderate-sized right pleural effusion is also unchanged.  Small capsular metastatic lesions within the upper abdomen adjacent to  the right lobe of the liver are unchanged, as well.     These findings were discussed with Dr. Monge in the emergency room on  [birth year]13/1950 1:00 PM     This report was finalized on 10/13/2019 2:53 PM by Dr. Jhon Arroyo M.D.       XR Chest 2 View [[id number]] Collected:  10/13/19 1208     Updated:  10/13/19 1214    Narrative:       XR CHEST 2 VW-     HISTORY: Female who is 64 years-old, short of breath     TECHNIQUE: Frontal and lateral views of the chest     COMPARISON: 09/21/2019     FINDINGS: Stable appearing left chest port. Heart size is borderline.  Aorta appears tortuous. Mild prominence of vascular and interstitial  markings. New opacity has developed in the left upper lung suggesting  infiltrate in the left upper lobe, follow-up recommended.  Loculated  right pleural effusion appears similar to the prior exam, with right  basilar atelectasis or infiltrate. No pneumothorax. No acute osseous  process.       Impression:          New opacity has developed in the left upper lung, suggesting infiltrate  in the left upper lobe. Loculated right pleural effusion appears similar  to prior exam with right basilar atelectasis or  infiltrate.     This report was finalized on 10/13/2019 12:11 PM by Dr. Natanael Whelan M.D.           Results for orders placed during the hospital encounter of 10/13/19   Duplex Venous Lower Extremity - Right CAR    Narrative · Acute DVT in right soleus veins.  · Chronic DVT in right gastrocnemius veins.  · All other right sided veins appeared normal.  · Enlarged right inguinal lymph node.        Pertinent Labs     Results from last 7 days   Lab Units 10/16/19  0627 10/15/19  0549 10/14/19  0556 10/13/19  1159   WBC 10*3/mm3 5.09 5.84 14.66* 29.76*   HEMOGLOBIN g/dL 8.1* 7.9* 8.1* 10.0*   PLATELETS 10*3/mm3 110* 85* 103* 152     Results from last 7 days   Lab Units 10/16/19  0627 10/15/19  0549 10/14/19  0556 10/13/19  1159   SODIUM mmol/L 133* 131* 139 133*   POTASSIUM mmol/L 3.1* 2.8* 3.7 4.0   CHLORIDE mmol/L 94* 94* 100 90*   CO2 mmol/L 27.4 29.4* 28.2 28.2   BUN mg/dL 4* 7* 10 7*   CREATININE mg/dL 0.42* 0.39* 0.47* 0.59   GLUCOSE mg/dL 85 126* 112* 190*   Estimated Creatinine Clearance: 122.6 mL/min (A) (by C-G formula based on SCr of 0.42 mg/dL (L)).  Results from last 7 days   Lab Units 10/14/19  0556 10/13/19  1159   ALBUMIN g/dL 3.00* 3.80   BILIRUBIN mg/dL 0.4 0.8   ALK PHOS U/L 115 162*   AST (SGOT) U/L 18 25   ALT (SGPT) U/L 13 13     Results from last 7 days   Lab Units 10/16/19  0627 10/15/19  1321 10/15/19  0549 10/14/19  0556 10/13/19  1159   CALCIUM mg/dL 8.3*  --  8.0* 7.9* 8.5*   ALBUMIN g/dL  --   --   --  3.00* 3.80   MAGNESIUM mg/dL 2.2 1.1* 1.2*  --   --                Invalid input(s): LDLCALC  Results from last 7 days   Lab Units 10/14/19  1341 10/13/19  1312 10/13/19  1216   BLOODCX   --  No growth at 3 days No growth at 3 days   RESPCX  Light growth (2+) Normal Respiratory Lauren  --   --        Test Results Pending at Discharge      Order Current Status    Blood Culture - Blood, Blood, Central Line Preliminary result    Blood Culture - Blood, Blood, Central Line Preliminary result         Discharge Details        Discharge Medications      New Medications      Instructions Start Date   cefdinir 300 MG capsule  Commonly known as:  OMNICEF  Notes to patient:  10/16/19 PM   300 mg, Oral, 2 Times Daily      dabigatran etexilate 150 MG capsu  Commonly known as:  PRADAXA  Notes to patient:  10/16/19 PM   150 mg, Oral, 2 Times Daily         Changes to Medications      Instructions Start Date   albuterol sulfate  (90 Base) MCG/ACT inhaler  Commonly known as:  PROVENTIL HFA;VENTOLIN HFA;PROAIR HFA  What changed:  Another medication with the same name was changed. Make sure you understand how and when to take each.   2 puffs, Inhalation, Every 4 Hours PRN      albuterol (2.5 MG/3ML) 0.083% nebulizer solution  Commonly known as:  PROVENTIL  What changed:  when to take this   2.5 mg, Nebulization, Every 6 Hours PRN      polyethylene glycol pack packet  Commonly known as:  MIRALAX  What changed:    · when to take this  · reasons to take this  Notes to patient:  10/17/19 AM   17 g, Oral, Daily         Continue These Medications      Instructions Start Date   budesonide 180 MCG/ACT inhaler  Commonly known as:  PULMICORT FLEXHALER  Notes to patient:  10/16/19 PM   2 puffs, Inhalation, 2 Times Daily - RT, Rinse mouth after using      desloratadine 5 MG tablet  Commonly known as:  CLARINEX  Notes to patient:  10/17/19 AM   5 mg, Oral, Daily      fluticasone 50 MCG/ACT nasal spray  Commonly known as:  FLONASE  Notes to patient:  10/17/19 AM   2 sprays, Nasal, Daily      insulin detemir 100 UNIT/ML injection  Commonly known as:  LEVEMIR  Notes to patient:  10/16/19 PM   12 Units, Subcutaneous, Nightly      lisinopril 20 MG tablet  Commonly known as:  PRINIVIL,ZESTRIL  Notes to patient:  10/17/19 AM   20 mg, Oral, Daily      montelukast 10 MG tablet  Commonly known as:  SINGULAIR  Notes to patient:  10/16/19 PM   10 mg, Oral, Nightly      Morphine 30 MG 12 hr tablet  Commonly known as:  MS CONTIN   30 mg,  Oral, Every 12 Hours, As needed for pain.      ondansetron 8 MG tablet  Commonly known as:  ZOFRAN   8 mg, Oral, 3 Times Daily PRN      oxyCODONE 10 MG tablet  Commonly known as:  ROXICODONE   10 mg, Oral, Every 4 Hours PRN      pantoprazole 40 MG EC tablet  Commonly known as:  PROTONIX  Notes to patient:  10/17/19 PM   40 mg, Oral, 2 Times Daily Before Meals      simvastatin 20 MG tablet  Commonly known as:  ZOCOR  Notes to patient:  10/16/19 PM   20 mg, Oral, Nightly      timolol 0.25 % ophthalmic solution  Commonly known as:  TIMOPTIC  Notes to patient:  10/17/19 AM   1 drop, Both Eyes, Daily             Allergies   Allergen Reactions   • Penicillins Hives         Discharge Disposition:  Home or Self Care    Discharge Diet:  Diet Order   Procedures   • Diet Regular; Consistent Carbohydrate       Discharge Activity:   Activity Instructions     Activity as Tolerated            CODE STATUS:    Code Status and Medical Interventions:   Ordered at: 10/13/19 1925     Code Status:    CPR     Medical Interventions (Level of Support Prior to Arrest):    Full       Future Appointments   Date Time Provider Department Center   10/21/2019  3:00 PM INFU [facility]FUS [facility]   10/21/2019  3:30 PM RN REV 1 [facility] INFUS [facility]   10/28/2019  8:15 AM INFU [facility]   10/28/2019  8:40 AM Wendy Daly APRN [facility]   10/28/2019  9:00 AM CHEMO INF 1 [facility] INFUS [facility]   10/29/2019  2:30 PM Raine Ghosh MD [facility] None   11/11/2019  7:45 AM INFU [facility] INFUS [facility]   11/11/2019  8:20 AM Landy Fu APRN [facility]   11/11/2019  8:45 AM CHEMO INF 2 [facility] INFUS [facility]   11/25/2019  7:30 AM INFU [facility] INFUS [facility]   11/25/2019  8:00 AM Aime Zhong MD [facility]   11/25/2019  8:30 [facility]     Follow-up Information     Raine Ghosh MD Follow up on  10/29/2019.    Specialty:  Internal Medicine  Why:  appointment time: 2:30 pm.   Contact information:  [address]  [place] [address]  [phone]                   Time Spent on Discharge:  Greater than 30 minutes      Caleb Dominguez MD  [facility]  10/16/19  11:10 AM                Electronically signed by Caleb Dominguez MD at 10/16/19 4078

## 2019-10-18 NOTE — OUTREACH NOTE
Prep Survey      Responses   Facility patient discharged from?  [facility]   Is patient eligible?  Yes   What are the reasons patient is not eligible?  Other   Discharge diagnosis  PNA, RLE DVT   Does the patient have one of the following disease processes/diagnoses(primary or secondary)?  COPD/Pneumonia   Does the patient have Home health ordered?  No   Is there a DME ordered?  No   Prep survey completed?  Yes          Rajni Bass RN

## 2019-10-21 NOTE — PROGRESS NOTES
[facility] CBC GROUP OUTPATIENT FOLLOW UP CLINIC VISIT    REASON FOR FOLLOW-UP:    1.  Metastatic adenocarcinoma of the pancreas  2.  Palliative chemotherapy with FOLFIRINOX initiated on 7/10/2019.  3.  Neutropenia related to chemotherapy requiring Neulasta  4.  Bilateral pulmonary emboli and bilateral calf vein deep vein thrombosis diagnosed on 8/5/2019.  She also had gastrointestinal bleeding with a single angiectasia discovered in the duodenum which was injected and treated with APC.  Anticoagulation with Eliquis      HISTORY OF PRESENT ILLNESS:  Linette Ghosh is a 64 y.o. female returns today for lab review following recent hospitalization at [facility] from 10/13/2019 to 10/16/2019 due to pneumonia.  She was treated with IV antibiotics and discharged home on Omnicef.  During hospitalization she was having swelling in her right leg which Doppler revealed distal DVT.  This is while she was on Eliquis and therefore she was switched anticoagulants.  We ideally wished her to be on Lovenox however the patient did not want to perform injections and therefore chose to be on Pradaxa.    The patient is having continued fatigue and concerns regarding her tolerance to her treatment.  This will be addressed at the next office visit.    ONCOLOGIC HISTORY:  She has had about a 40 pound weight loss in the past 6 months associated with worsening bilateral flank pain.  She does note that the pain is worse with eating.  She was taking hydrocodone a couple of times per day with some in her appetite and this is reflected.     Due to worsening pain, she presented to the emergency department on 6/1/2019.  A chest x-ray was unremarkable.  She had a CT angiogram of the chest showing no evidence for pulmonary embolism.  There was some nodularity involving the major fissure on the right.  There was some pleural thickening and nodularity involving the minor fissure on the right.  New infiltrate involving the posterior  sulcus on the right medial he was noted to have a small pleural effusion on the right was noted as well.  A small groundglass area is appreciated in the right upper lobe.  The left adrenal gland was prominent in the tail the pancreas is prominent measuring 2.9 cm.  Some liver cysts were noted.     She had a follow-up CT scan of the abdomen and pelvis with contrast on 6/9/2019 showing a large hypoenhancing mass involving the entire tail of the pancreas which encases the splenic artery and occludes and splenic vein.  This abuts and possibly infiltrates the left adrenal gland.  No evidence for metastatic disease otherwise.     She saw Dr. Varela with gastroenterology and had an endoscopic ultrasound performed on 6/17/2019 with pathology confirming adenocarcinoma.  The CA 19-9 is elevated at 655.        She was seen initially on 6/21/2019.    A PET scan was requested and performed on 6/25/2019.  A 4 cm intensely hypermetabolic pancreatic tail mass is noted.  Unfortunately, extensive metastatic disease is noted with carcinomatosis throughout the abdomen and extensive metastatic disease throughout the right pleura.  Metastatic disease extending into the posterior mediastinum was noted as well.    She did see Dr. Smiley with surgical oncology.  This referral was made for the PET scan results returned in hopes that she had localized disease that could be resected.  He placed a Mediport on 7/8/2019.    FOLFIRINOX initiated 7/10/2019.    A PET scan on 9/18/2020 4:19 cycles of therapy shows a decrease in the size and FDG avidity of the pancreas and liver metastases.  There is an enlarging right pleural effusion.  There is significant bowel activity of undetermined significance.    ALLERGIES:  Allergies   Allergen Reactions   • Penicillins Hives       MEDICATIONS:  The medication list has been reviewed with the patient by the medical assistant, and the list has been updated in the electronic medical record, which I  "reviewed.  Medication dosages and frequencies were confirmed to be accurate.    I have reviewed the patient's medical history in detail and updated the computerized patient record.    Review of Systems   Constitutional: Positive for appetite change, fatigue and unexpected weight change. Negative for activity change, chills and fever.   HENT: Negative for mouth sores and sore throat.    Eyes: Negative for visual disturbance.   Respiratory: Negative for cough and shortness of breath.    Cardiovascular: Positive for leg swelling. Negative for chest pain.   Gastrointestinal: Negative for abdominal pain, diarrhea, nausea and vomiting.   Genitourinary: Negative for dysuria and frequency.   Neurological: Negative for dizziness and weakness.   Hematological: Does not bruise/bleed easily.   Psychiatric/Behavioral: The patient is not nervous/anxious.    All other systems reviewed and are negative.          Vitals:    10/21/19 1524   BP: 112/74   Pulse: 115   Resp: 16   Temp: 98.2 °F (36.8 °C)   TempSrc: Oral   SpO2: 94%   Height: 167.6 cm (65.98\")   PainSc:   8   PainLoc: Back  Comment: rt side       PHYSICAL EXAMINATION:  GENERAL:  Well-developed well-nourished female; awake, alert and oriented, in no acute distress.  SKIN:  Warm and dry, without rashes, purpura, or petechiae.  HEAD:  Normocephalic, atraumatic.  EARS:  Hearing intact.  NOSE:  Septum midline.  No excoriations or nasal discharge.  MOUTH:  No stomatitis or ulcers.  Lips are normal.  THROAT:  Oropharynx without lesions or exudates.  LYMPHATICS:  No cervical, supraclavicular lymphadenopathy.  CHEST: Decreased breath sounds at the right base. No adventitious breath sounds in the left lung.   HEART: Tachycardic and regular without murmurs gallops or rubs, this is unchanged today   EXTREMITIES: Trace edema noted in ankles.  Ace bandage in place on right ankle.  NEUROLOGICAL:  No focal neurologic deficits.          DIAGNOSTIC DATA:  Results for orders placed or " performed in visit on 10/21/19   CBC Auto Differential   Result Value Ref Range    WBC 25.12 (H) 3.40 - 10.80 10*3/mm3    RBC 3.51 (L) 3.77 - 5.28 10*6/mm3    Hemoglobin 9.4 (L) 12.0 - 15.9 g/dL    Hematocrit 32.2 (L) 34.0 - 46.6 %    MCV 91.7 79.0 - 97.0 fL    MCH 26.8 26.6 - 33.0 pg    MCHC 29.2 (L) 31.5 - 35.7 g/dL    RDW 22.5 (H) 12.3 - 15.4 %    RDW-SD 68.9 (H) 37.0 - 54.0 fl    MPV 10.3 6.0 - 12.0 fL    Platelets 400 140 - 450 10*3/mm3    Neutrophil % 72.3 42.7 - 76.0 %    Lymphocyte % 6.4 (L) 19.6 - 45.3 %    Monocyte % 6.7 5.0 - 12.0 %    Eosinophil % 0.2 (L) 0.3 - 6.2 %    Basophil % 0.3 0.0 - 1.5 %    Immature Grans % 14.1 (H) 0.0 - 0.5 %    Neutrophils, Absolute 18.17 (H) 1.70 - 7.00 10*3/mm3    Lymphocytes, Absolute 1.62 0.70 - 3.10 10*3/mm3    Monocytes, Absolute 1.68 (H) 0.10 - 0.90 10*3/mm3    Eosinophils, Absolute 0.04 0.00 - 0.40 10*3/mm3    Basophils, Absolute 0.07 0.00 - 0.20 10*3/mm3    Immature Grans, Absolute 3.54 (H) 0.00 - 0.05 10*3/mm3    nRBC 3.4 (H) 0.0 - 0.2 /100 WBC        ASSESSMENT:  This is a 64 y.o. female with:  1.  Metastatic pancreas cancer originating in the tail of the pancreas: There is evidence for carcinomatosis and extensive metastatic disease throughout the right pleura with extension into the posterior mediastinum.  She is not a surgical candidate.  She did see Dr. Smiley with surgical oncology nonetheless and he placed a Mediport.  We initiated therapy with palliative FOLFIRINOX on 7/10/2019.    She has required a delay in therapy.    A PET scan on 9/18/2019 overall shows improvement with decreasing liver metastases and improvement in the pancreas mass.    The patient is concerned about the intensity of her treatment which will be addressed at the next office visit.    2.  Bilateral lower extremity DVT and pulmonary embolism: Patient had a new right soleal DVT on 10/15/2019.  Eliquis was changed to Pradaxa 150 mg twice daily as the patient declines Lovenox.  She reports  her edema has improved being home with and with wrapping her foot with an Ace bandage.  She reports continuing the Pradaxa.    3.  Decreased appetite and weight loss: Prescribed Remeron, 15 mg daily, though she states today she is not taking this.  Her weights seem to be inaccurate and erratic. Resume Remeron and refer to oncology dietician.     4.  Cancer related pain: Current pain regimen is controlling her pain reasonably well    5.  We do need to check BRCA mutation status at some point to see if she would be a candidate for olaparib.      6.  Left ankle injury with left foot numbness: Noted improvement.  We will continue to monitor this.  This is an old injury with no acute fracture on plain film imaging.  She declines any further imaging at this point.    7.  Neutropenia related to therapy: Neulasta was added with cycle #4 of therapy.     8.  Recent right lower lobe pneumonia.  Symptoms have nearly resolved.  She did have an area of loculated pleural effusion within the right base on CT imaging which we will continue to monitor.    9.  Right pleural effusion: Intermittent thoracentesis. Last 10/2/2019 with 500ml removed. The patient did not complete the procedure due to pain. She is reports today her symptoms do not improve with thoracentesis and she does not wish to proceed further.     10.  Left lung pneumonia.  Patient was recently hospitalized due to left-sided pneumonia placed on IV antibiotics.  She was discharged home on a 7-day course of Omnicef which she has completed.    11.  Leukocytosis.  This has been a pattern for the patient.  She denies fevers.  Is reviewed with Dr. Zhong today.      PLAN:    1.  Return in 1 week for review by THADDEUS Souza for evaluation prior to treatment.  2.  Continue Pradaxa.  3.  We will call the office for any increased lower extremity edema, pain with deep breath, or new onset bleeding or present to the ER.    THADDEUS Osborn  10/07/2019

## 2019-10-22 NOTE — OUTREACH NOTE
COPD/PN Week 1 Survey      Responses   Facility patient discharged from?  [facility]   Does the patient have one of the following disease processes/diagnoses(primary or secondary)?  COPD/Pneumonia   Is there a successful TCM telephone encounter documented?  No   Was the primary reason for admission:  Pneumonia   Week 1 attempt successful?  Yes   Call start time  0922   Rescheduled  Rescheduled-pt requested [Sister requested reschedule to talk to patient. ]   Call end time  0923   Person spoke with today (if not patient) and relationship  Sister, Gayathrimo Gonzalez RN

## 2019-10-24 NOTE — OUTREACH NOTE
COPD/PN Week 1 Survey      Responses   Facility patient discharged from?  [facility]   Does the patient have one of the following disease processes/diagnoses(primary or secondary)?  COPD/Pneumonia   Is there a successful TCM telephone encounter documented?  No   Was the primary reason for admission:  Pneumonia   Week 1 attempt successful?  Yes   Call start time  1428   Call end time  1430   Discharge diagnosis  PNA, RLE DVT   Meds reviewed with patient/caregiver?  Yes   Is the patient having any side effects they believe may be caused by any medication additions or changes?  No   Does the patient have all medications ordered at discharge?  Yes   Is the patient taking all medications as directed (includes completed medication regime)?  Yes   Does the patient have a primary care provider?   Yes   Does the patient have an appointment with their PCP or pulmonologist within 7 days of discharge?  No   What is preventing the patient from scheduling follow up appointments within 7 days of discharge?  Haven't had time   Nursing Interventions  Educated patient on importance of making appointment, Advised patient to make appointment   Has the patient kept scheduled appointments due by today?  N/A   Has home health visited the patient within 72 hours of discharge?  N/A   Psychosocial issues?  No   Did the patient receive a copy of their discharge instructions?  Yes   What is the patient's perception of their health status since discharge?  Improving   Nursing Interventions  Nurse provided patient education   Additional teach back comments  Pt says she is doing good, no questions or concerns at this time.   Is the patient/caregiver able to teach back signs and symptoms of worsening condition:  Shortness of breath, Fever/chills, Chest pain   Is the patient/caregiver able to teach back importance of completing antibiotic course of treatment?  Yes   Week 1 call completed?  Yes          Sophy Chinchilla RN

## 2019-10-28 NOTE — PROGRESS NOTES
Per Wendy KEEN, NP-pt states the copay card for her Pradaxa is not working and she is having difficulty getting Protonix BID dosing approved through insurance.     I contacted [facility] [phone] and inquired on the Pradaxa. Per the technician-it is too soon to run the claim for the Pradaxa as pt got this filled on 10/15/19. She cannot fill it again until 11/7/19. I provided the copay card information to process on the next fill.    There is an approval in pts chart for Protonix through [employer]. It has Raine Ghosh as the provider on the approval.   I contacted [employer] [phone] and spoke to Courtney. I inquired on the Qty that the Protonix has been approved for and she states it is for #60 per 30 days.     Pt should be covered when it is time to fill. I did speak with pt while she was in the office about the above. I provided my contact information should she have difficulties in the future.

## 2019-10-28 NOTE — PROGRESS NOTES
[facility] GROUP OUTPATIENT FOLLOW UP CLINIC VISIT    REASON FOR FOLLOW-UP:    1.  Metastatic adenocarcinoma of the pancreas  2.  Palliative chemotherapy with FOLFIRINOX initiated on 7/10/2019.  3.  Neutropenia related to chemotherapy requiring Neulasta  4.  Bilateral pulmonary emboli and bilateral calf vein deep vein thrombosis diagnosed on 8/5/2019.  She also had gastrointestinal bleeding with a single angiectasia discovered in the duodenum which was injected and treated with APC.    5.  Right lower extremity DVT while on Eliquis, transition to Pradaxa    HISTORY OF PRESENT ILLNESS:  Linette Ghosh is a 64 y.o. female with the above-mentioned history, who returns the office today to discuss resuming chemotherapy.  She received treatment 3 weeks ago with FOLFIRINOX.  She was then hospitalized 10/13/2019 through 10/16/2019 for left upper lobe pneumonia.  She completed prescribed Omnicef as an outpatient, reporting today complete resolution of her symptoms.  Additionally, while inpatient she was noted to have an acute right lower extremity DVT while anticoagulated with Eliquis.  She was therefore transitioned to Pradaxa 150 mg twice daily.  She reports today she is tolerating Pradaxa well without signs or symptoms of bleeding, though is struggling to receive this prescription through her pharmacy for a reasonable co-pay.   The patient is noted to have lost 5 pounds since chemotherapy 3 weeks ago.  She reports her appetite is actually improved over the past week.  She does utilize Remeron, though takes this only as needed rather than nightly as prescribed.  She currently denies nausea or vomiting.  She denies any new pain.  She is utilizing MS Contin twice daily with good control of her pain.  She denies worsening shortness of breath, she continues to have a cough with occasional clear sputum.  She denies fevers or chills.    ONCOLOGIC HISTORY:  She has had about a 40 pound weight loss in the past 6 months  associated with worsening bilateral flank pain.  She does note that the pain is worse with eating.  She was taking hydrocodone a couple of times per day with some in her appetite and this is reflected.     Due to worsening pain, she presented to the emergency department on 6/1/2019.  A chest x-ray was unremarkable.  She had a CT angiogram of the chest showing no evidence for pulmonary embolism.  There was some nodularity involving the major fissure on the right.  There was some pleural thickening and nodularity involving the minor fissure on the right.  New infiltrate involving the posterior sulcus on the right medial he was noted to have a small pleural effusion on the right was noted as well.  A small groundglass area is appreciated in the right upper lobe.  The left adrenal gland was prominent in the tail the pancreas is prominent measuring 2.9 cm.  Some liver cysts were noted.     She had a follow-up CT scan of the abdomen and pelvis with contrast on 6/9/2019 showing a large hypoenhancing mass involving the entire tail of the pancreas which encases the splenic artery and occludes and splenic vein.  This abuts and possibly infiltrates the left adrenal gland.  No evidence for metastatic disease otherwise.     She saw Dr. Varela with gastroenterology and had an endoscopic ultrasound performed on 6/17/2019 with pathology confirming adenocarcinoma.  The CA 19-9 is elevated at 655.        She was seen initially on 6/21/2019.    A PET scan was requested and performed on 6/25/2019.  A 4 cm intensely hypermetabolic pancreatic tail mass is noted.  Unfortunately, extensive metastatic disease is noted with carcinomatosis throughout the abdomen and extensive metastatic disease throughout the right pleura.  Metastatic disease extending into the posterior mediastinum was noted as well.    She did see Dr. Smiley with surgical oncology.  This referral was made for the PET scan results returned in hopes that she had localized  "disease that could be resected.  He placed a Mediport on 7/8/2019.    FOLFIRINOX initiated 7/10/2019.    A PET scan on 9/18/2020 4:19 cycles of therapy shows a decrease in the size and FDG avidity of the pancreas and liver metastases.  There is an enlarging right pleural effusion.  There is significant bowel activity of undetermined significance.    ALLERGIES:  Allergies   Allergen Reactions   • Penicillins Hives       MEDICATIONS:  The medication list has been reviewed with the patient by the medical assistant, and the list has been updated in the electronic medical record, which I reviewed.  Medication dosages and frequencies were confirmed to be accurate.    I have reviewed the patient's medical history in detail and updated the computerized patient record.    Review of Systems   Constitutional: Positive for appetite change, fatigue and unexpected weight change. Negative for activity change, chills and fever.   HENT: Negative for mouth sores and sore throat.    Eyes: Negative for visual disturbance.   Respiratory: Positive for cough (improved) and shortness of breath.    Cardiovascular: Negative for chest pain and leg swelling.   Gastrointestinal: Negative for abdominal pain, diarrhea, nausea and vomiting.   Genitourinary: Negative for dysuria and frequency.   Neurological: Negative for dizziness and weakness.   Hematological: Does not bruise/bleed easily.   Psychiatric/Behavioral: The patient is not nervous/anxious.    All other systems reviewed and are negative.  Review of systems 10/28/2019 unchanged from previous office visit except as updated.        Vitals:    10/28/19 0858   BP: 115/80   Pulse: 109   Resp: 16   Temp: 97.8 °F (36.6 °C)   TempSrc: Oral   SpO2: 92%   Weight: 53.5 kg (117 lb 14.4 oz)   Height: 168 cm (66.14\")   PainSc: 0-No pain       PHYSICAL EXAMINATION:  GENERAL:  Well-developed well-nourished female; awake, alert and oriented, in no acute distress.  Seen today seated in a wheelchair, she " appears thin.  SKIN:  Warm and dry, without rashes, purpura, or petechiae.  HEAD:  Normocephalic, atraumatic.  EARS:  Hearing intact.  NOSE:  Septum midline.  No excoriations or nasal discharge.  MOUTH:  No stomatitis or ulcers.  Lips are normal.  THROAT:  Oropharynx without lesions or exudates.  LYMPHATICS:  No cervical, supraclavicular lymphadenopathy.  CHEST: Decreased breath sounds in the right lung. No adventitious breath sounds in the left lung.  Mediport in the left chest wall  ABDOMEN: Soft, nontender, bowel sounds present, no ascites on exam  HEART: Tachycardic and regular without murmurs gallops or rubs, this is unchanged today   EXTREMITIES: No edema noted bilaterally   NEUROLOGICAL:  No focal neurologic deficits.     Physical exam 10/28/2019  unchanged from previous office visit except as updated.      DIAGNOSTIC DATA:  Results from last 7 days   Lab Units 10/28/19  0832 10/21/19  1505   WBC 10*3/mm3 8.99 25.12*   NEUTROS ABS 10*3/mm3 6.63 18.17*   HEMOGLOBIN g/dL 9.8* 9.4*   HEMATOCRIT % 33.7* 32.2*   PLATELETS 10*3/mm3 410 400     Results from last 7 days   Lab Units 10/28/19  0832   SODIUM mmol/L 135   POTASSIUM mmol/L 4.9*   CHLORIDE mmol/L 93*   CO2 mmol/L 29.4*   BUN mg/dL 9   CREATININE mg/dL 0.58*   CALCIUM mg/dL 9.4   ALBUMIN g/dL 3.50   BILIRUBIN mg/dL 0.4   ALK PHOS U/L 100   ALT (SGPT) U/L 9   AST (SGOT) U/L 16   GLUCOSE mg/dL 158*           ASSESSMENT:  This is a 64 y.o. female with:  1.  Metastatic pancreas cancer originating in the tail of the pancreas: There is evidence for carcinomatosis and extensive metastatic disease throughout the right pleura with extension into the posterior mediastinum.  She is not a surgical candidate.  She did see Dr. Smiley with surgical oncology nonetheless and he placed a Mediport.  We initiated therapy with palliative FOLFIRINOX on 7/10/2019.    She has required a delay in therapy.    A PET scan on 9/18/2019 overall shows improvement with decreasing liver  metastases and improvement in the pancreas mass.    Due to intolerance, we will proceed today with cycle 7, though treatment will be changed to every 3 weeks.  Additionally, we will dose reduce chemotherapy by 20%.    2.  Bilateral lower extremity DVT and pulmonary embolism: He is anticoagulated with Eliquis, however, the patient developed a new right soleal DVT on 10/15/2019.  Eliquis was changed to Pradaxa 150 mg twice daily as the patient declines Lovenox.     3.  Decreased appetite and weight loss: Prescribed Remeron    4.  Cancer related pain: She reports her pain is well controlled with MS Contin 30 mg twice daily.  She does not require breakthrough pain medication, though does have oxycodone 10 mg to utilize as needed    5.  We do need to check BRCA mutation status at some point to see if she would be a candidate for olaparib.      6.  Left ankle injury with left foot numbness: Noted improvement.  We will continue to monitor this.  This is an old injury with no acute fracture on plain film imaging.  She declines any further imaging at this point.    7.  Neutropenia related to therapy: Neulasta was added with cycle #4 of therapy.  Neutropenia has resolved.  The patient is actually now developed leukocytosis related to growth factor    8.  Right pleural effusion: Intermittent thoracentesis. Last 10/2/2019 with 500ml removed. The patient did not complete the procedure due to pain. She is reports today her symptoms do not improve with thoracentesis and she does not wish to proceed further.  Clinical exam does indicate recurrent right pleural effusion, the patient declines thoracentesis at this time    9.  Left lung pneumonia.  Patient was recently hospitalized due to left-sided pneumonia placed on IV antibiotics.  She was discharged home on a 7-day course of Omnicef which she has completed.  Her symptoms have resolved      PLAN:  1. Proceed with FOLFIRINOX today  2. 20% dose reduction to all medications due to  previous intolerance  3. FOLFIRINOX schedule will be changed to every 3 weeks.  4. We discussed taking Remeron 15 mg nightly rather than as needed  5. Follow up with oncology dietitian as scheduled  6. Continue Pradaxa 150 mg twice daily  7. Continue MS Contin 30 mg twice daily  8. Continue Protonix 40 mg twice daily  9. Return in 3 weeks for CBC, CMP, MD follow-up with Dr. Zhong and continued FOLFIRINOX  10. Planning to repeat imaging following cycle 8 FOLFIRINOX    Today's plan was discussed reviewed with Dr. Zhong who is in agreement including dose adjustments and schedule change to chemotherapy.    Wendy Daly, APRN  10/28/2019

## 2019-10-31 NOTE — OUTREACH NOTE
COPD/PN Week 2 Survey      Responses   Facility patient discharged from?  [place]   Does the patient have one of the following disease processes/diagnoses(primary or secondary)?  COPD/Pneumonia   Was the primary reason for admission:  Pneumonia   Week 2 attempt successful?  Yes   Call start time  1554   Call end time  1555   Discharge diagnosis  PNA, RLE DVT   What is the patient's perception of their health status since discharge?  Improving   Nursing Interventions  Nurse provided patient education   Additional teach back comments  Pt says she is doing good but chemo has her down, no questions or concerns at this time.   Week 2 call completed?  Yes   Revoked  No further contact(revokes)-requires comment   Graduated/Revoked comments  pt says she would like no further calls.          Sophy Chinchilla RN

## 2019-11-18 NOTE — TELEPHONE ENCOUNTER
Called patient regarding a cervical neck collar.    I called [facility].   A cerviacal collar for beverley richardson can be picked up in the store on the shelf and an order is not nessecary.

## 2019-11-18 NOTE — PROGRESS NOTES
FABRICIOW notarized the patient's Living Will and Health Care Surrogate document for her. She was leaving the facility as she did not feel well. At her next visit, she will bring the documents with her and we will copy them for her medical record.

## 2019-11-18 NOTE — PROGRESS NOTES
"Outpatient Oncology Assessment      Patient Name:  Linette Ghosh  YOB: 1955  MRN: [id number]      Assessment Date:  11/18/2019    Comments:  Follow up with patient today in [facility]. Drinking Ensure at home sometimes three per day, says she is eating a little better. Noted c/o neck weakness, cervical collar arranged for patient by nurse today. Weight is actually stable with previous weight 10/28.   Will continue to follow and encourage intake.     Reason for Assessment     Row Name 11/18/19 1500          Reason for Assessment    Reason For Assessment  follow-up protocol     Diagnosis  cancer diagnosis/related complications pancreatic cancer         Nutrition/Diet History     Row Name 11/18/19 1500          Nutrition/Diet History    Typical Food/Fluid Intake  Says she is eating a little better, drinking Ensure         Anthropometrics     Row Name 11/18/19 1500 11/18/19 0806       Anthropometrics    Height  168 cm (66.14\")  168 cm (66.14\")    Weight  53.1 kg (117 lb)  53.4 kg (117 lb 12.8 oz)       Ideal Body Weight (IBW)    Ideal Body Weight (IBW) (kg)  59.9  59.9    % Ideal Body Weight  88.6  89.2       Body Mass Index (BMI)    BMI (kg/m2)  18.84  18.97    BMI Assessment  BMI 18.5-24.9: normal  --        Labs/Tests/Procedures/Meds     Row Name 11/18/19 1500          Labs/Procedures/Meds    Lab Results Reviewed  reviewed, pertinent        Diagnostic Tests/Procedures    Diagnostic Test/Procedure Reviewed  reviewed, pertinent        Medications    Pertinent Medications Reviewed  reviewed, pertinent     Pertinent Medications Comments  FOLFIRINOX         Physical Findings     Row Name 11/18/19 1500          Physical Findings    Overall Physical Appearance  loss of muscle mass;loss of subcutaneous fat;underweight         Estimated/Assessed Needs     Row Name 11/18/19 1500 11/18/19 0806       Calculation Measurements    Height  168 cm (66.14\")  168 cm (66.14\")    "               Problem/Interventions:    Inadequate oral intake related to pancreatic cancer as evidenced by weight loss of 18# x 3 months and physical signs of fat and muscle wasting, weakness.           Intervention Goal     Row Name 11/18/19 1500          Intervention Goal    General  Maintain nutrition     Weight  Maintain weight             Education/Evaluation     Row Name 11/18/19 1500          Monitor/Evaluation    Monitor  PO intake;Weight           Electronically signed by:  Dayami Zaidi RD, LD  11/18/19 3:24 PM

## 2019-11-18 NOTE — PROGRESS NOTES
[facility] OUTPATIENT FOLLOW UP CLINIC VISIT    REASON FOR FOLLOW-UP:    1.  Metastatic adenocarcinoma of the pancreas  2.  Palliative chemotherapy with FOLFIRINOX initiated on 7/10/2019.  3.  Neutropenia related to chemotherapy requiring Neulasta  4.  Bilateral pulmonary emboli and bilateral calf vein deep vein thrombosis diagnosed on 8/5/2019.  She also had gastrointestinal bleeding with a single angiectasia discovered in the duodenum which was injected and treated with APC.    5.  Right lower extremity DVT while on Eliquis, transition to Pradaxa    HISTORY OF PRESENT ILLNESS:  Linette Ghosh is a 64 y.o. female with the above-mentioned history, who returns the office today for follow-up anticipating her next cycle of FOLFIRINOX.    3 weeks ago we proceeded with a 20% dose reduction and increase the interval of her cycles to 21 days.  She states that overall she did better with this with less fatigue.  She reports worsening numbness in her legs and feet and also some numbness in the left fifth digit.  She states that her pain is adequately controlled.  She does take 10 mg of oxycodone every 4 hours.  She does not desire an increase in her sustained-release morphine at this time.  Her appetite is increasing some.  She does note some difficulty with neck weakness today and notes that she frequently has difficulty holding her head up and is looking down a lot without even realizing it.  She has global weakness as well.  She has some mild difficulty swallowing at times.  Her weight is stable today.  She reports worsening heartburn.  She has been off of her Protonix.    ONCOLOGIC HISTORY:  She has had about a 40 pound weight loss in the past 6 months associated with worsening bilateral flank pain.  She does note that the pain is worse with eating.  She was taking hydrocodone a couple of times per day with some in her appetite and this is reflected.     Due to worsening pain, she presented to the emergency  department on 6/1/2019.  A chest x-ray was unremarkable.  She had a CT angiogram of the chest showing no evidence for pulmonary embolism.  There was some nodularity involving the major fissure on the right.  There was some pleural thickening and nodularity involving the minor fissure on the right.  New infiltrate involving the posterior sulcus on the right medial he was noted to have a small pleural effusion on the right was noted as well.  A small groundglass area is appreciated in the right upper lobe.  The left adrenal gland was prominent in the tail the pancreas is prominent measuring 2.9 cm.  Some liver cysts were noted.     She had a follow-up CT scan of the abdomen and pelvis with contrast on 6/9/2019 showing a large hypoenhancing mass involving the entire tail of the pancreas which encases the splenic artery and occludes and splenic vein.  This abuts and possibly infiltrates the left adrenal gland.  No evidence for metastatic disease otherwise.     She saw Dr. Varela with gastroenterology and had an endoscopic ultrasound performed on 6/17/2019 with pathology confirming adenocarcinoma.  The CA 19-9 is elevated at 655.        She was seen initially on 6/21/2019.    A PET scan was requested and performed on 6/25/2019.  A 4 cm intensely hypermetabolic pancreatic tail mass is noted.  Unfortunately, extensive metastatic disease is noted with carcinomatosis throughout the abdomen and extensive metastatic disease throughout the right pleura.  Metastatic disease extending into the posterior mediastinum was noted as well.    She did see Dr. Smiley with surgical oncology.  This referral was made for the PET scan results returned in hopes that she had localized disease that could be resected.  He placed a Mediport on 7/8/2019.    FOLFIRINOX initiated 7/10/2019.    A PET scan on 9/18/2020 4:19 cycles of therapy shows a decrease in the size and FDG avidity of the pancreas and liver metastases.  There is an enlarging  "right pleural effusion.  There is significant bowel activity of undetermined significance.    ALLERGIES:  Allergies   Allergen Reactions   • Penicillins Hives       MEDICATIONS:  The medication list has been reviewed with the patient by the medical assistant, and the list has been updated in the electronic medical record, which I reviewed.  Medication dosages and frequencies were confirmed to be accurate.    I have reviewed the patient's medical history in detail and updated the computerized patient record.    Review of Systems   Constitutional: Positive for appetite change, fatigue and unexpected weight change. Negative for activity change, chills and fever.   HENT: Negative for mouth sores and sore throat.    Eyes: Negative for visual disturbance.   Respiratory: Positive for cough (improved) and shortness of breath.    Cardiovascular: Negative for chest pain and leg swelling.   Gastrointestinal: Negative for abdominal pain, diarrhea, nausea and vomiting.   Genitourinary: Negative for dysuria and frequency.   Musculoskeletal:        Muscle weakness in her neck   Neurological: Positive for weakness and numbness (Worsening numbness in her feet and legs). Negative for dizziness.   Hematological: Does not bruise/bleed easily.   Psychiatric/Behavioral: The patient is not nervous/anxious.    All other systems reviewed and are negative.  Review of systems unchanged from previous office visit except as updated.        Vitals:    11/18/19 0806   BP: 108/70   Pulse: 69   Resp: 16   Temp: 97.9 °F (36.6 °C)   TempSrc: Oral   SpO2: 98%   Weight: 53.4 kg (117 lb 12.8 oz)   Height: 168 cm (66.14\")   PainSc: 8  Comment: pancreatic cancer   PainLoc: Comment: right side       PHYSICAL EXAMINATION:  GENERAL:  Well-developed well-nourished female; awake, alert and oriented, in no acute distress.  Seen today seated in a wheelchair, she appears thin.  SKIN:  Warm and dry, without rashes, purpura, or petechiae.  HEAD:  Normocephalic, " atraumatic.  EARS:  Hearing intact.  NOSE:  Septum midline.  No excoriations or nasal discharge.  MOUTH:  No stomatitis or ulcers.  Lips are normal.  THROAT:  Oropharynx without lesions or exudates.  NECK: Her neck strength appears normal on physical examination today.  LYMPHATICS:  No cervical, supraclavicular lymphadenopathy.  CHEST: Decreased breath sounds in the right lung base. Mediport in the left chest wall  ABDOMEN: Not examined today  HEART:  Regular rate and rhythm without murmurs gallops or rubs  EXTREMITIES: No edema noted bilaterally   NEUROLOGICAL:  No focal neurologic deficits.      DIAGNOSTIC DATA:  Results from last 7 days   Lab Units 11/18/19  0754   WBC 10*3/mm3 11.13*   NEUTROS ABS 10*3/mm3 8.24*   HEMOGLOBIN g/dL 10.3*   HEMATOCRIT % 34.0   PLATELETS 10*3/mm3 303     Results from last 7 days   Lab Units 11/18/19  0754   SODIUM mmol/L 135   POTASSIUM mmol/L 4.8*   CHLORIDE mmol/L 94*   CO2 mmol/L 31.4*   BUN mg/dL 9   CREATININE mg/dL 0.54*   CALCIUM mg/dL 9.3   ALBUMIN g/dL 3.40*   BILIRUBIN mg/dL 0.3   ALK PHOS U/L 118*   ALT (SGPT) U/L 6   AST (SGOT) U/L 17   GLUCOSE mg/dL 108           ASSESSMENT:  This is a 64 y.o. female with:  1.  Metastatic pancreas cancer originating in the tail of the pancreas: There is evidence for carcinomatosis and extensive metastatic disease throughout the right pleura with extension into the posterior mediastinum.  She is not a surgical candidate.  She did see Dr. Smiley with surgical oncology nonetheless and he placed a Mediport.  We initiated therapy with palliative FOLFIRINOX on 7/10/2019.    She has required a delay in therapy.    A PET scan on 9/18/2019 overall shows improvement with decreasing liver metastases and improvement in the pancreas mass.    We proceed today with cycle #8.  With cycle #7, we decreased all the doses by 20% and we increase the interval to every 21 days.    Due to worsening numbness in her feet and legs, discontinue oxaliplatin today  (previous 20% dose reduction).    2.  Bilateral lower extremity DVT and pulmonary embolism: He is anticoagulated with Eliquis, however, the patient developed a new right soleal DVT on 10/15/2019.  Eliquis was changed to Pradaxa 150 mg twice daily as the patient declines Lovenox.     3.  Decreased appetite and weight loss: Prescribed Remeron.  Weight is stable.    4.  Cancer related pain: She reports her pain is well controlled with MS Contin 30 mg twice daily as well as oxycodone 10 mg every 4 hours as needed.  She does not desire to increase in the sustained-release morphine today.    5.  We do need to check BRCA mutation status at some point to see if she would be a candidate for olaparib.      6.  Left ankle injury with left foot numbness: Noted improvement.  We will continue to monitor this.  This is an old injury with no acute fracture on plain film imaging.  She declines any further imaging at this point.    7.  Neutropenia related to therapy: Neulasta was added with cycle #4 of therapy.  Neutropenia has resolved.     8.  Right pleural effusion: Intermittent thoracentesis. Last 10/2/2019 with 500ml removed. The patient did not complete the procedure due to pain. She is reports today her symptoms do not improve with thoracentesis and she does not wish to proceed further.  Clinical exam does indicate recurrent right pleural effusion, the patient declines thoracentesis at this time    9.  Left lung pneumonia.  Resolved    10.  Neck weakness: Unclear etiology.  Her neck strength appears normal to me on examination today.  No other neuromuscular symptoms.  She is globally weak and I think that this may just be a reflection of this.  We will see if we can get her a soft cervical collar to wear as needed for assistance.  CT imaging of the neck when we reimage here in a couple of weeks.      PLAN:  1. Discontinue oxaliplatin and proceed with FOLFIRI alone today maintaining a 20% dose reduction and every 3-week  dosing.  2. Continue Neulasta support  3. Follow up with oncology dietitian as scheduled  4. Continue Pradaxa 150 mg twice daily  5. Continue MS Contin 30 mg twice daily  6. Continue Protonix 40 mg twice daily.  Refilled today.  7. I encouraged more protein intake  8. We will see if we can get her a soft cervical collar  9. CT imaging of the neck chest abdomen pelvis in 2 weeks and I will see her back in 3 weeks for follow-up  10. If progression, Gemzar and Abraxgeorge Zhong MD

## 2019-11-20 NOTE — TELEPHONE ENCOUNTER
11/20 Received fax for prior auth for pt pantoprazole sodium 40mg Dr tabs. Pt discharged from [facility] 10/16, call to pt pharmacy to notify this request should go thru pts PCP. Call tp pt to notify her of delay and need to check with her PCP or pharmacy to make sure prior auth is being taken care of. Shanique Jonas RN

## 2019-12-02 NOTE — ED PROVIDER NOTES
EMERGENCY DEPARTMENT ENCOUNTER    CHIEF COMPLAINT  Chief Complaint: L leg swelling  History given by: pt  History limited by: nothing  Room Number: 07/07  PMD: Raine Ghosh MD      HPI:  Pt is a 64 y.o. female with hx of pancreatic cancer who presents complaining of gradual onset, worsening, constant swelling to her L lower extremity for the past week.  She was recently changed from Eliquis to Pradaxa which is when the swelling started.  She also confirms intermittent SOA and chronic cough.        PAST MEDICAL HISTORY  Active Ambulatory Problems     Diagnosis Date Noted   • Moderate persistent asthma without complication 03/09/2016   • Asthma 03/09/2016   • Hyperlipidemia 03/09/2016   • Arthralgia of hip 03/09/2016   • Pain of thigh 03/09/2016   • Microscopic hematuria 03/09/2016   • Type 2 diabetes mellitus (CMS/HCC) 03/09/2016   • Tobacco abuse 09/13/2016   • Chronic kidney disease, stage III (moderate) (CMS/[facility]) 03/14/2017   • Iron deficiency anemia 03/14/2017   • Pancreatic mass 06/11/2019   • Malignant neoplasm of tail of pancreas (CMS/[facility]) 06/21/2019   • Encounter for fitting and adjustment of vascular catheter 07/08/2019   • Pleural effusion on right (malignant) 07/17/2019   • Hyponatremia 07/18/2019   • Tachycardia 07/26/2019   • Pleural effusion 07/29/2019   • Acute respiratory distress 08/05/2019   • Pulmonary embolism, bilateral (CMS/HCC) 08/05/2019   • Chemotherapy induced neutropenia (CMS/HCC) 08/06/2019   • Anemia associated with chemotherapy 08/06/2019   • Hematemesis with nausea 08/12/2019   • Anemia due to chronic kidney disease 09/21/2019   • Hypokalemia 09/22/2019   • Pleural nodule 09/22/2019   • Hypomagnesemia 09/22/2019   • Dyspnea 09/22/2019   • Drug-induced constipation 09/22/2019   • Pneumonia of left upper lobe due to infectious organism (CMS/[facility]) 10/13/2019   • Opioid dependence (CMS/[facility]) 10/14/2019   • Type 2 diabetes mellitus with hyperglycemia, with long-term current use of insulin  (CMS/[facility]) 10/14/2019   • Sepsis present on admission 10/14/2019   • Thrombocytopenia (CMS/[facility]) 10/14/2019   • Hypokalemia 10/15/2019   • Acute deep vein thrombosis (DVT) of distal end of right lower extremity (CMS/[facility]) 10/15/2019     Resolved Ambulatory Problems     Diagnosis Date Noted   • Abnormal kidney function study 03/09/2016     Past Medical History:   Diagnosis Date   • Asthma    • Carotid artery stenosis    • Chronic kidney disease, stage III (moderate) (CMS/[facility])    • Diabetes mellitus (CMS/[facility])    • GERD (gastroesophageal reflux disease)    • Glaucoma    • Hydronephrosis    • Hyperlipidemia    • Hypertension    • Intracranial hemorrhage (CMS/[facility])    • Iron deficiency anemia    • Pancreatic cancer (CMS/[facility]) 06/2019   • Pleural effusion on right    • Pneumonia    • Polyp of sigmoid colon    • Shortness of breath    • Tachycardia    • Tachycardia    • Vitamin D deficiency        PAST SURGICAL HISTORY  Past Surgical History:   Procedure Laterality Date   • CHOLECYSTECTOMY     • ENDOSCOPY N/A 8/13/2019    Procedure: ESOPHAGOGASTRODUODENOSCOPY WITH CAUTERY;  Surgeon: Yessenia Hurtado MD;  Location: [facility] ENDOSCOPY;  Service: Gastroenterology   • GALLBLADDER SURGERY     • HERNIA REPAIR      x3   • HYSTERECTOMY     • STOMACH SURGERY         FAMILY HISTORY  Family History   Problem Relation Age of Onset   • Hypertension Mother    • Diabetes Mother    • Arthritis Mother    • Hypertension Sister    • Diabetes Sister    • Thyroid disease Sister    • Arthritis Sister    • Hypertension Brother    • Diabetes Brother    • Lung cancer Maternal Uncle    • Stroke Maternal Grandfather    • Hypertension Brother    • Hypertension Sister    • Hypertension Sister        SOCIAL HISTORY  Social History     Socioeconomic History   • Marital status: Single     Spouse name: Not on file   • Number of children: 0   • Years of education: College   • Highest education level: Not on file   Occupational History   • Occupation: Customer  Service     Employer: [employer]   Tobacco Use   • Smoking status: Current Every Day Smoker     Packs/day: 1.00     Years: 30.00     Pack years: 30.00     Types: Cigarettes   • Smokeless tobacco: Never Used   Substance and Sexual Activity   • Alcohol use: No   • Drug use: No   • Sexual activity: Defer       ALLERGIES  Penicillins    REVIEW OF SYSTEMS  Review of Systems   Constitutional: Negative for fever.   HENT: Negative for sore throat.    Eyes: Negative.    Respiratory: Positive for cough (chronic) and shortness of breath (intermittent).    Cardiovascular: Positive for leg swelling (L leg). Negative for chest pain.   Gastrointestinal: Negative for abdominal pain, diarrhea and vomiting.   Genitourinary: Negative for dysuria.   Musculoskeletal: Negative for neck pain.   Skin: Negative for rash.   Allergic/Immunologic: Negative.    Neurological: Negative for weakness, numbness and headaches.   Hematological: Negative.    Psychiatric/Behavioral: Negative.    All other systems reviewed and are negative.      PHYSICAL EXAM  ED Triage Vitals   Temp Heart Rate Resp BP SpO2   12/02/19 1249 12/02/19 1249 12/02/19 1249 12/02/19 1249 12/02/19 1249   97.4 °F (36.3 °C) 112 16 125/68 97 %      Temp src Heart Rate Source Patient Position BP Location FiO2 (%)   12/02/19 1249 12/02/19 1249 12/02/19 1547 12/02/19 1547 --   Tympanic Monitor Lying Right arm        Physical Exam   Constitutional: She is oriented to person, place, and time. No distress.   HENT:   Head: Normocephalic and atraumatic.   Mouth/Throat: Mucous membranes are dry.   Eyes: EOM are normal. Pupils are equal, round, and reactive to light.   Pale conjunctiva    Neck: Normal range of motion.   Cardiovascular: Regular rhythm. Tachycardia present.   No murmur heard.     Pulmonary/Chest: Effort normal. No respiratory distress. She has decreased breath sounds ( bilateral bases).   Port in L upper chest   Abdominal: Soft. Bowel sounds are normal.   abd  swelling with mild upper abd tenderness   Musculoskeletal: Normal range of motion.   swelling of L lower extremity but no calf tenderness.     Neurological: She is alert and oriented to person, place, and time. She has normal sensation and normal strength.   No focal neurological deficits.   Skin: Skin is warm and dry.   Psychiatric: Affect normal.       LAB RESULTS  Lab Results (last 24 hours)     Procedure Component Value Units Date/Time    CBC & Differential [[id number]] Collected:  12/02/19 1834    Specimen:  Blood Updated:  12/02/19 1845    Narrative:       The following orders were created for panel order CBC & Differential.  Procedure                               Abnormality         Status                     ---------                               -----------         ------                     CBC Auto Differential[[id number]]        Abnormal            Final result                 Please view results for these tests on the individual orders.    Comprehensive Metabolic Panel [[id number]]  (Abnormal) Collected:  12/02/19 1834    Specimen:  Blood Updated:  12/02/19 1916     Glucose 90 mg/dL      BUN 10 mg/dL      Creatinine 0.53 mg/dL      Sodium 135 mmol/L      Potassium 4.0 mmol/L      Chloride 94 mmol/L      CO2 33.0 mmol/L      Calcium 9.2 mg/dL      Total Protein 6.6 g/dL      Albumin 3.20 g/dL      ALT (SGPT) 10 U/L      AST (SGOT) 12 U/L      Alkaline Phosphatase 121 U/L      Total Bilirubin 0.2 mg/dL      eGFR   Amer 141 mL/min/1.73      Globulin 3.4 gm/dL      A/G Ratio 0.9 g/dL      BUN/Creatinine Ratio 18.9     Anion Gap 8.0 mmol/L     Narrative:       GFR Normal >60  Chronic Kidney Disease <60  Kidney Failure <15    Protime-INR [[id number]]  (Abnormal) Collected:  12/02/19 1834    Specimen:  Blood Updated:  12/02/19 1856     Protime 15.5 Seconds      INR 1.26    BNP [[id number]]  (Normal) Collected:  12/02/19 1834    Specimen:  Blood Updated:  12/02/19 1909     proBNP 569.7 pg/mL     Narrative:        Among patients with dyspnea, NT-proBNP is highly sensitive for the detection of acute congestive heart failure. In addition NT-proBNP of <300 pg/ml effectively rules out acute congestive heart failure with 99% negative predictive value.    Troponin [[id number]]  (Normal) Collected:  12/02/19 1834    Specimen:  Blood Updated:  12/02/19 1916     Troponin T <0.010 ng/mL     Narrative:       Troponin T Reference Range:  <= 0.03 ng/mL-   Negative for AMI  >0.03 ng/mL-     Abnormal for myocardial necrosis.  Clinicians would have to utilize clinical acumen, EKG, Troponin and serial changes to determine if it is an Acute Myocardial Infarction or myocardial injury due to an underlying chronic condition.     CBC Auto Differential [[id number]]  (Abnormal) Collected:  12/02/19 1834    Specimen:  Blood Updated:  12/02/19 1845     WBC 14.19 10*3/mm3      RBC 3.52 10*6/mm3      Hemoglobin 9.2 g/dL      Hematocrit 29.6 %      MCV 84.1 fL      MCH 26.1 pg      MCHC 31.1 g/dL      RDW 19.0 %      RDW-SD 55.7 fl      MPV 9.8 fL      Platelets 281 10*3/mm3      Neutrophil % 79.2 %      Lymphocyte % 10.6 %      Monocyte % 6.0 %      Eosinophil % 1.8 %      Basophil % 0.4 %      Immature Grans % 2.0 %      Neutrophils, Absolute 11.23 10*3/mm3      Lymphocytes, Absolute 1.50 10*3/mm3      Monocytes, Absolute 0.85 10*3/mm3      Eosinophils, Absolute 0.26 10*3/mm3      Basophils, Absolute 0.06 10*3/mm3      Immature Grans, Absolute 0.29 10*3/mm3      nRBC 0.8 /100 WBC           I ordered the above labs and reviewed the results    RADIOLOGY  XR Chest 1 View   Preliminary Result   Since prior chest x-ray 10/13/2019 there has been resolution   of the pneumonia in the left lung. There is persistent large right   pleural effusion opacifying the inferior half of the right hemithorax   with dense compressive atelectasis in the right lower lung zone. There   is a left-sided Mediport in place and its distal aspect courses up the   distal right  brachiocephalic vein. Results were communicated to Dr. Taylor in the emergency room 12/02/2019 at 6:45 PM and apparently is   going to get a CT angiogram of the chest to further evaluate as patient   has a prior history of pulmonary emboli in the past.          CT Angiogram Chest   Final Result       No pulmonary embolism. Previous left pneumonia has resolved. Persistent   right lower lobe atelectasis and moderate right loculated pleural   effusion. Partial occlusion of right lower lobe branch airways.       Discussed by telephone with Dr. Taylor at 2002, 12/02/2019.       This report was finalized on 12/2/2019 8:06 PM by Dr. Natanael Whelan M.D.               I ordered the above noted radiological studies. Interpreted by radiologist.  Reviewed by me in PACS.       PROCEDURES  Procedures  EKG    EKG time: 1845  Rhythm/Rate: tachycardia/104  No Acute Ischemia  Non-Specific ST-T changes    Unchanged compared to prior on 10/13/2019 except now the rate is slower    Interpreted Contemporaneously by me.  Independently viewed by me      PROGRESS AND CONSULTS     1754:  Pt is negative for DVT.  Ordered Labs, CXR, and CTA chest for further evaluation.  Ordered Morphine and Zofran.      2024: Rechecked the patient who is resting comfortably and in NAD. Patient is stable.   Informed the patient of CTA chest which showed R pleural effusion but no PE.  Pt does not want to have her pleural effusion drained.  Discussed the plan for discharge with a prescription for Miralax and instructions to f/u with Dr. Zhong for further evaluation and management. Strict RTER warnings given. Pt understands and agrees with the plan, all questions answered.        MEDICAL DECISION MAKING  Results were reviewed/discussed with the patient and they were also made aware of online access. Pt also made aware that some labs, such as cultures, will not be resulted during ER visit and follow up with PMD is necessary.     MDM  Number of Diagnoses or  Management Options     Amount and/or Complexity of Data Reviewed  Clinical lab tests: reviewed and ordered  Tests in the radiology section of CPT®: ordered and reviewed (CTA chest - Persistent R pleural effusion but no PE  CXR - see radiology note)  Tests in the medicine section of CPT®: ordered and reviewed (See EKG procedure note)  Decide to obtain previous medical records or to obtain history from someone other than the patient: yes  Independent visualization of images, tracings, or specimens: yes           DIAGNOSIS  Final diagnoses:   Left leg swelling   Pleural effusion on right       DISPOSITION  DISCHARGE    Patient discharged in stable condition.    Reviewed implications of results, diagnosis, meds, responsibility to follow up, warning signs and symptoms of possible worsening, potential complications and reasons to return to ER.    Patient/Family voiced understanding of above instructions.    Discussed plan for discharge, as there is no emergent indication for admission. Patient referred to primary care provider for BP management due to today's BP. Pt/family is agreeable and understands need for follow up and repeat testing.  Pt is aware that discharge does not mean that nothing is wrong but it indicates no emergency is present that requires admission and they must continue care with follow-up as given below or physician of their choice.     FOLLOW-UP  Aime Zhong MD  [address] [address]  [phone]    In 3 days  For recheck         Medication List      Changed    polyethylene glycol pack packet  Commonly known as:  MIRALAX  Take 17 g by mouth Daily.  What changed:    when to take this  reasons to take this              Latest Documented Vital Signs:  As of 1:10 AM  BP- 113/76 HR- 95 Temp- 97.4 °F (36.3 °C) (Tympanic) O2 sat- 94%    --  Documentation assistance provided by autumn Fernández for Dr. Brandon MD.  Information recorded by the autumn was done at my direction and  has been verified and validated by me.           Kamar Fernández  12/02/19 2029       Hal Taylor MD  12/03/19 0113

## 2019-12-02 NOTE — ED TRIAGE NOTES
Dx c blood clot in left ankle in oct.  She's been on eliquis and then changed to pradaxa.    Increased swelling over the last 3 days.

## 2019-12-03 NOTE — TELEPHONE ENCOUNTER
----- Message from Aime Zhnog MD sent at 12/2/2019  4:56 PM EST -----  Agree with stopping the potassium supplement.  Please don't refill, and notify her that we are holding it.  Thanks!  [facility]    ----- Message -----  From: Haylee Burger  Sent: 12/2/2019   4:30 PM  To: MD Dr Trevin Retana,  Pt is requesting a refill on KCl. Her potassium level has been above the recommended levels for the past two checks. Do you still want me to fill this medication?  She is coming in on 12/09/19. Do you want to wait until then and put an order for a CMP to be checked then?  Thanks  Haylee

## 2019-12-03 NOTE — TELEPHONE ENCOUNTER
SEE NOTE PER DR. SMART. CALLED PT AND ADVISED HER TO STOP TAKING PO K+ SUPPLEMENT. PT V/U. RF DENIED FOR K+. CMP ADDED TO PT'S NEXT APPT 12/9.

## 2019-12-03 NOTE — TELEPHONE ENCOUNTER
Pt calls questioning if she needs CT scan on Thursday since she had CT in ER yesterday. Informed her, per appt note, CT chest was cancelled per Dr. Zhong but pt will still have CT of neck, abdomen and pelvis. She v/u. Pt also requests refill on MS contin. Routed to Dr. Zhong for signature.

## 2019-12-03 NOTE — TELEPHONE ENCOUNTER
"Pt called to say her Left foot was swelling and she wanted a \"water pill\" pt did go to ER yesterday and had test. Neg for blood clot/  Spoke with Dr Ghosh and she offered an rosi today to see her , pt declined, didn't have a ride. Appt was made for Friday.  Pt informed to elevate foot above hip,wear a compression stocking, light  And call her onc .  Pt states she has an appt Thursday with onc and a repeat CT    "

## 2019-12-06 NOTE — PROGRESS NOTES
Chief Complaint   Patient presents with   • Hyperlipidemia   • Cancer       History of Present Illness   Linette Ghosh is a 64 y.o. female presents for follow up evaluation. She has a complicated medical history. She was diagnosed w/ pancreatic cancer and is on palliative chemotherapy. She was dehydrated and hctz was d/c. She is now swelling w/ weight gain. She went ER and was negative for blood clots. She reports that she had increased heart burn when off of protonix and this has improved. Patient is on opioids and notes constipation. Previously had diarrhea.     The following portions of the patient's history were reviewed and updated as appropriate: allergies, current medications, past family history, past medical history, past social history, past surgical history and problem list.  Current Outpatient Medications on File Prior to Visit   Medication Sig Dispense Refill   • dabigatran etexilate (PRADAXA) 150 MG capsu Take 1 capsule by mouth 2 (Two) Times a Day. 60 capsule 6   • desloratadine (CLARINEX) 5 MG tablet Take 1 tablet by mouth Daily. 90 tablet 1   • fluticasone (FLONASE) 50 MCG/ACT nasal spray 2 sprays into the nostril(s) as directed by provider Daily. 3 bottle 3   • insulin detemir (LEVEMIR) 100 UNIT/ML injection Inject 12 Units under the skin into the appropriate area as directed Every Night. 9 mL 1   • lisinopril (PRINIVIL,ZESTRIL) 20 MG tablet Take 1 tablet by mouth Daily. 90 tablet 3   • metFORMIN (GLUCOPHAGE) 1000 MG tablet TAKE 1 TABLET BY MOUTH TWICE DAILY WITH MEALS 180 tablet 3   • montelukast (SINGULAIR) 10 MG tablet TAKE 1 TABLET BY MOUTH EVERY NIGHT 90 tablet 3   • Morphine (MS CONTIN) 30 MG 12 hr tablet Take 1 tablet by mouth Every 12 (Twelve) Hours. As needed for pain. 60 tablet 0   • ondansetron (ZOFRAN) 8 MG tablet Take 1 tablet by mouth 3 (Three) Times a Day As Needed for Nausea or Vomiting. 60 tablet 2   • oxyCODONE (ROXICODONE) 10 MG tablet Take 1 tablet by mouth Every 4 (Four) Hours  As Needed for Moderate Pain . 120 tablet 0   • pantoprazole (PROTONIX) 40 MG EC tablet Take 1 tablet by mouth Daily. 90 tablet 2   • polyethylene glycol (MIRALAX) pack packet Take 17 g by mouth Daily. (Patient taking differently: Take 17 g by mouth As Needed.)     • simvastatin (ZOCOR) 20 MG tablet TAKE 1 TABLET BY MOUTH EVERY NIGHT 90 tablet 3   • timolol (TIMOPTIC) 0.25 % ophthalmic solution Administer 1 drop to both eyes Daily.     • albuterol (PROVENTIL) (2.5 MG/3ML) 0.083% nebulizer solution INHALE THE CONTENTS OF ONE VIAL VIA NEBULIZATION EVERY 6 HOURS AS NEEDED FOR WHEEZING 75 mL 1   • albuterol sulfate  (90 Base) MCG/ACT inhaler Inhale 2 puffs Every 4 (Four) Hours As Needed for Wheezing. 1 inhaler 0   • albuterol sulfate  (90 Base) MCG/ACT inhaler Inhale 2 puffs Every 4 (Four) Hours As Needed for Wheezing.     • [DISCONTINUED] albuterol (PROVENTIL) (2.5 MG/3ML) 0.083% nebulizer solution INHALE THE CONTENTS OF ONE VIAL VIA NEBULIZATION EVERY 6 HOURS AS NEEDED FOR WHEEZING 75 vial 2     No current facility-administered medications on file prior to visit.      Review of Systems   Constitutional: Positive for fatigue.   HENT: Negative.    Eyes: Negative.    Respiratory: Positive for cough, shortness of breath and wheezing.    Cardiovascular: Negative.    Gastrointestinal: Positive for abdominal pain and constipation.   Endocrine: Negative.    Genitourinary: Negative.    Musculoskeletal: Negative.    Skin: Negative.    Allergic/Immunologic: Negative.    Neurological: Negative.    Hematological: Negative.    Psychiatric/Behavioral: Negative.        Objective   Physical Exam   Constitutional: She is oriented to person, place, and time. She appears well-developed and well-nourished.   HENT:   Head: Normocephalic and atraumatic.   Right Ear: External ear normal.   Left Ear: External ear normal.   Nose: Nose normal.   Mouth/Throat: Oropharynx is clear and moist.   Eyes: EOM are normal. Pupils are equal,  "round, and reactive to light.   Neck: Neck supple.   Cardiovascular: Normal rate, regular rhythm and normal heart sounds.   Pulmonary/Chest: Effort normal and breath sounds normal. No respiratory distress.   Abdominal: Soft.   Musculoskeletal: Normal range of motion.   Neurological: She is alert and oriented to person, place, and time.   Skin: Skin is warm and dry.   Psychiatric: She has a normal mood and affect. Her behavior is normal.   Nursing note and vitals reviewed.       /88   Pulse (!) 123   Ht 160 cm (63\")   Wt 56.7 kg (125 lb)   SpO2 97%   BMI 22.14 kg/m²     Assessment/Plan   Diagnoses and all orders for this visit:    Hypokalemia  -     Basic Metabolic Panel; Future  -     Magnesium; Future  -     potassium chloride (K-DUR,KLOR-CON) 20 MEQ CR tablet; Take 1 tablet by mouth Daily.    Hypertension, unspecified type  -     Basic Metabolic Panel; Future  -     Magnesium; Future  -     potassium chloride (K-DUR,KLOR-CON) 20 MEQ CR tablet; Take 1 tablet by mouth Daily.    Moderate persistent asthma without complication    Chronic kidney disease, stage III (moderate) (CMS/HCC)  -     potassium chloride (K-DUR,KLOR-CON) 20 MEQ CR tablet; Take 1 tablet by mouth Daily.    Malignant neoplasm of pancreas, unspecified location of malignancy (CMS/HCC)    Other orders  -     Discontinue: hydroCHLOROthiazide (HYDRODIURIL) 12.5 MG tablet; Take 1 tablet by mouth Daily.  -     hydroCHLOROthiazide (HYDRODIURIL) 12.5 MG tablet; Take 1 tablet by mouth Daily.  -     albuterol (ACCUNEB) 1.25 MG/3ML nebulizer solution; Take 3 mL by nebulization Every 6 (Six) Hours As Needed for Wheezing.      patietn w/ leg edema. Likely related to attempt to stop hctz given hypokalemia. Will restart this with potassium. She will wear compression stockings. miralax for constipation. Neb treatment today and q 6 hr for asthma/ copd. Advised to stop smoking. She will have her labs monitored w/ repeat potassium level in 2-3 weeks. Monitor " glucose. Continue metformin for now but may d/c in the future. Test a1c w/ next labs. F/u 3 moths or prn.

## 2019-12-09 NOTE — PROGRESS NOTES
[facility] GROUP OUTPATIENT FOLLOW UP CLINIC VISIT    REASON FOR FOLLOW-UP:    1.  Metastatic adenocarcinoma of the pancreas  2.  Palliative chemotherapy with FOLFIRINOX initiated on 7/10/2019.  3.  Neutropenia related to chemotherapy requiring Neulasta  4.  Bilateral pulmonary emboli and bilateral calf vein deep vein thrombosis diagnosed on 8/5/2019.  She also had gastrointestinal bleeding with a single angiectasia discovered in the duodenum which was injected and treated with APC.    5.  Right lower extremity DVT while on Eliquis, transition to Pradaxa  6.  Oxaliplatin discontinued 11/18/2019 with plans to proceed with FOLFIRI    HISTORY OF PRESENT ILLNESS:  Linette Ghosh is a 64 y.o. female with the above-mentioned history, who returns the office today for follow-up.    She has been having a little bit more shortness of breath and dyspnea on exertion.  She has remained reluctant to proceed with another right lung thoracentesis.  She states that as long as she takes her pain medication that her pain is adequately controlled and she does not desire any change in her pain medication at this time.  She does report some left lower extremity edema.  She was recently in the emergency department with a CT angiogram showing no evidence for pulmonary embolism and a left lower extremity venous duplex showing no evidence for DVT.  She denies any bleeding.  She states that she is eating well.  She does admit to constipation.  She had diarrhea when using MiraLAX aggressively and therefore she has been reluctant to use an aggressive bowel regimen.  Her neck remains weak.  We told her that she could go and get a soft cervical collar but she did not go and get this as she misunderstood the instructions.      ONCOLOGIC HISTORY:  She has had about a 40 pound weight loss in the past 6 months associated with worsening bilateral flank pain.  She does note that the pain is worse with eating.  She was taking hydrocodone a  couple of times per day with some in her appetite and this is reflected.     Due to worsening pain, she presented to the emergency department on 6/1/2019.  A chest x-ray was unremarkable.  She had a CT angiogram of the chest showing no evidence for pulmonary embolism.  There was some nodularity involving the major fissure on the right.  There was some pleural thickening and nodularity involving the minor fissure on the right.  New infiltrate involving the posterior sulcus on the right medial he was noted to have a small pleural effusion on the right was noted as well.  A small groundglass area is appreciated in the right upper lobe.  The left adrenal gland was prominent in the tail the pancreas is prominent measuring 2.9 cm.  Some liver cysts were noted.     She had a follow-up CT scan of the abdomen and pelvis with contrast on 6/9/2019 showing a large hypoenhancing mass involving the entire tail of the pancreas which encases the splenic artery and occludes and splenic vein.  This abuts and possibly infiltrates the left adrenal gland.  No evidence for metastatic disease otherwise.     She saw Dr. Varela with gastroenterology and had an endoscopic ultrasound performed on 6/17/2019 with pathology confirming adenocarcinoma.  The CA 19-9 is elevated at 655.        She was seen initially on 6/21/2019.    A PET scan was requested and performed on 6/25/2019.  A 4 cm intensely hypermetabolic pancreatic tail mass is noted.  Unfortunately, extensive metastatic disease is noted with carcinomatosis throughout the abdomen and extensive metastatic disease throughout the right pleura.  Metastatic disease extending into the posterior mediastinum was noted as well.    She did see Dr. Smiley with surgical oncology.  This referral was made for the PET scan results returned in hopes that she had localized disease that could be resected.  He placed a Mediport on 7/8/2019.    FOLFIRINOX initiated 7/10/2019.    A PET scan on 9/18/2020  "4:19 cycles of therapy shows a decrease in the size and FDG avidity of the pancreas and liver metastases.  There is an enlarging right pleural effusion.  There is significant bowel activity of undetermined significance.    ALLERGIES:  Allergies   Allergen Reactions   • Penicillins Hives       MEDICATIONS:  The medication list has been reviewed with the patient by the medical assistant, and the list has been updated in the electronic medical record, which I reviewed.  Medication dosages and frequencies were confirmed to be accurate.    I have reviewed the patient's medical history in detail and updated the computerized patient record.    Review of Systems   Constitutional: Positive for appetite change, fatigue and unexpected weight change. Negative for activity change, chills and fever.   HENT: Negative for mouth sores and sore throat.    Eyes: Negative for visual disturbance.   Respiratory: Positive for cough (improved) and shortness of breath.    Cardiovascular: Negative for chest pain and leg swelling.   Gastrointestinal: Positive for abdominal pain and constipation. Negative for diarrhea, nausea and vomiting.   Genitourinary: Negative for dysuria and frequency.   Musculoskeletal:        Muscle weakness in her neck   Neurological: Positive for weakness and numbness (Worsening numbness in her feet and legs, now stable). Negative for dizziness.   Hematological: Does not bruise/bleed easily.   Psychiatric/Behavioral: The patient is not nervous/anxious.    All other systems reviewed and are negative.  Review of systems unchanged from previous office visit except as updated.        Vitals:    12/09/19 0814   BP: 112/74   Pulse: 120   Resp: 16   Temp: 98.2 °F (36.8 °C)   TempSrc: Oral   SpO2: 92%   Weight: 57.6 kg (126 lb 14.4 oz)   Height: 176.6 cm (69.53\")   PainSc: 8  Comment: pancreatic cancer   PainLoc: Back       PHYSICAL EXAMINATION:  GENERAL: Chronically ill-appearing female; awake, alert and oriented, in no " acute distress.  Seen today seated in a wheelchair, she appears thin.  SKIN:  Warm and dry, without rashes, purpura, or petechiae.  HEAD:  Normocephalic, atraumatic.  EARS:  Hearing intact.  NOSE:  Septum midline.  No excoriations or nasal discharge.  MOUTH:  No stomatitis or ulcers.  Lips are normal.  THROAT:  Oropharynx without lesions or exudates.  NECK: Her neck strength appears normal on physical examination today.  LYMPHATICS:  No cervical, supraclavicular lymphadenopathy.  CHEST: Decreased breath sounds in the right lung base. Mediport in the left subclavian area  ABDOMEN: Soft, moderately distended, normal active bowel sounds  HEART:  Regular rate and rhythm without murmurs gallops or rubs  EXTREMITIES: 1+ left lower extremity edema  NEUROLOGICAL:  No focal neurologic deficits.      DIAGNOSTIC DATA:  Results from last 7 days   Lab Units 12/09/19  0801 12/02/19  1834   WBC 10*3/mm3 7.40 14.19*   NEUTROS ABS 10*3/mm3 5.05 11.23*   HEMOGLOBIN g/dL 9.4* 9.2*   HEMATOCRIT % 32.0* 29.6*   PLATELETS 10*3/mm3 266 281     Results from last 7 days   Lab Units 12/06/19  1133 12/02/19  1834   SODIUM mmol/L  --  135*   POTASSIUM mmol/L  --  4.0   CHLORIDE mmol/L  --  94*   CO2 mmol/L  --  33.0*   BUN mg/dL  --  10   CREATININE mg/dL 0.60 0.53*   CALCIUM mg/dL  --  9.2   ALBUMIN g/dL  --  3.20*   BILIRUBIN mg/dL  --  0.2   ALK PHOS U/L  --  121*   ALT (SGPT) U/L  --  10   AST (SGOT) U/L  --  12   GLUCOSE mg/dL  --  90     Results from last 7 days   Lab Units 12/02/19  1834   INR  1.26*       Imaging: I personally reviewed CT images of the chest from 12/2/2019 and CT images of the abdomen and pelvis and neck from 12/6/2019.  The CT angiogram of the chest shows some improvement as the left lung pneumonia has resolved.  No pulmonary embolism.  Persistent right pleural effusion.      The CT of the abdomen and pelvis to my eye shows severe constipation and stable liver metastases but we await radiology  interpretation.    ASSESSMENT:  This is a 64 y.o. female with:  1.  Metastatic pancreas cancer originating in the tail of the pancreas: There is evidence for carcinomatosis and extensive metastatic disease throughout the right pleura with extension into the posterior mediastinum.  She is not a surgical candidate.  She did see Dr. Smiley with surgical oncology nonetheless and he placed a Mediport.  We initiated therapy with palliative FOLFIRINOX on 7/10/2019.    She has required a delay in therapy.    A PET scan on 9/18/2019 overall shows improvement with decreasing liver metastases and improvement in the pancreas mass.    We proceed today with cycle #8.  With cycle #7, we decreased all the doses by 20% and we increase the interval to every 21 days.    Due to worsening numbness in her feet and legs, oxaliplatin discontinued 11/18/2019 (previous 20% dose reduction) and we proceeded with FOLFIRI.    This was well-tolerated.  Proceed today.  We continue Neulasta support.    2.  Bilateral lower extremity DVT and pulmonary embolism: He is anticoagulated with Eliquis, however, the patient developed a new right soleal DVT on 10/15/2019.  Eliquis was changed to Pradaxa 150 mg twice daily as the patient declines Lovenox.     3.  Decreased appetite and weight loss: Prescribed Remeron.  Weight is improved today.    4.  Cancer related pain: She reports her pain is well controlled with MS Contin 30 mg twice daily as well as oxycodone 10 mg every 4 hours as needed.  She does not desire to increase in the sustained-release morphine today.    5.  We do need to check BRCA mutation status at some point to see if she would be a candidate for olaparib.  Referred to the genetics clinic today.    6.  Left ankle injury with left foot numbness: Noted improvement.  We will continue to monitor this.  This is an old injury with no acute fracture on plain film imaging.  She declines any further imaging at this point.    7.  Neutropenia  related to therapy: Neulasta was added with cycle #4 of therapy.  Neutropenia has resolved.  Proceed with Neulasta on day 3.    8.  Right pleural effusion: Intermittent thoracentesis. Last 10/2/2019 with 500ml removed. The patient did not complete the procedure due to pain.  She continues to have some shortness of breath and dyspnea on exertion.  Refer to thoracic surgery to consider a Pleurx catheter or other management.  She declines any further attempts at thoracentesis.    9.  Left lung pneumonia.  Resolved    10.  Neck weakness: Unclear etiology.  Her neck strength was normal on examination.  No other neuromuscular symptoms.  She is globally weak and I think that this may just be a reflection of this.  She can go to [facility] and get a soft cervical collar.  Follow-up CT imaging of the neck.    11.  Left leg edema: Venous duplex negative for DVT.  We will get her a left lower extremity compression stocking.      PLAN:  1. Proceed with FOLFIRI alone today maintaining a 20% dose reduction and every 3-week dosing.  2. Continue Neulasta support  3. Follow up with oncology dietitian as scheduled  4. Continue Pradaxa 150 mg twice daily  5. Continue MS Contin 30 mg twice daily and oxycodone 10 mg as needed  6. Continue Protonix 40 mg twice daily.    7. I encouraged a more aggressive bowel regimen  8. Refer to thoracic surgery to consider treatment options for her loculated right pleural effusion  9. Refer to genetics clinic for BRCA testing for olaparib.  10. She can go to [facility] and get a soft cervical collar  11. Left lower extremity compression stocking  12. Follow-up radiology interpretation of the CT imaging of her neck and abdomen  13. At progression, Yudy Zhong MD

## 2019-12-12 NOTE — TELEPHONE ENCOUNTER
Pt called requesting refill on pradaxa, prefers a 90 day supply. Send 90 supply with 1 refill to pt's preferred pharmacy.

## 2019-12-21 PROBLEM — B33.8 RSV INFECTION: Status: ACTIVE | Noted: 2019-01-01

## 2019-12-28 NOTE — OUTREACH NOTE
Prep Survey      Responses   Facility patient discharged from?  [facility]   Is patient eligible?  Yes   Discharge diagnosis  RSV infection    Does the patient have one of the following disease processes/diagnoses(primary or secondary)?  Other   Does the patient have Home health ordered?  No   Is there a DME ordered?  No   General alerts for this patient  pancreatic cancer on palliative chemotherapy   Prep survey completed?  Yes          Nicolasa Alonso RN

## 2019-12-31 PROBLEM — R06.03 ACUTE RESPIRATORY DISTRESS: Status: RESOLVED | Noted: 2019-01-01 | Resolved: 2019-01-01

## 2019-12-31 NOTE — PROGRESS NOTES
"Chief Complaint   Patient presents with   • Pancreatic Cancer   • Hypertension   • Diabetes   • Anemia       History of Present Illness   Linette Ghosh is a 64 y.o. female presents for hospital follow up. She has pancreatic cancer, metastatic. Today she reports feeling \"sick\". Just d/c from hospital for RSV. Her friend reports that the cough has improved from that time. She is on morphine for pain relief which is working fairly well but some continued back pain. She reports some poor sleep. Some anxiety at night w/ waking throughout the night. She is agitated in the evening and wanting to get out of bed.   She is having peripheral edema      The following portions of the patient's history were reviewed and updated as appropriate: allergies, current medications, past family history, past medical history, past social history, past surgical history and problem list.  Current Outpatient Medications on File Prior to Visit   Medication Sig Dispense Refill   • albuterol (ACCUNEB) 1.25 MG/3ML nebulizer solution Take 3 mL by nebulization Every 6 (Six) Hours As Needed for Wheezing. (Patient taking differently: Take 1 ampule by nebulization Every 3 (Three) Hours As Needed for Wheezing.) 100 vial 5   • albuterol (PROVENTIL) (2.5 MG/3ML) 0.083% nebulizer solution INHALE THE CONTENTS OF ONE VIAL VIA NEBULIZATION EVERY 6 HOURS AS NEEDED FOR WHEEZING 75 mL 1   • albuterol sulfate  (90 Base) MCG/ACT inhaler Inhale 2 puffs Every 4 (Four) Hours As Needed for Wheezing. 1 inhaler 0   • albuterol sulfate  (90 Base) MCG/ACT inhaler Inhale 2 puffs Every 4 (Four) Hours As Needed for Wheezing.     • dabigatran etexilate (PRADAXA) 150 MG capsu Take 1 capsule by mouth 2 (Two) Times a Day. 180 capsule 1   • desloratadine (CLARINEX) 5 MG tablet Take 1 tablet by mouth Daily. 90 tablet 1   • fluticasone (FLONASE) 50 MCG/ACT nasal spray 2 sprays into the nostril(s) as directed by provider Daily. 3 bottle 3   • hydroCHLOROthiazide " (HYDRODIURIL) 12.5 MG tablet Take 1 tablet by mouth Daily. 90 tablet 2   • insulin detemir (LEVEMIR) 100 UNIT/ML injection Inject 12 Units under the skin into the appropriate area as directed Every Night. 9 mL 1   • lisinopril (PRINIVIL,ZESTRIL) 20 MG tablet Take 1 tablet by mouth Daily. 90 tablet 3   • metFORMIN (GLUCOPHAGE) 1000 MG tablet TAKE 1 TABLET BY MOUTH TWICE DAILY WITH MEALS 180 tablet 3   • montelukast (SINGULAIR) 10 MG tablet TAKE 1 TABLET BY MOUTH EVERY NIGHT 90 tablet 3   • Morphine (MS CONTIN) 30 MG 12 hr tablet Take 1 tablet by mouth Every 12 (Twelve) Hours. As needed for pain. 60 tablet 0   • ondansetron (ZOFRAN) 8 MG tablet Take 1 tablet by mouth 3 (Three) Times a Day As Needed for Nausea or Vomiting. 60 tablet 2   • oxyCODONE (ROXICODONE) 10 MG tablet Take 1 tablet by mouth Every 4 (Four) Hours As Needed for Moderate Pain . 120 tablet 0   • pantoprazole (PROTONIX) 40 MG EC tablet Take 1 tablet by mouth Daily. 90 tablet 2   • polyethylene glycol (MIRALAX) pack packet Take 17 g by mouth Daily. (Patient taking differently: Take 17 g by mouth As Needed.)     • potassium chloride (K-DUR,KLOR-CON) 20 MEQ CR tablet Take 1 tablet by mouth Daily. 30 tablet 5   • simvastatin (ZOCOR) 20 MG tablet TAKE 1 TABLET BY MOUTH EVERY NIGHT 90 tablet 3   • timolol (TIMOPTIC) 0.25 % ophthalmic solution Administer 1 drop to both eyes Daily.       Current Facility-Administered Medications on File Prior to Visit   Medication Dose Route Frequency Provider Last Rate Last Dose   • levalbuterol (XOPENEX) nebulizer solution 0.63 mg  0.63 mg Nebulization Q6H PRN Raine Ghosh MD   0.63 mg at 12/06/19 1006     Review of Systems   Constitutional: Positive for fatigue.   HENT: Positive for postnasal drip and rhinorrhea.    Eyes: Negative.    Respiratory: Positive for cough and shortness of breath.    Cardiovascular: Negative.    Gastrointestinal: Negative.    Endocrine: Negative.    Genitourinary: Negative.    Musculoskeletal:  Positive for arthralgias and back pain.   Skin: Negative.    Allergic/Immunologic: Negative.    Neurological: Negative.    Hematological: Negative.    Psychiatric/Behavioral: The patient is nervous/anxious.        Objective   Physical Exam   Constitutional: She is oriented to person, place, and time.   Ill appearing but no distress   HENT:   Head: Normocephalic and atraumatic.   Right Ear: External ear normal.   Left Ear: External ear normal.   Dry mouth w/ post nasal drainage   Eyes: Pupils are equal, round, and reactive to light. Conjunctivae and EOM are normal.   Neck: Normal range of motion. Neck supple.   Cardiovascular:   Tachycardia/ regular   Pulmonary/Chest: Effort normal and breath sounds normal.   Abdominal: Soft. Bowel sounds are normal.   Musculoskeletal: Normal range of motion.   Neurological: She is alert and oriented to person, place, and time.   Skin: Skin is warm and dry.   Psychiatric: She has a normal mood and affect. Her behavior is normal. Judgment and thought content normal.   Nursing note and vitals reviewed.       /64   Pulse 109   Wt 55.8 kg (123 lb)   SpO2 99%   BMI 20.47 kg/m²     Assessment/Plan   Diagnoses and all orders for this visit:    Malignant neoplasm of tail of pancreas (CMS/HCC)    Type 2 diabetes mellitus with stage 3 chronic kidney disease, without long-term current use of insulin (CMS/[facility])    Hypertension, unspecified type    Peripheral edema    Other orders  -     methylPREDNISolone (MEDROL, TOBIN,) 4 MG tablet; Take as directed on package instructions.  -     traZODone (DESYREL) 50 MG tablet; Take 1 tablet by mouth Every Night.      Patient w/ pancreatic cancer. She will f/u w/ oncology. She was advised that a referral for hosparus can be made at any time and she will let me know when she is interested in these services. She is having some insomnia and will try trazodone for this. Will elevate legs and use compression stockings. She is on pradaxa for dvt. She will  continue meds for pulmonary benefit. She will monitor her blood sugar and call if any challenges there. Short course medrol for wheezing/ increased secretions. She will f/u here in 2 months or prn.     Patient w/ dyspnea related to asthma and malignant pleural effusion. She needs albuterol q 4-6 hours nebulized given inability to fully inhale MDI effectively. Will continue nebulized meds.

## 2020-01-01 ENCOUNTER — INFUSION (OUTPATIENT)
Dept: ONCOLOGY | Facility: HOSPITAL | Age: 65
End: 2020-01-01

## 2020-01-01 ENCOUNTER — OFFICE VISIT (OUTPATIENT)
Dept: ONCOLOGY | Facility: CLINIC | Age: 65
End: 2020-01-01

## 2020-01-01 ENCOUNTER — DOCUMENTATION (OUTPATIENT)
Dept: ONCOLOGY | Facility: CLINIC | Age: 65
End: 2020-01-01

## 2020-01-01 ENCOUNTER — APPOINTMENT (OUTPATIENT)
Dept: CT IMAGING | Facility: HOSPITAL | Age: 65
End: 2020-01-01

## 2020-01-01 ENCOUNTER — APPOINTMENT (OUTPATIENT)
Dept: GENERAL RADIOLOGY | Facility: HOSPITAL | Age: 65
End: 2020-01-01

## 2020-01-01 ENCOUNTER — READMISSION MANAGEMENT (OUTPATIENT)
Dept: CALL CENTER | Facility: HOSPITAL | Age: 65
End: 2020-01-01

## 2020-01-01 ENCOUNTER — APPOINTMENT (OUTPATIENT)
Dept: ONCOLOGY | Facility: HOSPITAL | Age: 65
End: 2020-01-01

## 2020-01-01 ENCOUNTER — DOCUMENTATION (OUTPATIENT)
Dept: SOCIAL WORK | Facility: HOSPITAL | Age: 65
End: 2020-01-01

## 2020-01-01 ENCOUNTER — TELEPHONE (OUTPATIENT)
Dept: INTERNAL MEDICINE | Facility: CLINIC | Age: 65
End: 2020-01-01

## 2020-01-01 ENCOUNTER — TELEPHONE (OUTPATIENT)
Dept: ONCOLOGY | Facility: CLINIC | Age: 65
End: 2020-01-01

## 2020-01-01 ENCOUNTER — HOSPITAL ENCOUNTER (OUTPATIENT)
Dept: PET IMAGING | Facility: HOSPITAL | Age: 65
Discharge: HOME | End: 2020-02-19
Admitting: INTERNAL MEDICINE

## 2020-01-01 ENCOUNTER — HOSPITAL ENCOUNTER (INPATIENT)
Facility: HOSPITAL | Age: 65
LOS: 2 days | Discharge: HOME | End: 2020-03-27
Attending: EMERGENCY MEDICINE | Admitting: INTERNAL MEDICINE

## 2020-01-01 ENCOUNTER — APPOINTMENT (OUTPATIENT)
Dept: LAB | Facility: HOSPITAL | Age: 65
End: 2020-01-01

## 2020-01-01 ENCOUNTER — OFFICE VISIT (OUTPATIENT)
Dept: INTERNAL MEDICINE | Facility: CLINIC | Age: 65
End: 2020-01-01

## 2020-01-01 ENCOUNTER — TELEPHONE (OUTPATIENT)
Dept: ONCOLOGY | Facility: OTHER | Age: 65
End: 2020-01-01

## 2020-01-01 ENCOUNTER — TELEPHONE (OUTPATIENT)
Dept: ONCOLOGY | Facility: HOSPITAL | Age: 65
End: 2020-01-01

## 2020-01-01 ENCOUNTER — APPOINTMENT (OUTPATIENT)
Dept: ULTRASOUND IMAGING | Facility: HOSPITAL | Age: 65
End: 2020-01-01

## 2020-01-01 VITALS
DIASTOLIC BLOOD PRESSURE: 70 MMHG | BODY MASS INDEX: 16.84 KG/M2 | TEMPERATURE: 97.8 F | HEART RATE: 115 BPM | HEIGHT: 66 IN | OXYGEN SATURATION: 100 % | SYSTOLIC BLOOD PRESSURE: 112 MMHG | WEIGHT: 104.8 LBS | RESPIRATION RATE: 18 BRPM

## 2020-01-01 VITALS
BODY MASS INDEX: 19.66 KG/M2 | TEMPERATURE: 97.4 F | WEIGHT: 118 LBS | HEIGHT: 65 IN | DIASTOLIC BLOOD PRESSURE: 78 MMHG | SYSTOLIC BLOOD PRESSURE: 124 MMHG | HEART RATE: 116 BPM | OXYGEN SATURATION: 94 % | RESPIRATION RATE: 16 BRPM

## 2020-01-01 VITALS
TEMPERATURE: 98.7 F | SYSTOLIC BLOOD PRESSURE: 124 MMHG | HEIGHT: 65 IN | HEART RATE: 123 BPM | BODY MASS INDEX: 17.48 KG/M2 | OXYGEN SATURATION: 91 % | DIASTOLIC BLOOD PRESSURE: 85 MMHG | RESPIRATION RATE: 18 BRPM | WEIGHT: 104.9 LBS

## 2020-01-01 VITALS
BODY MASS INDEX: 18.44 KG/M2 | RESPIRATION RATE: 16 BRPM | SYSTOLIC BLOOD PRESSURE: 127 MMHG | HEIGHT: 65 IN | OXYGEN SATURATION: 95 % | HEART RATE: 80 BPM | WEIGHT: 110.7 LBS | TEMPERATURE: 98.3 F | DIASTOLIC BLOOD PRESSURE: 81 MMHG

## 2020-01-01 VITALS
BODY MASS INDEX: 18.33 KG/M2 | TEMPERATURE: 97.6 F | WEIGHT: 110 LBS | HEART RATE: 101 BPM | DIASTOLIC BLOOD PRESSURE: 90 MMHG | SYSTOLIC BLOOD PRESSURE: 130 MMHG | HEIGHT: 65 IN

## 2020-01-01 VITALS
SYSTOLIC BLOOD PRESSURE: 106 MMHG | OXYGEN SATURATION: 93 % | RESPIRATION RATE: 18 BRPM | BODY MASS INDEX: 21.31 KG/M2 | WEIGHT: 127.9 LBS | TEMPERATURE: 98.6 F | HEIGHT: 65 IN | DIASTOLIC BLOOD PRESSURE: 72 MMHG | HEART RATE: 103 BPM

## 2020-01-01 DIAGNOSIS — C79.9 METASTASIS FROM PANCREATIC CANCER (HCC): ICD-10-CM

## 2020-01-01 DIAGNOSIS — R05.9 COUGH: Primary | ICD-10-CM

## 2020-01-01 DIAGNOSIS — C25.2 MALIGNANT NEOPLASM OF TAIL OF PANCREAS (HCC): ICD-10-CM

## 2020-01-01 DIAGNOSIS — R50.9 FEVER IN ADULT: ICD-10-CM

## 2020-01-01 DIAGNOSIS — C25.2 MALIGNANT NEOPLASM OF TAIL OF PANCREAS (HCC): Primary | ICD-10-CM

## 2020-01-01 DIAGNOSIS — G89.3 CANCER ASSOCIATED PAIN: ICD-10-CM

## 2020-01-01 DIAGNOSIS — R06.02 SHORTNESS OF BREATH: ICD-10-CM

## 2020-01-01 DIAGNOSIS — C25.9 METASTASIS FROM PANCREATIC CANCER (HCC): ICD-10-CM

## 2020-01-01 DIAGNOSIS — N60.82 SEBACEOUS CYST OF BREAST, LEFT: ICD-10-CM

## 2020-01-01 DIAGNOSIS — I26.99 PULMONARY EMBOLISM, BILATERAL (HCC): Primary | ICD-10-CM

## 2020-01-01 DIAGNOSIS — J90 PLEURAL EFFUSION ON RIGHT: ICD-10-CM

## 2020-01-01 DIAGNOSIS — K86.89 PANCREATIC MASS: Primary | ICD-10-CM

## 2020-01-01 DIAGNOSIS — E87.6 HYPOKALEMIA: ICD-10-CM

## 2020-01-01 DIAGNOSIS — Z45.2 ENCOUNTER FOR FITTING AND ADJUSTMENT OF VASCULAR CATHETER: ICD-10-CM

## 2020-01-01 DIAGNOSIS — J18.9 COMMUNITY ACQUIRED PNEUMONIA OF RIGHT LOWER LOBE OF LUNG: Primary | ICD-10-CM

## 2020-01-01 DIAGNOSIS — D50.9 IRON DEFICIENCY ANEMIA, UNSPECIFIED IRON DEFICIENCY ANEMIA TYPE: ICD-10-CM

## 2020-01-01 DIAGNOSIS — Z79.01 CURRENT USE OF LONG TERM ANTICOAGULATION: ICD-10-CM

## 2020-01-01 LAB
ALBUMIN SERPL-MCNC: 3 G/DL (ref 3.5–5.2)
ALBUMIN SERPL-MCNC: 3.4 G/DL (ref 3.5–5.2)
ALBUMIN SERPL-MCNC: 3.6 G/DL (ref 3.5–5.2)
ALBUMIN SERPL-MCNC: 3.6 G/DL (ref 3.5–5.2)
ALBUMIN SERPL-MCNC: 3.9 G/DL (ref 3.5–5.2)
ALBUMIN SERPL-MCNC: 4 G/DL (ref 3.5–5.2)
ALBUMIN/GLOB SERPL: 0.9 G/DL
ALBUMIN/GLOB SERPL: 0.9 G/DL (ref 1.1–2.4)
ALBUMIN/GLOB SERPL: 0.9 G/DL (ref 1.1–2.4)
ALBUMIN/GLOB SERPL: 1 G/DL
ALBUMIN/GLOB SERPL: 1 G/DL (ref 1.1–2.4)
ALBUMIN/GLOB SERPL: 1.1 G/DL (ref 1.1–2.4)
ALP SERPL-CCNC: 67 U/L (ref 39–117)
ALP SERPL-CCNC: 77 U/L (ref 38–116)
ALP SERPL-CCNC: 85 U/L (ref 38–116)
ALP SERPL-CCNC: 85 U/L (ref 39–117)
ALP SERPL-CCNC: 87 U/L (ref 38–116)
ALP SERPL-CCNC: 88 U/L (ref 38–116)
ALT SERPL W P-5'-P-CCNC: 5 U/L (ref 0–33)
ALT SERPL W P-5'-P-CCNC: 6 U/L (ref 0–33)
ALT SERPL W P-5'-P-CCNC: 7 U/L (ref 1–33)
ALT SERPL W P-5'-P-CCNC: <5 U/L (ref 0–33)
ALT SERPL W P-5'-P-CCNC: <5 U/L (ref 0–33)
ALT SERPL W P-5'-P-CCNC: <5 U/L (ref 1–33)
ANION GAP SERPL CALCULATED.3IONS-SCNC: 10.6 MMOL/L (ref 5–15)
ANION GAP SERPL CALCULATED.3IONS-SCNC: 11.7 MMOL/L (ref 5–15)
ANION GAP SERPL CALCULATED.3IONS-SCNC: 12.1 MMOL/L (ref 5–15)
ANION GAP SERPL CALCULATED.3IONS-SCNC: 12.9 MMOL/L (ref 5–15)
ANION GAP SERPL CALCULATED.3IONS-SCNC: 7.4 MMOL/L (ref 5–15)
ANION GAP SERPL CALCULATED.3IONS-SCNC: 8.9 MMOL/L (ref 5–15)
ANION GAP SERPL CALCULATED.3IONS-SCNC: 9.2 MMOL/L (ref 5–15)
ANISOCYTOSIS BLD QL: ABNORMAL
APPEARANCE FLD: ABNORMAL
APTT PPP: 57.8 SECONDS (ref 22.7–35.4)
AST SERPL-CCNC: 10 U/L (ref 1–32)
AST SERPL-CCNC: 11 U/L (ref 0–32)
AST SERPL-CCNC: 11 U/L (ref 0–32)
AST SERPL-CCNC: 13 U/L (ref 0–32)
AST SERPL-CCNC: 13 U/L (ref 0–32)
AST SERPL-CCNC: 7 U/L (ref 1–32)
B PARAPERT DNA SPEC QL NAA+PROBE: NOT DETECTED
B PERT DNA SPEC QL NAA+PROBE: NOT DETECTED
BACTERIA FLD CULT: NORMAL
BACTERIA SPEC AEROBE CULT: NORMAL
BACTERIA SPEC CULT: NORMAL
BACTERIA UR QL AUTO: ABNORMAL /HPF
BASOPHILS # BLD AUTO: 0.03 10*3/MM3 (ref 0–0.2)
BASOPHILS # BLD AUTO: 0.03 10*3/MM3 (ref 0–0.2)
BASOPHILS # BLD AUTO: 0.04 10*3/MM3 (ref 0–0.2)
BASOPHILS # BLD AUTO: 0.05 10*3/MM3 (ref 0–0.2)
BASOPHILS # BLD AUTO: 0.07 10*3/MM3 (ref 0–0.2)
BASOPHILS # BLD MANUAL: 0.14 10*3/MM3 (ref 0–0.2)
BASOPHILS NFR BLD AUTO: 0.3 % (ref 0–1.5)
BASOPHILS NFR BLD AUTO: 0.4 % (ref 0–1.5)
BASOPHILS NFR BLD AUTO: 0.5 % (ref 0–1.5)
BASOPHILS NFR BLD AUTO: 0.5 % (ref 0–1.5)
BASOPHILS NFR BLD AUTO: 1 % (ref 0–1.5)
BASOPHILS NFR BLD AUTO: 1.2 % (ref 0–1.5)
BILIRUB SERPL-MCNC: 0.2 MG/DL (ref 0.2–1.2)
BILIRUB SERPL-MCNC: 0.3 MG/DL (ref 0.2–1.2)
BILIRUB UR QL STRIP: NEGATIVE
BUN BLD-MCNC: 10 MG/DL (ref 6–20)
BUN BLD-MCNC: 10 MG/DL (ref 8–23)
BUN BLD-MCNC: 11 MG/DL (ref 6–20)
BUN BLD-MCNC: 7 MG/DL (ref 6–20)
BUN BLD-MCNC: 7 MG/DL (ref 8–23)
BUN BLD-MCNC: 9 MG/DL (ref 6–20)
BUN BLD-MCNC: 9 MG/DL (ref 8–23)
BUN/CREAT SERPL: 14.3 (ref 7.3–30)
BUN/CREAT SERPL: 15.8 (ref 7.3–30)
BUN/CREAT SERPL: 16.3 (ref 7–25)
BUN/CREAT SERPL: 18.9 (ref 7–25)
BUN/CREAT SERPL: 20 (ref 7–25)
BUN/CREAT SERPL: 20.4 (ref 7.3–30)
BUN/CREAT SERPL: 21.7 (ref 7.3–30)
C PNEUM DNA NPH QL NAA+NON-PROBE: NOT DETECTED
CALCIUM SPEC-SCNC: 10 MG/DL (ref 8.6–10.5)
CALCIUM SPEC-SCNC: 9 MG/DL (ref 8.6–10.5)
CALCIUM SPEC-SCNC: 9 MG/DL (ref 8.6–10.5)
CALCIUM SPEC-SCNC: 9.2 MG/DL (ref 8.5–10.2)
CALCIUM SPEC-SCNC: 9.5 MG/DL (ref 8.5–10.2)
CALCIUM SPEC-SCNC: 9.8 MG/DL (ref 8.5–10.2)
CALCIUM SPEC-SCNC: 9.9 MG/DL (ref 8.5–10.2)
CHLORIDE SERPL-SCNC: 85 MMOL/L (ref 98–107)
CHLORIDE SERPL-SCNC: 86 MMOL/L (ref 98–107)
CHLORIDE SERPL-SCNC: 89 MMOL/L (ref 98–107)
CHLORIDE SERPL-SCNC: 90 MMOL/L (ref 98–107)
CHLORIDE SERPL-SCNC: 92 MMOL/L (ref 98–107)
CHLORIDE SERPL-SCNC: 92 MMOL/L (ref 98–107)
CHLORIDE SERPL-SCNC: 95 MMOL/L (ref 98–107)
CLARITY UR: ABNORMAL
CO2 SERPL-SCNC: 29.6 MMOL/L (ref 22–29)
CO2 SERPL-SCNC: 29.9 MMOL/L (ref 22–29)
CO2 SERPL-SCNC: 30.1 MMOL/L (ref 22–29)
CO2 SERPL-SCNC: 31.1 MMOL/L (ref 22–29)
CO2 SERPL-SCNC: 31.3 MMOL/L (ref 22–29)
CO2 SERPL-SCNC: 33.4 MMOL/L (ref 22–29)
CO2 SERPL-SCNC: 34.8 MMOL/L (ref 22–29)
COLOR FLD: ABNORMAL
COLOR UR: YELLOW
CREAT BLD-MCNC: 0.43 MG/DL (ref 0.57–1)
CREAT BLD-MCNC: 0.45 MG/DL (ref 0.57–1)
CREAT BLD-MCNC: 0.46 MG/DL (ref 0.6–1.1)
CREAT BLD-MCNC: 0.49 MG/DL (ref 0.6–1.1)
CREAT BLD-MCNC: 0.53 MG/DL (ref 0.57–1)
CREAT BLD-MCNC: 0.54 MG/DL (ref 0.6–1.1)
CREAT BLD-MCNC: 0.57 MG/DL (ref 0.6–1.1)
CREAT BLDA-MCNC: 0.8 MG/DL (ref 0.6–1.3)
D DIMER PPP FEU-MCNC: 1.03 MCGFEU/ML (ref 0–0.49)
D-LACTATE SERPL-SCNC: 1.3 MMOL/L (ref 0.5–2)
DEPRECATED RDW RBC AUTO: 39.5 FL (ref 37–54)
DEPRECATED RDW RBC AUTO: 39.6 FL (ref 37–54)
DEPRECATED RDW RBC AUTO: 39.9 FL (ref 37–54)
DEPRECATED RDW RBC AUTO: 40.1 FL (ref 37–54)
DEPRECATED RDW RBC AUTO: 40.6 FL (ref 37–54)
DEPRECATED RDW RBC AUTO: 46.6 FL (ref 37–54)
DEPRECATED RDW RBC AUTO: 51.1 FL (ref 37–54)
DEPRECATED RDW RBC AUTO: 58.2 FL (ref 37–54)
DEPRECATED RDW RBC AUTO: 66.6 FL (ref 37–54)
EOSINOPHIL # BLD AUTO: 0.06 10*3/MM3 (ref 0–0.4)
EOSINOPHIL # BLD AUTO: 0.07 10*3/MM3 (ref 0–0.4)
EOSINOPHIL # BLD AUTO: 0.07 10*3/MM3 (ref 0–0.4)
EOSINOPHIL # BLD AUTO: 0.1 10*3/MM3 (ref 0–0.4)
EOSINOPHIL # BLD AUTO: 0.16 10*3/MM3 (ref 0–0.4)
EOSINOPHIL # BLD AUTO: 0.16 10*3/MM3 (ref 0–0.4)
EOSINOPHIL # BLD AUTO: 0.18 10*3/MM3 (ref 0–0.4)
EOSINOPHIL NFR BLD AUTO: 0.6 % (ref 0.3–6.2)
EOSINOPHIL NFR BLD AUTO: 0.6 % (ref 0.3–6.2)
EOSINOPHIL NFR BLD AUTO: 1 % (ref 0.3–6.2)
EOSINOPHIL NFR BLD AUTO: 1.1 % (ref 0.3–6.2)
EOSINOPHIL NFR BLD AUTO: 1.7 % (ref 0.3–6.2)
EOSINOPHIL NFR BLD AUTO: 1.9 % (ref 0.3–6.2)
EOSINOPHIL NFR BLD AUTO: 3.1 % (ref 0.3–6.2)
ERYTHROCYTE [DISTWIDTH] IN BLOOD BY AUTOMATED COUNT: 15.3 % (ref 12.3–15.4)
ERYTHROCYTE [DISTWIDTH] IN BLOOD BY AUTOMATED COUNT: 15.4 % (ref 12.3–15.4)
ERYTHROCYTE [DISTWIDTH] IN BLOOD BY AUTOMATED COUNT: 17.1 % (ref 12.3–15.4)
ERYTHROCYTE [DISTWIDTH] IN BLOOD BY AUTOMATED COUNT: 18.3 % (ref 12.3–15.4)
ERYTHROCYTE [DISTWIDTH] IN BLOOD BY AUTOMATED COUNT: 19.3 % (ref 12.3–15.4)
ERYTHROCYTE [DISTWIDTH] IN BLOOD BY AUTOMATED COUNT: 21.5 % (ref 12.3–15.4)
FERRITIN SERPL-MCNC: 37.6 NG/ML (ref 13–150)
FLUAV H1 2009 PAND RNA NPH QL NAA+PROBE: NOT DETECTED
FLUAV H1 HA GENE NPH QL NAA+PROBE: NOT DETECTED
FLUAV H3 RNA NPH QL NAA+PROBE: NOT DETECTED
FLUAV SUBTYP SPEC NAA+PROBE: NOT DETECTED
FLUBV RNA ISLT QL NAA+PROBE: NOT DETECTED
FUNGUS WND CULT: NORMAL
GFR SERPL CREATININE-BSD FRML MDRD: 129 ML/MIN/1.73
GFR SERPL CREATININE-BSD FRML MDRD: 138 ML/MIN/1.73
GFR SERPL CREATININE-BSD FRML MDRD: 141 ML/MIN/1.73
GFR SERPL CREATININE-BSD FRML MDRD: >150 ML/MIN/1.73
GLOBULIN UR ELPH-MCNC: 3.2 GM/DL (ref 1.8–3.5)
GLOBULIN UR ELPH-MCNC: 3.5 GM/DL
GLOBULIN UR ELPH-MCNC: 3.9 GM/DL
GLOBULIN UR ELPH-MCNC: 3.9 GM/DL (ref 1.8–3.5)
GLOBULIN UR ELPH-MCNC: 4 GM/DL (ref 1.8–3.5)
GLOBULIN UR ELPH-MCNC: 4.2 GM/DL (ref 1.8–3.5)
GLUCOSE BLD-MCNC: 109 MG/DL (ref 74–124)
GLUCOSE BLD-MCNC: 139 MG/DL (ref 74–124)
GLUCOSE BLD-MCNC: 140 MG/DL (ref 74–124)
GLUCOSE BLD-MCNC: 165 MG/DL (ref 65–99)
GLUCOSE BLD-MCNC: 171 MG/DL (ref 74–124)
GLUCOSE BLD-MCNC: 64 MG/DL (ref 65–99)
GLUCOSE BLD-MCNC: 75 MG/DL (ref 65–99)
GLUCOSE BLDC GLUCOMTR-MCNC: 105 MG/DL (ref 70–130)
GLUCOSE BLDC GLUCOMTR-MCNC: 105 MG/DL (ref 70–130)
GLUCOSE BLDC GLUCOMTR-MCNC: 136 MG/DL (ref 70–130)
GLUCOSE BLDC GLUCOMTR-MCNC: 145 MG/DL (ref 70–130)
GLUCOSE BLDC GLUCOMTR-MCNC: 162 MG/DL (ref 70–130)
GLUCOSE BLDC GLUCOMTR-MCNC: 198 MG/DL (ref 70–130)
GLUCOSE BLDC GLUCOMTR-MCNC: 79 MG/DL (ref 70–130)
GLUCOSE FLD-MCNC: <54 MG/DL
GLUCOSE UR STRIP-MCNC: NEGATIVE MG/DL
GRAM STN SPEC: NORMAL
HADV DNA SPEC NAA+PROBE: NOT DETECTED
HBA1C MFR BLD: 6.4 % (ref 4.8–5.6)
HCOV 229E RNA SPEC QL NAA+PROBE: NOT DETECTED
HCOV HKU1 RNA SPEC QL NAA+PROBE: NOT DETECTED
HCOV NL63 RNA SPEC QL NAA+PROBE: NOT DETECTED
HCOV OC43 RNA SPEC QL NAA+PROBE: NOT DETECTED
HCT VFR BLD AUTO: 27.4 % (ref 34–46.6)
HCT VFR BLD AUTO: 29.1 % (ref 34–46.6)
HCT VFR BLD AUTO: 30.5 % (ref 34–46.6)
HCT VFR BLD AUTO: 30.6 % (ref 34–46.6)
HCT VFR BLD AUTO: 33.2 % (ref 34–46.6)
HCT VFR BLD AUTO: 33.8 % (ref 34–46.6)
HCT VFR BLD AUTO: 34.2 % (ref 34–46.6)
HCT VFR BLD AUTO: 34.2 % (ref 34–46.6)
HCT VFR BLD AUTO: 35.5 % (ref 34–46.6)
HGB BLD-MCNC: 10.6 G/DL (ref 12–15.9)
HGB BLD-MCNC: 10.7 G/DL (ref 12–15.9)
HGB BLD-MCNC: 8.7 G/DL (ref 12–15.9)
HGB BLD-MCNC: 8.7 G/DL (ref 12–15.9)
HGB BLD-MCNC: 9.2 G/DL (ref 12–15.9)
HGB BLD-MCNC: 9.2 G/DL (ref 12–15.9)
HGB BLD-MCNC: 9.4 G/DL (ref 12–15.9)
HGB BLD-MCNC: 9.8 G/DL (ref 12–15.9)
HGB BLD-MCNC: 9.9 G/DL (ref 12–15.9)
HGB UR QL STRIP.AUTO: ABNORMAL
HMPV RNA NPH QL NAA+NON-PROBE: NOT DETECTED
HOLD SPECIMEN: NORMAL
HOLD SPECIMEN: NORMAL
HPIV1 RNA SPEC QL NAA+PROBE: NOT DETECTED
HPIV2 RNA SPEC QL NAA+PROBE: NOT DETECTED
HPIV3 RNA NPH QL NAA+PROBE: NOT DETECTED
HPIV4 P GENE NPH QL NAA+PROBE: NOT DETECTED
HYALINE CASTS UR QL AUTO: ABNORMAL /LPF
HYPOCHROMIA BLD QL: ABNORMAL
IMM GRANULOCYTES # BLD AUTO: 0.02 10*3/MM3 (ref 0–0.05)
IMM GRANULOCYTES NFR BLD AUTO: 0.2 % (ref 0–0.5)
IMM GRANULOCYTES NFR BLD AUTO: 0.2 % (ref 0–0.5)
IMM GRANULOCYTES NFR BLD AUTO: 0.3 % (ref 0–0.5)
IMM GRANULOCYTES NFR BLD AUTO: 0.3 % (ref 0–0.5)
INR PPP: 1.21 (ref 0.9–1.1)
INR PPP: 1.54 (ref 0.9–1.1)
IRON 24H UR-MRATE: 10 MCG/DL (ref 37–145)
IRON SATN MFR SERPL: 3 % (ref 20–50)
KETONES UR QL STRIP: ABNORMAL
L PNEUMO1 AG UR QL IA: NEGATIVE
LDH FLD-CCNC: >1066 U/L
LEUKOCYTE ESTERASE UR QL STRIP.AUTO: ABNORMAL
LYMPHOCYTES # BLD AUTO: 0.6 10*3/MM3 (ref 0.7–3.1)
LYMPHOCYTES # BLD AUTO: 0.71 10*3/MM3 (ref 0.7–3.1)
LYMPHOCYTES # BLD AUTO: 0.77 10*3/MM3 (ref 0.7–3.1)
LYMPHOCYTES # BLD AUTO: 0.78 10*3/MM3 (ref 0.7–3.1)
LYMPHOCYTES # BLD AUTO: 1.11 10*3/MM3 (ref 0.7–3.1)
LYMPHOCYTES # BLD AUTO: 1.26 10*3/MM3 (ref 0.7–3.1)
LYMPHOCYTES # BLD AUTO: 1.33 10*3/MM3 (ref 0.7–3.1)
LYMPHOCYTES # BLD MANUAL: 0.56 10*3/MM3 (ref 0.7–3.1)
LYMPHOCYTES NFR BLD AUTO: 11.6 % (ref 19.6–45.3)
LYMPHOCYTES NFR BLD AUTO: 14.4 % (ref 19.6–45.3)
LYMPHOCYTES NFR BLD AUTO: 15.1 % (ref 19.6–45.3)
LYMPHOCYTES NFR BLD AUTO: 19 % (ref 19.6–45.3)
LYMPHOCYTES NFR BLD AUTO: 5.6 % (ref 19.6–45.3)
LYMPHOCYTES NFR BLD AUTO: 6.6 % (ref 19.6–45.3)
LYMPHOCYTES NFR BLD AUTO: 8 % (ref 19.6–45.3)
LYMPHOCYTES NFR BLD MANUAL: 4 % (ref 19.6–45.3)
LYMPHOCYTES NFR FLD MANUAL: 7 %
M PNEUMO IGG SER IA-ACNC: NOT DETECTED
MCH RBC QN AUTO: 22 PG (ref 26.6–33)
MCH RBC QN AUTO: 22.5 PG (ref 26.6–33)
MCH RBC QN AUTO: 22.8 PG (ref 26.6–33)
MCH RBC QN AUTO: 22.9 PG (ref 26.6–33)
MCH RBC QN AUTO: 23 PG (ref 26.6–33)
MCH RBC QN AUTO: 23 PG (ref 26.6–33)
MCH RBC QN AUTO: 23.1 PG (ref 26.6–33)
MCH RBC QN AUTO: 23.4 PG (ref 26.6–33)
MCH RBC QN AUTO: 24.8 PG (ref 26.6–33)
MCHC RBC AUTO-ENTMCNC: 27.8 G/DL (ref 31.5–35.7)
MCHC RBC AUTO-ENTMCNC: 28.4 G/DL (ref 31.5–35.7)
MCHC RBC AUTO-ENTMCNC: 28.9 G/DL (ref 31.5–35.7)
MCHC RBC AUTO-ENTMCNC: 29.5 G/DL (ref 31.5–35.7)
MCHC RBC AUTO-ENTMCNC: 29.9 G/DL (ref 31.5–35.7)
MCHC RBC AUTO-ENTMCNC: 30.2 G/DL (ref 31.5–35.7)
MCHC RBC AUTO-ENTMCNC: 31.3 G/DL (ref 31.5–35.7)
MCHC RBC AUTO-ENTMCNC: 31.6 G/DL (ref 31.5–35.7)
MCHC RBC AUTO-ENTMCNC: 31.8 G/DL (ref 31.5–35.7)
MCV RBC AUTO: 72.7 FL (ref 79–97)
MCV RBC AUTO: 72.8 FL (ref 79–97)
MCV RBC AUTO: 73.4 FL (ref 79–97)
MCV RBC AUTO: 74.4 FL (ref 79–97)
MCV RBC AUTO: 74.6 FL (ref 79–97)
MCV RBC AUTO: 76.5 FL (ref 79–97)
MCV RBC AUTO: 79.2 FL (ref 79–97)
MCV RBC AUTO: 84.3 FL (ref 79–97)
MCV RBC AUTO: 87.2 FL (ref 79–97)
MONOCYTES # BLD AUTO: 0.44 10*3/MM3 (ref 0.1–0.9)
MONOCYTES # BLD AUTO: 0.5 10*3/MM3 (ref 0.1–0.9)
MONOCYTES # BLD AUTO: 0.59 10*3/MM3 (ref 0.1–0.9)
MONOCYTES # BLD AUTO: 0.66 10*3/MM3 (ref 0.1–0.9)
MONOCYTES # BLD AUTO: 0.68 10*3/MM3 (ref 0.1–0.9)
MONOCYTES # BLD AUTO: 0.72 10*3/MM3 (ref 0.1–0.9)
MONOCYTES # BLD AUTO: 0.81 10*3/MM3 (ref 0.1–0.9)
MONOCYTES NFR BLD AUTO: 13.8 % (ref 5–12)
MONOCYTES NFR BLD AUTO: 4.7 % (ref 5–12)
MONOCYTES NFR BLD AUTO: 6.3 % (ref 5–12)
MONOCYTES NFR BLD AUTO: 6.5 % (ref 5–12)
MONOCYTES NFR BLD AUTO: 6.9 % (ref 5–12)
MONOCYTES NFR BLD AUTO: 7.1 % (ref 5–12)
MONOCYTES NFR BLD AUTO: 7.8 % (ref 5–12)
MONOS+MACROS NFR FLD: 48 %
MRSA DNA SPEC QL NAA+PROBE: ABNORMAL
NEUTROPHILS # BLD AUTO: 13.33 10*3/MM3 (ref 1.7–7)
NEUTROPHILS # BLD AUTO: 3.66 10*3/MM3 (ref 1.7–7)
NEUTROPHILS # BLD AUTO: 5.39 10*3/MM3 (ref 1.7–7)
NEUTROPHILS # BLD AUTO: 6.27 10*3/MM3 (ref 1.7–7)
NEUTROPHILS # BLD AUTO: 7.04 10*3/MM3 (ref 1.7–7)
NEUTROPHILS # BLD AUTO: 7.94 10*3/MM3 (ref 1.7–7)
NEUTROPHILS # BLD AUTO: 9.27 10*3/MM3 (ref 1.7–7)
NEUTROPHILS # BLD AUTO: 9.44 10*3/MM3 (ref 1.7–7)
NEUTROPHILS NFR BLD AUTO: 62.6 % (ref 42.7–76)
NEUTROPHILS NFR BLD AUTO: 75.2 % (ref 42.7–76)
NEUTROPHILS NFR BLD AUTO: 76 % (ref 42.7–76)
NEUTROPHILS NFR BLD AUTO: 80.2 % (ref 42.7–76)
NEUTROPHILS NFR BLD AUTO: 82.6 % (ref 42.7–76)
NEUTROPHILS NFR BLD AUTO: 85.7 % (ref 42.7–76)
NEUTROPHILS NFR BLD AUTO: 88.4 % (ref 42.7–76)
NEUTROPHILS NFR BLD MANUAL: 95 % (ref 42.7–76)
NEUTROPHILS NFR FLD MANUAL: 45 %
NITRITE UR QL STRIP: NEGATIVE
NRBC BLD AUTO-RTO: 0 /100 WBC (ref 0–0.2)
OSMOLALITY SERPL: 270 MOSM/KG (ref 280–301)
PH FLD: 7.53 [PH]
PH UR STRIP.AUTO: 6 [PH] (ref 5–8)
PLAT MORPH BLD: NORMAL
PLATELET # BLD AUTO: 272 10*3/MM3 (ref 140–450)
PLATELET # BLD AUTO: 293 10*3/MM3 (ref 140–450)
PLATELET # BLD AUTO: 295 10*3/MM3 (ref 140–450)
PLATELET # BLD AUTO: 297 10*3/MM3 (ref 140–450)
PLATELET # BLD AUTO: 315 10*3/MM3 (ref 140–450)
PLATELET # BLD AUTO: 325 10*3/MM3 (ref 140–450)
PLATELET # BLD AUTO: 354 10*3/MM3 (ref 140–450)
PLATELET # BLD AUTO: 368 10*3/MM3 (ref 140–450)
PLATELET # BLD AUTO: 376 10*3/MM3 (ref 140–450)
PMV BLD AUTO: 10 FL (ref 6–12)
PMV BLD AUTO: 10 FL (ref 6–12)
PMV BLD AUTO: 10.2 FL (ref 6–12)
PMV BLD AUTO: 9.1 FL (ref 6–12)
PMV BLD AUTO: 9.5 FL (ref 6–12)
PMV BLD AUTO: 9.6 FL (ref 6–12)
POLYCHROMASIA BLD QL SMEAR: ABNORMAL
POTASSIUM BLD-SCNC: 3.5 MMOL/L (ref 3.5–5.2)
POTASSIUM BLD-SCNC: 3.7 MMOL/L (ref 3.5–5.2)
POTASSIUM BLD-SCNC: 3.9 MMOL/L (ref 3.5–5.2)
POTASSIUM BLD-SCNC: 4 MMOL/L (ref 3.5–4.7)
POTASSIUM BLD-SCNC: 4.1 MMOL/L (ref 3.5–4.7)
POTASSIUM BLD-SCNC: 4.1 MMOL/L (ref 3.5–4.7)
POTASSIUM BLD-SCNC: 4.4 MMOL/L (ref 3.5–4.7)
PROCALCITONIN SERPL-MCNC: 0.33 NG/ML (ref 0.1–0.25)
PROT FLD-MCNC: 4.1 G/DL
PROT SERPL-MCNC: 6.5 G/DL (ref 6–8.5)
PROT SERPL-MCNC: 6.6 G/DL (ref 6.3–8)
PROT SERPL-MCNC: 7.6 G/DL (ref 6.3–8)
PROT SERPL-MCNC: 7.8 G/DL (ref 6.3–8)
PROT SERPL-MCNC: 7.8 G/DL (ref 6–8.5)
PROT SERPL-MCNC: 7.9 G/DL (ref 6.3–8)
PROT UR QL STRIP: ABNORMAL
PROTHROMBIN TIME: 15 SECONDS (ref 11.7–14.2)
PROTHROMBIN TIME: 18.1 SECONDS (ref 11.7–14.2)
RBC # BLD AUTO: 3.51 10*6/MM3 (ref 3.77–5.28)
RBC # BLD AUTO: 3.77 10*6/MM3 (ref 3.77–5.28)
RBC # BLD AUTO: 4 10*6/MM3 (ref 3.77–5.28)
RBC # BLD AUTO: 4.01 10*6/MM3 (ref 3.77–5.28)
RBC # BLD AUTO: 4.09 10*6/MM3 (ref 3.77–5.28)
RBC # BLD AUTO: 4.32 10*6/MM3 (ref 3.77–5.28)
RBC # BLD AUTO: 4.46 10*6/MM3 (ref 3.77–5.28)
RBC # BLD AUTO: 4.64 10*6/MM3 (ref 3.77–5.28)
RBC # BLD AUTO: 4.66 10*6/MM3 (ref 3.77–5.28)
RBC # FLD AUTO: ABNORMAL /MM3
RBC # UR: ABNORMAL /HPF
REF LAB TEST METHOD: ABNORMAL
RHINOVIRUS RNA SPEC NAA+PROBE: NOT DETECTED
RSV RNA NPH QL NAA+NON-PROBE: NOT DETECTED
S PNEUM AG SPEC QL LA: NEGATIVE
SODIUM BLD-SCNC: 127 MMOL/L (ref 134–145)
SODIUM BLD-SCNC: 129 MMOL/L (ref 136–145)
SODIUM BLD-SCNC: 130 MMOL/L (ref 136–145)
SODIUM BLD-SCNC: 132 MMOL/L (ref 134–145)
SODIUM BLD-SCNC: 132 MMOL/L (ref 136–145)
SODIUM BLD-SCNC: 136 MMOL/L (ref 134–145)
SODIUM BLD-SCNC: 136 MMOL/L (ref 134–145)
SP GR UR STRIP: 1.03 (ref 1–1.03)
SQUAMOUS #/AREA URNS HPF: ABNORMAL /HPF
TIBC SERPL-MCNC: 328 MCG/DL (ref 298–536)
TRANSFERRIN SERPL-MCNC: 220 MG/DL (ref 200–360)
UROBILINOGEN UR QL STRIP: ABNORMAL
WBC # FLD AUTO: ABNORMAL /MM3
WBC MORPH BLD: NORMAL
WBC NRBC COR # BLD: 10.67 10*3/MM3 (ref 3.4–10.8)
WBC NRBC COR # BLD: 10.81 10*3/MM3 (ref 3.4–10.8)
WBC NRBC COR # BLD: 14.03 10*3/MM3 (ref 3.4–10.8)
WBC NRBC COR # BLD: 5.85 10*3/MM3 (ref 3.4–10.8)
WBC NRBC COR # BLD: 6.73 10*3/MM3 (ref 3.4–10.8)
WBC NRBC COR # BLD: 8.34 10*3/MM3 (ref 3.4–10.8)
WBC NRBC COR # BLD: 9.22 10*3/MM3 (ref 3.4–10.8)
WBC NRBC COR # BLD: 9.26 10*3/MM3 (ref 3.4–10.8)
WBC NRBC COR # BLD: 9.61 10*3/MM3 (ref 3.4–10.8)
WBC UR QL AUTO: ABNORMAL /HPF
WHOLE BLOOD HOLD SPECIMEN: NORMAL
WHOLE BLOOD HOLD SPECIMEN: NORMAL

## 2020-01-01 PROCEDURE — 85027 COMPLETE CBC AUTOMATED: CPT | Performed by: INTERNAL MEDICINE

## 2020-01-01 PROCEDURE — 85610 PROTHROMBIN TIME: CPT | Performed by: NURSE PRACTITIONER

## 2020-01-01 PROCEDURE — 85025 COMPLETE CBC W/AUTO DIFF WBC: CPT | Performed by: NURSE PRACTITIONER

## 2020-01-01 PROCEDURE — 25010000002 AZITHROMYCIN PER 500 MG: Performed by: INTERNAL MEDICINE

## 2020-01-01 PROCEDURE — 83986 ASSAY PH BODY FLUID NOS: CPT | Performed by: NURSE PRACTITIONER

## 2020-01-01 PROCEDURE — 25010000002 LORAZEPAM PER 2 MG: Performed by: NURSE PRACTITIONER

## 2020-01-01 PROCEDURE — 36593 DECLOT VASCULAR DEVICE: CPT

## 2020-01-01 PROCEDURE — 85025 COMPLETE CBC W/AUTO DIFF WBC: CPT

## 2020-01-01 PROCEDURE — 94640 AIRWAY INHALATION TREATMENT: CPT | Performed by: INTERNAL MEDICINE

## 2020-01-01 PROCEDURE — 36591 DRAW BLOOD OFF VENOUS DEVICE: CPT

## 2020-01-01 PROCEDURE — 25010000002 HYDROMORPHONE 1 MG/ML SOLUTION: Performed by: INTERNAL MEDICINE

## 2020-01-01 PROCEDURE — 80048 BASIC METABOLIC PNL TOTAL CA: CPT | Performed by: NURSE PRACTITIONER

## 2020-01-01 PROCEDURE — 87015 SPECIMEN INFECT AGNT CONCNTJ: CPT | Performed by: NURSE PRACTITIONER

## 2020-01-01 PROCEDURE — 99215 OFFICE O/P EST HI 40 MIN: CPT | Performed by: INTERNAL MEDICINE

## 2020-01-01 PROCEDURE — 0 DIATRIZOATE MEGLUMINE & SODIUM PER 1 ML: Performed by: INTERNAL MEDICINE

## 2020-01-01 PROCEDURE — 63710000001 INSULIN GLARGINE PER 5 UNITS: Performed by: INTERNAL MEDICINE

## 2020-01-01 PROCEDURE — 87070 CULTURE OTHR SPECIMN AEROBIC: CPT | Performed by: NURSE PRACTITIONER

## 2020-01-01 PROCEDURE — 83615 LACTATE (LD) (LDH) ENZYME: CPT | Performed by: NURSE PRACTITIONER

## 2020-01-01 PROCEDURE — 0100U HC BIOFIRE FILMARRAY RESP PANEL 2: CPT | Performed by: NURSE PRACTITIONER

## 2020-01-01 PROCEDURE — 25010000002 CEFEPIME 2 G/NS 100 ML SOLUTION: Performed by: INTERNAL MEDICINE

## 2020-01-01 PROCEDURE — 25010000002 HYDROMORPHONE PER 4 MG: Performed by: NURSE PRACTITIONER

## 2020-01-01 PROCEDURE — 85730 THROMBOPLASTIN TIME PARTIAL: CPT | Performed by: NURSE PRACTITIONER

## 2020-01-01 PROCEDURE — 83036 HEMOGLOBIN GLYCOSYLATED A1C: CPT | Performed by: INTERNAL MEDICINE

## 2020-01-01 PROCEDURE — 99231 SBSQ HOSP IP/OBS SF/LOW 25: CPT | Performed by: NURSE PRACTITIONER

## 2020-01-01 PROCEDURE — 83540 ASSAY OF IRON: CPT | Performed by: NURSE PRACTITIONER

## 2020-01-01 PROCEDURE — 84466 ASSAY OF TRANSFERRIN: CPT | Performed by: NURSE PRACTITIONER

## 2020-01-01 PROCEDURE — 85379 FIBRIN DEGRADATION QUANT: CPT | Performed by: INTERNAL MEDICINE

## 2020-01-01 PROCEDURE — 71045 X-RAY EXAM CHEST 1 VIEW: CPT

## 2020-01-01 PROCEDURE — 82962 GLUCOSE BLOOD TEST: CPT

## 2020-01-01 PROCEDURE — 87102 FUNGUS ISOLATION CULTURE: CPT | Performed by: NURSE PRACTITIONER

## 2020-01-01 PROCEDURE — 25010000002 CEFEPIME PER 500 MG: Performed by: NURSE PRACTITIONER

## 2020-01-01 PROCEDURE — 87899 AGENT NOS ASSAY W/OPTIC: CPT | Performed by: INTERNAL MEDICINE

## 2020-01-01 PROCEDURE — 25010000002 CEFEPIME PER 500 MG: Performed by: INTERNAL MEDICINE

## 2020-01-01 PROCEDURE — 71250 CT THORAX DX C-: CPT

## 2020-01-01 PROCEDURE — 99285 EMERGENCY DEPT VISIT HI MDM: CPT

## 2020-01-01 PROCEDURE — 94799 UNLISTED PULMONARY SVC/PX: CPT

## 2020-01-01 PROCEDURE — 63710000001 INSULIN LISPRO (HUMAN) PER 5 UNITS: Performed by: NURSE PRACTITIONER

## 2020-01-01 PROCEDURE — 87040 BLOOD CULTURE FOR BACTERIA: CPT | Performed by: NURSE PRACTITIONER

## 2020-01-01 PROCEDURE — 71275 CT ANGIOGRAPHY CHEST: CPT

## 2020-01-01 PROCEDURE — 87641 MR-STAPH DNA AMP PROBE: CPT | Performed by: INTERNAL MEDICINE

## 2020-01-01 PROCEDURE — 76942 ECHO GUIDE FOR BIOPSY: CPT

## 2020-01-01 PROCEDURE — 71046 X-RAY EXAM CHEST 2 VIEWS: CPT | Performed by: INTERNAL MEDICINE

## 2020-01-01 PROCEDURE — 80053 COMPREHEN METABOLIC PANEL: CPT

## 2020-01-01 PROCEDURE — 25010000003 LIDOCAINE 1 % SOLUTION: Performed by: RADIOLOGY

## 2020-01-01 PROCEDURE — 89051 BODY FLUID CELL COUNT: CPT | Performed by: NURSE PRACTITIONER

## 2020-01-01 PROCEDURE — 99223 1ST HOSP IP/OBS HIGH 75: CPT | Performed by: NURSE PRACTITIONER

## 2020-01-01 PROCEDURE — 25010000002 HYDROMORPHONE 1 MG/ML SOLUTION: Performed by: NURSE PRACTITIONER

## 2020-01-01 PROCEDURE — 82728 ASSAY OF FERRITIN: CPT | Performed by: NURSE PRACTITIONER

## 2020-01-01 PROCEDURE — 84157 ASSAY OF PROTEIN OTHER: CPT | Performed by: NURSE PRACTITIONER

## 2020-01-01 PROCEDURE — 94640 AIRWAY INHALATION TREATMENT: CPT

## 2020-01-01 PROCEDURE — 36415 COLL VENOUS BLD VENIPUNCTURE: CPT

## 2020-01-01 PROCEDURE — 99222 1ST HOSP IP/OBS MODERATE 55: CPT | Performed by: INTERNAL MEDICINE

## 2020-01-01 PROCEDURE — 25010000003 HEPARIN LOCK FLUCH PER 10 UNITS: Performed by: INTERNAL MEDICINE

## 2020-01-01 PROCEDURE — 83930 ASSAY OF BLOOD OSMOLALITY: CPT | Performed by: INTERNAL MEDICINE

## 2020-01-01 PROCEDURE — 99232 SBSQ HOSP IP/OBS MODERATE 35: CPT | Performed by: INTERNAL MEDICINE

## 2020-01-01 PROCEDURE — 0 IOPAMIDOL PER 1 ML: Performed by: INTERNAL MEDICINE

## 2020-01-01 PROCEDURE — 71260 CT THORAX DX C+: CPT

## 2020-01-01 PROCEDURE — 99214 OFFICE O/P EST MOD 30 MIN: CPT | Performed by: NURSE PRACTITIONER

## 2020-01-01 PROCEDURE — 85025 COMPLETE CBC W/AUTO DIFF WBC: CPT | Performed by: INTERNAL MEDICINE

## 2020-01-01 PROCEDURE — 96523 IRRIG DRUG DELIVERY DEVICE: CPT

## 2020-01-01 PROCEDURE — 83605 ASSAY OF LACTIC ACID: CPT | Performed by: NURSE PRACTITIONER

## 2020-01-01 PROCEDURE — 25010000002 VANCOMYCIN 750 MG RECONSTITUTED SOLUTION: Performed by: INTERNAL MEDICINE

## 2020-01-01 PROCEDURE — 82565 ASSAY OF CREATININE: CPT

## 2020-01-01 PROCEDURE — 25010000002 ALTEPLASE 2 MG RECONSTITUTED SOLUTION: Performed by: INTERNAL MEDICINE

## 2020-01-01 PROCEDURE — 36415 COLL VENOUS BLD VENIPUNCTURE: CPT | Performed by: INTERNAL MEDICINE

## 2020-01-01 PROCEDURE — 99214 OFFICE O/P EST MOD 30 MIN: CPT | Performed by: INTERNAL MEDICINE

## 2020-01-01 PROCEDURE — 84145 PROCALCITONIN (PCT): CPT | Performed by: NURSE PRACTITIONER

## 2020-01-01 PROCEDURE — 82945 GLUCOSE OTHER FLUID: CPT | Performed by: NURSE PRACTITIONER

## 2020-01-01 PROCEDURE — 0W9930Z DRAINAGE OF RIGHT PLEURAL CAVITY WITH DRAINAGE DEVICE, PERCUTANEOUS APPROACH: ICD-10-PCS | Performed by: RADIOLOGY

## 2020-01-01 PROCEDURE — 80053 COMPREHEN METABOLIC PANEL: CPT | Performed by: NURSE PRACTITIONER

## 2020-01-01 PROCEDURE — 25010000002 IOPAMIDOL 61 % SOLUTION: Performed by: INTERNAL MEDICINE

## 2020-01-01 PROCEDURE — 85007 BL SMEAR W/DIFF WBC COUNT: CPT | Performed by: NURSE PRACTITIONER

## 2020-01-01 PROCEDURE — 80053 COMPREHEN METABOLIC PANEL: CPT | Performed by: INTERNAL MEDICINE

## 2020-01-01 PROCEDURE — 87205 SMEAR GRAM STAIN: CPT | Performed by: NURSE PRACTITIONER

## 2020-01-01 PROCEDURE — 74177 CT ABD & PELVIS W/CONTRAST: CPT

## 2020-01-01 PROCEDURE — 85027 COMPLETE CBC AUTOMATED: CPT | Performed by: NURSE PRACTITIONER

## 2020-01-01 PROCEDURE — 81001 URINALYSIS AUTO W/SCOPE: CPT | Performed by: EMERGENCY MEDICINE

## 2020-01-01 RX ORDER — SODIUM CHLORIDE 0.9 % (FLUSH) 0.9 %
10 SYRINGE (ML) INJECTION AS NEEDED
Status: DISCONTINUED | OUTPATIENT
Start: 2020-01-01 | End: 2020-01-01 | Stop reason: HOSPADM

## 2020-01-01 RX ORDER — MORPHINE SULFATE 30 MG/1
30 TABLET, FILM COATED, EXTENDED RELEASE ORAL EVERY 12 HOURS
Qty: 60 TABLET | Refills: 0 | Status: SHIPPED | OUTPATIENT
Start: 2020-01-01 | End: 2020-01-01 | Stop reason: SDUPTHER

## 2020-01-01 RX ORDER — BUDESONIDE AND FORMOTEROL FUMARATE DIHYDRATE 160; 4.5 UG/1; UG/1
AEROSOL RESPIRATORY (INHALATION)
COMMUNITY
Start: 2020-01-01

## 2020-01-01 RX ORDER — VANCOMYCIN HYDROCHLORIDE 1 G/200ML
20 INJECTION, SOLUTION INTRAVENOUS ONCE
Status: DISCONTINUED | OUTPATIENT
Start: 2020-01-01 | End: 2020-01-01

## 2020-01-01 RX ORDER — HYDROCHLOROTHIAZIDE 12.5 MG/1
12.5 TABLET ORAL DAILY PRN
Qty: 90 TABLET | Refills: 2 | Status: SHIPPED | OUTPATIENT
Start: 2020-01-01

## 2020-01-01 RX ORDER — ONDANSETRON 2 MG/ML
4 INJECTION INTRAMUSCULAR; INTRAVENOUS EVERY 6 HOURS PRN
Status: DISCONTINUED | OUTPATIENT
Start: 2020-01-01 | End: 2020-01-01 | Stop reason: HOSPADM

## 2020-01-01 RX ORDER — ACETAMINOPHEN 650 MG/1
650 SUPPOSITORY RECTAL EVERY 4 HOURS PRN
Status: DISCONTINUED | OUTPATIENT
Start: 2020-01-01 | End: 2020-01-01 | Stop reason: HOSPADM

## 2020-01-01 RX ORDER — HEPARIN SODIUM (PORCINE) LOCK FLUSH IV SOLN 100 UNIT/ML 100 UNIT/ML
500 SOLUTION INTRAVENOUS AS NEEDED
Status: ACTIVE | OUTPATIENT
Start: 2020-01-01

## 2020-01-01 RX ORDER — DABIGATRAN ETEXILATE 150 MG/1
150 CAPSULE ORAL EVERY 12 HOURS SCHEDULED
Status: DISCONTINUED | OUTPATIENT
Start: 2020-01-01 | End: 2020-01-01

## 2020-01-01 RX ORDER — ACETAMINOPHEN 325 MG/1
650 TABLET ORAL EVERY 4 HOURS PRN
Status: DISCONTINUED | OUTPATIENT
Start: 2020-01-01 | End: 2020-01-01 | Stop reason: HOSPADM

## 2020-01-01 RX ORDER — ACETAMINOPHEN 160 MG/5ML
650 SOLUTION ORAL EVERY 4 HOURS PRN
Status: DISCONTINUED | OUTPATIENT
Start: 2020-01-01 | End: 2020-01-01 | Stop reason: HOSPADM

## 2020-01-01 RX ORDER — IPRATROPIUM BROMIDE AND ALBUTEROL SULFATE 2.5; .5 MG/3ML; MG/3ML
3 SOLUTION RESPIRATORY (INHALATION)
Status: DISCONTINUED | OUTPATIENT
Start: 2020-01-01 | End: 2020-01-01 | Stop reason: HOSPADM

## 2020-01-01 RX ORDER — DOXYCYCLINE 100 MG/1
100 CAPSULE ORAL EVERY 12 HOURS SCHEDULED
Status: DISCONTINUED | OUTPATIENT
Start: 2020-01-01 | End: 2020-01-01 | Stop reason: HOSPADM

## 2020-01-01 RX ORDER — DABIGATRAN ETEXILATE 150 MG/1
150 CAPSULE ORAL 2 TIMES DAILY
Qty: 180 CAPSULE | Refills: 1 | Status: SHIPPED | OUTPATIENT
Start: 2020-01-01

## 2020-01-01 RX ORDER — HEPARIN SODIUM (PORCINE) LOCK FLUSH IV SOLN 100 UNIT/ML 100 UNIT/ML
500 SOLUTION INTRAVENOUS AS NEEDED
Status: CANCELLED | OUTPATIENT
Start: 2020-01-01

## 2020-01-01 RX ORDER — MORPHINE SULFATE 60 MG/1
60 TABLET, FILM COATED, EXTENDED RELEASE ORAL 2 TIMES DAILY
Qty: 60 TABLET | Refills: 0 | Status: SHIPPED | OUTPATIENT
Start: 2020-01-01

## 2020-01-01 RX ORDER — OXYCODONE HYDROCHLORIDE 10 MG/1
10 TABLET ORAL EVERY 4 HOURS PRN
Qty: 120 TABLET | Refills: 0 | Status: SHIPPED | OUTPATIENT
Start: 2020-01-01 | End: 2020-01-01 | Stop reason: SDUPTHER

## 2020-01-01 RX ORDER — DESLORATADINE 5 MG/1
5 TABLET ORAL DAILY
Qty: 90 TABLET | Refills: 1 | Status: SHIPPED | OUTPATIENT
Start: 2020-01-01

## 2020-01-01 RX ORDER — HYDROCODONE BITARTRATE AND ACETAMINOPHEN 5; 325 MG/1; MG/1
1 TABLET ORAL EVERY 4 HOURS PRN
Status: DISCONTINUED | OUTPATIENT
Start: 2020-01-01 | End: 2020-01-01 | Stop reason: HOSPADM

## 2020-01-01 RX ORDER — DOXYCYCLINE 100 MG/1
100 CAPSULE ORAL 2 TIMES DAILY
Qty: 14 CAPSULE | Refills: 0 | Status: SHIPPED | OUTPATIENT
Start: 2020-01-01

## 2020-01-01 RX ORDER — SODIUM CHLORIDE 0.9 % (FLUSH) 0.9 %
10 SYRINGE (ML) INJECTION AS NEEDED
Status: ACTIVE | OUTPATIENT
Start: 2020-01-01

## 2020-01-01 RX ORDER — CEFDINIR 300 MG/1
300 CAPSULE ORAL EVERY 12 HOURS SCHEDULED
Qty: 18 CAPSULE | Refills: 0 | Status: SHIPPED | OUTPATIENT
Start: 2020-01-01 | End: 2020-01-01

## 2020-01-01 RX ORDER — BUPROPION HYDROCHLORIDE 150 MG/1
150 TABLET ORAL DAILY
Qty: 30 TABLET | Refills: 3 | Status: SHIPPED | OUTPATIENT
Start: 2020-01-01 | End: 2020-01-01 | Stop reason: HOSPADM

## 2020-01-01 RX ORDER — LORAZEPAM 2 MG/ML
0.5 INJECTION INTRAMUSCULAR ONCE
Status: COMPLETED | OUTPATIENT
Start: 2020-01-01 | End: 2020-01-01

## 2020-01-01 RX ORDER — SODIUM CHLORIDE 0.9 % (FLUSH) 0.9 %
10 SYRINGE (ML) INJECTION EVERY 12 HOURS SCHEDULED
Status: DISCONTINUED | OUTPATIENT
Start: 2020-01-01 | End: 2020-01-01 | Stop reason: HOSPADM

## 2020-01-01 RX ORDER — NICOTINE POLACRILEX 4 MG
15 LOZENGE BUCCAL
Status: DISCONTINUED | OUTPATIENT
Start: 2020-01-01 | End: 2020-01-01 | Stop reason: HOSPADM

## 2020-01-01 RX ORDER — DEXTROSE MONOHYDRATE 25 G/50ML
25 INJECTION, SOLUTION INTRAVENOUS
Status: DISCONTINUED | OUTPATIENT
Start: 2020-01-01 | End: 2020-01-01 | Stop reason: HOSPADM

## 2020-01-01 RX ORDER — SODIUM CHLORIDE 9 MG/ML
75 INJECTION, SOLUTION INTRAVENOUS CONTINUOUS
Status: DISCONTINUED | OUTPATIENT
Start: 2020-01-01 | End: 2020-01-01

## 2020-01-01 RX ORDER — SODIUM CHLORIDE 0.9 % (FLUSH) 0.9 %
10 SYRINGE (ML) INJECTION AS NEEDED
Status: CANCELLED | OUTPATIENT
Start: 2020-01-01

## 2020-01-01 RX ORDER — BLOOD-GLUCOSE METER
EACH MISCELLANEOUS
COMMUNITY
Start: 2020-01-01

## 2020-01-01 RX ORDER — CEFDINIR 300 MG/1
300 CAPSULE ORAL EVERY 12 HOURS SCHEDULED
Status: DISCONTINUED | OUTPATIENT
Start: 2020-01-01 | End: 2020-01-01 | Stop reason: HOSPADM

## 2020-01-01 RX ORDER — OXYCODONE HYDROCHLORIDE 10 MG/1
TABLET ORAL
Qty: 120 TABLET | Refills: 0 | Status: SHIPPED | OUTPATIENT
Start: 2020-01-01

## 2020-01-01 RX ORDER — MIRTAZAPINE 15 MG/1
15 TABLET, FILM COATED ORAL NIGHTLY
Qty: 30 TABLET | Refills: 2 | Status: SHIPPED | OUTPATIENT
Start: 2020-01-01

## 2020-01-01 RX ORDER — ACETAMINOPHEN 500 MG
1000 TABLET ORAL ONCE
Status: COMPLETED | OUTPATIENT
Start: 2020-01-01 | End: 2020-01-01

## 2020-01-01 RX ORDER — VANCOMYCIN HYDROCHLORIDE 1 G/200ML
1000 INJECTION, SOLUTION INTRAVENOUS EVERY 12 HOURS
Status: DISCONTINUED | OUTPATIENT
Start: 2020-01-01 | End: 2020-01-01 | Stop reason: DRUGHIGH

## 2020-01-01 RX ORDER — HYDROMORPHONE HYDROCHLORIDE 1 MG/ML
0.5 INJECTION, SOLUTION INTRAMUSCULAR; INTRAVENOUS; SUBCUTANEOUS ONCE
Status: COMPLETED | OUTPATIENT
Start: 2020-01-01 | End: 2020-01-01

## 2020-01-01 RX ORDER — INSULIN GLARGINE 100 [IU]/ML
13 INJECTION, SOLUTION SUBCUTANEOUS NIGHTLY
Status: DISCONTINUED | OUTPATIENT
Start: 2020-01-01 | End: 2020-01-01 | Stop reason: HOSPADM

## 2020-01-01 RX ORDER — LEVALBUTEROL INHALATION SOLUTION 0.63 MG/3ML
0.63 SOLUTION RESPIRATORY (INHALATION) EVERY 6 HOURS PRN
Status: SHIPPED | OUTPATIENT
Start: 2020-01-01

## 2020-01-01 RX ORDER — LIDOCAINE HYDROCHLORIDE 10 MG/ML
20 INJECTION, SOLUTION INFILTRATION; PERINEURAL ONCE
Status: COMPLETED | OUTPATIENT
Start: 2020-01-01 | End: 2020-01-01

## 2020-01-01 RX ORDER — ACETAMINOPHEN 325 MG/1
650 TABLET ORAL EVERY 4 HOURS PRN
Start: 2020-01-01

## 2020-01-01 RX ORDER — BUDESONIDE 0.5 MG/2ML
0.5 INHALANT ORAL
Status: DISCONTINUED | OUTPATIENT
Start: 2020-01-01 | End: 2020-01-01 | Stop reason: HOSPADM

## 2020-01-01 RX ADMIN — SODIUM CHLORIDE 750 MG: 900 INJECTION, SOLUTION INTRAVENOUS at 22:58

## 2020-01-01 RX ADMIN — SODIUM CHLORIDE 75 ML/HR: 9 INJECTION, SOLUTION INTRAVENOUS at 19:51

## 2020-01-01 RX ADMIN — IOPAMIDOL 95 ML: 755 INJECTION, SOLUTION INTRAVENOUS at 08:13

## 2020-01-01 RX ADMIN — CEFDINIR 300 MG: 300 CAPSULE ORAL at 15:14

## 2020-01-01 RX ADMIN — IPRATROPIUM BROMIDE AND ALBUTEROL SULFATE 3 ML: 2.5; .5 SOLUTION RESPIRATORY (INHALATION) at 07:28

## 2020-01-01 RX ADMIN — DABIGATRAN ETEXILATE MESYLATE 150 MG: 150 CAPSULE ORAL at 09:31

## 2020-01-01 RX ADMIN — CEFEPIME HYDROCHLORIDE 2 G: 2 INJECTION, POWDER, FOR SOLUTION INTRAVENOUS at 03:37

## 2020-01-01 RX ADMIN — HYDROCODONE BITARTRATE AND ACETAMINOPHEN 1 TABLET: 5; 325 TABLET ORAL at 15:36

## 2020-01-01 RX ADMIN — IOPAMIDOL 85 ML: 612 INJECTION, SOLUTION INTRAVENOUS at 12:48

## 2020-01-01 RX ADMIN — HYDROMORPHONE HYDROCHLORIDE 1 MG: 10 INJECTION, SOLUTION INTRAMUSCULAR; INTRAVENOUS; SUBCUTANEOUS at 16:41

## 2020-01-01 RX ADMIN — HYDROCODONE BITARTRATE AND ACETAMINOPHEN 1 TABLET: 5; 325 TABLET ORAL at 06:19

## 2020-01-01 RX ADMIN — INSULIN GLARGINE 13 UNITS: 100 INJECTION, SOLUTION SUBCUTANEOUS at 01:03

## 2020-01-01 RX ADMIN — HYDROCODONE BITARTRATE AND ACETAMINOPHEN 1 TABLET: 5; 325 TABLET ORAL at 13:19

## 2020-01-01 RX ADMIN — SODIUM CHLORIDE 750 MG: 900 INJECTION, SOLUTION INTRAVENOUS at 10:29

## 2020-01-01 RX ADMIN — INSULIN GLARGINE 13 UNITS: 100 INJECTION, SOLUTION SUBCUTANEOUS at 21:33

## 2020-01-01 RX ADMIN — HYDROMORPHONE HYDROCHLORIDE 1 MG: 10 INJECTION, SOLUTION INTRAMUSCULAR; INTRAVENOUS; SUBCUTANEOUS at 13:52

## 2020-01-01 RX ADMIN — IPRATROPIUM BROMIDE AND ALBUTEROL SULFATE 3 ML: 2.5; .5 SOLUTION RESPIRATORY (INHALATION) at 15:18

## 2020-01-01 RX ADMIN — HYDROMORPHONE HYDROCHLORIDE 1 MG: 10 INJECTION, SOLUTION INTRAMUSCULAR; INTRAVENOUS; SUBCUTANEOUS at 09:41

## 2020-01-01 RX ADMIN — HYDROMORPHONE HYDROCHLORIDE 1 MG: 10 INJECTION, SOLUTION INTRAMUSCULAR; INTRAVENOUS; SUBCUTANEOUS at 03:36

## 2020-01-01 RX ADMIN — BUDESONIDE 0.5 MG: 0.5 INHALANT RESPIRATORY (INHALATION) at 00:44

## 2020-01-01 RX ADMIN — HYDROMORPHONE HYDROCHLORIDE 1 MG: 10 INJECTION, SOLUTION INTRAMUSCULAR; INTRAVENOUS; SUBCUTANEOUS at 21:34

## 2020-01-01 RX ADMIN — LIDOCAINE HYDROCHLORIDE 15 ML: 10 INJECTION, SOLUTION INFILTRATION; PERINEURAL at 12:00

## 2020-01-01 RX ADMIN — IPRATROPIUM BROMIDE AND ALBUTEROL SULFATE 3 ML: 2.5; .5 SOLUTION RESPIRATORY (INHALATION) at 00:45

## 2020-01-01 RX ADMIN — DIATRIZOATE MEGLUMINE AND DIATRIZOATE SODIUM 30 ML: 660; 100 LIQUID ORAL; RECTAL at 12:03

## 2020-01-01 RX ADMIN — CEFEPIME HYDROCHLORIDE 2 G: 2 INJECTION, POWDER, FOR SOLUTION INTRAVENOUS at 21:02

## 2020-01-01 RX ADMIN — LEVALBUTEROL INHALATION SOLUTION 0.63 MG: 0.63 SOLUTION RESPIRATORY (INHALATION) at 10:45

## 2020-01-01 RX ADMIN — HYDROMORPHONE HYDROCHLORIDE 1 MG: 1 INJECTION, SOLUTION INTRAMUSCULAR; INTRAVENOUS; SUBCUTANEOUS at 19:53

## 2020-01-01 RX ADMIN — SODIUM CHLORIDE, PRESERVATIVE FREE 10 ML: 5 INJECTION INTRAVENOUS at 22:00

## 2020-01-01 RX ADMIN — Medication 10 ML: at 11:18

## 2020-01-01 RX ADMIN — HYDROCODONE BITARTRATE AND ACETAMINOPHEN 1 TABLET: 5; 325 TABLET ORAL at 01:07

## 2020-01-01 RX ADMIN — ACETAMINOPHEN 1000 MG: 500 TABLET, FILM COATED ORAL at 18:54

## 2020-01-01 RX ADMIN — SODIUM CHLORIDE 750 MG: 900 INJECTION, SOLUTION INTRAVENOUS at 09:31

## 2020-01-01 RX ADMIN — HYDROMORPHONE HYDROCHLORIDE 0.5 MG: 1 INJECTION, SOLUTION INTRAMUSCULAR; INTRAVENOUS; SUBCUTANEOUS at 10:27

## 2020-01-01 RX ADMIN — HYDROMORPHONE HYDROCHLORIDE 1 MG: 10 INJECTION, SOLUTION INTRAMUSCULAR; INTRAVENOUS; SUBCUTANEOUS at 23:54

## 2020-01-01 RX ADMIN — IPRATROPIUM BROMIDE AND ALBUTEROL SULFATE 3 ML: 2.5; .5 SOLUTION RESPIRATORY (INHALATION) at 06:47

## 2020-01-01 RX ADMIN — ALTEPLASE: 2.2 INJECTION, POWDER, LYOPHILIZED, FOR SOLUTION INTRAVENOUS at 09:32

## 2020-01-01 RX ADMIN — BUDESONIDE 0.5 MG: 0.5 INHALANT RESPIRATORY (INHALATION) at 07:28

## 2020-01-01 RX ADMIN — CEFEPIME HYDROCHLORIDE 2 G: 2 INJECTION, POWDER, FOR SOLUTION INTRAVENOUS at 20:15

## 2020-01-01 RX ADMIN — CEFEPIME HYDROCHLORIDE 2 G: 2 INJECTION, POWDER, FOR SOLUTION INTRAVENOUS at 12:59

## 2020-01-01 RX ADMIN — ACETAMINOPHEN 1000 MG: 500 TABLET, FILM COATED ORAL at 00:55

## 2020-01-01 RX ADMIN — DABIGATRAN ETEXILATE MESYLATE 150 MG: 150 CAPSULE ORAL at 00:56

## 2020-01-01 RX ADMIN — HYDROMORPHONE HYDROCHLORIDE 1 MG: 10 INJECTION, SOLUTION INTRAMUSCULAR; INTRAVENOUS; SUBCUTANEOUS at 23:41

## 2020-01-01 RX ADMIN — IPRATROPIUM BROMIDE AND ALBUTEROL SULFATE 3 ML: 2.5; .5 SOLUTION RESPIRATORY (INHALATION) at 11:55

## 2020-01-01 RX ADMIN — SODIUM CHLORIDE, PRESERVATIVE FREE 500 UNITS: 5 INJECTION INTRAVENOUS at 11:18

## 2020-01-01 RX ADMIN — SODIUM CHLORIDE, PRESERVATIVE FREE 10 ML: 5 INJECTION INTRAVENOUS at 21:03

## 2020-01-01 RX ADMIN — INSULIN LISPRO 2 UNITS: 100 INJECTION, SOLUTION INTRAVENOUS; SUBCUTANEOUS at 00:02

## 2020-01-01 RX ADMIN — DOXYCYCLINE 100 MG: 100 CAPSULE ORAL at 15:14

## 2020-01-01 RX ADMIN — SODIUM CHLORIDE, PRESERVATIVE FREE 10 ML: 5 INJECTION INTRAVENOUS at 09:31

## 2020-01-01 RX ADMIN — Medication 10 ML: at 08:53

## 2020-01-01 RX ADMIN — BUDESONIDE 0.5 MG: 0.5 INHALANT RESPIRATORY (INHALATION) at 19:44

## 2020-01-01 RX ADMIN — SODIUM CHLORIDE 1000 ML: 9 INJECTION, SOLUTION INTRAVENOUS at 18:54

## 2020-01-01 RX ADMIN — IPRATROPIUM BROMIDE AND ALBUTEROL SULFATE 3 ML: 2.5; .5 SOLUTION RESPIRATORY (INHALATION) at 19:44

## 2020-01-01 RX ADMIN — INSULIN LISPRO 2 UNITS: 100 INJECTION, SOLUTION INTRAVENOUS; SUBCUTANEOUS at 19:50

## 2020-01-01 RX ADMIN — CEFEPIME HYDROCHLORIDE 2 G: 2 INJECTION, POWDER, FOR SOLUTION INTRAVENOUS at 05:10

## 2020-01-01 RX ADMIN — BUDESONIDE 0.5 MG: 0.5 INHALANT RESPIRATORY (INHALATION) at 06:47

## 2020-01-01 RX ADMIN — LORAZEPAM 0.5 MG: 2 INJECTION INTRAMUSCULAR; INTRAVENOUS at 10:27

## 2020-01-01 RX ADMIN — IPRATROPIUM BROMIDE AND ALBUTEROL SULFATE 3 ML: 2.5; .5 SOLUTION RESPIRATORY (INHALATION) at 15:27

## 2020-01-01 RX ADMIN — HYDROMORPHONE HYDROCHLORIDE 1 MG: 10 INJECTION, SOLUTION INTRAMUSCULAR; INTRAVENOUS; SUBCUTANEOUS at 02:48

## 2020-01-01 RX ADMIN — SODIUM CHLORIDE, PRESERVATIVE FREE 500 UNITS: 5 INJECTION INTRAVENOUS at 08:53

## 2020-01-01 RX ADMIN — HYDROCODONE BITARTRATE AND ACETAMINOPHEN 1 TABLET: 5; 325 TABLET ORAL at 19:50

## 2020-01-01 RX ADMIN — SODIUM CHLORIDE, PRESERVATIVE FREE 10 ML: 5 INJECTION INTRAVENOUS at 10:29

## 2020-01-01 RX ADMIN — AZITHROMYCIN DIHYDRATE 500 MG: 500 INJECTION, POWDER, LYOPHILIZED, FOR SOLUTION INTRAVENOUS at 03:36

## 2020-01-06 NOTE — PROGRESS NOTES
Case Management/Social Work    Patient Name:  Linette Ghosh  YOB: 1955  MRN: [id number]  Admit Date:  (Not on file)    Received prior auth request for pantoprazole Sodium 40mg DR tablets from [facility] [id number].  Noted last ordered 11/18/19 Dr. Aime Zhong.  Placed call to the [facility] office [phone]- spoke with Moira- she requested to fax the prior auth form to their office and she would take care of it.  Placed call to patient to inform of above- left generic voice message for call back.  Abigail Dave RN     1/8/2019 1618- late entry from 1/7/2020 1222- received inbound call from patient Linette requesting call back to her sisters phone.. Call back to phone [phone]- spoke with Gayathri/sister/caregiver. She noted that they tried to fill the Pantoprazole Sodium, but it needed prior auth.  Informed that I did fax ([phone]) the paperwork to the [facility] office 1/6/2019 and spoke with Moira who informed that they would take care of it.   Informed her to follow up with the [facility] office as sometimes can take 24-48 hours for prior authorization.  Appreciative of call.   Abigail Dave RN          Electronically signed by:  Abigail Dave RN  01/06/20 5:12 PM

## 2020-01-06 NOTE — OUTREACH NOTE
Medical Week 2 Survey      Responses   Facility patient discharged from?  [facility]   Does the patient have one of the following disease processes/diagnoses(primary or secondary)?  Other   Week 2 attempt successful?  No   Unsuccessful attempts  Attempt 1          Gideon Dwyer RN

## 2020-01-07 NOTE — PROGRESS NOTES
[facility] GROUP OUTPATIENT FOLLOW UP CLINIC VISIT    REASON FOR FOLLOW-UP:    1.  Metastatic adenocarcinoma of the pancreas  2.  Palliative chemotherapy with FOLFIRINOX initiated on 7/10/2019.  3.  Neutropenia related to chemotherapy requiring Neulasta  4.  Bilateral pulmonary emboli and bilateral calf vein deep vein thrombosis diagnosed on 8/5/2019.  She also had gastrointestinal bleeding with a single angiectasia discovered in the duodenum which was injected and treated with APC.    5.  Right lower extremity DVT while on Eliquis, transition to Pradaxa  6.  Oxaliplatin discontinued 11/18/2019 with plans to proceed with FOLFIRI  7. Hospitalization from 12/21/2019 through 12/27/2019  for RSV.  Seen in the office on 1/7/2020 with improvement of symptoms but plans to delay treatment a week.    HISTORY OF PRESENT ILLNESS:  Linette Ghosh is a 64 y.o. female with the above-mentioned history here today for scheduled hospital return. She was recently hospitalized from 12/21/2019 through 12/27//2019 with RSV.  She reports improvement of symptoms but is hesitant to go forward with treatment today as she fears this will cause her to lose a little ground.  Remains on nasal cannula oxygen at 2 L, her oxygen saturation in the office today is 93%.  She denies fever, chills, chest pain, heart palpitations.    Pain is well managed with her current regimen that consists of MS Contin 30 mg every 12 hours and oxycodone 10 mg as needed.  She is requesting refills today.    Appetite is fair.  Her vital signs are stable.  Her CBC is relatively stable although she does have a lower hemoglobin today at 8.7 versus 9.4.  She denies bleeding or bruising.  She denies dark tarry stool.      She continues to take her Pradaxa as prescribed daily.    ONCOLOGIC HISTORY:  She has had about a 40 pound weight loss in the past 6 months associated with worsening bilateral flank pain.  She does note that the pain is worse with eating.  She was  taking hydrocodone a couple of times per day with some in her appetite and this is reflected.     Due to worsening pain, she presented to the emergency department on 6/1/2019.  A chest x-ray was unremarkable.  She had a CT angiogram of the chest showing no evidence for pulmonary embolism.  There was some nodularity involving the major fissure on the right.  There was some pleural thickening and nodularity involving the minor fissure on the right.  New infiltrate involving the posterior sulcus on the right medial he was noted to have a small pleural effusion on the right was noted as well.  A small groundglass area is appreciated in the right upper lobe.  The left adrenal gland was prominent in the tail the pancreas is prominent measuring 2.9 cm.  Some liver cysts were noted.     She had a follow-up CT scan of the abdomen and pelvis with contrast on 6/9/2019 showing a large hypoenhancing mass involving the entire tail of the pancreas which encases the splenic artery and occludes and splenic vein.  This abuts and possibly infiltrates the left adrenal gland.  No evidence for metastatic disease otherwise.     She saw Dr. Varela with gastroenterology and had an endoscopic ultrasound performed on 6/17/2019 with pathology confirming adenocarcinoma.  The CA 19-9 is elevated at 655.        She was seen initially on 6/21/2019.    A PET scan was requested and performed on 6/25/2019.  A 4 cm intensely hypermetabolic pancreatic tail mass is noted.  Unfortunately, extensive metastatic disease is noted with carcinomatosis throughout the abdomen and extensive metastatic disease throughout the right pleura.  Metastatic disease extending into the posterior mediastinum was noted as well.    She did see Dr. Smiley with surgical oncology.  This referral was made for the PET scan results returned in hopes that she had localized disease that could be resected.  He placed a Mediport on 7/8/2019.    FOLFIRINOX initiated 7/10/2019.    A  PET scan on 9/18/2020 4:19 cycles of therapy shows a decrease in the size and FDG avidity of the pancreas and liver metastases.  There is an enlarging right pleural effusion.  There is significant bowel activity of undetermined significance.    ALLERGIES:  Allergies   Allergen Reactions   • Penicillins Hives       MEDICATIONS:  The medication list has been reviewed with the patient by the medical assistant, and the list has been updated in the electronic medical record, which I reviewed.  Medication dosages and frequencies were confirmed to be accurate.    I have reviewed the patient's medical history in detail and updated the computerized patient record.    Review of Systems   Constitutional: Positive for appetite change and fatigue. Negative for activity change, chills and fever.   HENT: Negative for mouth sores and sore throat.    Eyes: Negative for visual disturbance.   Respiratory: Positive for shortness of breath (see hpi).    Cardiovascular: Negative for chest pain and leg swelling.   Gastrointestinal: Positive for abdominal pain (well managed with current pain medication). Negative for diarrhea, nausea and vomiting.   Genitourinary: Negative for dysuria and frequency.   Musculoskeletal:        Muscle weakness in her neck   Neurological: Positive for weakness and numbness (Stable). Negative for dizziness.   Hematological: Does not bruise/bleed easily.   Psychiatric/Behavioral: The patient is not nervous/anxious.    All other systems reviewed and are negative.  Review of systems unchanged from previous office visit except as updated.        There were no vitals filed for this visit.    PHYSICAL EXAMINATION:  GENERAL: Chronically ill-appearing female; awake, alert and oriented, in no acute distress.  Seen today seated in a wheelchair,she is accompanied by her .   SKIN:  Warm and dry, without rashes, purpura, or petechiae.  HEAD:  Normocephalic, atraumatic.  EARS:  Hearing intact.  NOSE:  Septum  midline.  No excoriations or nasal discharge.  MOUTH:  No stomatitis or ulcers.  Lips are normal.  THROAT:  Oropharynx without lesions or exudates.  NECK: Her neck strength appears normal on physical examination today.  LYMPHATICS:  No cervical, supraclavicular lymphadenopathy.  CHEST: Decreased breath sounds in the bases.  She is currently on nasal cannula oxygen 2 L.  ABDOMEN: Soft, moderately distended, normal active bowel sounds  HEART: Slightly tachycardic per my apical exam at 105  EXTREMITIES: 1+ lower extremity edema.  NEUROLOGICAL:  No focal neurologic deficits.      DIAGNOSTIC DATA:                Imaging: I personally reviewed CT images of the chest from 12/2/2019 and CT images of the abdomen and pelvis and neck from 12/6/2019.  The CT angiogram of the chest shows some improvement as the left lung pneumonia has resolved.  No pulmonary embolism.  Persistent right pleural effusion.      The CT of the abdomen and pelvis to my eye shows severe constipation and stable liver metastases but we await radiology interpretation.    ASSESSMENT:  This is a 64 y.o. female with:  1.  Metastatic pancreas cancer originating in the tail of the pancreas: There is evidence for carcinomatosis and extensive metastatic disease throughout the right pleura with extension into the posterior mediastinum.  She is not a surgical candidate.  She did see Dr. Smiley with surgical oncology nonetheless and he placed a Mediport.  We initiated therapy with palliative FOLFIRINOX on 7/10/2019.    She has required a delay in therapy.    A PET scan on 9/18/2019 overall shows improvement with decreasing liver metastases and improvement in the pancreas mass.    We proceed today with cycle #8.  With cycle #7, we decreased all the doses by 20% and we increase the interval to every 21 days.    Due to worsening numbness in her feet and legs, oxaliplatin discontinued 11/18/2019 (previous 20% dose reduction) and we proceeded with FOLFIRI.    She last  received FOLFIRI without oxaliplatin on 12/9/2019.    2.  Bilateral lower extremity DVT and pulmonary embolism: She is anticoagulated with Eliquis, however, the patient developed a new right soleal DVT on 10/15/2019.  Eliquis was changed to Pradaxa 150 mg twice daily as the patient declines Lovenox.     3.  Decreased appetite and weight loss: Prescribed Remeron.      4.  Cancer related pain: She reports her pain is well controlled with MS Contin 30 mg twice daily as well as oxycodone 10 mg every 4 hours as needed.  She is requesting a refill today.    5.  We do need to check BRCA mutation status at some point to see if she would be a candidate for olaparib.  Referred to the genetics clinic today.    6.  Left ankle injury with left foot numbness: Noted improvement.  We will continue to monitor this.  This is an old injury with no acute fracture on plain film imaging.  She declines any further imaging at this point.  She did not complain of this today.    7.  Neutropenia related to therapy: Neulasta was added with cycle #4 of therapy.  Neutropenia has resolved.  Proceed with Neulasta on day 3.    8.  Right pleural effusion: Intermittent thoracentesis. Last 10/2/2019 with 500ml removed. The patient did not complete the procedure due to pain.  She continues to have some shortness of breath and dyspnea on exertion.  Refer to thoracic surgery to consider a Pleurx catheter or other management.  She declines any further attempts at thoracentesis.    9.  RSV requiring hospitalization.  Symptoms have improved but she remains weak.    10.  Neck weakness: Unclear etiology.  Her neck strength was normal on examination.  No other neuromuscular symptoms.  She is globally weak and did not complain of neck weakness specifically in the office today.    11.  Left leg edema: Venous duplex negative for DVT.         PLAN:  1. We will hold treatment today at the patient's request give her a little bit longer to recover from her recent  hospitalization.  She will return next week for assessment and to potentially resume therapy will see Dr. Zhong this day.  2. Continue Pradaxa 150 mg twice daily  3. I will E scribe morphine and oxycodone quested today.  4. Continue Protonix 40 mg twice daily.  I will E scribe refills at the patient's request today.  5. Refer to genetics clinic for BRCA testing for olaparib ?  6. At progression, Yudy Myers, APRN

## 2020-01-08 NOTE — OUTREACH NOTE
Medical Week 2 Survey      Responses   Facility patient discharged from?  [facility]   Does the patient have one of the following disease processes/diagnoses(primary or secondary)?  Other   Week 2 attempt successful?  No   Unsuccessful attempts  Attempt 2          Angy Gonzalez RN

## 2020-01-10 NOTE — OUTREACH NOTE
Medical Week 3 Survey      Responses   Facility patient discharged from?  [facility]   Does the patient have one of the following disease processes/diagnoses(primary or secondary)?  Other   Week 3 attempt successful?  No   Unsuccessful attempts  Attempt 1          Mily Valle RN

## 2020-01-10 NOTE — PROGRESS NOTES
Fax rec from [facility] has started a PA for pts Protonix. This was last ordered by Dr Zhong. I completed the request and submitted to [facility].     Waiting for a decision.

## 2020-01-15 NOTE — PROGRESS NOTES
Pts [employer] insurance will not approve or pay for the Pantoprazole tablets. It is excluded from her benefit plan.

## 2020-01-15 NOTE — OUTREACH NOTE
Medical Week 3 Survey      Responses   Facility patient discharged from?  [place]   Does the patient have one of the following disease processes/diagnoses(primary or secondary)?  Other   Week 3 attempt successful?  No   Unsuccessful attempts  Attempt 2          Gideon Dwyer RN

## 2020-01-17 NOTE — TELEPHONE ENCOUNTER
"Pt called stated \"they called [employer] the  has faxed over paperwork that was need this morning to fill out for disability benefits\".   Please call pt at [phone]  "

## 2020-01-27 NOTE — TELEPHONE ENCOUNTER
I had the opportunity to review the med list and/or validate the medication prescribed   Medication: Morphine  Dose: 30mg  How does the patient take the medication? Oral Tablet  How often does the patient take the medication? Every 12 Hours as needed  Pharmacy Type (local, mail order, specialty): Local  Pharmacy Name: [facility]  Pharmacy Phone number or location (if available): [place]  Supply Amount (e.g, 30 or 90 days): 60 count  Medication: Pradaxa  Dose: 150mg  How does the patient take the medication? Oral Tablet  How often does the patient take the medication? Twice As Day  Pharmacy Type (local, mail order, specialty): Local  Pharmacy Name: [facility]  Pharmacy Phone number or location (if available): [place]  Supply Amount (e.g, 30 or 90 days): 60 count

## 2020-01-27 NOTE — TELEPHONE ENCOUNTER
Pt calling for pradaxa refill.  States she has been without for 2 weeks.  We sent in script on 1-17 but she says they wouldn't fill it because it is too soon.  She says she never picked up the script on Dec 12th.  Attempted to call [facility] pharmacy but forced to leave a voicemail because they wouldn't .

## 2020-01-28 NOTE — TELEPHONE ENCOUNTER
Called [facility]. They said pt picked up Pradaxa on 12/15. They are unable to refill until late next month. Pt has been off Pradaxa for 2 weeks. D/W Dr. Zhong. Per Dr. Zhong, see if we have any samples of Pradaxa. D/W Anamaria and she will call the drug rep to see if he can provide samples. Also, she suggested pt call her insurance company to see if they can do an override so she can get her refill. She will also contact pt when the samples arrive. Informed pt's sister and she v/u. Message sent to Dr. Zhong to let him know.

## 2020-01-28 NOTE — TELEPHONE ENCOUNTER
Maribell from [facility] [phone]  Returning call to Michelle from yesterday evening.

## 2020-01-29 NOTE — TELEPHONE ENCOUNTER
Dr Zhong's patient called and she wants his nurse to call her regarding her medication:      dabigatran etexilate (PRADAXA) 150 MG     Please call pt back @ [phone]     Thanks

## 2020-01-29 NOTE — TELEPHONE ENCOUNTER
Gayathri, pt. Sister inquiring of we have any samples of the pradaxa 150mg.  S/w Nerissa Jason yesterday.  Informed Gayathri that I s/w [facility] Fuentes and the drug rep will be here on Friday with samples and she will give pt. A call so they can come and pick them up.  Gayathri was willing to pay for 4 tablets of the pradaxa to get pt. Through to Friday, but when I s/w the pharm. At [facility], they are not allowed to break open a bottle and dispense only 4 tabs.  Dr. Zhong aware.  Gayathri thinks pt. Might have some eliquis at her house.  If she does, pt. Instructed to take it twice a day until she can get the pradaxa samples.   understanding noted.

## 2020-02-03 NOTE — PROGRESS NOTES
I contacted Gayathri, pts sister, to let her know I have rec the Pradaxa samples. She states they found the missing supply of pts Pradaxa. She has 2 bottles and was started back on it today. She does not need the samples at this time

## 2020-02-04 NOTE — PROGRESS NOTES
[facility] GROUP OUTPATIENT FOLLOW UP CLINIC VISIT    REASON FOR FOLLOW-UP:    1.  Metastatic adenocarcinoma of the pancreas  2.  Palliative chemotherapy with FOLFIRINOX initiated on 7/10/2019.  3.  Neutropenia related to chemotherapy requiring Neulasta  4.  Bilateral pulmonary emboli and bilateral calf vein deep vein thrombosis diagnosed on 8/5/2019.  She also had gastrointestinal bleeding with a single angiectasia discovered in the duodenum which was injected and treated with APC.    5.  Right lower extremity DVT while on Eliquis, transition to Pradaxa  6.  Oxaliplatin discontinued 11/18/2019 with plans to proceed with FOLFIRI  7. Hospitalization from 12/21/2019 through 12/27/2019  for RSV.  Seen in the office on 1/7/2020 with improvement of symptoms but plans to delay treatment due to persistent weakness.    HISTORY OF PRESENT ILLNESS:  Linette Ghosh is a 64 y.o. female with the above-mentioned history here today for follow-up to resume therapy.  She ran out of her Pradaxa and lost some bottles.  Ultimately she did find this and has resumed the Pradaxa.  No bleeding.  She states that her pain is adequately controlled on her current regimen of sustained-release morphine and immediate release oxycodone.  She remains a little bit confused.  She complains of some blurred vision.  She is going to see her eye doctor tomorrow she states.  No headaches.  She complains of persistent cough productive of yellow sputum.  No fevers.  Persistent dyspnea requiring oxygen but this is no worse.    ONCOLOGIC HISTORY:  She has had about a 40 pound weight loss in the past 6 months associated with worsening bilateral flank pain.  She does note that the pain is worse with eating.  She was taking hydrocodone a couple of times per day with some in her appetite and this is reflected.     Due to worsening pain, she presented to the emergency department on 6/1/2019.  A chest x-ray was unremarkable.  She had a CT angiogram of the  chest showing no evidence for pulmonary embolism.  There was some nodularity involving the major fissure on the right.  There was some pleural thickening and nodularity involving the minor fissure on the right.  New infiltrate involving the posterior sulcus on the right medial he was noted to have a small pleural effusion on the right was noted as well.  A small groundglass area is appreciated in the right upper lobe.  The left adrenal gland was prominent in the tail the pancreas is prominent measuring 2.9 cm.  Some liver cysts were noted.     She had a follow-up CT scan of the abdomen and pelvis with contrast on 6/9/2019 showing a large hypoenhancing mass involving the entire tail of the pancreas which encases the splenic artery and occludes and splenic vein.  This abuts and possibly infiltrates the left adrenal gland.  No evidence for metastatic disease otherwise.     She saw Dr. Varela with gastroenterology and had an endoscopic ultrasound performed on 6/17/2019 with pathology confirming adenocarcinoma.  The CA 19-9 is elevated at 655.        She was seen initially on 6/21/2019.    A PET scan was requested and performed on 6/25/2019.  A 4 cm intensely hypermetabolic pancreatic tail mass is noted.  Unfortunately, extensive metastatic disease is noted with carcinomatosis throughout the abdomen and extensive metastatic disease throughout the right pleura.  Metastatic disease extending into the posterior mediastinum was noted as well.    She did see Dr. Smiley with surgical oncology.  This referral was made for the PET scan results returned in hopes that she had localized disease that could be resected.  He placed a Mediport on 7/8/2019.    FOLFIRINOX initiated 7/10/2019.    A PET scan on 9/18/2020 4:19 cycles of therapy shows a decrease in the size and FDG avidity of the pancreas and liver metastases.  There is an enlarging right pleural effusion.  There is significant bowel activity of undetermined  "significance.    ALLERGIES:  Allergies   Allergen Reactions   • Penicillins Hives       MEDICATIONS:  The medication list has been reviewed with the patient by the medical assistant, and the list has been updated in the electronic medical record, which I reviewed.  Medication dosages and frequencies were confirmed to be accurate.    I have reviewed the patient's medical history in detail and updated the computerized patient record.    Review of Systems   Constitutional: Positive for appetite change and fatigue. Negative for activity change, chills and fever.   HENT: Negative for mouth sores and sore throat.    Eyes: Negative for visual disturbance.   Respiratory: Positive for cough and shortness of breath.    Cardiovascular: Negative for chest pain and leg swelling.   Gastrointestinal: Positive for abdominal pain (well managed with current pain medication). Negative for diarrhea, nausea and vomiting.   Genitourinary: Negative for dysuria and frequency.   Musculoskeletal:        Muscle weakness in her neck   Neurological: Positive for weakness and numbness (Stable in her hands and feet.  No neuropathic pain.). Negative for dizziness.   Hematological: Does not bruise/bleed easily.   Psychiatric/Behavioral: The patient is not nervous/anxious.    All other systems reviewed and are negative.  Review of systems unchanged from previous office visit except as updated.        Vitals:    02/04/20 0926   BP: 124/78   Pulse: 116   Resp: 16   Temp: 97.4 °F (36.3 °C)   TempSrc: Oral   SpO2: 94%   Weight: 53.5 kg (118 lb)   Height: 165.1 cm (65\")   PainSc: 8  Comment: pancreatic cancer   PainLoc: Back       PHYSICAL EXAMINATION:  GENERAL: Chronically ill-appearing female; awake, alert and oriented, in no acute distress.  Ambulatory today.  SKIN:  Warm and dry, without rashes, purpura, or petechiae.  HEAD:  Normocephalic, atraumatic.  EARS:  Hearing intact.  NOSE:  Septum midline.  No excoriations or nasal discharge.  MOUTH:  No " stomatitis or ulcers.  Lips are normal.  THROAT:  Oropharynx without lesions or exudates.  LYMPHATICS:  No cervical, supraclavicular lymphadenopathy.  CHEST: Decreased breath sounds in the bases particularly the right base.  She is currently on nasal cannula oxygen 2 L.  ABDOMEN: Soft, moderately distended, normal active bowel sounds  HEART: Tachycardic but regular  Abdomen: Soft, nontender, nondistended, normal active bowel sounds  EXTREMITIES: 1+ lower extremity edema.  NEUROLOGICAL:  No focal neurologic deficits.      DIAGNOSTIC DATA:  Results from last 7 days   Lab Units 02/04/20  0916   WBC 10*3/mm3 6.73   NEUTROS ABS 10*3/mm3 5.39   HEMOGLOBIN g/dL 9.4*   HEMATOCRIT % 33.8*   PLATELETS 10*3/mm3 297     Results from last 7 days   Lab Units 02/04/20  0916   SODIUM mmol/L 136   POTASSIUM mmol/L 4.1   CHLORIDE mmol/L 92*   CO2 mmol/L 33.4*   BUN mg/dL 9   CREATININE mg/dL 0.57*   CALCIUM mg/dL 9.9   ALBUMIN g/dL 4.00   BILIRUBIN mg/dL 0.2   ALK PHOS U/L 88   ALT (SGPT) U/L <5   AST (SGOT) U/L 13   GLUCOSE mg/dL 139*           Imaging: None reviewed today    ASSESSMENT:  This is a 64 y.o. female with:  1.  Metastatic pancreas cancer originating in the tail of the pancreas: There is evidence for carcinomatosis and extensive metastatic disease throughout the right pleura with extension into the posterior mediastinum.  She is not a surgical candidate.  She did see Dr. Smiley with surgical oncology nonetheless and he placed a Mediport.  We initiated therapy with palliative FOLFIRINOX on 7/10/2019.    She has required a delay in therapy.    A PET scan on 9/18/2019 overall shows improvement with decreasing liver metastases and improvement in the pancreas mass.    Due to worsening numbness in her feet and legs, oxaliplatin discontinued 11/18/2019 (previous 20% dose reduction) and we proceeded with FOLFIRI.    She last received FOLFIRI without oxaliplatin on 12/9/2019.    Therapy has been delayed due to a poor  performance status.  We discussed today pursuing further treatment with FOLFIRI or perhaps making a transition to Gemzar.  Is been 2 months since her last scans.  She requests a delay in treatment today which is reasonable as her performance status is marginal.  We will get scans in the next couple of weeks and I will see her back after that to consider pursuing further therapy potentially with Gemzar at that time if she continues to be a candidate for therapy.    2.  Bilateral lower extremity DVT and pulmonary embolism: She is anticoagulated with Eliquis, however, the patient developed a new right soleal DVT on 10/15/2019.  Eliquis was changed to Pradaxa 150 mg twice daily.  She lost her bottles but recently discovered them.    3.  Decreased appetite and weight loss: Prescribed Remeron.      4.  Cancer related pain: She reports her pain is well controlled with MS Contin 30 mg twice daily as well as oxycodone 10 mg every 4 hours as needed.  Oxycodone refilled today.  Electronically sent to her pharmacy.    5.  Right pleural effusion: Intermittent thoracentesis. Last 10/2/2019 with 500ml removed. The patient did not complete the procedure due to pain.  She continues to have some shortness of breath and dyspnea on exertion.  Refer to thoracic surgery to consider a Pleurx catheter or other management.  She declines any further attempts at thoracentesis.      PLAN:  1. Hold treatment today.  Remove the port needle.  CT imaging of the chest abdomen and pelvis in a couple of weeks and I will see her back after that to review the results.  If she remains a candidate for further therapy at that time we will plan for treatment with Gemzar.  2. Continue pain medication  3. Continue oxygen therapy  4. If her performance status worsens, hospice    Aime Zhong MD

## 2020-02-06 NOTE — TELEPHONE ENCOUNTER
PT WOULD LIKE TO SPEAK [facility] CONCERNING DISABILITY FORMS. PT WAS DENIED HER DISABILITY BECAUSE FORMS HAVE NOT BEEN SUBMITTED IN TIME. STATES SHE DROPPED THEM OFF AT HER APPT 2/4/20.   PLEASE FAX THESE FORMS )[phone] PT CLAIM # IS [id number].     PLEASE NOTIFY PT THAT THIS HAS BEEN DONE OR IF SHE NEEDS TO GET MORE FORMS ) [phone] ) [phone].

## 2020-02-14 NOTE — TELEPHONE ENCOUNTER
Elmo from [facility] is needing to speak to someone about pts ongoing treatment , chemo & radiation also when the pt's next office visit is for the pt's disability      Contact # [phone]    Spoke with Elmo From [place], informed that palliative chemo is ongoing.

## 2020-02-17 NOTE — TELEPHONE ENCOUNTER
On 2-13-20 Landy did a peer to peer with Dr Bronson with [facility] for ct chest, abd,pelvis. It was approved Case # [id number]  RH

## 2020-03-02 NOTE — PROGRESS NOTES
Chief Complaint   Patient presents with   • Hyperlipidemia   • URI     cough productive cough   • Cancer   • Breast Mass     left breast       History of Present Illness   Linette Ghosh is a 64 y.o. female presents for acute care. She has noticed an area of her left breast that she is concerned about. She has pancreatic cancer and has also noted decreased appetitis. She continues to smoke. Has some shortness of breath as well as a new onset cough that has worsened over the last one week. No recent travel. She has a dry and irritated mouth. She reports that she is drinking 64 ounces water most days.   Pain is fairly well managed w/ oxycodone. She takes miralax for constipation. She does drink ensure regularly.   17 pound weight loss from jan.     The following portions of the patient's history were reviewed and updated as appropriate: allergies, current medications, past family history, past medical history, past social history, past surgical history and problem list.  Current Outpatient Medications on File Prior to Visit   Medication Sig Dispense Refill   • albuterol (ACCUNEB) 1.25 MG/3ML nebulizer solution Take 3 mL by nebulization Every 6 (Six) Hours As Needed for Wheezing. (Patient taking differently: Take 1 ampule by nebulization Every 3 (Three) Hours As Needed for Wheezing.) 100 vial 5   • albuterol (PROVENTIL) (2.5 MG/3ML) 0.083% nebulizer solution INHALE THE CONTENTS OF ONE VIAL VIA NEBULIZATION EVERY 6 HOURS AS NEEDED FOR WHEEZING 75 mL 1   • albuterol sulfate  (90 Base) MCG/ACT inhaler Inhale 2 puffs Every 4 (Four) Hours As Needed for Wheezing. 1 inhaler 0   • Blood Gluc Meter Disp-Strips device E11.9 DM to check glucose bid. Medicare approved glucose meter 1 each 1   • Continuous Glucose Monitor kit      • dabigatran etexilate (PRADAXA) 150 MG capsu Take 1 capsule by mouth 2 (Two) Times a Day. 180 capsule 1   • desloratadine (CLARINEX) 5 MG tablet Take 1 tablet by mouth Daily. 90 tablet 1   •  fluticasone (FLONASE) 50 MCG/ACT nasal spray 2 sprays into the nostril(s) as directed by provider Daily. 3 bottle 3   • glucose blood test strip E11.9 DM to check glucose bid. Use as instructed 100 each 12   • hydroCHLOROthiazide (HYDRODIURIL) 12.5 MG tablet Take 1 tablet by mouth Daily. 90 tablet 2   • insulin detemir (LEVEMIR) 100 UNIT/ML injection Inject 12 Units under the skin into the appropriate area as directed Every Night. 9 mL 1   • Lancets (ACCU-CHEK SOFT TOUCH) lancets E11.9 DM to check glucose bed. 100 each 12   • lisinopril (PRINIVIL,ZESTRIL) 20 MG tablet Take 1 tablet by mouth Daily. 90 tablet 3   • metFORMIN (GLUCOPHAGE) 1000 MG tablet TAKE 1 TABLET BY MOUTH TWICE DAILY WITH MEALS 180 tablet 3   • montelukast (SINGULAIR) 10 MG tablet TAKE 1 TABLET BY MOUTH EVERY NIGHT 90 tablet 3   • Morphine (MS CONTIN) 30 MG 12 hr tablet Take 1 tablet by mouth Every 12 (Twelve) Hours. As needed for pain. 60 tablet 0   • ondansetron (ZOFRAN) 8 MG tablet Take 1 tablet by mouth 3 (Three) Times a Day As Needed for Nausea or Vomiting. 60 tablet 2   • oxyCODONE (ROXICODONE) 10 MG tablet Take 1 tablet by mouth Every 4 (Four) Hours As Needed for Moderate Pain . 120 tablet 0   • pantoprazole (PROTONIX) 40 MG EC tablet Take 1 tablet by mouth Daily. 90 tablet 2   • polyethylene glycol (MIRALAX) pack packet Take 17 g by mouth Daily. (Patient taking differently: Take 17 g by mouth As Needed.)     • potassium chloride (K-DUR,KLOR-CON) 20 MEQ CR tablet Take 1 tablet by mouth Daily. 30 tablet 5   • simvastatin (ZOCOR) 20 MG tablet TAKE 1 TABLET BY MOUTH EVERY NIGHT 90 tablet 3   • timolol (TIMOPTIC) 0.25 % ophthalmic solution Administer 1 drop to both eyes Daily.     • traZODone (DESYREL) 50 MG tablet Take 1 tablet by mouth Every Night. 90 tablet 3   • albuterol sulfate  (90 Base) MCG/ACT inhaler Inhale 2 puffs Every 4 (Four) Hours As Needed for Wheezing.       Current Facility-Administered Medications on File Prior to Visit  "  Medication Dose Route Frequency Provider Last Rate Last Dose   • levalbuterol (XOPENEX) nebulizer solution 0.63 mg  0.63 mg Nebulization Q6H PRN Raine Ghosh MD   0.63 mg at 12/06/19 1006     Review of Systems   Constitutional: Negative.    HENT: Negative.    Eyes: Negative.    Respiratory: Positive for cough, shortness of breath and wheezing.    Cardiovascular: Negative.    Gastrointestinal: Positive for constipation.   Endocrine: Negative.    Genitourinary: Negative.    Musculoskeletal: Positive for arthralgias.   Skin:        Nodule left breast below the nipple   Allergic/Immunologic: Negative.    Neurological: Negative.    Hematological: Negative.    Psychiatric/Behavioral: Negative.        Objective   Physical Exam   Constitutional: She is oriented to person, place, and time.   Alert an appropriate. Thin. Chronically ill appearing.    HENT:   Head: Normocephalic and atraumatic.   Right Ear: External ear normal.   Left Ear: External ear normal.   wthite plaquing along tongue more pronounced on sides. Oropharynx dry.    Eyes: Pupils are equal, round, and reactive to light. EOM are normal.   Neck: Normal range of motion. Neck supple.   Cardiovascular: Regular rhythm.   tachycardic   Pulmonary/Chest: Effort normal. She has wheezes.   L>R lung wheeze. Decreased breath sounds right side   Abdominal: Soft. Bowel sounds are normal.   Neurological: She is alert and oriented to person, place, and time.   Skin: Skin is warm.   Left breast w cyst draining purulent material   Psychiatric: She has a normal mood and affect. Her behavior is normal. Thought content normal.   Nursing note and vitals reviewed.       /90   Pulse 101   Temp 97.6 °F (36.4 °C)   Ht 165.1 cm (65\")   Wt 49.9 kg (110 lb)   BMI 18.30 kg/m²     Assessment/Plan   Diagnoses and all orders for this visit:    Cough  -     XR Chest PA & Lateral (In Office)  -     levalbuterol (XOPENEX) nebulizer solution 0.63 mg    Shortness of breath  -     XR " Chest PA & Lateral (In Office)  -     levalbuterol (XOPENEX) nebulizer solution 0.63 mg    Sebaceous cyst of breast, left  -     Culture, Routine - Swab, Breast, Left    Other orders  -     mupirocin (BACTROBAN) 2 % ointment; Apply  topically to the appropriate area as directed 3 (Three) Times a Day.  -     doxycycline (MONODOX) 100 MG capsule; Take 1 capsule by mouth 2 (Two) Times a Day.  -     nystatin (MYCOSTATIN) 262138 UNIT/ML suspension; Swish and swallow 5 mL 4 (Four) Times a Day.  -     buPROPion XL (WELLBUTRIN XL) 150 MG 24 hr tablet; Take 1 tablet by mouth Daily.      Patient here today for eval of breast nodule. This appears to be an infected sebaceous cyst. Expressed/ drained purulent material and swabbed for culture. She has known pleural effusions of her lungs from ct scan last week. Early effusions noted then on Her left side. She has a persistent effusion right side up mid way. Left less involvement. Sent to radiology for overread. Given xopenex neb in office w/ some symptomatic relief. Will treat w/ doxycycline to help w/ both skin infection of sebaceous cyst and likely early pneumonia. She is strongly encouraged to stop smoking and may try wellbutrin once she completes her antibiotic. Mupirocin oinment to area and to use a warm compress. F/u routinely here and has appointment w/ oncology as well.

## 2020-03-03 NOTE — PROGRESS NOTES
[facility] GROUP OUTPATIENT FOLLOW UP CLINIC VISIT    REASON FOR FOLLOW-UP:    1.  Metastatic adenocarcinoma of the pancreas  2.  Palliative chemotherapy with FOLFIRINOX initiated on 7/10/2019.  3.  Neutropenia related to chemotherapy requiring Neulasta  4.  Bilateral pulmonary emboli and bilateral calf vein deep vein thrombosis diagnosed on 8/5/2019.  She also had gastrointestinal bleeding with a single angiectasia discovered in the duodenum which was injected and treated with APC.    5.  Right lower extremity DVT while on Eliquis, transition to Pradaxa  6.  Oxaliplatin discontinued 11/18/2019 with plans to proceed with FOLFIRI  7. Hospitalization from 12/21/2019 through 12/27/2019  for RSV.  Seen in the office on 1/7/2020 with improvement of symptoms but plans to delay treatment due to persistent weakness.    HISTORY OF PRESENT ILLNESS:  Linette Ghohs is a 64 y.o. female with the above-mentioned history here today for follow-up to resume therapy.      Poor appetite with ongoing weight loss.  She states that she remains active doing things like folding laundry.  She has thrush and primary care sent a prescription for this.  She was started on antibiotic for wheezing yesterday.  She also had an infected sebaceous cyst which was drained.  Occasional increase in right chest pain.    ONCOLOGIC HISTORY:  She has had about a 40 pound weight loss in the past 6 months associated with worsening bilateral flank pain.  She does note that the pain is worse with eating.  She was taking hydrocodone a couple of times per day with some in her appetite and this is reflected.     Due to worsening pain, she presented to the emergency department on 6/1/2019.  A chest x-ray was unremarkable.  She had a CT angiogram of the chest showing no evidence for pulmonary embolism.  There was some nodularity involving the major fissure on the right.  There was some pleural thickening and nodularity involving the minor fissure on the  right.  New infiltrate involving the posterior sulcus on the right medial he was noted to have a small pleural effusion on the right was noted as well.  A small groundglass area is appreciated in the right upper lobe.  The left adrenal gland was prominent in the tail the pancreas is prominent measuring 2.9 cm.  Some liver cysts were noted.     She had a follow-up CT scan of the abdomen and pelvis with contrast on 6/9/2019 showing a large hypoenhancing mass involving the entire tail of the pancreas which encases the splenic artery and occludes and splenic vein.  This abuts and possibly infiltrates the left adrenal gland.  No evidence for metastatic disease otherwise.     She saw Dr. Varela with gastroenterology and had an endoscopic ultrasound performed on 6/17/2019 with pathology confirming adenocarcinoma.  The CA 19-9 is elevated at 655.        She was seen initially on 6/21/2019.    A PET scan was requested and performed on 6/25/2019.  A 4 cm intensely hypermetabolic pancreatic tail mass is noted.  Unfortunately, extensive metastatic disease is noted with carcinomatosis throughout the abdomen and extensive metastatic disease throughout the right pleura.  Metastatic disease extending into the posterior mediastinum was noted as well.    She did see Dr. Smiley with surgical oncology.  This referral was made for the PET scan results returned in hopes that she had localized disease that could be resected.  He placed a Mediport on 7/8/2019.    FOLFIRINOX initiated 7/10/2019.    A PET scan on 9/18/2020 4:19 cycles of therapy shows a decrease in the size and FDG avidity of the pancreas and liver metastases.  There is an enlarging right pleural effusion.  There is significant bowel activity of undetermined significance.    ALLERGIES:  Allergies   Allergen Reactions   • Penicillins Hives       MEDICATIONS:  The medication list has been reviewed with the patient by the medical assistant, and the list has been updated in  the electronic medical record, which I reviewed.  Medication dosages and frequencies were confirmed to be accurate.    I have reviewed the patient's medical history in detail and updated the computerized patient record.    Review of Systems   Constitutional: Positive for appetite change and fatigue. Negative for activity change, chills and fever.   HENT: Negative for mouth sores and sore throat.    Eyes: Negative for visual disturbance.   Respiratory: Positive for cough and shortness of breath.    Cardiovascular: Negative for chest pain and leg swelling.   Gastrointestinal: Positive for abdominal pain (well managed with current pain medication). Negative for diarrhea, nausea and vomiting.   Genitourinary: Negative for dysuria and frequency.   Musculoskeletal:        Muscle weakness in her neck   Neurological: Positive for weakness and numbness (Stable in her hands and feet.  No neuropathic pain.). Negative for dizziness.   Hematological: Does not bruise/bleed easily.   Psychiatric/Behavioral: The patient is not nervous/anxious.    All other systems reviewed and are negative.  Review of systems unchanged from previous office visit except as updated.        Vitals:    03/03/20 0745   PainSc: Comment: pancreatic cancer       PHYSICAL EXAMINATION:  GENERAL: Chronically ill-appearing female; awake, alert and oriented, in no acute distress.  Ambulatory today.  SKIN:  Warm and dry, without rashes, purpura, or petechiae.  HEAD:  Normocephalic, atraumatic.  EARS:  Hearing intact.  NOSE:  Septum midline.  No excoriations or nasal discharge.  MOUTH:  No stomatitis or ulcers.  Lips are normal.  THROAT:  Oropharynx without lesions or exudates.  LYMPHATICS:  No cervical, supraclavicular lymphadenopathy.  CHEST: Decreased breath sounds in the bases particularly the right base.  She is currently on nasal cannula oxygen 2 L.  ABDOMEN: Soft, moderately distended, normal active bowel sounds  HEART: Tachycardic but regular  Abdomen:  Soft, nontender, nondistended, normal active bowel sounds  EXTREMITIES: 1+ lower extremity edema.  NEUROLOGICAL:  No focal neurologic deficits.      DIAGNOSTIC DATA:                Imaging: CT imaging chest abdomen and pelvis 2/19/2020 images personally reviewed.  No change in the pancreas mass.  No change in the pleural effusion.  Progressive right middle lobe collapse.    ASSESSMENT:  This is a 64 y.o. female with:  1.  Metastatic pancreas cancer originating in the tail of the pancreas: There is evidence for carcinomatosis and extensive metastatic disease throughout the right pleura with extension into the posterior mediastinum.  She is not a surgical candidate.  She did see Dr. Smiley with surgical oncology nonetheless and he placed a Mediport.  We initiated therapy with palliative FOLFIRINOX on 7/10/2019.    She has required a delay in therapy.    A PET scan on 9/18/2019 overall shows improvement with decreasing liver metastases and improvement in the pancreas mass.    Due to worsening numbness in her feet and legs, oxaliplatin discontinued 11/18/2019 (previous 20% dose reduction) and we proceeded with FOLFIRI.    She last received FOLFIRI without oxaliplatin on 12/9/2019.    Therapy has been delayed due to a poor performance status.  We discussed today pursuing further treatment with FOLFIRI or perhaps making a transition to Gemzar.     Scans 2/19/2019 actually stable.    2.  Bilateral lower extremity DVT and pulmonary embolism: She is anticoagulated with Eliquis, however, the patient developed a new right soleal DVT on 10/15/2019.  Eliquis was changed to Pradaxa 150 mg twice daily.  She lost her bottles but recently discovered them.    Continue Pradaxa    3.  Decreased appetite and weight loss: Prescribed Remeron.  Refilled today as she is out.    4.  Cancer related pain: She reports her pain is well controlled with MS Contin 30 mg twice daily as well as oxycodone 10 mg every 4 hours as needed.  Oxycodone  refilled today.  Prescriptions electronically sent to her pharmacy for refills today.  I advised her she can take more immediate release if needed she just needs to notify us..    5.  Right pleural effusion: Intermittent thoracentesis. Last 10/2/2019 with 500ml removed. The patient did not complete the procedure due to pain.  She continues to have some shortness of breath and dyspnea on exertion.  Refer to thoracic surgery to consider a Pleurx catheter or other management.  She declines any further attempts at thoracentesis.    6.  Thrush: Recently started on medication.      PLAN:  1. Hold treatment today.  Remove the port needle.  Return in a few weeks with a nurse practitioner for Gemzar if she is doing better.  2. Restart Remeron  3. Continue pain medication refills sent to her pharmacy today  4. Refill the Pradaxa  5. Continue oxygen therapy  6. If her performance status worsens, hospice  7. If she improves and starts to gain weight, proceed with Gemzar as planned    Aime Zhong MD

## 2020-03-04 NOTE — PROGRESS NOTES
Staff message rec from Dr Zhong about pts Pradaxa. See below.    Aime Zhong MD sent to [facility].             She's saying she needs a refill on the pradaxa.  Is it on a copay card or something?  Can you help to refill that please?  RUSTY     Thanks!  RUSTY      I contacted [facility] [phone] and spoke to Sherice. I inquired on the Pradaxa rx and copay card. Sherice states she does not have an rx ready for pt and sees the discount card has been profiled. She ran the rx and it came back with a copay of $15 for a 30 day supply. She applied the copay card and it brought the cost down to $0.    She states they do not have the Pradaxa 150 mg in stock but it will be ordered today and rx will be ready for pickup tomorrow, 3/5/20 around 2 pm.     I have attempted to contact pt at the number listed as preferred in her chart. No answer-general message asking for a return call back. My direct phone number was given.

## 2020-03-09 NOTE — TELEPHONE ENCOUNTER
Approved Morphine sulfate ER 30 mg   PA Case: [id number], Status: Approved, Coverage Starts on: 3/6/2020 12:00:00 AM, Coverage Ends on: 3/6/2021 12:00:00 AM.  RH

## 2020-03-09 NOTE — TELEPHONE ENCOUNTER
[facility] I do not see a [employer] option listed. What [employer]. There are several in [place].

## 2020-03-16 NOTE — TELEPHONE ENCOUNTER
PT HAS 20 PILLS LEFT. SHE WOULD LIKE TO BE NOTIFIED ONCE PRESCRIPTION IS SENT TO [facility].    DR SMART  OXYCODONE 10MG  TAKE 1 TABLET BY MOUTH EVERY 4 HOURS AS NEEDED FOR MODERATE PAIN  [facility]  [phone]  120 TABLETS

## 2020-03-23 NOTE — TELEPHONE ENCOUNTER
Pt calling to ask for a refill on her oxycodone.  In our records, it looks like pt got a refill on the 16th of March.  1 week ago.  Pt very upset that I'm saying this and asking me for samples of the medicine.  Finally got off the phone with patient and called pharmacy to clarify.  Script was sent on the 16th however, it was too soon for pt to .  She cannot pick it up till Thursday.  Discussed with Dr. Zhong, pt has appt tomorrow and he wants her to discuss this with Marifer tomorrow.  Called back and explained to pt that per the pharmacy, she can  her script on Thursday but that Marifer will discuss her pain with her tomorrow. She felt much better about that.

## 2020-03-24 NOTE — TELEPHONE ENCOUNTER
03-24-*20 Approved morphine sul ER 60 mg.  PA Case [id number] Status: Approved Called Bartolo [id number]

## 2020-03-24 NOTE — PROGRESS NOTES
[facility] GROUP OUTPATIENT FOLLOW UP CLINIC VISIT    REASON FOR FOLLOW-UP:    1.  Metastatic adenocarcinoma of the pancreas  2.  Palliative chemotherapy with FOLFIRINOX initiated on 7/10/2019.  3.  Neutropenia related to chemotherapy requiring Neulasta  4.  Bilateral pulmonary emboli and bilateral calf vein deep vein thrombosis diagnosed on 8/5/2019.  She also had gastrointestinal bleeding with a single angiectasia discovered in the duodenum which was injected and treated with APC.    5.  Right lower extremity DVT while on Eliquis, transition to Pradaxa  6.  Oxaliplatin discontinued 11/18/2019 with plans to proceed with FOLFIRI  7. Hospitalization from 12/21/2019 through 12/27/2019  for RSV.  Seen in the office on 1/7/2020 with improvement of symptoms but plans to delay treatment due to persistent weakness.  8.  3/24/2020 increased pain medication morphine extended release 30 mg to 60 mg twice daily, oxycodone 10 1 to 2 tablets every 4-6 hours.  Continues to decline hospice, continues to decline therapy as well.    HISTORY OF PRESENT ILLNESS:  Linette Ghosh is a 64 y.o. female with the above-mentioned history here today for scheduled lab review and assessment for further treatment.  She states that at this point she does not wish to have therapy.  Does report increased cancer related pain.  She is currently taking morphine extended release 30 mg twice daily, and oxycodone 10 every 4 hours.  This is no longer appropriately controlling her pain.    She reports fatigue and generalized malaise, however, denies new or acute symptoms such as fever, chills, shortness of breath, nausea, vomiting or diarrhea.    Her CBC is stable.      ONCOLOGIC HISTORY:  She has had about a 40 pound weight loss in the past 6 months associated with worsening bilateral flank pain.  She does note that the pain is worse with eating.  She was taking hydrocodone a couple of times per day with some in her appetite and this is  reflected.     Due to worsening pain, she presented to the emergency department on 6/1/2019.  A chest x-ray was unremarkable.  She had a CT angiogram of the chest showing no evidence for pulmonary embolism.  There was some nodularity involving the major fissure on the right.  There was some pleural thickening and nodularity involving the minor fissure on the right.  New infiltrate involving the posterior sulcus on the right medial he was noted to have a small pleural effusion on the right was noted as well.  A small groundglass area is appreciated in the right upper lobe.  The left adrenal gland was prominent in the tail the pancreas is prominent measuring 2.9 cm.  Some liver cysts were noted.     She had a follow-up CT scan of the abdomen and pelvis with contrast on 6/9/2019 showing a large hypoenhancing mass involving the entire tail of the pancreas which encases the splenic artery and occludes and splenic vein.  This abuts and possibly infiltrates the left adrenal gland.  No evidence for metastatic disease otherwise.     She saw Dr. Varela with gastroenterology and had an endoscopic ultrasound performed on 6/17/2019 with pathology confirming adenocarcinoma.  The CA 19-9 is elevated at 655.        She was seen initially on 6/21/2019.    A PET scan was requested and performed on 6/25/2019.  A 4 cm intensely hypermetabolic pancreatic tail mass is noted.  Unfortunately, extensive metastatic disease is noted with carcinomatosis throughout the abdomen and extensive metastatic disease throughout the right pleura.  Metastatic disease extending into the posterior mediastinum was noted as well.    She did see Dr. Smiley with surgical oncology.  This referral was made for the PET scan results returned in hopes that she had localized disease that could be resected.  He placed a Mediport on 7/8/2019.    FOLFIRINOX initiated 7/10/2019.    A PET scan on 9/18/2020 4:19 cycles of therapy shows a decrease in the size and FDG  "avidity of the pancreas and liver metastases.  There is an enlarging right pleural effusion.  There is significant bowel activity of undetermined significance.    ALLERGIES:  Allergies   Allergen Reactions   • Penicillins Hives       MEDICATIONS:  The medication list has been reviewed with the patient by the medical assistant, and the list has been updated in the electronic medical record, which I reviewed.  Medication dosages and frequencies were confirmed to be accurate.    I have reviewed the patient's medical history in detail and updated the computerized patient record.    Review of Systems   Constitutional: Positive for fatigue (progressive). Negative for activity change, chills and fever.   HENT: Negative for mouth sores and sore throat.    Eyes: Negative for visual disturbance.   Respiratory: Positive for shortness of breath.    Cardiovascular: Negative for chest pain and leg swelling.   Gastrointestinal: Positive for abdominal pain (worse - see HPI). Negative for diarrhea, nausea and vomiting.   Genitourinary: Negative for dysuria and frequency.   Musculoskeletal:        Muscle weakness in her neck   Neurological: Positive for weakness (progressive) and numbness (Stable in her hands and feet.  No neuropathic pain stable). Negative for dizziness.   Hematological: Does not bruise/bleed easily.   Psychiatric/Behavioral: The patient is not nervous/anxious.    All other systems reviewed and are negative.  .        Vitals:    03/24/20 1032   Height: 165.1 cm (65\")       PHYSICAL EXAMINATION:  GENERAL: Chronically ill-appearing female; awake, alert and oriented, in no acute distress.  Ambulatory today.  SKIN:  Warm and dry, without rashes, purpura, or petechiae.  HEAD:  Normocephalic, atraumatic.  EARS:  Hearing intact.  NOSE:  Septum midline.  No excoriations or nasal discharge.  MOUTH:  No stomatitis or ulcers.  Lips are normal.  THROAT:  Oropharynx without lesions or exudates.  LYMPHATICS:  No cervical, " supraclavicular lymphadenopathy.  CHEST: Decreased breath sounds in the bases particularly the right base.  She is currently on nasal cannula oxygen 2 L.  ABDOMEN: Soft, moderately distended, normal active bowel sounds  HEART: Tachycardic but regular  Abdomen: Soft, nontender, nondistended, normal active bowel sounds  EXTREMITIES: 1+ lower extremity edema.  NEUROLOGICAL:  No focal neurologic deficits.      DIAGNOSTIC DATA:                Imaging: CT imaging chest abdomen and pelvis 2/19/2020 images personally reviewed.  No change in the pancreas mass.  No change in the pleural effusion.  Progressive right middle lobe collapse.    ASSESSMENT:  This is a 64 y.o. female with:  1.  Metastatic pancreas cancer originating in the tail of the pancreas: There is evidence for carcinomatosis and extensive metastatic disease throughout the right pleura with extension into the posterior mediastinum.  She is not a surgical candidate.  She did see Dr. Smiley with surgical oncology nonetheless and he placed a Mediport.  We initiated therapy with palliative FOLFIRINOX on 7/10/2019.    She has required a delay in therapy.    A PET scan on 9/18/2019 overall shows improvement with decreasing liver metastases and improvement in the pancreas mass.    Due to worsening numbness in her feet and legs, oxaliplatin discontinued 11/18/2019 (previous 20% dose reduction) and we proceeded with FOLFIRI.    She last received FOLFIRI without oxaliplatin on 12/9/2019.    Therapy has been delayed due to a poor performance status.  She was scheduled for Gemzar today, 3/24/2020, should she feel drawn off her therapy, however, she does not wish to proceed.    Scans 2/19/2019 actually stable.    2.  Bilateral lower extremity DVT and pulmonary embolism: She is anticoagulated with Eliquis, however, the patient developed a new right soleal DVT on 10/15/2019.  Eliquis was changed to Pradaxa 150 mg twice daily.  She lost her bottles but recently discovered  them.    Continue Pradaxa    3.  Decreased appetite and weight loss: Prescribed Remeron.  She continues to lose weight.    4.  Cancer related pain: She reports her pain is well controlled with MS Contin 30 mg twice daily as well as oxycodone 10 mg every 4 hours as needed.  Is no longer adequately controlling her pain.    5.  Right pleural effusion: Intermittent thoracentesis. Last 10/2/2019 with 500ml removed. The patient did not complete the procedure due to pain.  She continues to have some shortness of breath and dyspnea on exertion.  Refer to thoracic surgery to consider a Pleurx catheter or other management.  She declines any further attempts at thoracentesis.    6.  Thrush: Recently started on medication.  Improved.      PLAN:  1. We will continue to hold treatment.  Had a long, tanner discussion about palliative care.  I did make the patient and her caregiver aware that should she go forward with hospice consult her pain management would be less cumbersome and she would not require visits to our office.  She declines today, however, does agree to think about this over the next couple of days.  I have therefore scheduled the patient to return in 2 weeks should she change her mind and decide to go forward with palliative care she will call our office and we will facilitate this.  2. I have increased her pain medication to morphine extended release 60 mg twice daily and oxycodone 10 mg 1 to 2 tablets every 4 hours as needed.  I did discuss this with her pharmacy to make sure that insurance would approve this and she can in fact pick both of these prescriptions up today.      ADDENDUM: Sodium resulted after the patient left the office today at 127.  I have left a message for the patient to return my call to discuss current fluid intake status and whether she needs to cut back on her water and free fluids.  Left a message on the patient's voicemail.  Attempt to call again should I not hear back from her by the end  of the day.    Marifer Myers APRN

## 2020-03-25 PROBLEM — E87.1 HYPONATREMIA: Status: ACTIVE | Noted: 2020-01-01

## 2020-03-25 PROBLEM — J44.9 COPD (CHRONIC OBSTRUCTIVE PULMONARY DISEASE) (HCC): Status: ACTIVE | Noted: 2020-01-01

## 2020-03-25 PROBLEM — J18.9 COMMUNITY ACQUIRED PNEUMONIA OF RIGHT LOWER LOBE OF LUNG: Status: ACTIVE | Noted: 2020-01-01

## 2020-03-27 PROBLEM — Z22.322 POSITIVE RESULT FOR METHICILLIN RESISTANT STAPHYLOCOCCUS AUREUS (MRSA) SCREENING: Status: ACTIVE | Noted: 2020-01-01

## 2020-03-28 NOTE — OUTREACH NOTE
Prep Survey      Responses   [facility] facility patient discharged from?  [facility]   Is LACE score < 7 ?  No   Eligibility  Readm Mgmt   Discharge diagnosis  Community acquired pneumonia of right lower lobe of lung    Does the patient have one of the following disease processes/diagnoses(primary or secondary)?  COPD/Pneumonia   Does the patient have Home health ordered?  No   Is there a DME ordered?  No   Prep survey completed?  Yes          Jade Weiss RN

## 2020-03-31 NOTE — OUTREACH NOTE
COPD/PN Week 1 Survey      Responses   [facility] patient discharged from?  [facility]   Does the patient have one of the following disease processes/diagnoses(primary or secondary)?  COPD/Pneumonia   Is there a successful TCM telephone encounter documented?  No   Was the primary reason for admission:  Pneumonia   Week 1 attempt successful?  Yes   Call start time  0837   Call end time  0841   Is patient permission given to speak with other caregiver?  Yes   List who call center can speak with  SIMINRICHARD LAUGHLIN   Person spoke with today (if not patient) and relationship  [facility] patient/caregiver?  Yes   Is the patient having any side effects they believe may be caused by any medication additions or changes?  No   Does the patient have all medications ordered at discharge?  Yes   Is the patient taking all medications as directed (includes completed medication regime)?  Yes   Does the patient have a primary care provider?   Yes   Does the patient have an appointment with their PCP or pulmonologist within 7 days of discharge?  No   Comments regarding PCP  PATIENT DOES NOT HAVE AN APPOINTMENT WITH DR. YANG UNTIL JULY, BUT [facility] STATES THEY WILL CALL DR. YANG'S OFFICE FOR ANY NEEDS. PATIENT HAS AN APPOINTMENT WITH DR. SMART IN [place].    Has the patient kept scheduled appointments due by today?  N/A   Has home health visited the patient within 72 hours of discharge?  N/A   Did the patient receive a copy of their discharge instructions?  Yes   Nursing interventions  Reviewed instructions with patient   What is the patient's perception of their health status since discharge?  Improving   Nursing Interventions  Nurse provided patient education   Is the patient/caregiver able to teach back the hierarchy of who to call/visit for symptoms/problems? PCP, Specialist, Home health nurse, Urgent Care, ED, 911  Yes   Is the patient/caregiver able to teach back signs and symptoms of worsening  condition:  Fever/chills, Shortness of breath, Chest pain   Is the patient/caregiver able to teach back importance of completing antibiotic course of treatment?  Yes   Week 1 call completed?  Yes          Reanna Flores LPN

## 2020-04-03 NOTE — PAYOR COMM NOTE
"Ashley Yang (64 y.o. Female)     DC SUMMARY FOR  [id number]    Date of Birth Social Security Number Address Home Phone MRN    1955  [id number]  [address]  [place] [address] [phone] [id number]    Hindu Marital Status          [facility] Single       Admission Date Admission Type Admitting Provider Attending Provider Department, Room/Bed    3/25/20 Emergency Mady Duarte MD  [address], P383/1    Discharge Date Discharge Disposition Discharge Destination        3/27/2020 Home or Self Care              Attending Provider:  (none)   Allergies:  Penicillins    Isolation:  Contact   Infection:  MRSA (03/26/20)   Code Status:  Prior    Ht:  167.6 cm (65.98\")   Wt:  47.5 kg (104 lb 12.8 oz)    Admission Cmt:  None   Principal Problem:  Community acquired pneumonia of right lower lobe of lung (CMS/HCC) [J18.1]                 Active Insurance as of 3/25/2020     Primary Coverage     Payor Plan Insurance Group Employer/Plan Group    CIGNA CIGNA [id number]     Payor Plan Address Payor Plan Phone Number Payor Plan Fax Number Effective Dates    [address] [phone]  1/1/2002 - None Entered    [facility] [id number]       Subscriber Name Subscriber Birth Date Member ID       ASHLEY YANG 1955 [id number]           Secondary Coverage     Payor Plan Insurance Group Employer/Plan Group    [employer] BLUE CROSS AN[employer] BLUE CROSS BLUE [employer]IELD PPO [id number]     Payor Plan Address Payor Plan Phone Number Payor Plan Fax Number Effective Dates    [address] [phone]  4/1/2013 - None Entered    [facility] [address]       Subscriber Name Subscriber Birth Date Member ID       ASHLEY YANG 1955 [id number]                 Emergency Contacts      (Rel.) Home Phone Work Phone Mobile Phone    Gayathri Shen (Sister) [phone] -- [phone]    Augie Jerry (Brother) [phone] -- --               Discharge Summary      April Merino APRN at 03/27/20 " 1030     Attestation signed by Dmitriy Sloan MD at 20 1556    Addendum: I have reviewed the history and plan as obtained by THADDEUS Man and performed my own independent history. I have personally examined the patient. My exam confirms her physical findings and I agree with the plan as listed above, with the addition of the following:     Patient has now agreed for thoracentesis today.  She would like to be discharged thereafter.  No real change in her symptoms today.  On exam she is chronically ill and frail appearing.  Heart is regular.  She has diminished breath sounds particularly on the right.  Abdominal exam is unremarkable.  She will go for thoracentesis today with follow-up chest x-ray.  Thoracic surgery doubts they will perform any additional intervention and suspect that her lung is trapped.  She will now be discharged home with outpatient follow-up.    Dmitriy Sloan MD  [facility]  20  3:03 PM                    Discharge Summary    Patient Name: Linette Ghosh  Age/Sex: 64 y.o. female  : 1955  MRN: [id number]  Patient Care Team:  Raine Ghosh MD as PCP - General (Internal Medicine)  Hal Miles MD as Consulting Physician (Ophthalmology)  Berhane Rodriguez MD as Consulting Physician (Pulmonary Disease)  Raine Ghosh MD as Referring Physician (Internal Medicine)  Aime Zhong MD as Consulting Physician (Hematology and Oncology)  Red Caballero MD as Consulting Physician (Urology)  Ayan Varela MD as Consulting Physician (Gastroenterology)  Constantin Smiley MD as Consulting Physician (Surgical Oncology)    Hospital Course     Date of Admit: 3/25/2020  Date of Discharge:  20   Discharge Condition: Stable    Discharge Diagnoses:    Community acquired pneumonia of right lower lobe of lung (CMS/HCC)    Asthma    Hyperlipidemia    Iron deficiency anemia    Malignant neoplasm of tail  of pancreas (CMS/HCC)    Pleural effusion on right (malignant)    Type 2 diabetes mellitus with hyperglycemia, with long-term current use of insulin (CMS/HCC)    Hyponatremia    COPD (chronic obstructive pulmonary disease) (CMS/HCC)    Positive result for methicillin resistant Staphylococcus aureus (MRSA) screening    Present on Admission:  • Community acquired pneumonia of right lower lobe of lung (CMS/HCC)  • Asthma  • Hyperlipidemia  • Iron deficiency anemia  • Malignant neoplasm of tail of pancreas (CMS/HCC)  • Pleural effusion on right (malignant)  • Hyponatremia  • COPD (chronic obstructive pulmonary disease) (CMS/HCC)      Hospital Course:   Linette Ghosh presented to [facility] with complaints of increasing shortness of breath. Please see the admitting history and physical for further details. She was found to have community acquired pneumonia and enlarged right pleural effusion and was admitted to the hospital for further evaluation and treatment. Oncology and thoracic surgery were consulted. Patient was initially treated with IV Vancomycin and Cefepime. MRSA screen was positive and patient was transitioned to oral Omnicef and Doxycycline.     Patient has been known to have a chronic right effusion that was positive for malignancy. She was on pradaxa for history of pulmonary embolitis and this was initially held. she underwent thoracentesis on 3/27/20 with 600 ml removed, but procedure was stopped early due to patient's discomfort. She will follow up with thoracic surgery as outpatient and may consider pleurx catheter if fluid re-accumulates.  She has chronic anemia and hemoglobin remained stable. She can resume pradaxa in am.    She did have hyponatremia that improved with IV fluids and holding of diuretics. Given her low blood pressure, she was recommended to stop lisinopril and use HCTZ as needed for edema.     Patient had recently stopped further chemotherapy. Oncology evaluated  this admission and [facility] care was discussed in detail. Patient would like to discuss with her family and Dr. Zhong. She was provided the number for [facility] should she decide to get a consultation as outpatient.        Consults:   IP CONSULT TO HOSPITALIST  IP CONSULT TO HEMATOLOGY AND ONCOLOGY  IP CONSULT TO CASE MANAGEMENT   IP CONSULT TO NUTRITION SERVICES  IP CONSULT TO THORACIC SURGERY    Significant Discharge Diagnostics   Procedures Performed:         Pertinent Lab Results:  Results from last 7 days   Lab Units 03/27/20  0459 03/26/20  0537 03/25/20 1853 03/24/20  1033   SODIUM mmol/L 132* 130* 129* 127*   POTASSIUM mmol/L 3.5 3.7 3.9 4.4   CHLORIDE mmol/L 95* 90* 86* 85*   CO2 mmol/L 29.6* 31.1* 30.1* 29.9*   BUN mg/dL 7* 9 10 7   CREATININE mg/dL 0.43* 0.45* 0.53* 0.49*   GLUCOSE mg/dL 64* 75 165* 171*   CALCIUM mg/dL 9.0 9.0 10.0 9.8   AST (SGOT) U/L 7  --  10 13   ALT (SGPT) U/L <5  --  7 <5         Results from last 7 days   Lab Units 03/27/20  0459 03/26/20  0913 03/26/20 0537 03/25/20 1853 03/24/20  1033   WBC 10*3/mm3 9.61 10.67 10.81* 14.03* 9.26   HEMOGLOBIN g/dL 8.7* 9.8* 9.2* 10.7* 10.6*   HEMATOCRIT % 27.4* 33.2* 29.1* 34.2 35.5   PLATELETS 10*3/mm3 295 293 315 376 368   MCV fL 72.7* 74.4* 72.8* 73.4* 76.5*   MCH pg 23.1* 22.0* 23.0* 23.0* 22.8*   MCHC g/dL 31.8 29.5* 31.6 31.3* 29.9*   RDW % 15.3 15.3 15.3 15.4 17.1*   RDW-SD fl 39.6 40.1 39.5 39.9 46.6   MPV fL 9.5 9.6 10.2 10.0 9.1   NEUTROPHIL % % 82.6* 88.4* 85.7*  --  76.0   LYMPHOCYTE % % 8.0* 5.6* 6.6*  --  14.4*   MONOCYTES % % 6.9 4.7* 6.3  --  7.8   EOSINOPHIL % % 1.7 0.6 0.6  --  1.1   BASOPHIL % % 0.4 0.3 0.4  --  0.5   IMM GRAN % %  --   --   --   --  0.2   NEUTROS ABS 10*3/mm3 7.94* 9.44* 9.27* 13.33* 7.04*   LYMPHS ABS 10*3/mm3 0.77 0.60* 0.71  --  1.33   MONOS ABS 10*3/mm3 0.66 0.50 0.68  --  0.72   EOS ABS 10*3/mm3 0.16 0.06 0.07  --  0.10   BASOS ABS 10*3/mm3 0.04 0.03 0.04 0.14 0.05   IMMATURE GRANS (ABS)  10*3/mm3  --   --   --   --  0.02   NRBC /100 WBC  --   --   --   --  0.0     Results from last 7 days   Lab Units 03/27/20  0459 03/26/20  0537   INR  1.21* 1.54*   APTT seconds 57.8*  --                Invalid input(s): LDLCALC              Results from last 7 days   Lab Units 03/25/20  1853 03/25/20  1852   PROCALCITONIN ng/mL 0.33*  --    LACTATE mmol/L  --  1.3             Results from last 7 days   Lab Units 03/25/20 2003 03/1955   BLOODCX  No growth at 24 hours No growth at 24 hours     Results from last 7 days   Lab Units 03/25/20  2044   NITRITE UA  Negative   WBC UA /HPF 3-5*   BACTERIA UA /HPF None Seen   SQUAM EPITHEL UA /HPF 3-6*     Results from last 7 days   Lab Units 03/25/20  1845   ADENOVIRUS DETECTION BY PCR  Not Detected   CORONAVIRUS 229E  Not Detected   CORONAVIRUS HKU1  Not Detected   CORONAVIRUS NL63  Not Detected   CORONAVIRUS OC43  Not Detected   HUMAN METAPNEUMOVIRUS  Not Detected   HUMAN RHINOVIRUS/ENTEROVIRUS  Not Detected   INFLUENZA B PCR  Not Detected   PARAINFLUENZA 1  Not Detected   PARAINFLUENZA VIRUS 2  Not Detected   PARAINFLUENZA VIRUS 3  Not Detected   PARAINFLUENZA VIRUS 4  Not Detected   BORDETELLA PERTUSSIS PCR  Not Detected   BORDETELLA PARAPERTUSSIS PCR  Not Detected   [id number]  Not Detected   CHLAMYDOPHILA PNEUMONIAE PCR  Not Detected   MYCOPLAMA PNEUMO PCR  Not Detected   INFLUENZA A H3  Not Detected   INFLUENZA A H1  Not Detected   RSV, PCR  Not Detected           Imaging Results:  Imaging Results (Most Recent)     Procedure Component Value Units Date/Time    CT Angiogram Chest With & Without Contrast [[id number]] Collected:  03/26/20 1113     Updated:  03/27/20 0740    Narrative:       CT ANGIOGRAM OF THE CHEST. MULTIPLE CORONAL, SAGITTAL, AND 3-D  RECONSTRUCTIONS     HISTORY: Dyspnea, cardiac origin.     TECHNIQUE: Radiation dose reduction techniques were utilized, including  automated exposure control and exposure modulation based on body size.   CT  angiogram of the chest was performed. Multiple coronal, sagittal, and  3-D reconstruction images were obtained.      COMPARISON:CT chest 02/19/2020.     FINDINGS:      Hypodense lesions are present within the liver, as seen on multiple  prior CTs. Hyperdense lesion within the posterior aspect of the right  hepatic lobe measures 1 cm and is grossly unchanged since 12/02/2019.     Irregular thickening along the superior aspect of the right hilum  measures up to 1.1 cm and is grossly unchanged since 02/19/2020.  Mediastinum and axilla are not well evaluated due to inherent lack of  fat in these locations as well as contrast timing. No left hilar  adenopathy is present.     There are no findings of pulmonary embolism. The RV/LV ratio is not  elevated at 0.9. Left Port-A-Cath tip appears to terminate within the  superior vena cava.     There is a large right pleural effusion. Interlobular septal thickening  with intervening ground glass opacification and tree-in-bud nodularity  is scattered throughout the right lung. There are innumerable  endobronchial filling defects. 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 The right lower lobe bronchi are  completely opacified. There are a few somewhat nodular areas of  pulmonary opacification scattered throughout the right lung as well.  Subcentimeter pulmonary nodules are present within the left lung, some  of which were not visualized on prior CT, primarily within the left  upper lobe. Please see CT images for exact location. Pulmonary nodule  within the lingula measuring 0.7 cm is grossly unchanged since prior CT  but was not visualized on 12/02/2019.     There is irregular pulmonary opacification with tree-in-bud nodularity  and endobronchial filling defects within the left base, as before, with  interval worsening within the superior aspect of the left lower lobe.     There  are scattered lytic lesions throughout the thoracic and upper  lumbar spine and ribs, many of which are new since 02/19/2020 with index  lesions within the L2 vertebral body and right 11th rib posteriorly.  Lytic lesion within the T11 vertebral body is stable.       Impression:       1.  No findings of pulmonary embolism. Please note evaluation of the  bilateral lung bases is suboptimal due to pulmonary opacification in  these areas.  2.  Large right pleural effusion with right lower lobe consolidation.  There has been significant interval worsening in the degree of ground  glass and pulmonary opacification with tree-in-bud nodularity and  intervening intralobular septal thickening throughout the aerated right  lung and endobronchial filling defects throughout the right bronchial  tree. A few areas of tree-in-bud nodularity have also worsened within  the left lung with persistence of pulmonary opacification, endobronchial  filling defects and tree-in-bud nodularity within the left base as well.  Findings are most suggestive of worsening multifocal pneumonia, possibly  related to postobstructive pneumonia versus aspiration in the  appropriate clinical context. Endobronchial spread of malignancy is felt  less likely. Correlation with patient history is recommended.  3.  Interval development of osseous metastatic disease within the  visualized thoracic and lumbar spine and ribs bilaterally with index new  lesions as above.  4.  Hypodense hepatic lesions, as before, which are not well  characterized on current CT due to contrast timing. Correlation with  abdominal CT 02/19/2020 is recommended.  5.  Other findings as above.     The above findings were discussed with Dr. Sloan by telephone by  Howie Whyte at 10:40 AM on 03/26/2020.     This report was finalized on 3/27/2020 7:37 AM by Dr. Howie Whyte M.D.       XR Chest 1 View [[id number]] Collected:  03/27/20 0509     Updated:  03/27/20 0518    Narrative:        PORTABLE CHEST RADIOGRAPH     HISTORY: Pleural effusion     COMPARISON: 03/25/2020     FINDINGS:  Large right pleural effusion is unchanged. Alveolar and interstitial  infiltrates are seen throughout the remaining right lung. This may  represent edema. Additional scattered reticulonodular infiltrates are  suspected within the left lung, with more focal consolidation identified  within the left lower lobe. No pneumothorax or effusion is seen on the  left. The patient's left subclavian Mediport has its tip directed  superiorly into the right innominate vein. No pneumothorax is seen on  the right.       Impression:       No significant interval change.     This report was finalized on 3/27/2020 5:15 AM by Dr. Ophelia Moore M.D.       XR Chest 1 View [[id number]] Collected:  03/25/20 1912     Updated:  03/25/20 1919    Narrative:       PORTABLE CHEST 03/25/2020 AT 6:52 PM     CLINICAL HISTORY: 64-year-old female with metastatic pancreatic  carcinoma. Dyspnea. Follow-up pleural effusion.     Compared to the previous chest x-ray dated 12/24 2019.     There is a large subpulmonic right pleural effusion producing  compressive atelectasis of approximately three fourths of the right lung  that has increased in size slightly. The left lung remains well expanded  and clear. The heart is partially obscured, but does not appear grossly  enlarged. The pulmonary vasculature is within normal limits. A Mediport  device remains in place in satisfactory position in the left subclavian  vein. A 10 mm diameter well-circumscribed lucent lesion is noted in the  proximal left humerus that could be a metastatic lesion.     IMPRESSIONS: Large right pleural effusion showing increase in size since  12/24/2019. Possible small lytic metastatic lesion within the proximal  left humerus.     This report was finalized on 3/25/2020 7:16 PM by Dr. Jhon Arroyo M.D.               Objective:   Temp:  [96.4 °F (35.8 °C)-98 °F (36.7 °C)] 97.7 °F  (36.5 °C)  Heart Rate:  [] 100  Resp:  [16-18] 16  BP: (118-120)/(60-78) 118/78   SpO2:  [95 %-100 %] 100 %  on  Flow (L/min):  [2-3] 2 Device (Oxygen Therapy): nasal cannula    Intake/Output Summary (Last 24 hours) at 3/27/2020 1127  Last data filed at 3/27/2020 0439  Gross per 24 hour   Intake 1782 ml   Output 600 ml   Net 1182 ml     Body mass index is 16.92 kg/m².      03/25/20  1830 03/25/20  2156   Weight: 47 kg (103 lb 9.6 oz) 47.5 kg (104 lb 12.8 oz)       Physical Exam   Constitutional: No distress.   HENT:   Head: Atraumatic.   Nose: Nose normal.   Eyes: Conjunctivae are normal.   Cardiovascular: Normal rate and regular rhythm.   No murmur heard.  Pulmonary/Chest: Effort normal. She has no wheezes. She has rales (bilateral mid lung posterior and LLL).   Diminished breath sounds with dullness to percussion in RLL   Abdominal: Soft. Bowel sounds are normal. She exhibits distension (mild epigastric). There is no tenderness. There is no rebound.   Musculoskeletal: She exhibits edema (trace ankle).   Neurological: She is alert.   Skin: Skin is warm and dry. She is not diaphoretic.       Discharge Medications and Instructions:     Discharge Medications     Discharge Medications      New Medications      Instructions Start Date   acetaminophen 325 MG tablet  Commonly known as:  TYLENOL   650 mg, Oral, Every 4 Hours PRN      cefdinir 300 MG capsule  Commonly known as:  OMNICEF   300 mg, Oral, Every 12 Hours Scheduled         Changes to Medications      Instructions Start Date   albuterol sulfate  (90 Base) MCG/ACT inhaler  Commonly known as:  PROVENTIL HFA;VENTOLIN HFA;PROAIR HFA  What changed:    · Another medication with the same name was changed. Make sure you understand how and when to take each.  · Another medication with the same name was removed. Continue taking this medication, and follow the directions you see here.   2 puffs, Inhalation, Every 4 Hours PRN      albuterol 1.25 MG/3ML nebulizer  solution  Commonly known as:  ACCUNEB  What changed:    · when to take this  · Another medication with the same name was removed. Continue taking this medication, and follow the directions you see here.   1.25 mg, Nebulization, Every 6 Hours PRN      hydroCHLOROthiazide 12.5 MG tablet  Commonly known as:  HYDRODIURIL  What changed:    · when to take this  · reasons to take this   12.5 mg, Oral, Daily PRN      polyethylene glycol pack packet  Commonly known as:  MIRALAX  What changed:    · when to take this  · reasons to take this   17 g, Oral, Daily         Continue These Medications      Instructions Start Date   Accu-Chek Junie Plus w/Device kit   USE TO TEST BID      accu-chek soft touch lancets   E11.9 DM to check glucose bed.      Blood Gluc Meter Disp-Strips device   E11.9 DM to check glucose bid. Medicare approved glucose meter      Continuous Glucose Monitor kit   Does not apply      dabigatran etexilate 150 MG capsu  Commonly known as:  PRADAXA   150 mg, Oral, 2 Times Daily      desloratadine 5 MG tablet  Commonly known as:  CLARINEX   5 mg, Oral, Daily      doxycycline 100 MG capsule  Commonly known as:  MONODOX   100 mg, Oral, 2 Times Daily      fluticasone 50 MCG/ACT nasal spray  Commonly known as:  FLONASE   2 sprays, Nasal, Daily      glucose blood test strip   E11.9 DM to check glucose bid. Use as instructed      insulin detemir 100 UNIT/ML injection  Commonly known as:  LEVEMIR   12 Units, Subcutaneous, Nightly      metFORMIN 1000 MG tablet  Commonly known as:  GLUCOPHAGE   TAKE 1 TABLET BY MOUTH TWICE DAILY WITH MEALS      mirtazapine 15 MG tablet  Commonly known as:  REMERON   15 mg, Oral, Nightly      montelukast 10 MG tablet  Commonly known as:  SINGULAIR   10 mg, Oral, Nightly      Morphine 60 MG 12 hr tablet  Commonly known as:  MS CONTIN   60 mg, Oral, 2 Times Daily, As needed for pain.      mupirocin 2 % ointment  Commonly known as:  Bactroban   Topical, 3 Times Daily      nystatin 429306  UNIT/ML suspension  Commonly known as:  MYCOSTATIN   500,000 Units, Swish & Swallow, 4 Times Daily      ondansetron 8 MG tablet  Commonly known as:  ZOFRAN   8 mg, Oral, 3 Times Daily PRN      oxyCODONE 10 MG tablet  Commonly known as:  ROXICODONE   1-2 tablets every 4 hours as needed for pain      pantoprazole 40 MG EC tablet  Commonly known as:  PROTONIX   40 mg, Oral, Daily      potassium chloride 20 MEQ CR tablet  Commonly known as:  K-DUR,KLOR-CON   20 mEq, Oral, Daily      simvastatin 20 MG tablet  Commonly known as:  ZOCOR   20 mg, Oral, Nightly      Symbicort 160-4.5 MCG/ACT inhaler  Generic drug:  budesonide-formoterol   No dose, route, or frequency recorded.      timolol 0.25 % ophthalmic solution  Commonly known as:  TIMOPTIC   1 drop, Both Eyes, Daily      traZODone 50 MG tablet  Commonly known as:  DESYREL   50 mg, Oral, Nightly         Stop These Medications    buPROPion  MG 24 hr tablet  Commonly known as:  Wellbutrin XL     lisinopril 20 MG tablet  Commonly known as:  PRINIVIL,ZESTRIL             Medication Reconciliation: Please see electronically completed Med Rec.    Discharge Diet:    Dietary Orders (From admission, onward)     Start     Ordered    03/26/20 1200  Dietary Nutrition Supplements Other (See Comment); chocolate carnation instant breakfast made with 2% milk  Daily With Breakfast, Lunch & Dinner     Question Answer Comment   Select Supplement: Other (See Comment)    Other chocolate carnation instant breakfast made with 2% milk        03/26/20 1109    03/26/20 1109  Diet Regular  Diet Effective Now     Question:  Diet Texture / Consistency  Answer:  Regular    03/26/20 1109                Activity at Discharge:       Discharge disposition: Home    Discharge Instructions and Follow ups:  Follow-up Information     Raine Ghosh MD Follow up.    Specialty:  Internal Medicine  Contact information:  [address] [place] [id number] [phone]             \[facility]\""  [place] Follow up.    Specialty:  Hospice  Contact information:  [address]  [facility] [address]  [phone]           Aime Zhong MD Follow up.    Specialties:  Hematology and Oncology, Oncology, Hematology  Contact information:  [address] [facility] [address]  [facility] [id number] [phone]             Dmitriy Rivera III, MD Follow up.    Specialty:  Thoracic Surgery  Contact information:  [address] [facility] [address]  [place] [id number] [phone]                 Future Appointments   Date Time Provider Department Center   4/7/2020  9:15 AM [facility]  [facility]   4/7/2020  9:40 AM Marifer Myers APRN [facility]   7/27/2020  8:40 AM [facility] None   7/31/2020 10:00 AM Raine Ghosh MD [facility] None       Total time spent discharging patient including evaluation, medication reconciliation, arranging follow up, and post hospitalization instructions and education total time exceeds 30 minutes.      THADDEUS Man  03/27/20  11:24 AM      Electronically signed by Dmitriy Sloan MD at 03/27/20 4313

## 2020-04-06 NOTE — TELEPHONE ENCOUNTER
----- Message from THADDEUS Benjamin sent at 4/6/2020  7:55 AM EDT -----  Regarding: Correction  I did not realize I am at [place] Wednesday so if she still wants to come in this week it will have to be Friday. Thanks.

## 2020-04-07 NOTE — OUTREACH NOTE
COPD/PN Week 2 Survey      Responses   [facility] patient discharged from?  [place]   COVID-19 Test Status  Not tested   Does the patient have one of the following disease processes/diagnoses(primary or secondary)?  COPD/Pneumonia   Was the primary reason for admission:  Pneumonia   Week 2 attempt successful?  No   Unsuccessful attempts  Attempt 1          Reanna Flores LPN

## 2020-04-08 NOTE — OUTREACH NOTE
COPD/PN Week 2 Survey      Responses   [facility] patient discharged from?  [place]   COVID-19 Test Status  Not tested   Does the patient have one of the following disease processes/diagnoses(primary or secondary)?  COPD/Pneumonia   Was the primary reason for admission:  Pneumonia   Week 2 attempt successful?  No   Unsuccessful attempts  Attempt 2          Frances Holcomb RN

## 2020-04-10 NOTE — PROGRESS NOTES
Mrs. Ghosh was scheduled for a visit in our office today, however, she called today inquiring about transitioning her care to [facility].  We have discussed this several times in past visits and she feels she is now ready.  I initiated the proper paperwork, and discussed her case with Dr. Zhong who agrees this is an appropriate plan of care.  Hospice will hopefully be in contact with the patient today or tomorrow.   I did make her aware that she is always welcome to call our office with any questions or concerns.

## 2020-04-13 NOTE — OUTREACH NOTE
COPD/PN Week 3 Survey      Responses   [facility] patient discharged from?  [place]   COVID-19 Test Status  Not tested   Does the patient have one of the following disease processes/diagnoses(primary or secondary)?  COPD/Pneumonia   Was the primary reason for admission:  Pneumonia   Week 3 attempt successful?  No   Unsuccessful attempts  Attempt 1          Dary Zamarripa LPN

## 2020-04-14 NOTE — OUTREACH NOTE
COPD/PN Week 3 Survey      Responses   [facility] patient discharged from?  [place]   COVID-19 Test Status  Not tested   Does the patient have one of the following disease processes/diagnoses(primary or secondary)?  COPD/Pneumonia   Was the primary reason for admission:  Pneumonia   Week 3 attempt successful?  No   Unsuccessful attempts  Attempt 1          Frances Holcomb RN

## 2020-04-15 NOTE — OUTREACH NOTE
COPD/PN Week 3 Survey      Responses   [facility] patient discharged from?  [place]   COVID-19 Test Status  Not tested   Does the patient have one of the following disease processes/diagnoses(primary or secondary)?  COPD/Pneumonia   Was the primary reason for admission:  Pneumonia   Week 3 attempt successful?  No   Unsuccessful attempts  Attempt 2          Haylee Guevara RN

## 2021-01-06 NOTE — PLAN OF CARE
Problem: Patient Care Overview  Goal: Plan of Care Review  Outcome: Ongoing (interventions implemented as appropriate)   07/18/19 0593   Coping/Psychosocial   Plan of Care Reviewed With patient   OTHER   Outcome Summary Pt admitted to [facility] for R pleural effusion. VSS. PRN and scheduled pain medication given. 500mL fluid bolus given. Thoracentesis scheduled for today. Continue to monitor.     Goal: Individualization and Mutuality  Outcome: Ongoing (interventions implemented as appropriate)    Goal: Discharge Needs Assessment  Outcome: Ongoing (interventions implemented as appropriate)    Goal: Interprofessional Rounds/Family Conf  Outcome: Ongoing (interventions implemented as appropriate)      Problem: Pain, Chronic (Adult)  Goal: Acceptable Pain/Comfort Level and Functional Ability  Outcome: Ongoing (interventions implemented as appropriate)        
Problem: Patient Care Overview  Goal: Plan of Care Review  Outcome: Ongoing (interventions implemented as appropriate)   07/18/19 1703   Coping/Psychosocial   Plan of Care Reviewed With patient   OTHER   Outcome Summary VSS. Thoracentesis removed 1L of clear juancho. CXR in AM and if okay will be discharged. Miralax started and urine sent down. Continue to monitor.    Plan of Care Review   Progress improving     Goal: Individualization and Mutuality  Outcome: Ongoing (interventions implemented as appropriate)    Goal: Discharge Needs Assessment  Outcome: Ongoing (interventions implemented as appropriate)    Goal: Interprofessional Rounds/Family Conf  Outcome: Ongoing (interventions implemented as appropriate)      Problem: Pain, Chronic (Adult)  Goal: Identify Related Risk Factors and Signs and Symptoms  Outcome: Outcome(s) achieved Date Met: 07/18/19    Goal: Acceptable Pain/Comfort Level and Functional Ability  Outcome: Ongoing (interventions implemented as appropriate)      Problem: Breathing Pattern Ineffective (Adult)  Goal: Identify Related Risk Factors and Signs and Symptoms  Outcome: Outcome(s) achieved Date Met: 07/18/19    Goal: Effective Oxygenation/Ventilation  Outcome: Ongoing (interventions implemented as appropriate)    Goal: Anxiety/Fear Reduction  Outcome: Ongoing (interventions implemented as appropriate)        
Problem: Patient Care Overview  Goal: Plan of Care Review  Outcome: Ongoing (interventions implemented as appropriate)   07/19/19 9006   Coping/Psychosocial   Plan of Care Reviewed With patient   OTHER   Outcome Summary VSS. No c/o pain or N/V. Pt resting well throughout most of night. CXR this AM and possible d/c after. Continue to monitor.   Plan of Care Review   Progress improving     Goal: Individualization and Mutuality  Outcome: Ongoing (interventions implemented as appropriate)    Goal: Discharge Needs Assessment  Outcome: Ongoing (interventions implemented as appropriate)    Goal: Interprofessional Rounds/Family Conf  Outcome: Ongoing (interventions implemented as appropriate)      Problem: Pain, Chronic (Adult)  Goal: Acceptable Pain/Comfort Level and Functional Ability  Outcome: Ongoing (interventions implemented as appropriate)      Problem: Breathing Pattern Ineffective (Adult)  Goal: Effective Oxygenation/Ventilation  Outcome: Ongoing (interventions implemented as appropriate)    Goal: Anxiety/Fear Reduction  Outcome: Ongoing (interventions implemented as appropriate)        
Problem: Patient Care Overview  Goal: Plan of Care Review  Outcome: Outcome(s) achieved Date Met: 07/19/19 07/19/19 8055   Coping/Psychosocial   Plan of Care Reviewed With patient   OTHER   Outcome Summary VSS. Discharged   Plan of Care Review   Progress improving         
No

## 2021-06-09 NOTE — TELEPHONE ENCOUNTER
Levo weaned off, versed 3 mg, fent 175 mcg/hr, pt opens eyes follows commands by giving thumbs up and nodding then drifts back to sleep, pt on cpap 8/5, changed shasta dressing, washed face , hands, and lavaged and suctioned, sat up high in semi almonte/bed in chair position,   Routed to Dr. Zhong    ----- Message from Judith Pena sent at 11/5/2019  9:56 AM EST -----  [phone]  Needs new oxycodone and morphine

## 2025-05-18 NOTE — PLAN OF CARE
Problem: Patient Care Overview  Goal: Plan of Care Review  Outcome: Outcome(s) achieved Date Met: 09/22/19 09/22/19 1868   Coping/Psychosocial   Plan of Care Reviewed With patient   Plan of Care Review   Progress improving   OTHER   Outcome Summary Pt c/o chronic pain, medications given as scheduled. K+ replaced and recheck 4.6. XR done to left ankle. Patient discharched home, appointment tomorrow for chemo.     Goal: Individualization and Mutuality  Outcome: Outcome(s) achieved Date Met: 09/22/19    Goal: Discharge Needs Assessment  Outcome: Outcome(s) achieved Date Met: 09/22/19    Goal: Interprofessional Rounds/Family Conf  Outcome: Outcome(s) achieved Date Met: 09/22/19      Problem: Pain, Chronic (Adult)  Goal: Identify Related Risk Factors and Signs and Symptoms  Outcome: Outcome(s) achieved Date Met: 09/22/19    Goal: Acceptable Pain/Comfort Level and Functional Ability  Outcome: Outcome(s) achieved Date Met: 09/22/19         5 (moderate pain)

## (undated) DEVICE — BITEBLOCK OMNI BLOC

## (undated) DEVICE — ENDOSCOPIC ULTRASOUND FINE NEEDLE BIOPSY (FNB) DEVICE: Brand: ACQUIRE

## (undated) DEVICE — ENDOSCOPIC ULTRASOUND ASPIRATION NEEDLE: Brand: EXPECT SLIMLINE SL

## (undated) DEVICE — Device: Brand: DEFENDO AIR/WATER/SUCTION AND BIOPSY VALVE

## (undated) DEVICE — CANN NASL CO2 TRULINK W/O2 A/

## (undated) DEVICE — TUBING, SUCTION, 1/4" X 10', STRAIGHT: Brand: MEDLINE

## (undated) DEVICE — THE CARR-LOCKE INJECTION NEEDLE IS A SINGLE USE, DISPOSABLE, FLEXIBLE SHEATH INJECTION NEEDLE USED FOR THE INJECTION OF VARIOUS TYPES OF MEDIA THROUGH FLEXIBLE ENDOSCOPES.

## (undated) DEVICE — SENSR O2 OXIMAX FNGR A/ 18IN NONSTR

## (undated) DEVICE — APC PROBE 2200 A, SINGLE USE OD 2.3MM/6.9FR; L. 2.2M/7.2FT: Brand: ERBE

## (undated) DEVICE — CANNULA,ADULT,SOFT-TOUCH,7'TUBE,UC: Brand: PENDING

## (undated) DEVICE — PAD GRND REM POLYHESIVE A/ DISP